# Patient Record
Sex: FEMALE | Race: WHITE | NOT HISPANIC OR LATINO | Employment: OTHER | URBAN - METROPOLITAN AREA
[De-identification: names, ages, dates, MRNs, and addresses within clinical notes are randomized per-mention and may not be internally consistent; named-entity substitution may affect disease eponyms.]

---

## 2017-01-11 ENCOUNTER — OFFICE VISIT (OUTPATIENT)
Dept: FAMILY MEDICINE CLINIC | Facility: CLINIC | Age: 72
End: 2017-01-11

## 2017-01-11 VITALS
TEMPERATURE: 97.6 F | SYSTOLIC BLOOD PRESSURE: 110 MMHG | RESPIRATION RATE: 16 BRPM | WEIGHT: 180.8 LBS | BODY MASS INDEX: 29.06 KG/M2 | DIASTOLIC BLOOD PRESSURE: 80 MMHG | HEIGHT: 66 IN

## 2017-01-11 DIAGNOSIS — E78.5 HYPERLIPIDEMIA, UNSPECIFIED HYPERLIPIDEMIA TYPE: ICD-10-CM

## 2017-01-11 DIAGNOSIS — I10 ESSENTIAL HYPERTENSION: Primary | ICD-10-CM

## 2017-01-11 PROCEDURE — 99213 OFFICE O/P EST LOW 20 MIN: CPT | Performed by: INTERNAL MEDICINE

## 2017-01-11 RX ORDER — ATORVASTATIN CALCIUM 20 MG/1
20 TABLET, FILM COATED ORAL DAILY
Qty: 30 TABLET | Refills: 5 | Status: SHIPPED | OUTPATIENT
Start: 2017-01-11 | End: 2017-05-25 | Stop reason: SDUPTHER

## 2017-01-11 NOTE — MR AVS SNAPSHOT
Judi Sy   1/11/2017 9:00 AM   Office Visit    Dept Phone:  [phone]   Encounter #:  [id number]    Provider:  Ravi Griffin MD   Department:  [facility]                Your Full Care Plan              Today's Medication Changes          These changes are accurate as of: 1/11/17  9:34 AM.  If you have any questions, ask your nurse or doctor.               Medication(s)that have changed:     atorvastatin 20 MG tablet   Commonly known as:  LIPITOR   Take 1 tablet by mouth Daily.   What changed:  See the new instructions.   Changed by:  Ravi Griffin MD            Where to Get Your Medications      These medications were sent to [address] [place] [address] AT [facility] [phone] [facility] [phone] [address], [facility] [address]     Phone:  [phone]     atorvastatin 20 MG tablet                  Your Updated Medication List          This list is accurate as of: 1/11/17  9:34 AM.  Always use your most recent med list.                atorvastatin 20 MG tablet   Commonly known as:  LIPITOR   Take 1 tablet by mouth Daily.       Calcium Citrate-Vitamin D 250-200 MG-UNIT tablet       clopidogrel 75 MG tablet   Commonly known as:  PLAVIX   TAKE ONE TABLET DAILY       MULTI-VITAMIN tablet       pantoprazole 40 MG EC tablet   Commonly known as:  PROTONIX   TAKE ONE TABLET BY MOUTH EVERY MORNING 30 MINUTES BEFORE BREAKFAST               You Were Diagnosed With        Codes Comments    Essential hypertension    -  Primary ICD-10-CM: I10  ICD-9-CM: 401.9     Hyperlipidemia, unspecified hyperlipidemia type     ICD-10-CM: E78.5  ICD-9-CM: 272.4       Instructions     None    Patient Instructions History      Upcoming Appointments     Visit Type Date Time Department    OFFICE VISIT 1/11/2017  9:00 AM [facility] Signup     Our records indicate that you have  "an active [facility] account.    You can view your After Visit Summary by going to [email or web address] and logging in with your [employer] username and password.  If you don't have a [employer] username and password but a parent or guardian has access to your record, the parent or guardian should login with their own [employer] username and password and access your record to view the After Visit Summary.    If you have questions, you can email [employer] [email or web address] or call [phone] to talk to our [employer] staff.  Remember, [employer] is NOT to be used for urgent needs.  For medical emergencies, dial 911.               Other Info from Your Visit           Allergies     Latex        Reason for Visit     Follow-up LABS      Vital Signs     Blood Pressure Temperature Respirations Height Weight Body Mass Index    110/80 97.6 °F (36.4 °C) (Oral) 16 66\" (167.6 cm) 180 lb 12.8 oz (82 kg) 29.18 kg/m2    Smoking Status                   Former Smoker           Problems and Diagnoses Noted     High cholesterol or triglycerides    High blood pressure        "

## 2017-01-11 NOTE — PROGRESS NOTES
Subjective   Judi Sy is a 71 y.o. female. Patient is here today for follow-up on her hypertension and hyperlipidemia.  She generally is feeling okay and has no new acute problems and has had no chest pain, shortness of breath, edema or significant myalgias.    Chief Complaint   Patient presents with   • Follow-up     LABS          Vitals:    01/11/17 0913   BP: 110/80   Resp: 16   Temp: 97.6 °F (36.4 °C)     The following portions of the patient's history were reviewed and updated as appropriate: allergies, current medications, past family history, past medical history, past social history, past surgical history and problem list.    Past Medical History   Diagnosis Date   • Atrial fibrillation    • Hypertension    • Vasovagal syncope       Allergies   Allergen Reactions   • Latex       Social History     Social History   • Marital status:      Spouse name: N/A   • Number of children: N/A   • Years of education: N/A     Occupational History   • Not on file.     Social History Main Topics   • Smoking status: Former Smoker   • Smokeless tobacco: Not on file   • Alcohol use Yes   • Drug use: Defer   • Sexual activity: Defer     Other Topics Concern   • Not on file     Social History Narrative        Current Outpatient Prescriptions:   •  atorvastatin (LIPITOR) 20 MG tablet, Take 1 tablet by mouth Daily., Disp: 30 tablet, Rfl: 5  •  Calcium Citrate-Vitamin D 250-200 MG-UNIT tablet, Take  by mouth., Disp: , Rfl:   •  clopidogrel (PLAVIX) 75 MG tablet, TAKE ONE TABLET DAILY, Disp: 90 tablet, Rfl: 0  •  Multiple Vitamin (MULTI-VITAMIN) tablet, Take  by mouth daily., Disp: , Rfl:   •  pantoprazole (PROTONIX) 40 MG EC tablet, TAKE ONE TABLET BY MOUTH EVERY MORNING 30 MINUTES BEFORE BREAKFAST, Disp: 90 tablet, Rfl: 2     Objective     History of Present Illness     Review of Systems   Constitutional: Negative.    HENT: Negative.    Respiratory: Negative.    Cardiovascular: Negative.    Gastrointestinal: Negative.     Genitourinary: Negative.    Musculoskeletal: Negative.    Skin: Negative.    Neurological: Negative.    Psychiatric/Behavioral: Negative.        Physical Exam   Constitutional: She appears well-developed and well-nourished.   Pleasant, cooperative and in no distress with a blood pressure 130/80   HENT:   Head: Normocephalic and atraumatic.   Eyes: Conjunctivae are normal. Pupils are equal, round, and reactive to light.   Neck: Normal range of motion. Neck supple. No thyromegaly present.   Cardiovascular: Normal rate, regular rhythm and normal heart sounds.    Pulmonary/Chest: Effort normal and breath sounds normal. No respiratory distress. She has no wheezes. She has no rales.   Musculoskeletal: Normal range of motion. She exhibits no edema.   Neurological: She is alert.   Skin: Skin is warm and dry.   Psychiatric: She has a normal mood and affect. Her behavior is normal.   Nursing note and vitals reviewed.      ASSESSMENT  overall the patient seems stable.  Her blood pressures under reasonable control and heart and lungs sound fine and she has no new acute problems.  Her CMP is normal aside from a sugar of 105.  Her lipid panel remains under excellent control and TSH was normal.  Hepatitis C screen was negative.     Problem List Items Addressed This Visit        Cardiovascular and Mediastinum    Hypertension - Primary       Other    Hyperlipidemia    Relevant Medications    atorvastatin (LIPITOR) 20 MG tablet          PLAN  the patient will continue her current medicines but I'm going to decrease her atorvastatin to 20 mg daily.  I would like to recheck her in 4 months with a CMP, NMR lipid panel, hemoglobin A1c    There are no Patient Instructions on file for this visit.  Return in about 4 months (around 5/11/2017) for with labs.

## 2017-03-01 RX ORDER — CLOPIDOGREL BISULFATE 75 MG/1
TABLET ORAL
Qty: 90 TABLET | Refills: 0 | Status: SHIPPED | OUTPATIENT
Start: 2017-03-01 | End: 2017-05-30 | Stop reason: SDUPTHER

## 2017-03-20 ENCOUNTER — OFFICE VISIT (OUTPATIENT)
Dept: CARDIOLOGY | Facility: CLINIC | Age: 72
End: 2017-03-20

## 2017-03-20 VITALS
WEIGHT: 174 LBS | BODY MASS INDEX: 28.08 KG/M2 | DIASTOLIC BLOOD PRESSURE: 90 MMHG | HEART RATE: 92 BPM | SYSTOLIC BLOOD PRESSURE: 130 MMHG

## 2017-03-20 DIAGNOSIS — I67.82 TEMPORARY CEREBRAL VASCULAR DYSFUNCTION: ICD-10-CM

## 2017-03-20 DIAGNOSIS — Q21.12 PATENT FORAMEN OVALE: ICD-10-CM

## 2017-03-20 DIAGNOSIS — E78.2 MIXED HYPERLIPIDEMIA: Primary | ICD-10-CM

## 2017-03-20 DIAGNOSIS — I10 ESSENTIAL HYPERTENSION: ICD-10-CM

## 2017-03-20 PROCEDURE — 93000 ELECTROCARDIOGRAM COMPLETE: CPT | Performed by: INTERNAL MEDICINE

## 2017-03-20 PROCEDURE — 99213 OFFICE O/P EST LOW 20 MIN: CPT | Performed by: INTERNAL MEDICINE

## 2017-03-20 NOTE — PROGRESS NOTES
Procedure   [facility]  Cardiology Progress Note    Patient Identification:  Name:Judi Sy  Age:71 y.o.  Sex: female  :  1945  MRN: [id number]           3/20/2017    Subjective:    Chief Complaint   Patient presents with   • Hypertension       HPI     Ms. Sy is a 71-year-old white female with history of patent foramen ovale an intra-atrial septal aneurysm and underwent PFO closure by Dr. Helms, a followup echocardiogram showed small right-to-left shunt with residual PFO; hypertension, hyperlipidemia, who presents for cardiac followup. she denies any chest pain. No palpitations, dizziness or syncope. No orthopedic or PND. No edema. She has gained a couple pounds because of lack of physical activity because the bad weather. Lipid profile management is followed by Dr. Griffin in the past and shows total cholesterol 134, triglyceride 120, HDL 53, LDL 50 in February.    No angina, had normal cardiolyte stress test in . Clinically she is doing ok and her ECG has been stable. She is on plavix, no unusual dyspnea.    Review of Systems   Constitution: Negative for chills, fever and malaise/fatigue.   HENT: Negative for headaches.    Eyes: Negative for blurred vision and double vision.   Cardiovascular: Negative for chest pain, irregular heartbeat, leg swelling and palpitations.   Respiratory: Negative for shortness of breath and snoring.    Skin: Negative for color change.   Musculoskeletal: Negative for arthritis and joint pain.   Gastrointestinal: Negative for abdominal pain, nausea and vomiting.   Neurological: Negative for dizziness, light-headedness and numbness.   Psychiatric/Behavioral: Negative for depression.       The following portions of the patient's history were reviewed and updated as appropriate: allergies, current medications, past family history, past medical history, past social history,and problem list.    Past Medical History   Diagnosis Date   • Atrial fibrillation    •  Hypertension    • Vasovagal syncope        Past Surgical History   Procedure Laterality Date   • Tonsillectomy     • Tubal abdominal ligation     • Patent foramen ovale closure         Social History     Social History   • Marital status:      Spouse name: N/A   • Number of children: N/A   • Years of education: N/A     Occupational History   • Not on file.     Social History Main Topics   • Smoking status: Former Smoker   • Smokeless tobacco: Not on file   • Alcohol use Yes   • Drug use: Defer   • Sexual activity: Defer     Other Topics Concern   • Not on file     Social History Narrative       Family History   Problem Relation Age of Onset   • Heart disease Father    • Heart disease Maternal Grandmother        Scheduled Meds:    Current Outpatient Prescriptions:   •  atorvastatin (LIPITOR) 20 MG tablet, Take 1 tablet by mouth Daily., Disp: 30 tablet, Rfl: 5  •  Calcium Citrate-Vitamin D 250-200 MG-UNIT tablet, Take  by mouth., Disp: , Rfl:   •  clopidogrel (PLAVIX) 75 MG tablet, TAKE ONE TABLET DAILY, Disp: 90 tablet, Rfl: 0  •  Multiple Vitamin (MULTI-VITAMIN) tablet, Take  by mouth daily., Disp: , Rfl:   •  pantoprazole (PROTONIX) 40 MG EC tablet, TAKE ONE TABLET BY MOUTH EVERY MORNING 30 MINUTES BEFORE BREAKFAST, Disp: 90 tablet, Rfl: 2    Objective:  Visit Vitals   • /90   • Pulse 92   • Wt 174 lb (78.9 kg)   • BMI 28.08 kg/m2        Physical Exam  Physical Exam:    General: No acute distress.    Skin: Warm and dry, no diaphoresis noted   HEENT: No ptosis; external ear and nose normal; oral mucosa moist   Neck: Supple; no carotid bruits; no JVD, Trachea mid line   Heart: S1S2 regular rate and rhythm; no murmurs; no gallop or rub appreciated, apex not displaced   Chest: Respirations regular, unlabored at rest, bilateral breath sounds have good air entry; no  wheezes auscultated.     Abdomen: Soft, non-tender, non-distended, positive bowel sounds  No hepatosplenomegaly   Extremities: Bilateral lower  extremities have no pre-tibial pitting edema; Radials are palpable   Neurological: Alert and oriented x 3; no new motor deficits,           ECG 12 Lead  Date/Time: 3/20/2017 11:46 AM  Performed by: MAXINE MERCADO  Authorized by: MAXINE MERCADO   Comparison: compared with previous ECG   Similar to previous ECG  Rhythm: sinus rhythm  Rate: normal  QRS axis: normal             Comparison to previous ECG:  Similar to previous ecg     Assessment:  Problem List Items Addressed This Visit        Cardiovascular and Mediastinum    Hyperlipidemia - Primary    Hypertension    Patent foramen ovale    Temporary cerebral vascular dysfunction          Plan:    Overall clinically she has been stable with no exertional angina.  She had occasional PVC.  No shortness of breath or dizzy spells or blackouts.  Physical she is active and lost some weight as well clinically she is doing very well and her lipid panel is reasonably good. No angina and her PVC's are stable.    I will see her Prn    03/20/2017  Senthil Mercado MD, FACC

## 2017-05-10 DIAGNOSIS — E78.2 MIXED HYPERLIPIDEMIA: ICD-10-CM

## 2017-05-10 DIAGNOSIS — R73.9 BLOOD GLUCOSE ELEVATED: Primary | ICD-10-CM

## 2017-05-12 LAB
ALBUMIN SERPL-MCNC: 4.3 G/DL (ref 3.5–5.2)
ALBUMIN/GLOB SERPL: 2 G/DL
ALP SERPL-CCNC: 88 U/L (ref 39–117)
ALT SERPL-CCNC: 16 U/L (ref 1–33)
AST SERPL-CCNC: 17 U/L (ref 1–32)
BILIRUB SERPL-MCNC: 0.4 MG/DL (ref 0.1–1.2)
BUN SERPL-MCNC: 15 MG/DL (ref 8–23)
BUN/CREAT SERPL: 21.4 (ref 7–25)
CALCIUM SERPL-MCNC: 9.4 MG/DL (ref 8.6–10.5)
CHLORIDE SERPL-SCNC: 103 MMOL/L (ref 98–107)
CHOLEST SERPL-MCNC: 165 MG/DL (ref 100–199)
CO2 SERPL-SCNC: 27.3 MMOL/L (ref 22–29)
CREAT SERPL-MCNC: 0.7 MG/DL (ref 0.57–1)
GLOBULIN SER CALC-MCNC: 2.2 GM/DL
GLUCOSE SERPL-MCNC: 103 MG/DL (ref 65–99)
HBA1C MFR BLD: 5.39 % (ref 4.8–5.6)
HDL SERPL-SCNC: 37.8 UMOL/L
HDLC SERPL-MCNC: 62 MG/DL
LDL SERPL QN: 21.2 NM
LDL SERPL-SCNC: 1231 NMOL/L
LDL SMALL SERPL-SCNC: 534 NMOL/L
LDLC SERPL CALC-MCNC: 87 MG/DL (ref 0–99)
POTASSIUM SERPL-SCNC: 4.1 MMOL/L (ref 3.5–5.2)
PROT SERPL-MCNC: 6.5 G/DL (ref 6–8.5)
SODIUM SERPL-SCNC: 143 MMOL/L (ref 136–145)
TRIGL SERPL-MCNC: 78 MG/DL (ref 0–149)

## 2017-05-25 ENCOUNTER — OFFICE VISIT (OUTPATIENT)
Dept: FAMILY MEDICINE CLINIC | Facility: CLINIC | Age: 72
End: 2017-05-25

## 2017-05-25 VITALS
HEIGHT: 66 IN | TEMPERATURE: 98 F | SYSTOLIC BLOOD PRESSURE: 128 MMHG | DIASTOLIC BLOOD PRESSURE: 78 MMHG | OXYGEN SATURATION: 96 % | BODY MASS INDEX: 27.93 KG/M2 | HEART RATE: 85 BPM | WEIGHT: 173.8 LBS

## 2017-05-25 DIAGNOSIS — Q21.12 PATENT FORAMEN OVALE: Primary | ICD-10-CM

## 2017-05-25 DIAGNOSIS — R03.0 PRE-HYPERTENSION: ICD-10-CM

## 2017-05-25 DIAGNOSIS — K21.9 GASTROESOPHAGEAL REFLUX DISEASE WITHOUT ESOPHAGITIS: ICD-10-CM

## 2017-05-25 DIAGNOSIS — E78.2 MIXED HYPERLIPIDEMIA: ICD-10-CM

## 2017-05-25 PROCEDURE — 99214 OFFICE O/P EST MOD 30 MIN: CPT | Performed by: INTERNAL MEDICINE

## 2017-05-25 RX ORDER — ATORVASTATIN CALCIUM 20 MG/1
20 TABLET, FILM COATED ORAL DAILY
Qty: 90 TABLET | Refills: 3 | Status: SHIPPED | OUTPATIENT
Start: 2017-05-25 | End: 2018-05-12 | Stop reason: SDUPTHER

## 2017-05-30 RX ORDER — CLOPIDOGREL BISULFATE 75 MG/1
TABLET ORAL
Qty: 90 TABLET | Refills: 0 | Status: SHIPPED | OUTPATIENT
Start: 2017-05-30 | End: 2017-08-31 | Stop reason: SDUPTHER

## 2017-08-31 RX ORDER — PANTOPRAZOLE SODIUM 40 MG/1
TABLET, DELAYED RELEASE ORAL
Qty: 90 TABLET | Refills: 1 | Status: SHIPPED | OUTPATIENT
Start: 2017-08-31 | End: 2017-09-21

## 2017-08-31 RX ORDER — CLOPIDOGREL BISULFATE 75 MG/1
TABLET ORAL
Qty: 90 TABLET | Refills: 0 | Status: SHIPPED | OUTPATIENT
Start: 2017-08-31 | End: 2017-11-26 | Stop reason: SDUPTHER

## 2017-09-06 DIAGNOSIS — R73.9 BLOOD GLUCOSE ELEVATED: ICD-10-CM

## 2017-09-06 DIAGNOSIS — E78.2 MIXED HYPERLIPIDEMIA: ICD-10-CM

## 2017-09-14 LAB
ALBUMIN SERPL-MCNC: 4.3 G/DL (ref 3.5–5.2)
ALBUMIN/GLOB SERPL: 2.3 G/DL
ALP SERPL-CCNC: 86 U/L (ref 39–117)
ALT SERPL-CCNC: 16 U/L (ref 1–33)
AST SERPL-CCNC: 16 U/L (ref 1–32)
BILIRUB SERPL-MCNC: 0.4 MG/DL (ref 0.1–1.2)
BUN SERPL-MCNC: 12 MG/DL (ref 8–23)
BUN/CREAT SERPL: 16.7 (ref 7–25)
CALCIUM SERPL-MCNC: 9.3 MG/DL (ref 8.6–10.5)
CHLORIDE SERPL-SCNC: 104 MMOL/L (ref 98–107)
CHOLEST SERPL-MCNC: 157 MG/DL (ref 100–199)
CO2 SERPL-SCNC: 28.3 MMOL/L (ref 22–29)
CREAT SERPL-MCNC: 0.72 MG/DL (ref 0.57–1)
GLOBULIN SER CALC-MCNC: 1.9 GM/DL
GLUCOSE SERPL-MCNC: 88 MG/DL (ref 65–99)
HBA1C MFR BLD: 5.5 % (ref 4.8–5.6)
HDL SERPL-SCNC: 36.5 UMOL/L
HDLC SERPL-MCNC: 62 MG/DL
LDL SERPL QN: 21.2 NM
LDL SERPL-SCNC: 1013 NMOL/L
LDL SMALL SERPL-SCNC: 277 NMOL/L
LDLC SERPL CALC-MCNC: 75 MG/DL (ref 0–99)
POTASSIUM SERPL-SCNC: 4.2 MMOL/L (ref 3.5–5.2)
PROT SERPL-MCNC: 6.2 G/DL (ref 6–8.5)
SODIUM SERPL-SCNC: 144 MMOL/L (ref 136–145)
TRIGL SERPL-MCNC: 99 MG/DL (ref 0–149)

## 2017-09-21 ENCOUNTER — OFFICE VISIT (OUTPATIENT)
Dept: FAMILY MEDICINE CLINIC | Facility: CLINIC | Age: 72
End: 2017-09-21

## 2017-09-21 VITALS
OXYGEN SATURATION: 96 % | DIASTOLIC BLOOD PRESSURE: 70 MMHG | HEART RATE: 82 BPM | TEMPERATURE: 98.2 F | SYSTOLIC BLOOD PRESSURE: 112 MMHG | WEIGHT: 171 LBS | HEIGHT: 66 IN | BODY MASS INDEX: 27.48 KG/M2

## 2017-09-21 DIAGNOSIS — I67.82 TEMPORARY CEREBRAL VASCULAR DYSFUNCTION: Primary | ICD-10-CM

## 2017-09-21 DIAGNOSIS — E78.2 MIXED HYPERLIPIDEMIA: ICD-10-CM

## 2017-09-21 DIAGNOSIS — Z12.31 VISIT FOR SCREENING MAMMOGRAM: ICD-10-CM

## 2017-09-21 DIAGNOSIS — M81.0 OSTEOPOROSIS: ICD-10-CM

## 2017-09-21 DIAGNOSIS — Z78.0 POST-MENOPAUSAL: ICD-10-CM

## 2017-09-21 DIAGNOSIS — K21.9 GASTROESOPHAGEAL REFLUX DISEASE WITHOUT ESOPHAGITIS: ICD-10-CM

## 2017-09-21 DIAGNOSIS — Q21.12 PATENT FORAMEN OVALE: ICD-10-CM

## 2017-09-21 DIAGNOSIS — R03.0 PRE-HYPERTENSION: ICD-10-CM

## 2017-09-21 PROCEDURE — 99214 OFFICE O/P EST MOD 30 MIN: CPT | Performed by: INTERNAL MEDICINE

## 2017-09-21 NOTE — PROGRESS NOTES
Subjective   Judi Sy is a 72 y.o. female. Patient is here today for follow-up on her history of TIA, pre-hypertension, a patent foramen ovale, hyperlipidemia, GERD and history of osteoporosis.  She's not had mammogram or bone density in at least 2 years.  Otherwise she's been feeling well, is exercising and her weight is down a couple of pounds.  She's had no chest pain, shortness of breath, edema or significant myalgias and no neurologic symptoms.    Chief Complaint   Patient presents with   • Hyperlipidemia     GERD- FOLLOW UP LABS          Vitals:    09/21/17 0943   BP: 112/70   Pulse: 82   Temp: 98.2 °F (36.8 °C)   SpO2: 96%     The following portions of the patient's history were reviewed and updated as appropriate: allergies, current medications, past family history, past medical history, past social history, past surgical history and problem list.    Past Medical History:   Diagnosis Date   • Atrial fibrillation    • Hypertension    • Vasovagal syncope       Allergies   Allergen Reactions   • Latex       Social History     Social History   • Marital status:      Spouse name: N/A   • Number of children: N/A   • Years of education: N/A     Occupational History   • Not on file.     Social History Main Topics   • Smoking status: Former Smoker   • Smokeless tobacco: Not on file   • Alcohol use Yes   • Drug use: Defer   • Sexual activity: Defer     Other Topics Concern   • Not on file     Social History Narrative   • No narrative on file        Current Outpatient Prescriptions:   •  atorvastatin (LIPITOR) 20 MG tablet, Take 1 tablet by mouth Daily., Disp: 90 tablet, Rfl: 3  •  Calcium Citrate-Vitamin D 250-200 MG-UNIT tablet, Take  by mouth., Disp: , Rfl:   •  clopidogrel (PLAVIX) 75 MG tablet, TAKE ONE TABLET BY MOUTH DAILY, Disp: 90 tablet, Rfl: 0  •  Multiple Vitamin (MULTI-VITAMIN) tablet, Take  by mouth daily., Disp: , Rfl:      Objective     History of Present Illness     Review of Systems    Constitutional: Negative.    HENT: Negative.    Eyes: Negative.    Respiratory: Negative.    Cardiovascular: Negative.    Gastrointestinal: Negative.    Endocrine: Negative.    Genitourinary: Negative.    Musculoskeletal: Negative.    Skin: Negative.    Neurological: Negative.    Psychiatric/Behavioral: Negative.        Physical Exam   Constitutional: She is oriented to person, place, and time. She appears well-developed and well-nourished.   Pleasant, cooperative and in no distress with a blood pressure of 120/80   HENT:   Head: Normocephalic and atraumatic.   Eyes: Conjunctivae are normal. Pupils are equal, round, and reactive to light. No scleral icterus.   Neck: Normal range of motion. Neck supple. No thyromegaly present.   Cardiovascular: Normal rate, regular rhythm and normal heart sounds.    Pulmonary/Chest: Effort normal and breath sounds normal. No respiratory distress. She has no wheezes. She has no rales.   Musculoskeletal: Normal range of motion. She exhibits no edema.   Neurological: She is alert and oriented to person, place, and time.   Skin: Skin is warm and dry.   Psychiatric: She has a normal mood and affect. Her behavior is normal.   Nursing note and vitals reviewed.      ASSESSMENT  CMP is completely normal.  Hemoglobin A1c is also normal at 5.5.  Lipid panel is better with a total cholesterol of 157, HDL of 62, LDL 75 and LDL particle #1013.  Patient's blood pressures fine and heart and lungs sound fine.  She's had no cardiac or neurologic symptoms.     Problem List Items Addressed This Visit        Cardiovascular and Mediastinum    Hyperlipidemia    Pre-hypertension    Patent foramen ovale    Temporary cerebral vascular dysfunction - Primary       Digestive    Gastroesophageal reflux disease without esophagitis       Musculoskeletal and Integument    Osteoporosis    Relevant Orders    DEXA Bone Density Axial      Other Visit Diagnoses     Visit for screening mammogram        Relevant Orders     Mammo Screening Bilateral With CAD    Post-menopausal        Relevant Orders    DEXA Bone Density Axial          PLAN  The patient will continue current medicines.  I've ordered a mammogram and bone density test for the patient.  She will schedule a gynecology appointment.  I plan on rechecking her in 4 months with a CBC, CMP, NMR lipid panel    There are no Patient Instructions on file for this visit.  Return in about 4 months (around 1/21/2018) for with labs.

## 2017-09-28 ENCOUNTER — HOSPITAL ENCOUNTER (OUTPATIENT)
Dept: MAMMOGRAPHY | Facility: HOSPITAL | Age: 72
Discharge: HOME | End: 2017-09-28

## 2017-09-28 ENCOUNTER — HOSPITAL ENCOUNTER (OUTPATIENT)
Dept: BONE DENSITY | Facility: HOSPITAL | Age: 72
Discharge: HOME | End: 2017-09-28
Admitting: INTERNAL MEDICINE

## 2017-09-28 DIAGNOSIS — M81.0 OSTEOPOROSIS: ICD-10-CM

## 2017-09-28 DIAGNOSIS — Z78.0 POST-MENOPAUSAL: ICD-10-CM

## 2017-09-28 DIAGNOSIS — Z12.31 VISIT FOR SCREENING MAMMOGRAM: ICD-10-CM

## 2017-09-28 PROCEDURE — 77080 DXA BONE DENSITY AXIAL: CPT

## 2017-09-28 PROCEDURE — G0202 SCR MAMMO BI INCL CAD: HCPCS

## 2017-09-29 ENCOUNTER — TELEPHONE (OUTPATIENT)
Dept: FAMILY MEDICINE CLINIC | Facility: CLINIC | Age: 72
End: 2017-09-29

## 2017-09-29 NOTE — TELEPHONE ENCOUNTER
S/W PT AND ADVISED NEEDS AN APPT. [facility] APPT FOR PATIENT.     ----- Message from Ravi Griffin MD sent at 9/29/2017  8:01 AM EDT -----  Needs apt        ----- Message -----     From: Interface, Rad Results [place] In     Sent: 9/28/2017   5:07 PM       To: Ravi Griffin MD

## 2017-10-04 ENCOUNTER — OFFICE VISIT (OUTPATIENT)
Dept: FAMILY MEDICINE CLINIC | Facility: CLINIC | Age: 72
End: 2017-10-04

## 2017-10-04 VITALS
HEIGHT: 66 IN | BODY MASS INDEX: 28.12 KG/M2 | WEIGHT: 175 LBS | HEART RATE: 80 BPM | OXYGEN SATURATION: 95 % | TEMPERATURE: 98.2 F | SYSTOLIC BLOOD PRESSURE: 120 MMHG | DIASTOLIC BLOOD PRESSURE: 80 MMHG

## 2017-10-04 DIAGNOSIS — M81.0 AGE-RELATED OSTEOPOROSIS WITHOUT CURRENT PATHOLOGICAL FRACTURE: Primary | ICD-10-CM

## 2017-10-04 PROCEDURE — 99213 OFFICE O/P EST LOW 20 MIN: CPT | Performed by: INTERNAL MEDICINE

## 2017-10-04 NOTE — PROGRESS NOTES
Subjective   Judi Sy is a 72 y.o. female. Patient is here today for follow-up on her bone density test.  She's been feeling okay and she has been taking some calcium but has not been on any vitamin D.  She's had no pathologic fractures.  In the past she has had osteopenia and also been in the osteoporotic range    Chief Complaint   Patient presents with   • Osteoporosis     FOLLOW UP ON DEXA SCAN          Vitals:    10/04/17 1456   BP: 120/80   Pulse: 80   Temp: 98.2 °F (36.8 °C)   SpO2: 95%     The following portions of the patient's history were reviewed and updated as appropriate: allergies, current medications, past family history, past medical history, past social history, past surgical history and problem list.    Past Medical History:   Diagnosis Date   • Atrial fibrillation    • Hypertension    • Vasovagal syncope       Allergies   Allergen Reactions   • Latex       Social History     Social History   • Marital status:      Spouse name: N/A   • Number of children: N/A   • Years of education: N/A     Occupational History   • Not on file.     Social History Main Topics   • Smoking status: Former Smoker   • Smokeless tobacco: Never Used   • Alcohol use Yes   • Drug use: Defer   • Sexual activity: Defer     Other Topics Concern   • Not on file     Social History Narrative        Current Outpatient Prescriptions:   •  atorvastatin (LIPITOR) 20 MG tablet, Take 1 tablet by mouth Daily., Disp: 90 tablet, Rfl: 3  •  Calcium Citrate-Vitamin D 250-200 MG-UNIT tablet, Take  by mouth., Disp: , Rfl:   •  clopidogrel (PLAVIX) 75 MG tablet, TAKE ONE TABLET BY MOUTH DAILY, Disp: 90 tablet, Rfl: 0  •  Multiple Vitamin (MULTI-VITAMIN) tablet, Take  by mouth daily., Disp: , Rfl:      Objective     History of Present Illness     Review of Systems   Constitutional: Negative.    HENT: Negative.    Eyes: Negative.    Respiratory: Negative.    Cardiovascular: Negative.    Gastrointestinal: Negative.    Endocrine:  Negative.    Genitourinary: Negative.    Musculoskeletal: Negative.    Skin: Negative.    Neurological: Negative.    Psychiatric/Behavioral: Negative.        Physical Exam   Constitutional: She is oriented to person, place, and time. She appears well-developed and well-nourished.   Pleasant, cooperative and in no distress   HENT:   Head: Normocephalic and atraumatic.   Eyes: EOM are normal. Pupils are equal, round, and reactive to light. No scleral icterus.   Neck: Normal range of motion. Neck supple.   Cardiovascular: Normal rate, regular rhythm and normal heart sounds.    Pulmonary/Chest: Effort normal and breath sounds normal. No respiratory distress. She has no wheezes. She has no rales.   Musculoskeletal: Normal range of motion. She exhibits no edema.   Neurological: She is alert and oriented to person, place, and time.   Skin: Skin is warm and dry.   Psychiatric: She has a normal mood and affect. Her behavior is normal.   Nursing note and vitals reviewed.      ASSESSMENT  mammograms were okay on report.  Patient's bone density showed osteopenia in the lumbar spine, a T score in the left hip of -2.4, improved and in the right hip -2.5, unchanged  The patient does not want to do any specific treatment for osteoporosis at this time.     Problem List Items Addressed This Visit        Musculoskeletal and Integument    Osteoporosis - Primary          PLAN  The patient will continue with walking and I encouraged some strength training.  She will start a calcium and vitamin D supplements such as Citracal her Caltrate on a regular basis.  We should repeat her bone density test in one year    There are no Patient Instructions on file for this visit.  No Follow-up on file.

## 2017-10-16 ENCOUNTER — FLU SHOT (OUTPATIENT)
Dept: FAMILY MEDICINE CLINIC | Facility: CLINIC | Age: 72
End: 2017-10-16

## 2017-10-16 PROCEDURE — G0008 ADMIN INFLUENZA VIRUS VAC: HCPCS | Performed by: INTERNAL MEDICINE

## 2017-10-19 ENCOUNTER — APPOINTMENT (OUTPATIENT)
Dept: GENERAL RADIOLOGY | Facility: HOSPITAL | Age: 72
End: 2017-10-19

## 2017-10-19 PROCEDURE — 73630 X-RAY EXAM OF FOOT: CPT | Performed by: GENERAL PRACTICE

## 2017-10-19 PROCEDURE — 73610 X-RAY EXAM OF ANKLE: CPT | Performed by: GENERAL PRACTICE

## 2017-10-20 ENCOUNTER — OFFICE VISIT (OUTPATIENT)
Dept: ORTHOPEDIC SURGERY | Facility: CLINIC | Age: 72
End: 2017-10-20

## 2017-10-20 VITALS — WEIGHT: 170 LBS | BODY MASS INDEX: 27.32 KG/M2 | HEIGHT: 66 IN | TEMPERATURE: 97.6 F

## 2017-10-20 DIAGNOSIS — S92.152A CLOSED DISPLACED AVULSION FRACTURE OF LEFT TALUS, INITIAL ENCOUNTER: ICD-10-CM

## 2017-10-20 DIAGNOSIS — S93.402A SPRAIN OF LEFT ANKLE, UNSPECIFIED LIGAMENT, INITIAL ENCOUNTER: Primary | ICD-10-CM

## 2017-10-20 PROCEDURE — 99204 OFFICE O/P NEW MOD 45 MIN: CPT | Performed by: ORTHOPAEDIC SURGERY

## 2017-10-20 PROCEDURE — 28430 CLTX TALUS FRACTURE W/O MNPJ: CPT | Performed by: ORTHOPAEDIC SURGERY

## 2017-10-20 NOTE — PROGRESS NOTES
New Patient Complaint      Patient: Judi Sy  YOB: 1945 72 y.o. female  Medical Record Number: [id number]    Chief Complaints: I hurt my ankle    History of Present Illness:   Patient sustained a fall on 10/5/17 while hiking in the at around that.  She's not sure she felt or heard a pop had quite a bit of pain and swelling at the time of injury and no prior injury to the ankle.  Symptoms persisted and she was seen at urgent care recently and placed into a boot which was quite uncomfortable for her since then she's been using an ankle stirrup.  She describes moderate intermittent stabbing pain with swelling over the anterolateral aspect of the left ankle and dorsum of the hindfoot with associated swelling is worse with walking and improved some with use of her brace.       HPI    Allergies:   Allergies   Allergen Reactions   • Latex        Medications:   Current Outpatient Prescriptions on File Prior to Visit   Medication Sig   • atorvastatin (LIPITOR) 20 MG tablet Take 1 tablet by mouth Daily.   • Calcium Citrate-Vitamin D 250-200 MG-UNIT tablet Take  by mouth.   • clopidogrel (PLAVIX) 75 MG tablet TAKE ONE TABLET BY MOUTH DAILY   • Multiple Vitamin (MULTI-VITAMIN) tablet Take  by mouth daily.     No current facility-administered medications on file prior to visit.        Past Medical History:   Diagnosis Date   • Atrial fibrillation    • Hypertension    • Vasovagal syncope      Past Surgical History:   Procedure Laterality Date   • BREAST BIOPSY Left     15 years ago; benign   • PATENT FORAMEN OVALE CLOSURE     • TONSILLECTOMY     • TUBAL ABDOMINAL LIGATION       Social History     Occupational History   • Not on file.     Social History Main Topics   • Smoking status: Former Smoker   • Smokeless tobacco: Never Used   • Alcohol use Yes   • Drug use: Defer   • Sexual activity: Defer      Social History     Social History Narrative     Family History   Problem Relation Age of Onset   • Heart  "disease Father    • Heart disease Maternal Grandmother    • Breast cancer Mother      50's       Review of Systems: 14 point review of systems performed, positive pertinent findings identified in HPI. All remaining systems negative     Review of Systems      Physical Exam:   Vitals:    10/20/17 1429   Temp: 97.6 °F (36.4 °C)   Weight: 170 lb (77.1 kg)   Height: 66\" (167.6 cm)     Physical Exam   Constitutional: pleasant, well developed   Eyes: sclera non icteric  Hearing : adequate for exam  Cardiovascular: palpable pulses in left foot, left calf/ thigh NT without sign of DVT  Respiratoy: breathing unlabored   Neurological: grossly sensate to LT throughout left LE  Psychiatric: oriented with normal mood and affect.   Lymphatic: No palpable popliteal lymphadenopathy left LE  Skin: intact throughout left leg/foot  Musculoskeletal: Left ankle shows no tenderness medially there is mild discomfort over the anterolateral ligamentous structures but grossly stable anterior drawer and no focal pain along the peroneal tendons to palpation or with resisted eversion.  There is mild discomfort over the dorsum of the hindfoot but no palpable defects of the anterior tib tendon she is able to actively dorsiflex the ankle.  She's nontender over the syndesmosis and has no focal pain with external rotation testing or proximal fibular compression    There are some palpable osteophytes and limited motion of the first MTP joint but no focal pain with range of motion  Physical Exam  Ortho Exam    Radiology: 3 views of the left foot and 3 views left ankle were reviewed on the [employer] system from 10/19/17.  Talus appears well seated within the mortise no widening of the syndesmosis.  The does appear to a small avulsion over the dorsum of the talar neck.  There is moderate arthritic change of the first MTP joint    Assessment/Plan: 1.  Left ankle anterolateral ligamentous sprain without evidence of peroneal tendon injury  2.  Left dorsal " talar neck avulsion fracture  3.  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    I discussed these findings with her in detail and I do not anticipate anything from a surgical standpoint at this time.  As she was uncomfortable in her boot she was fitted with an ASO brace which was more comfortable for her today and begin physical therapy.  She was to go to place in [place] I gave her prescription for this.    I will see her back in 4 weeks x-rays of her left ankle.

## 2017-10-24 ENCOUNTER — TREATMENT (OUTPATIENT)
Dept: PHYSICAL THERAPY | Facility: CLINIC | Age: 72
End: 2017-10-24

## 2017-10-24 DIAGNOSIS — S93.402D SPRAIN OF LEFT ANKLE, UNSPECIFIED LIGAMENT, SUBSEQUENT ENCOUNTER: Primary | ICD-10-CM

## 2017-10-24 DIAGNOSIS — S92.152D CLOSED DISPLACED AVULSION FRACTURE OF LEFT TALUS WITH ROUTINE HEALING, SUBSEQUENT ENCOUNTER: ICD-10-CM

## 2017-10-24 DIAGNOSIS — S82.892G CLOSED AVULSION FRACTURE OF LEFT ANKLE WITH DELAYED HEALING, SUBSEQUENT ENCOUNTER: ICD-10-CM

## 2017-10-24 PROCEDURE — G8978 MOBILITY CURRENT STATUS: HCPCS | Performed by: PHYSICAL THERAPIST

## 2017-10-24 PROCEDURE — 97140 MANUAL THERAPY 1/> REGIONS: CPT | Performed by: PHYSICAL THERAPIST

## 2017-10-24 PROCEDURE — 97530 THERAPEUTIC ACTIVITIES: CPT | Performed by: PHYSICAL THERAPIST

## 2017-10-24 PROCEDURE — G8979 MOBILITY GOAL STATUS: HCPCS | Performed by: PHYSICAL THERAPIST

## 2017-10-24 PROCEDURE — 97161 PT EVAL LOW COMPLEX 20 MIN: CPT | Performed by: PHYSICAL THERAPIST

## 2017-10-24 PROCEDURE — 97110 THERAPEUTIC EXERCISES: CPT | Performed by: PHYSICAL THERAPIST

## 2017-10-24 NOTE — PROGRESS NOTES
[facility]  Physical Therapy  Orthopedic / Sports / Industrial Physical Therapy  Physical Therapy Initial Evaluation and Plan of Care    Patient Name: Judi Sy          :  1945  Referring Physician: Kade Jacob MD  Diagnosis: Sprain of left ankle, unspecified ligament, subsequent encounter [Z30.204P] ;  Avulsion Fx (L) ankle 10/08/2017  Date of Evaluation: 10/24/2017  ______________________________________________________________________  TIME IN 1045 TIME OUT 1220    Subjective Evaluation    History of Present Illness  Date of onset: 10/8/2017  Mechanism of injury: [place] and slipped on some rocks and leaves and twisted ankle - Pain and swelling -     Subjective comment: Normally walks 4-5 miles per day Pain  Current pain ratin  At worst pain ratin  Location: Anterior, ant/lat ankle (L) -   Quality: Ache, stabbing, throbbing -   Alleviating factors: Rest, brace, heat.  Exacerbated by: Inversion, PF, in/out bathtub; Walking; WB-ing LLE; Stairs ( pain more Down > UP)  Progression: improved    Social Support  Lives in: multiple-level home    Diagnostic Tests  X-ray: abnormal (There is a triangular ossific density adjacent to the dorsal margin of)    Treatments  Current treatment: immobilization and medication  Patient Goals  Patient/family treatment goals: Pain alleviation; Normal mobility, gait, strength, function, and return to normal hobby activiites including hiking -          ___________________________________________________  Objective       Observations     Additional Observation Details  Swelling anterior and lateral ankle   (R) foot pronated more with calcaneal valgus and (L) foot more supinated -  Antalgic gait with decreased WB-ing LLE and decreased push-off (L) - Slight toing in (B) with ambulation -     Palpation     Additional Palpation Details  Very tender anterior ankle, talus, ant tib-fib lig and up distal tib-fib region,   ant/lat ligament region (L) -     Active Range of Motion   Left Ankle/Foot   Dorsiflexion (ke): 5 degrees with pain  Plantar flexion: 35 degrees with pain  Inversion: 15 degrees with pain  Eversion: 10 degrees     Right Ankle/Foot   Normal active range of motion    Strength/Myotome Testing     Additional Strength Details  (L) Pain with resisted EVersion > INversion and DF > PF    Tests     Additional Tests Details  (L) Pain with *Anterior Drawer;                        *INVersion stress                       *Talar tilt    Swelling   Left Ankle/Foot   Figure 8 girth: 2.5 CM > (R)     MODALITIES:          ULTRASOUND x 10 min:  1.0; 1.8w/cm2 to anterior, ant/lat ankle (L)  MANUAL THERAPY: x 15 min         Edema mobilization to (L) foot, ankle, Lower leg         STM / DTM to foot, ankle, lower leg (L)  THERAPEUTIC EXERCISE: x 10 min         AROM Ankle DF/PF, INV/EV, CW/CCW x 20 ea (L)   FUNCTIONAL ACTIVITIES: X 25 min  · TAPING / BRACING: Taping of ankle, arch and foot with basket weave with leukotape for proximal tib-fib stabilization   · Jt protection, ADL modification; Posture and     ___________________________________________________  Assessment & Plan     Assessment  Assessment details:   (L) Ankle Sprain with avulsion Fx;     PROBLEMS: Pain; Limited mobility, gait, strength, function, and unable to participate in hobby activities -   PROGNOSIS: Good     GOALS:   SHORT TERM GOALS: 2 weeks:  1) HEP Initiated; 2) Pain decreased 50%:   3) AROM  increased:  4) Improved gait / functional ability grossly;     LONG TERM GOALS: 4 weeks (or at time of DISCHARGE): 1) (I) HEP; 2) AROM WFL and pain free; 3) Strength / gait / mobility to be able to perform all ADL's and hobby activities w/o restrictions;       Plan  Planned therapy interventions: flexibility, gait training, home exercise program, joint mobilization, manual therapy, neuromuscular re-education, orthotic fitting/training, soft tissue mobilization,  strengthening, stretching and therapeutic activities (Modalities prn; Taping / Bracing prn - )  Frequency: 3x week  Duration in weeks: 4  Treatment plan discussed with: patient    ___________________________________________________  Manual Therapy:    15     mins  52641;   Therapeutic Exercise:    10     mins  57400;     Neuromuscular Lea:    NA    mins  26203;   Therapeutic Activity:     25     mins  53535;     Ultrasound:     10     mins  02581;    Electrical Stimulation:   NA     mins  68294 ( );  Dry Needling     NA     mins self-pay   Gait Training:     NA     mins  36918;  EVAL TIME:   25 mins    Timed Treatment:   60   mins                Total Treatment:     95   mins    PT SIGNATURE:   Salazar Conteh, PT  DATE TREATMENT INITIATED: 10/24/2017  ___________________________________________________  Initial Certification  Certification Period: 1/22/2018  I certify that the therapy services are furnished while this patient is under my care.  The services outlined above are required by this patient, and will be reviewed every 90 days.     PHYSICIAN: ________________________________  DATE: ______  Kade Jacob MD        Please sign and return via fax to [phone].. Thank you, [facility].  ______________________________________________________________________  [address] [place] [address]  Phone: [phone] Fax: [phone]

## 2017-10-26 ENCOUNTER — TREATMENT (OUTPATIENT)
Dept: PHYSICAL THERAPY | Facility: CLINIC | Age: 72
End: 2017-10-26

## 2017-10-26 DIAGNOSIS — S93.402D SPRAIN OF LEFT ANKLE, UNSPECIFIED LIGAMENT, SUBSEQUENT ENCOUNTER: Primary | ICD-10-CM

## 2017-10-26 DIAGNOSIS — S92.152D CLOSED DISPLACED AVULSION FRACTURE OF LEFT TALUS WITH ROUTINE HEALING, SUBSEQUENT ENCOUNTER: ICD-10-CM

## 2017-10-26 PROCEDURE — 97530 THERAPEUTIC ACTIVITIES: CPT | Performed by: PHYSICAL THERAPIST

## 2017-10-26 PROCEDURE — 97110 THERAPEUTIC EXERCISES: CPT | Performed by: PHYSICAL THERAPIST

## 2017-10-26 NOTE — PROGRESS NOTES
Physical Therapy Daily Progress Note    Patient Name: Judi Sy         :  1945  Referring Physician: Kade Jacob MD    Time In 0900  Time Out 1030    Subjective   Judi Sy reports: feeling much better after initial session with  pain and improved mobility and function - noting that the taping very very helpful and she was able to walk over 2 miles with minimal discomfort - today notes soreness anterior and ant/lat ankle (L)    Objective   Pt demonstrating improved gait w/ ankle taped with increased WB-ing LLE -   Grossly decreased swelling (L) ankle / foot -   Tender anterior ankle, ant tib-fib lig region; Tender ant/lat ankle ligament region; (L)  Improved AROM grossly (L) ankle -     See Exercise, Manual, and Modality Logs for complete treatment.     Functional / Therapeutic Activities:  30 min  · TAPING / BRACIN) K-Tape to prevent over PF and stabilize anterior ankle; 2) (L) ankle taping with arch tape; 3) Basket weave with leuko tape to stabilize distal tib-fib joint (L)  · SEE EXERCISE FLOW SHEET -   · Jt protection, ADL modification; Posture and      Assessment/Plan  (L) ankle sprain with avulsion fx and High ankle component -   Taping very effective in decreasing pain and allowing improved gait / function -   Improving w/ decreased pain and improved mobility, gait, and function -     Progress strengthening /stabilization /functional activity     _________________________________________________  Manual Therapy:    15     mins  77191;  Therapeutic Exercise:    25     mins  52825;     Neuromuscular Lea:   3    mins  13182;    Therapeutic Activity:     30     mins  26334;     Gait Training:      NA     mins  05827;     Ultrasound:     10     mins  70740;    Electrical Stimulation:    NA     mins  25875 ( );  Dry Needling     NA     mins self-pay    Timed Treatment:   83   mins                  Total Treatment:     90   mins    Salazar Conteh  PT  Physical Therapist

## 2017-10-30 ENCOUNTER — TREATMENT (OUTPATIENT)
Dept: PHYSICAL THERAPY | Facility: CLINIC | Age: 72
End: 2017-10-30

## 2017-10-30 DIAGNOSIS — S92.152D CLOSED DISPLACED AVULSION FRACTURE OF LEFT TALUS WITH ROUTINE HEALING, SUBSEQUENT ENCOUNTER: ICD-10-CM

## 2017-10-30 DIAGNOSIS — S93.402D SPRAIN OF LEFT ANKLE, UNSPECIFIED LIGAMENT, SUBSEQUENT ENCOUNTER: Primary | ICD-10-CM

## 2017-10-30 PROCEDURE — 97530 THERAPEUTIC ACTIVITIES: CPT | Performed by: PHYSICAL THERAPIST

## 2017-10-30 PROCEDURE — 97110 THERAPEUTIC EXERCISES: CPT | Performed by: PHYSICAL THERAPIST

## 2017-10-30 PROCEDURE — 97112 NEUROMUSCULAR REEDUCATION: CPT | Performed by: PHYSICAL THERAPIST

## 2017-10-31 NOTE — PROGRESS NOTES
Physical Therapy Daily Progress Note     Patient Name: Judi Sy         :  1945  Referring Physician: aKde Jacob MD     Time In 1030                                   Time Out 1150     Subjective   Judi Sy reports: continued improvement - feeling much better with  pain and improved mobility and function - noting that the taping very very helpful and she was able to walk much farther with minimal discomfort - today notes minimal  soreness anterior and ant/lat ankle (L)     Objective   Pt demonstrating improved gait w/ ankle taped with increased WB-ing LLE -   Grossly decreased swelling (L) ankle / foot -   Tender anterior ankle, ant tib-fib lig region; Tender ant/lat ankle ligament region; (L)  Improved AROM grossly (L) ankle -      See Exercise, Manual, and Modality Logs for complete treatment.      Functional / Therapeutic Activities:  30 min  · TAPING / BRACING: HELD TODAY - 1) K-Tape to prevent over PF and stabilize anterior ankle; 2) (L) ankle taping with arch tape; 3) Basket weave with leuko tape to stabilize distal tib-fib joint (L)  · SEE EXERCISE FLOW SHEET -   · Jt protection, ADL modification; Posture and       Assessment/Plan  (L) ankle sprain with avulsion fx and High ankle component -   Taping very effective in decreasing pain and allowing improved gait / function -   Improving w/ decreased pain and improved mobility, gait, and function -      Progress strengthening /stabilization /functional activity - Use brace at home prn -      _________________________________________________  Manual Therapy:                      NA     mins  41355;  Therapeutic Exercise:              25     mins  98406;     Neuromuscular Lea:   10    mins  10450;    Therapeutic Activity:                 30     mins  46685;     Gait Training:                            NA     mins  06351;     Ultrasound:                               10     mins  45937;    Electrical  Stimulation:              NA     mins  31179 ( );  Dry Needling                              NA     mins self-pay     Timed Treatment:   75   mins                  Total Treatment:     80   mins     Salazar Conteh, PT  Physical Therapist

## 2017-11-01 ENCOUNTER — TREATMENT (OUTPATIENT)
Dept: PHYSICAL THERAPY | Facility: CLINIC | Age: 72
End: 2017-11-01

## 2017-11-01 DIAGNOSIS — S92.152D CLOSED DISPLACED AVULSION FRACTURE OF LEFT TALUS WITH ROUTINE HEALING, SUBSEQUENT ENCOUNTER: ICD-10-CM

## 2017-11-01 DIAGNOSIS — S93.402D SPRAIN OF LEFT ANKLE, UNSPECIFIED LIGAMENT, SUBSEQUENT ENCOUNTER: Primary | ICD-10-CM

## 2017-11-01 PROCEDURE — 97110 THERAPEUTIC EXERCISES: CPT | Performed by: PHYSICAL THERAPIST

## 2017-11-01 PROCEDURE — 97530 THERAPEUTIC ACTIVITIES: CPT | Performed by: PHYSICAL THERAPIST

## 2017-11-01 PROCEDURE — 97112 NEUROMUSCULAR REEDUCATION: CPT | Performed by: PHYSICAL THERAPIST

## 2017-11-06 NOTE — PROGRESS NOTES
Physical Therapy Daily Progress Note      Patient Name: Judi Sy         :  1945  Referring Physician: Kade Jacob MD      Time In 0930                                   Time Out 1050      Subjective   Judi Sy reports: continued improvement - feeling much better with  pain and improved mobility and function - noting that the taping and now her brace are very very helpful and she was able to walk much farther with minimal discomfort - today notes minimal  soreness anterior and ant/lat ankle (L) -       Objective   Pt demonstrating improved gait w/o ankle taped with increased WB-ing LLE -   Grossly decreased swelling (L) ankle / foot -   Tender anterior ankle, ant tib-fib lig region; Tender ant/lat ankle ligament region; (L)  Improved AROM grossly (L) ankle -       See Exercise, Manual, and Modality Logs for complete treatment.      Functional / Therapeutic Activities:  30 min  · TAPING / BRACING: **HELD TODAY** - 1) K-Tape to prevent over PF and stabilize anterior ankle; 2) (L) ankle taping with arch tape; 3) Basket weave with leuko tape to stabilize distal tib-fib joint (L)  · SEE EXERCISE FLOW SHEET -   · Jt protection, ADL modification; Posture and        Assessment/Plan  (L) ankle sprain with avulsion fx and High ankle component -   Taping very effective in decreasing pain and allowing improved gait / function -   Improving w/ decreased pain and improved mobility, gait, and function -       Progress strengthening /stabilization /functional activity - Use brace at home prn -      _________________________________________________  Manual Therapy:                      NA     mins  57442;  Therapeutic Exercise:              25     mins  95273;     Neuromuscular Lea:            10    mins  89187;    Therapeutic Activity:                 30     mins  42548;     Gait Training:                            NA     mins  71932;      Ultrasound:                               10     mins  18616;    Electrical Stimulation:              NA     mins  47803 ( );  Dry Needling                              NA     mins self-pay      Timed Treatment:   75   mins                  Total Treatment:     80   mins      Salazar Conteh PT  Physical Therapist

## 2017-11-07 ENCOUNTER — TREATMENT (OUTPATIENT)
Dept: PHYSICAL THERAPY | Facility: CLINIC | Age: 72
End: 2017-11-07

## 2017-11-07 DIAGNOSIS — S93.402D SPRAIN OF LEFT ANKLE, UNSPECIFIED LIGAMENT, SUBSEQUENT ENCOUNTER: Primary | ICD-10-CM

## 2017-11-07 DIAGNOSIS — S92.152D CLOSED DISPLACED AVULSION FRACTURE OF LEFT TALUS WITH ROUTINE HEALING, SUBSEQUENT ENCOUNTER: ICD-10-CM

## 2017-11-07 PROCEDURE — 97110 THERAPEUTIC EXERCISES: CPT | Performed by: PHYSICAL THERAPIST

## 2017-11-07 PROCEDURE — 97530 THERAPEUTIC ACTIVITIES: CPT | Performed by: PHYSICAL THERAPIST

## 2017-11-07 PROCEDURE — 97112 NEUROMUSCULAR REEDUCATION: CPT | Performed by: PHYSICAL THERAPIST

## 2017-11-27 RX ORDER — CLOPIDOGREL BISULFATE 75 MG/1
TABLET ORAL
Qty: 90 TABLET | Refills: 1 | Status: SHIPPED | OUTPATIENT
Start: 2017-11-27 | End: 2018-03-06

## 2018-01-16 DIAGNOSIS — E78.2 MIXED HYPERLIPIDEMIA: Primary | ICD-10-CM

## 2018-01-18 ENCOUNTER — TELEPHONE (OUTPATIENT)
Dept: FAMILY MEDICINE CLINIC | Facility: CLINIC | Age: 73
End: 2018-01-18

## 2018-01-18 ENCOUNTER — TREATMENT (OUTPATIENT)
Dept: PHYSICAL THERAPY | Facility: CLINIC | Age: 73
End: 2018-01-18

## 2018-01-18 DIAGNOSIS — M25.562 LEFT KNEE PAIN, UNSPECIFIED CHRONICITY: Primary | ICD-10-CM

## 2018-01-18 DIAGNOSIS — M17.12 PRIMARY OSTEOARTHRITIS OF LEFT KNEE: ICD-10-CM

## 2018-01-18 PROCEDURE — 97140 MANUAL THERAPY 1/> REGIONS: CPT | Performed by: PHYSICAL THERAPIST

## 2018-01-18 PROCEDURE — G8979 MOBILITY GOAL STATUS: HCPCS | Performed by: PHYSICAL THERAPIST

## 2018-01-18 PROCEDURE — 97161 PT EVAL LOW COMPLEX 20 MIN: CPT | Performed by: PHYSICAL THERAPIST

## 2018-01-18 PROCEDURE — 97035 APP MDLTY 1+ULTRASOUND EA 15: CPT | Performed by: PHYSICAL THERAPIST

## 2018-01-18 PROCEDURE — 97530 THERAPEUTIC ACTIVITIES: CPT | Performed by: PHYSICAL THERAPIST

## 2018-01-18 PROCEDURE — G8978 MOBILITY CURRENT STATUS: HCPCS | Performed by: PHYSICAL THERAPIST

## 2018-01-18 NOTE — TELEPHONE ENCOUNTER
ORDER HAS BEEN PUT IN.     ----- Message from Randal Moya sent at 1/18/2018  9:10 AM EST -----  Contact: PT  PT CALLED AND WANTS TO GO BACK TO [facility] FOR L KNEE PAIN. CAN YOU PLEASE PUT A NEW REFERRAL IN FOR THIS?    THANKS LUCINA

## 2018-01-18 NOTE — PROGRESS NOTES
[facility]  Physical Therapy  Orthopedic / Sports / Industrial Physical Therapy  Physical Therapy Initial Evaluation and Plan of Care    Patient Name: Judi Sy          :  1945  Referring Physician: Ravi Griffin MD  Diagnosis: Left knee pain, unspecified chronicity [M25.562]  Knee OA; PF Jt Dysfunction;   Date of Evaluation: 2018  ______________________________________________________________________  TIME IN 1400 TIME OUT 1555    Subjective Evaluation    History of Present Illness  Onset date: 1 week or 10 days ago.  Mechanism of injury: Bathtub - turned on it to flip over and had pain  (L) knee gives out since -   Then walking in [employer] x 4 days -           Pain  Current pain ratin  At worst pain ratin  Location: Posterior (L) knee pain and buckling - Mild swelling?   Quality: Sharp/stabbing; ache;   Alleviating factors: Heat / hot bath; Rest;   Exacerbated by: Walking; Stairs; Walking up/down hill; Squatting; pivoting / twisting;   Progression: worsening    Social Support  Patient lives at: Two story home - w/ stairs -   Lives with: spouse    Diagnostic Tests  No diagnostic tests performed    Treatments  No previous or current treatments  Patient Goals  Patient/family treatment goals: Pain alleviation; Normal mobility, gait, strength, function, and return to normal  hobby activiites -          ___________________________________________________  Objective       Observations     Additional Observation Details  (L) Knee flexed vs (R); WS to (R);   Pt ambulating with St cane on (R) - Antalgic gait with decreased WB-ing LLE with toeing in on LLE -     Palpation     Additional Palpation Details  Tender infrapatella, medial PF Jt > lat PF Jt; Tender along medial joint line (L) around post/medial knee - ;      Active Range of Motion     Additional Active Range of Motion Details  (L) Knee WNL with pain at end range knee flexion -     Strength/Myotome Testing     Left Knee    Flexion: 4- (Pain anterior knee)  Extension: 3+ (Pain anterior knee )  Quadriceps contraction: fair    Tests     Additional Tests Details  (+) Step-Up and Squat Tests (PF Jt) (L);   (-) ACL, MCL / LCL  (+) Bernie's medial joint line (L)        MODALITIES: US x 10 min 100% 2.0 w/cm2 to infrapatella, medial PF jt, medial joint line (L) knee  MANUAL THERAPY: x 15 min ; Edema mobilization; STM / DTM to (L) Knee; PF Jt mobs  THERAPEUTIC EXERCISE: x 10 min; QS on roll; [employer],  FUNCTIONAL ACTIVITIES: X 20 min  · TAPING / BRACING: K-Tape to 1) Unload PF Jt; 2) Unload infrapatella; 3) Unload Medial Jt (L) knee -   · Jt protection, ADL modification; Posture and     ___________________________________________________  Assessment & Plan     Assessment  Assessment details:   Knee Strain; Knee OA; PF Jt Dysfunction; Possible medial meniscus involvement -     PROBLEMS: Pain; Limited strength, weakness, abnormal gait, limited function;   PROGNOSIS: Good    GOALS:   SHORT TERM GOALS: 2 weeks:  1) HEP Initiated; 2) Pain decreased 50%:   3) AROM  increased:  4) Improved gait / functional ability grossly;     LONG TERM GOALS: 4 weeks (or at time of DISCHARGE): 1) (I) HEP; 2) AROM WFL and pain free; 3) Strength / gait / mobility to be able to perform all ADL's and job-related activities w/o restrictions;       Plan  Planned therapy interventions: flexibility, gait training, home exercise program, joint mobilization, manual therapy, neuromuscular re-education, postural training, soft tissue mobilization, strengthening, stretching and therapeutic activities (Modalities prn; Taping / bracing prn - )  Frequency: 2-3x week.  Duration in weeks: 4  Treatment plan discussed with: patient    ___________________________________________________  Manual Therapy:    15     mins  92093;   Therapeutic Exercise:    05     mins  51901;     Neuromuscular Lea:        mins  01929;   Therapeutic Activity:     20     mins  01155;      Ultrasound:     10     mins  40734;    Electrical Stimulation:        mins  65037 ( );  Dry Needling          mins self-pay   Gait Training:          mins  26514;  EVAL TIME:   25 mins     Timed Treatment:   50   mins                Total Treatment:     85   mins    PT SIGNATURE:   Salazar Conteh, PT  DATE TREATMENT INITIATED: 1/18/2018  ___________________________________________________  Initial Certification  Certification Period: 4/18/2018  I certify that the therapy services are furnished while this patient is under my care.  The services outlined above are required by this patient, and will be reviewed every 90 days.     PHYSICIAN: ________________________________  DATE: ______  Ravi Griffin MD        Please sign and return via fax to [phone].. Thank you, [facility].  ______________________________________________________________________  [address] [place] [address]  Phone: [phone] Fax: [phone]

## 2018-01-22 ENCOUNTER — TREATMENT (OUTPATIENT)
Dept: PHYSICAL THERAPY | Facility: CLINIC | Age: 73
End: 2018-01-22

## 2018-01-22 DIAGNOSIS — M25.562 LEFT KNEE PAIN, UNSPECIFIED CHRONICITY: Primary | ICD-10-CM

## 2018-01-22 DIAGNOSIS — M17.12 PRIMARY OSTEOARTHRITIS OF LEFT KNEE: ICD-10-CM

## 2018-01-22 PROCEDURE — 97530 THERAPEUTIC ACTIVITIES: CPT | Performed by: PHYSICAL THERAPIST

## 2018-01-22 PROCEDURE — 97035 APP MDLTY 1+ULTRASOUND EA 15: CPT | Performed by: PHYSICAL THERAPIST

## 2018-01-22 NOTE — PROGRESS NOTES
Physical Therapy Daily Progress Note    Patient Name: Judi Sy         :  1945  Referring Physician: Ravi Griffin MD    Time In 11:00  Time Out 1150    Subjective   Judi Sy reports: feeling much better after last session with knee taped on 2018 and she notes that she did well for the next 2-3 days - She took the tape off last night and then she twisted her knee in bed last night/this AM and had severe pain posterior and medial (L) knee and severe pain and giving way with walking today - needing cane more today -     Objective   Pt ambulating with antalgic gait with cane not properly fit for her and improper gait mechanics - limited WB-ing LLE and guarded / antalgic gait - with occasional buckling of (L) knee -   Severely tender (Pt screamed) w/ palpation of posterior, posterior-lat, post-medial knee, medial joint line and medial PF jt > infrapatellar region -   Severe pain with Bernie's and Thesaly testing with increased pain medial / post-medial (L) knee -   Very painful AROM (L) knee and with any twisting -     See Exercise, Manual, and Modality Logs for complete treatment.     Functional / Therapeutic Activities:  30 min  · TAPING / BRACING: K-Tape to 1) Unload PF Jt; 2) Unload Infrapat reg (L); 3) Unload med / lat jt; 4) Unload post/lat knee (L)  · Fitted Pt with St cane and instructed in proper gait pattern using a St cane on her (R) side -   · Jt protection, ADL modification; Posture and      Assessment/Plan  Knee Strain; Knee OA; PF Jt Dysfunction; Possible medial meniscus involvement, etc-   Was much improved with decreased pain and improved gait and function with knee taped, but when tape came off, she twisted her knee in bed with severe pain then and since and much more difficult to get pain under control - with tape with continued pain and giving way -   Pt would benefit from further assessment (i.e. X-Ray, MRI, Ortho referral) to rule out possible internal  derangement vs OA, etc.     Progress strengthening /stabilization /functional activity -        _________________________________________________  Manual Therapy:          mins  67048;  Therapeutic Exercise:          mins  96222;     Neuromuscular Lea:         mins  49337;    Therapeutic Activity:     30    mins  69788;     Gait Training:              mins  66340;     Ultrasound:      10    mins  90629;    Electrical Stimulation:            mins  50169 ( );  Dry Needling             mins self-pay    Timed Treatment:   40   mins                  Total Treatment:     50   mins    Salazar Conteh PT  Physical Therapist

## 2018-01-23 ENCOUNTER — TELEPHONE (OUTPATIENT)
Dept: FAMILY MEDICINE CLINIC | Facility: CLINIC | Age: 73
End: 2018-01-23

## 2018-01-23 DIAGNOSIS — M25.562 LEFT KNEE PAIN, UNSPECIFIED CHRONICITY: Primary | ICD-10-CM

## 2018-01-23 NOTE — TELEPHONE ENCOUNTER
CALLED AND S/W PT- SHE IS REQUESTING AN APPT WITH ORTHO OR XRAY ORDERS, BASED OFF HER PHYSICAL THERAPIST RECOMMENDATION FOR HER LEFT KNEE PAIN. I S/W DR. LAWTON AND HE SAID TO REFER PATIENT TO [facility]. ORDER HAS BEEN PUT IN AND PT IS AWARE THAT SOMEONE IN REFERRALS WILL CONTACT HER REGARDING APPT.     ----- Message from Marya Haji sent at 1/22/2018  1:11 PM EST -----  Wanted to know if an order for mri on knee or xray to see an orthopedic     Please call pt with any question [phone]

## 2018-01-25 ENCOUNTER — TREATMENT (OUTPATIENT)
Dept: PHYSICAL THERAPY | Facility: CLINIC | Age: 73
End: 2018-01-25

## 2018-01-25 DIAGNOSIS — M25.562 LEFT KNEE PAIN, UNSPECIFIED CHRONICITY: Primary | ICD-10-CM

## 2018-01-25 DIAGNOSIS — M17.12 PRIMARY OSTEOARTHRITIS OF LEFT KNEE: ICD-10-CM

## 2018-01-25 LAB
ALBUMIN SERPL-MCNC: 4.1 G/DL (ref 3.5–5.2)
ALBUMIN/GLOB SERPL: 1.6 G/DL
ALP SERPL-CCNC: 81 U/L (ref 39–117)
ALT SERPL-CCNC: 15 U/L (ref 1–33)
AST SERPL-CCNC: 18 U/L (ref 1–32)
BASOPHILS # BLD AUTO: 0.03 10*3/MM3 (ref 0–0.2)
BASOPHILS NFR BLD AUTO: 0.4 % (ref 0–1.5)
BILIRUB SERPL-MCNC: 0.4 MG/DL (ref 0.1–1.2)
BUN SERPL-MCNC: 13 MG/DL (ref 8–23)
BUN/CREAT SERPL: 18.1 (ref 7–25)
CALCIUM SERPL-MCNC: 9.3 MG/DL (ref 8.6–10.5)
CHLORIDE SERPL-SCNC: 104 MMOL/L (ref 98–107)
CHOLEST SERPL-MCNC: 172 MG/DL (ref 100–199)
CO2 SERPL-SCNC: 28.8 MMOL/L (ref 22–29)
CREAT SERPL-MCNC: 0.72 MG/DL (ref 0.57–1)
EOSINOPHIL # BLD AUTO: 0.19 10*3/MM3 (ref 0–0.7)
EOSINOPHIL NFR BLD AUTO: 2.8 % (ref 0.3–6.2)
ERYTHROCYTE [DISTWIDTH] IN BLOOD BY AUTOMATED COUNT: 13.8 % (ref 11.7–13)
GFR SERPLBLD CREATININE-BSD FMLA CKD-EPI: 80 ML/MIN/1.73
GFR SERPLBLD CREATININE-BSD FMLA CKD-EPI: 97 ML/MIN/1.73
GLOBULIN SER CALC-MCNC: 2.6 GM/DL
GLUCOSE SERPL-MCNC: 94 MG/DL (ref 65–99)
HCT VFR BLD AUTO: 43.3 % (ref 35.6–45.5)
HDL SERPL-SCNC: 36.4 UMOL/L
HDLC SERPL-MCNC: 54 MG/DL
HGB BLD-MCNC: 14.2 G/DL (ref 11.9–15.5)
IMM GRANULOCYTES # BLD: 0.02 10*3/MM3 (ref 0–0.03)
IMM GRANULOCYTES NFR BLD: 0.3 % (ref 0–0.5)
LDL SERPL QN: 20.7 NM
LDL SERPL-SCNC: 1324 NMOL/L
LDL SMALL SERPL-SCNC: 676 NMOL/L
LDLC SERPL CALC-MCNC: 97 MG/DL (ref 0–99)
LYMPHOCYTES # BLD AUTO: 1.78 10*3/MM3 (ref 0.9–4.8)
LYMPHOCYTES NFR BLD AUTO: 26 % (ref 19.6–45.3)
MCH RBC QN AUTO: 31.6 PG (ref 26.9–32)
MCHC RBC AUTO-ENTMCNC: 32.8 G/DL (ref 32.4–36.3)
MCV RBC AUTO: 96.4 FL (ref 80.5–98.2)
MONOCYTES # BLD AUTO: 0.57 10*3/MM3 (ref 0.2–1.2)
MONOCYTES NFR BLD AUTO: 8.3 % (ref 5–12)
NEUTROPHILS # BLD AUTO: 4.25 10*3/MM3 (ref 1.9–8.1)
NEUTROPHILS NFR BLD AUTO: 62.2 % (ref 42.7–76)
PLATELET # BLD AUTO: 192 10*3/MM3 (ref 140–500)
POTASSIUM SERPL-SCNC: 4.4 MMOL/L (ref 3.5–5.2)
PROT SERPL-MCNC: 6.7 G/DL (ref 6–8.5)
RBC # BLD AUTO: 4.49 10*6/MM3 (ref 3.9–5.2)
SODIUM SERPL-SCNC: 146 MMOL/L (ref 136–145)
TRIGL SERPL-MCNC: 105 MG/DL (ref 0–149)
WBC # BLD AUTO: 6.84 10*3/MM3 (ref 4.5–10.7)

## 2018-01-25 PROCEDURE — 97035 APP MDLTY 1+ULTRASOUND EA 15: CPT | Performed by: PHYSICAL THERAPIST

## 2018-01-25 PROCEDURE — G0283 ELEC STIM OTHER THAN WOUND: HCPCS | Performed by: PHYSICAL THERAPIST

## 2018-01-25 PROCEDURE — 97140 MANUAL THERAPY 1/> REGIONS: CPT | Performed by: PHYSICAL THERAPIST

## 2018-01-25 PROCEDURE — 97530 THERAPEUTIC ACTIVITIES: CPT | Performed by: PHYSICAL THERAPIST

## 2018-01-29 NOTE — PROGRESS NOTES
Physical Therapy Daily Progress Note     Patient Name: Judi Sy         :  1945  Referring Physician: Ravi Griffin MD     Time In 1030                                  Time Out 1130     Subjective   Judi Sy reports:Decreased pain after last session - Says she Dr. Griffin has referred her to an Orthopedic Surgeon and her appointment is next week -   Taping very helpful in decreasing her pain and allowing improved gait - Pain is anterior, posterior, med and lat (L) knee increased with AROM, WB-ing / Walking, stairs, squatting, twisting, etc,      Objective   Pt ambulating with antalgic gait with st cane with improved, but limited WB-ing LLE and guarded / antalgic gait - with fewer instances of buckling of (L) knee -   Very tender w/ palpation of posterior, posterior-lat, post-medial knee, medial joint line and medial PF jt > infrapatellar region -   Improved, but limited and  painful AROM (L) knee and with any twisting -      See Exercise, Manual, and Modality Logs for complete treatment.      Functional / Therapeutic Activities:  15 min  · TAPING / BRACING: K-Tape to 1) Unload PF Jt; 2) Unload Infrapat reg (L); 3) Unload med / lat jt; 4) Unload post/lat knee (L)  · Jt protection, ADL modification; Posture and       Assessment/Plan  Knee Strain; Knee OA; PF Jt Dysfunction; Possible medial meniscus involvement, etc-   Was much improved with decreased pain and improved gait and function with knee taped, but when tape came off, she twisted her knee in bed with severe pain then and since and much more difficult to get pain under control - with tape with continued pain and giving way -   Pt would benefit from further assessment (i.e. X-Ray, MRI, Ortho referral) to rule out possible internal derangement vs OA, etc.      Progress strengthening /stabilization /functional activity - To see Ortho next week -      _________________________________________________  Manual Therapy:                       10      mins  28265;  Therapeutic Exercise:                    mins  62840;     Neuromuscular Lea:         mins  13153;    Therapeutic Activity:                15    mins  13174;     Gait Training:                                  mins  90660;     Ultrasound:                                10    mins  48044;    Electrical Stimulation:               20       mins  06227 ( );  Dry Needling                                              mins self-pay     Timed Treatment:   35   mins                  Total Treatment:     60   mins     Salazar Conteh, PT  Physical Therapist

## 2018-01-30 ENCOUNTER — TREATMENT (OUTPATIENT)
Dept: PHYSICAL THERAPY | Facility: CLINIC | Age: 73
End: 2018-01-30

## 2018-01-30 DIAGNOSIS — M17.12 PRIMARY OSTEOARTHRITIS OF LEFT KNEE: ICD-10-CM

## 2018-01-30 DIAGNOSIS — M25.562 LEFT KNEE PAIN, UNSPECIFIED CHRONICITY: Primary | ICD-10-CM

## 2018-01-30 PROCEDURE — 97110 THERAPEUTIC EXERCISES: CPT | Performed by: PHYSICAL THERAPIST

## 2018-01-30 PROCEDURE — 97530 THERAPEUTIC ACTIVITIES: CPT | Performed by: PHYSICAL THERAPIST

## 2018-01-31 ENCOUNTER — OFFICE VISIT (OUTPATIENT)
Dept: FAMILY MEDICINE CLINIC | Facility: CLINIC | Age: 73
End: 2018-01-31

## 2018-01-31 VITALS
HEART RATE: 90 BPM | TEMPERATURE: 97.9 F | HEIGHT: 66 IN | OXYGEN SATURATION: 96 % | SYSTOLIC BLOOD PRESSURE: 120 MMHG | BODY MASS INDEX: 28.54 KG/M2 | DIASTOLIC BLOOD PRESSURE: 80 MMHG | WEIGHT: 177.6 LBS

## 2018-01-31 DIAGNOSIS — R03.0 PRE-HYPERTENSION: Primary | ICD-10-CM

## 2018-01-31 DIAGNOSIS — S83.207S ACUTE MENISCAL TEAR OF KNEE, LEFT, SEQUELA: ICD-10-CM

## 2018-01-31 DIAGNOSIS — S92.152D CLOSED DISPLACED AVULSION FRACTURE OF LEFT TALUS WITH ROUTINE HEALING, SUBSEQUENT ENCOUNTER: ICD-10-CM

## 2018-01-31 DIAGNOSIS — E78.2 MIXED HYPERLIPIDEMIA: ICD-10-CM

## 2018-01-31 PROBLEM — S93.402A SPRAIN OF LEFT ANKLE: Status: RESOLVED | Noted: 2017-10-20 | Resolved: 2018-01-31

## 2018-01-31 PROCEDURE — 99214 OFFICE O/P EST MOD 30 MIN: CPT | Performed by: INTERNAL MEDICINE

## 2018-01-31 RX ORDER — PREDNISONE 10 MG/1
TABLET ORAL
COMMUNITY
Start: 2018-01-29 | End: 2018-03-06

## 2018-01-31 NOTE — PROGRESS NOTES
Subjective   Judi Sy is a 72 y.o. female. Patient is here today for follow-up on her pre-hypertension, hyperlipidemia and history of patent foramen ovale.  She is generally been doing okay.  She does tell me that she had a ankle injury with a small avulsion fracture and receive physical therapy for that.  That's pretty much resolved.  Additionally she apparently had a meniscal tear of her left knee and has seen orthopedics for that and is getting physical therapy and that's improving.  Otherwise she's had no chest pain, shortness of breath, edema or myalgias.  Chief Complaint   Patient presents with   • Hyperlipidemia     FOLLOW UP LABS          Vitals:    01/31/18 1255   BP: 120/80   Pulse: 90   Temp: 97.9 °F (36.6 °C)   SpO2: 96%     The following portions of the patient's history were reviewed and updated as appropriate: allergies, current medications, past family history, past medical history, past social history, past surgical history and problem list.    Past Medical History:   Diagnosis Date   • Atrial fibrillation    • Hypertension    • Vasovagal syncope       Allergies   Allergen Reactions   • Latex       Social History     Social History   • Marital status:      Spouse name: N/A   • Number of children: N/A   • Years of education: N/A     Occupational History   • Not on file.     Social History Main Topics   • Smoking status: Former Smoker   • Smokeless tobacco: Never Used   • Alcohol use Yes   • Drug use: Defer   • Sexual activity: Defer     Other Topics Concern   • Not on file     Social History Narrative        Current Outpatient Prescriptions:   •  atorvastatin (LIPITOR) 20 MG tablet, Take 1 tablet by mouth Daily., Disp: 90 tablet, Rfl: 3  •  Calcium Citrate-Vitamin D 250-200 MG-UNIT tablet, Take  by mouth., Disp: , Rfl:   •  clopidogrel (PLAVIX) 75 MG tablet, TAKE ONE TABLET BY MOUTH DAILY, Disp: 90 tablet, Rfl: 1  •  Multiple Vitamin (MULTI-VITAMIN) tablet, Take  by mouth daily., Disp: ,  Rfl:   •  predniSONE (DELTASONE) 10 MG tablet, , Disp: , Rfl:      Objective     History of Present Illness     Review of Systems   Constitutional: Negative.    HENT: Negative.    Eyes: Negative.    Respiratory: Negative.    Cardiovascular: Negative.    Gastrointestinal: Negative.    Endocrine: Negative.    Genitourinary: Negative.    Musculoskeletal: Negative.    Neurological: Negative.    Psychiatric/Behavioral: Negative.        Physical Exam   Constitutional: She is oriented to person, place, and time. She appears well-developed and well-nourished.   Pleasant, cooperative no distress with a blood pressure 130/80   HENT:   Head: Normocephalic and atraumatic.   Eyes: Conjunctivae are normal. Pupils are equal, round, and reactive to light. No scleral icterus.   Neck: Normal range of motion. Neck supple.   Cardiovascular: Normal rate, regular rhythm and normal heart sounds.    Pulmonary/Chest: Effort normal and breath sounds normal. No respiratory distress. She has no wheezes. She has no rales.   Musculoskeletal: Normal range of motion. She exhibits no edema.   Neurological: She is alert and oriented to person, place, and time.   Skin: Skin is warm and dry.   Psychiatric: She has a normal mood and affect. Her behavior is normal.   Nursing note and vitals reviewed.      ASSESSMENT  CBC and CMP are normal.  Lipid panel has a total cholesterol 172, HDL 54, LDL of 97 with the particle number just into the borderline range at 1324, slightly higher than usual.  I personally reviewed x-rays of the patient's left knee which shows some osteoarthritic changes but fair amount of cartilage and is not bone-on-bone.  I can see no fractures.  #1-pre-hypertension with acceptable blood pressure today  #2-hyperlipidemia, just slightly higher than usual  #3-history of meniscal tear of the left knee that seems to be improving with physical therapy  #4-history of avulsion fracture of the left ankle that pretty much is resolved.      Problem List Items Addressed This Visit        Cardiovascular and Mediastinum    Hyperlipidemia    Pre-hypertension - Primary       Musculoskeletal and Integument    Closed displaced avulsion fracture of left talus    Acute meniscal tear of knee, left, sequela          PLAN  The patient will continue current medicines as now and continue with physical therapy.  I plan on rechecking her in 4 months in general with a CMP, NMR lipid panel, TSH and vitamin D level    There are no Patient Instructions on file for this visit.  Return in about 4 months (around 5/31/2018) for with labs.

## 2018-02-01 ENCOUNTER — TREATMENT (OUTPATIENT)
Dept: PHYSICAL THERAPY | Facility: CLINIC | Age: 73
End: 2018-02-01

## 2018-02-01 DIAGNOSIS — M17.12 PRIMARY OSTEOARTHRITIS OF LEFT KNEE: ICD-10-CM

## 2018-02-01 DIAGNOSIS — M25.562 LEFT KNEE PAIN, UNSPECIFIED CHRONICITY: Primary | ICD-10-CM

## 2018-02-01 PROCEDURE — G0283 ELEC STIM OTHER THAN WOUND: HCPCS | Performed by: PHYSICAL THERAPIST

## 2018-02-01 PROCEDURE — 97530 THERAPEUTIC ACTIVITIES: CPT | Performed by: PHYSICAL THERAPIST

## 2018-02-01 PROCEDURE — 97110 THERAPEUTIC EXERCISES: CPT | Performed by: PHYSICAL THERAPIST

## 2018-02-01 NOTE — PROGRESS NOTES
Physical Therapy Daily Progress Note      Patient Name: Judi Sy         :  1945  Referring Physician: Ravi Griffin MD      Time In 0930                                  Time Out 1100      Subjective   Judi Sy reports: seeing an orthopedic surgeon who took xrays and said she had OA and gave her a steroid pack and was supportive of taping - Pt reports decreased pain after last session - She notes minimal giving out and most pain posterior knee (L)  Taping very helpful in decreasing her pain and allowing improved gait - Able to walk much better and w/o AD, but pain with steps, squatting, twisting -      Objective   Pt ambulating with decreased antalgic gait without st cane with improvedWB-ing LLE and less guarded  - with fewer instances of buckling of (L) knee -   Very tender w/ palpation of posterior, posterior-lat, post-medial knee, medial joint line and medial PF jt > infrapatellar region -   Much Improved, AROM (L) knee  -       See Exercise, Manual, and Modality Logs for complete treatment.      Functional / Therapeutic Activities:  25 min  · TAPING / BRACING: K-Tape to 1) Unload PF Jt; 2) Unload Infrapatellar reg; 3) Unload post / med; pos/lat knee (L)  · Jt protection, ADL modification; Posture and        Assessment/Plan  Knee Strain; Knee OA of Fem-Tib and Pat-Fem joints; PF Jt Dysfunction; Possible medial meniscus involvement, etc-   Improving with decreased pain and improved gait and function with knee taped,       Progress strengthening /stabilization /functional activity -       _________________________________________________  Manual Therapy:                            mins  32309;  Therapeutic Exercise:              25      mins  69405;     Neuromuscular Lea:         mins  17435;    Therapeutic Activity:                25    mins  33572;     Gait Training:                                  mins  06914;     Ultrasound:                                10    mins   58889;    Electrical Stimulation:               20       mins  01510 ( );  Dry Needling                                              mins self-pay      Timed Treatment:   60   mins                  Total Treatment:     90   mins      Salazar Conteh, PT  Physical Therapist

## 2018-02-04 NOTE — PROGRESS NOTES
Physical Therapy Daily Progress Note      Patient Name: Judi Sy         :  1945  Referring Physician: Ravi Griffin MD      Time In 1000                                 Time Out 1130      Subjective   Judi Sy reports: continued improvement with decreased pain and improved mobility and function. Wants to try today w/o tape     Objective   Pt ambulating improved gait without st cane with improvedWB-ing LLE and less guarded  - with fewer instances of buckling of (L) knee -       See Exercise, Manual, and Modality Logs for complete treatment.      Functional / Therapeutic Activities:  10 min  · TAPING / BRACING: HELD -K-Tape to 1) Unload PF Jt; 2) Unload Infrapatellar reg; 3) Unload post / med; pos/lat knee (L)  · SEE EXERCISE FLOW SHEET -   · Jt protection, ADL modification; Posture and        Assessment/Plan  Knee Strain; Knee OA of Fem-Tib and Pat-Fem joints; PF Jt Dysfunction; Possible medial meniscus involvement, etc-   Improving with decreased pain and improved gait and function even without knee taped,       Progress strengthening /stabilization /functional activity -       _________________________________________________  Manual Therapy:                            mins  86031;  Therapeutic Exercise:              30      mins  40411;     Neuromuscular Lea:           10      mins  16423;    Therapeutic Activity:                10    mins  46379;     Gait Training:                                  mins  18265;     Ultrasound:                                    mins  14134;    Electrical Stimulation:               20       mins  13562 ( );  Dry Needling                                              mins self-pay      Timed Treatment:   50   mins                  Total Treatment:     90   mins      Salazar Conteh PT  Physical Therapist

## 2018-02-05 ENCOUNTER — TREATMENT (OUTPATIENT)
Dept: PHYSICAL THERAPY | Facility: CLINIC | Age: 73
End: 2018-02-05

## 2018-02-05 DIAGNOSIS — M25.562 LEFT KNEE PAIN, UNSPECIFIED CHRONICITY: Primary | ICD-10-CM

## 2018-02-05 DIAGNOSIS — M17.12 PRIMARY OSTEOARTHRITIS OF LEFT KNEE: ICD-10-CM

## 2018-02-05 DIAGNOSIS — S83.207S ACUTE MENISCAL TEAR OF KNEE, LEFT, SEQUELA: ICD-10-CM

## 2018-02-05 PROCEDURE — 97112 NEUROMUSCULAR REEDUCATION: CPT | Performed by: PHYSICAL THERAPIST

## 2018-02-05 PROCEDURE — 97110 THERAPEUTIC EXERCISES: CPT | Performed by: PHYSICAL THERAPIST

## 2018-02-05 PROCEDURE — 97530 THERAPEUTIC ACTIVITIES: CPT | Performed by: PHYSICAL THERAPIST

## 2018-02-06 NOTE — PROGRESS NOTES
Physical Therapy Daily Progress Note      Patient Name: Judi Sy         :  1945  Referring Physician: Ravi Griffin MD      Time In 0930                                 Time Out 1050      Subjective   Judi Sy reports: overall continued improvement with decreased pain and improved mobility and function. No tape or brace and notes increased medial and post/lateral and anterior knee pain (L) -  Fewer instances of buckling - Pt admits to going up/down the stairs a lot over the weekend doing projects at home -       Objective   Pt antalgic gait without st cane with decreased WB-ing LLE and flexed posture  -     Tender medial region of (L) knee and post/lateral as well as medial PF jt and infrapatellar region -       See Exercise, Manual, and Modality Logs for complete treatment.      Functional / Therapeutic Activities:  25 min  · TAPING / BRACING:  -K-Tape to 1) Unload PF Jt; 2) Unload Infrapatellar reg; 3) Unload  Med jt line and 4) pos/lat knee (L)  · SEE EXERCISE FLOW SHEET -   · Jt protection, ADL modification; Posture and        Assessment/Plan  Knee Strain; Knee OA of Fem-Tib and Pat-Fem joints; PF Jt Dysfunction; Possible medial meniscus involvement, etc-   Improving with decreased pain and improved gait and function even without knee taped,   Pain increased without taping and bracing and increased activities over weekend -   Pain greatly decreased and gait improved with knee taping today -       Progress strengthening /stabilization /functional activity -       _________________________________________________  Manual Therapy:                            mins  60722;  Therapeutic Exercise:              25      mins  51889;     Neuromuscular Lea:           10      mins  19528;    Therapeutic Activity:                25    mins  61784;     Gait Training:                                  mins  06377;     Ultrasound:                                    mins  95158;    Electrical  Stimulation:                      mins  39076 ( );  Dry Needling                                    mins self-pay      Timed Treatment:   60   mins                  Total Treatment:     80   mins      Salazar Conteh, PT  Physical Therapist

## 2018-02-08 ENCOUNTER — TREATMENT (OUTPATIENT)
Dept: PHYSICAL THERAPY | Facility: CLINIC | Age: 73
End: 2018-02-08

## 2018-02-08 DIAGNOSIS — M25.562 LEFT KNEE PAIN, UNSPECIFIED CHRONICITY: Primary | ICD-10-CM

## 2018-02-08 DIAGNOSIS — S83.207S ACUTE MENISCAL TEAR OF KNEE, LEFT, SEQUELA: ICD-10-CM

## 2018-02-08 DIAGNOSIS — M17.12 PRIMARY OSTEOARTHRITIS OF LEFT KNEE: ICD-10-CM

## 2018-02-08 PROCEDURE — 97112 NEUROMUSCULAR REEDUCATION: CPT | Performed by: PHYSICAL THERAPIST

## 2018-02-08 PROCEDURE — 97110 THERAPEUTIC EXERCISES: CPT | Performed by: PHYSICAL THERAPIST

## 2018-02-08 PROCEDURE — 97530 THERAPEUTIC ACTIVITIES: CPT | Performed by: PHYSICAL THERAPIST

## 2018-02-11 NOTE — PROGRESS NOTES
Physical Therapy Daily Progress Note      Patient Name: Judi Sy         :  1945  Referring Physician: Ravi Griffin MD      Time In 0930                                 Time Out 1040      Subjective   Judi Sy reports: overall continued improvement with decreased pain and improved mobility and function. Notes mostly medial knee pain today - Pain with walking, stairs, squatting, twisting - Tape very effective in minimizing her discomfort improving function -       Objective   Pt demonstrating improved gait with increased WB-ing LLE but limited with flexed (L) knee  -     Tender medial region of (L) knee       See Exercise, Manual, and Modality Logs for complete treatment.      Functional / Therapeutic Activities:  25 min  · TAPING / BRACING:  -K-Tape to 1) Unload PF Jt; 2) Unload Infrapatellar reg; 3) Unload  Med jt line and 4) pos/lat knee (L)  · SEE EXERCISE FLOW SHEET -   · Jt protection, ADL modification; Posture and        Assessment/Plan  Knee Strain; Knee OA of Fem-Tib and Pat-Fem joints; PF Jt Dysfunction; Possible medial meniscus involvement, etc-   Improving with decreased pain and improved gait and function even without knee taped,   Pain increased without taping and bracing and increased activities over weekend -   Pain greatly decreased and gait improved with knee taping today -       Progress strengthening /stabilization /functional activity -       _________________________________________________  Manual Therapy:                            mins  76732;  Therapeutic Exercise:              20      mins  04547;     Neuromuscular Lea:           10      mins  33766;    Therapeutic Activity:                25    mins  99182;     Gait Training:                                  mins  83659;     Ultrasound:                                    mins  28966;    Electrical Stimulation:                      mins  81733 ( );  Dry  Leighton                                    mins self-pay      Timed Treatment:   55   mins                  Total Treatment:     70   mins      Salazar Conteh, PT  Physical Therapist

## 2018-02-14 ENCOUNTER — TREATMENT (OUTPATIENT)
Dept: PHYSICAL THERAPY | Facility: CLINIC | Age: 73
End: 2018-02-14

## 2018-02-14 DIAGNOSIS — S83.207S ACUTE MENISCAL TEAR OF KNEE, LEFT, SEQUELA: ICD-10-CM

## 2018-02-14 DIAGNOSIS — M17.12 PRIMARY OSTEOARTHRITIS OF LEFT KNEE: ICD-10-CM

## 2018-02-14 DIAGNOSIS — M25.562 LEFT KNEE PAIN, UNSPECIFIED CHRONICITY: Primary | ICD-10-CM

## 2018-02-14 PROCEDURE — 97110 THERAPEUTIC EXERCISES: CPT | Performed by: PHYSICAL THERAPIST

## 2018-02-14 NOTE — PROGRESS NOTES
Physical Therapy Daily Progress Note  Visits:8    Subjective : Judi Sy reports: The knee still hurts quite a bit.  It seems like the leg locks up if I sit too long or at night.  I don't do well with too much sitting.  I will be seeing my orthopedic doctor next week.  The only things that help are hot baths and getting the leg moving.     Objective: Noted antalgic gait pattern with limited WB'ing time on the L LE.  Decreased gait speed.    See Exercise, Manual, and Modality Logs for complete treatment.     Assessment/Plan:Pt tolerated treatment fair.  She did require additional direction for existing exercises and review.  She continues to be reluctant to sit for any length of time, even for rest periods between activity.      Progress per Plan of Care and Progress strengthening /stabilization /functional activity       Manual Therapy:    5     mins  98465;  Therapeutic Exercise:    23     mins  32736;     Neuromuscular Lea:    6    mins  79613;    Therapeutic Activity:     3     mins  84643;     Gait Training:      -     mins  80278;     Ultrasound:     -     mins  90530;    Electrical Stimulation:    -     mins  72694 ( );  Dry Needling     -     mins self-pay    Timed Treatment:   37   mins   Total Treatment:     65   mins    MARYJO Varghese License #[id number]    Physical Therapist

## 2018-02-19 ENCOUNTER — TREATMENT (OUTPATIENT)
Dept: PHYSICAL THERAPY | Facility: CLINIC | Age: 73
End: 2018-02-19

## 2018-02-19 DIAGNOSIS — M17.12 PRIMARY OSTEOARTHRITIS OF LEFT KNEE: ICD-10-CM

## 2018-02-19 DIAGNOSIS — M25.562 LEFT KNEE PAIN, UNSPECIFIED CHRONICITY: Primary | ICD-10-CM

## 2018-02-19 DIAGNOSIS — S83.207S ACUTE MENISCAL TEAR OF KNEE, LEFT, SEQUELA: ICD-10-CM

## 2018-02-19 PROCEDURE — 97530 THERAPEUTIC ACTIVITIES: CPT | Performed by: PHYSICAL THERAPIST

## 2018-02-19 PROCEDURE — G0283 ELEC STIM OTHER THAN WOUND: HCPCS | Performed by: PHYSICAL THERAPIST

## 2018-02-19 PROCEDURE — 97035 APP MDLTY 1+ULTRASOUND EA 15: CPT | Performed by: PHYSICAL THERAPIST

## 2018-02-19 NOTE — PROGRESS NOTES
Physical Therapy Daily Progress Note      Patient Name: Judi Sy         :  1945  Referring Physician: Ravi Griffin MD      Time In 0930                                 Time Out 1030      Subjective   Judi Sy reports:  persistent medial knee pain  - Pain with walking, stairs, squatting, twisting - Tape very effective in minimizing her discomfort improving function - concerned and is seeing her ortho MD Painter hoping she will get a MRI - She says she doesn't want to do more damage - Does not want to do exercise due to pain      Objective   Pt demonstrating antalgic gait w/ decreased WB-ing LLE with flexed (L) knee w/ IR'ed LE using st cane RLE  -     Tender medial region of (L) knee , jt line, pes reg,       See Exercise, Manual, and Modality Logs for complete treatment.      Functional / Therapeutic Activities:  15 min  · TAPING / BRACING:  -K-Tape to 1) Unload PF Jt; 2) Unload  Med jt line W/ X   · Jt protection, ADL modification; Posture and        Assessment/Plan  Knee Strain; Knee OA of Fem-Tib and Pat-Fem joints; PF Jt Dysfunction; Possible medial meniscus involvement, etc-   Persistent pain, abnormal gait and function concerning patient,   Pain decreased and gait improved with knee taping, but still limited and painful -       Progress strengthening /stabilization /functional activity -  TO SEE MD PAINTER.       _________________________________________________  Manual Therapy:                            mins  66728;  Therapeutic Exercise:                    mins  73696;     Neuromuscular Lea:                 mins  78461;    Therapeutic Activity:                15    mins  67103;     Gait Training:                                  mins  07911;     Ultrasound:                              10     mins  20578;    Electrical Stimulation:              20      mins  24535 ( );  Dry Needling                                    mins self-pay      Timed Treatment:   25   mins                   Total Treatment:     60   mins      Salazar Conteh, PT  Physical Therapist

## 2018-02-21 ENCOUNTER — OFFICE VISIT (OUTPATIENT)
Dept: ORTHOPEDIC SURGERY | Facility: CLINIC | Age: 73
End: 2018-02-21

## 2018-02-21 VITALS — HEIGHT: 66 IN | TEMPERATURE: 98.6 F | BODY MASS INDEX: 29.06 KG/M2 | WEIGHT: 180.8 LBS

## 2018-02-21 DIAGNOSIS — S83.242A TEAR OF MEDIAL MENISCUS OF LEFT KNEE, CURRENT, UNSPECIFIED TEAR TYPE, INITIAL ENCOUNTER: Primary | ICD-10-CM

## 2018-02-21 DIAGNOSIS — M17.12 PRIMARY LOCALIZED OSTEOARTHROSIS OF LEFT LOWER LEG: ICD-10-CM

## 2018-02-21 PROCEDURE — 99214 OFFICE O/P EST MOD 30 MIN: CPT | Performed by: ORTHOPAEDIC SURGERY

## 2018-02-21 NOTE — PROGRESS NOTES
New Patient Complaint      Patient: Judi Sy  YOB: 1945 72 y.o. female  Medical Record Number: [id number]    Chief Complaints: I hurt my knee    History of Present Illness:  Patient injured her left knee on 1/10/18 when she slipped and twisted getting out of the bathtub she's had persistent complaints of severe intermittent stabbing aching pain with uncomfortable popping and swelling worse with standing and walking and improved some with ice and rest.    She went LOC where x-rays were made and was told by Dr. Pete that it may be some arthritis or meniscus and she was started on oral steroids.    She's been using a cane as well as a knee sleeve that she has    She's also seen a physical therapist is been doing some taping and stretching exercises.  She states that the stretching does help but she when she first gets up she doesn't have much pain but pain progressively worsens with walking.    Although she is on Plavix she has been intermittently holding her Plavix and using Advil for day or so with some relief.        HPI    Allergies:   Allergies   Allergen Reactions   • Latex        Medications:   Current Outpatient Prescriptions on File Prior to Visit   Medication Sig   • atorvastatin (LIPITOR) 20 MG tablet Take 1 tablet by mouth Daily.   • Calcium Citrate-Vitamin D 250-200 MG-UNIT tablet Take  by mouth.   • clopidogrel (PLAVIX) 75 MG tablet TAKE ONE TABLET BY MOUTH DAILY   • Multiple Vitamin (MULTI-VITAMIN) tablet Take  by mouth daily.   • predniSONE (DELTASONE) 10 MG tablet      No current facility-administered medications on file prior to visit.        Past Medical History:   Diagnosis Date   • Aortic heart valve prolapse    • Arthritis    • Atrial fibrillation    • Hypertension    • Vasovagal syncope      Past Surgical History:   Procedure Laterality Date   • BREAST BIOPSY Left     15 years ago; benign   • PATENT FORAMEN OVALE CLOSURE     • TONSILLECTOMY     • TUBAL ABDOMINAL LIGATION   "     Social History     Occupational History   • Not on file.     Social History Main Topics   • Smoking status: Former Smoker   • Smokeless tobacco: Never Used   • Alcohol use Yes   • Drug use: Defer   • Sexual activity: Defer      Social History     Social History Narrative     Family History   Problem Relation Age of Onset   • Heart disease Father    • Heart disease Maternal Grandmother    • Breast cancer Mother      50's       Review of Systems: 14 point review of systems performed, positive pertinent findings identified in HPI. All remaining systems negativeExcept bruising easily     Review of Systems      Physical Exam:   Vitals:    02/21/18 0857   Temp: 98.6 °F (37 °C)   TempSrc: Temporal Artery    Weight: 82 kg (180 lb 12.8 oz)   Height: 167.6 cm (66\")     Physical Exam   Constitutional: pleasant, well developed  she is with her   Eyes: sclera non icteric  Hearing : adequate for exam  Cardiovascular: palpable pulses in left foot, left calf/ thigh NT without sign of DVT  Respiratoy: breathing unlabored   Neurological: grossly sensate to LT throughout left LE  Psychiatric: oriented with normal mood and affect.   Lymphatic: No palpable popliteal lymphadenopathy left LE  Skin: intact throughout left leg/foot  Musculoskeletal: Left knee shows mild effusion no warmth or erythema.  She's able to actively fully extend this with no palpable defects of the extensor mechanism.  She has 110° of flexion.  There is discomfort with patellofemoral compression as well as over the medial more so the lateral joint line exacerbated with Bernie's.  Grossly stable ligamentous exam  Physical Exam  Ortho Exam    Radiology: 3 views left knee were reviewed from 1/29/18.  There are no prior x-rays available for comparison there is arthritic change tricompartmentally but overall joint space is maintained medially and laterally.  I do not see any gross malalignment, but does appear to be somewhat osteopenic    Assessment/Plan: " Left knee pain after fall with mechanical symptoms.    She certain he seems exacerbated some arthritis to the left knee but there is certainly concern for displaced meniscal tear and/or stress fracture.    I recommended that she continue with use of her knee sleeve as well as her cane.  She declined use of a walker.  I recommend that she hold on physical therapy until we get an MRI of her left knee to evaluate for meniscal tear or stress fracture.    Also recommended that she not hold her Plavix in order to use Advil as I think the risks of this out with the benefits.    She and her  understand to call to schedule follow-up appointment after MRI has been scheduled and ordered to review results in the office

## 2018-02-26 DIAGNOSIS — S83.242A TEAR OF MEDIAL MENISCUS OF LEFT KNEE, CURRENT, UNSPECIFIED TEAR TYPE, INITIAL ENCOUNTER: ICD-10-CM

## 2018-02-26 DIAGNOSIS — M17.12 PRIMARY LOCALIZED OSTEOARTHROSIS OF LEFT LOWER LEG: ICD-10-CM

## 2018-02-28 ENCOUNTER — TELEPHONE (OUTPATIENT)
Dept: ORTHOPEDIC SURGERY | Facility: CLINIC | Age: 73
End: 2018-02-28

## 2018-02-28 ENCOUNTER — LAB (OUTPATIENT)
Dept: LAB | Facility: HOSPITAL | Age: 73
End: 2018-02-28

## 2018-02-28 ENCOUNTER — OFFICE VISIT (OUTPATIENT)
Dept: ORTHOPEDIC SURGERY | Facility: CLINIC | Age: 73
End: 2018-02-28

## 2018-02-28 VITALS — BODY MASS INDEX: 29.02 KG/M2 | WEIGHT: 180.6 LBS | TEMPERATURE: 98.6 F | HEIGHT: 66 IN

## 2018-02-28 DIAGNOSIS — S83.242D TEAR OF MEDIAL MENISCUS OF LEFT KNEE, CURRENT, UNSPECIFIED TEAR TYPE, SUBSEQUENT ENCOUNTER: Primary | ICD-10-CM

## 2018-02-28 DIAGNOSIS — E55.9 VITAMIN D DEFICIENCY: ICD-10-CM

## 2018-02-28 DIAGNOSIS — S72.415A CLOSED NONDISPLACED FRACTURE OF CONDYLE OF LEFT FEMUR, INITIAL ENCOUNTER (HCC): ICD-10-CM

## 2018-02-28 LAB — 25(OH)D3 SERPL-MCNC: 32.2 NG/ML (ref 30–100)

## 2018-02-28 PROCEDURE — 99213 OFFICE O/P EST LOW 20 MIN: CPT | Performed by: ORTHOPAEDIC SURGERY

## 2018-02-28 PROCEDURE — 36415 COLL VENOUS BLD VENIPUNCTURE: CPT

## 2018-02-28 PROCEDURE — 82306 VITAMIN D 25 HYDROXY: CPT

## 2018-02-28 RX ORDER — CEFAZOLIN SODIUM 2 G/100ML
2 INJECTION, SOLUTION INTRAVENOUS ONCE
Status: CANCELLED | OUTPATIENT
Start: 2018-03-09 | End: 2018-03-09

## 2018-02-28 NOTE — PROGRESS NOTES
"Knee Exam      Patient: Judi Sy    YOB: 1945 72 y.o. female    Chief Complaints: knee hurts    History of Present Illness: Follows up injury to her left knee on 1/10/18 when she slipped and twisting out of bathtub.  She was initially seen on 2/21/18 has persistent complaints of severe intermittent stabbing aching pain with uncomfortable popping and says that she's had difficulty fully straightening the knee and pain is worse with weightbearing.  She been using a cane.  She's been doing some taping and stretching exercises with physical therapy but has held on this after her MRI.    She takes Plavix under the direction of her PCP.  HPI    ROS: knee pain fevers chills chest pain or shortness of breath  Past Medical History:   Diagnosis Date   • Aortic heart valve prolapse    • Arthritis    • Atrial fibrillation    • Hypertension    • Vasovagal syncope        Physical Exam:   Vitals:    02/28/18 0919   Temp: 98.6 °F (37 °C)   TempSrc: Temporal Artery    Weight: 81.9 kg (180 lb 9.6 oz)   Height: 167.6 cm (66\")     Well developed with normal mood.Knee shows mild effusion with moderate tenderness to palpation over the medial femoral condyle.  When she stands she is not able to fully straighten the left knee is much as the right and maybe lacks 3-5° of extension.  His 110° of flexion.  Left calf and thigh are nontender without sign of DVT moderate discomfort over the medial more so than lateral joint line.      Radiology: MRI films and report of the left knee dated 2/24/18 from [facility] were reviewed and shows a subarticular trabecular impaction fracture the medial femoral condyle with reactive marrow edema and some medial pericapsular inflammation.    There is also a radial tear of the medial meniscus at the posterior horn with some medial deviation and some intrameniscal degeneration.    Partial thickness chondral loss at the patellar apex large joint effusion with moderate Baker's cyst and mild " "thickening of the MCL from possible previous sprain.  It is felt that the area ligamentous edema is due to the medial femoral condylar injury rather than recent MCL injury also mild medial compartment chondral surface irregularity without high-grade cartilage defect    Vit D 11-3-15 33.8      Assessment/Plan:  .  Left knee medial meniscal tear  2.  Left knee impaction fracture medial femoral condyle with mild medial compartment chondral surface irregularity without high-grade chondral defect  3.  Previous MCL sprain  4.  Patellar chondromalacia    We have discussed operative and nonoperative treatment options.    This has been going on now for for about 7 weeks.  This is been unimproved so far.     I have discussed operative and non-operative treatment options and although no guarantees could be given,  pt would like to proceed with operative treatment. Plan is for knee scope with debridement as needed. We discussed the  procedure in detail as well as  post op protocol. Risks, benefits, potential outcomes, and complications were reviewed and can include but are not limited to heart attack, stroke, death, pneumonia, infection, bleeding, damage to blood vessels, nerves, or tendons, blood clot, pulmonary embolus,persistent or worsening symptoms, stiffness, need for subsequent surgery, and failure to return to presurgery and precondition levels of activity, as well as extravasation of some chondroplasty matrix into the knee joint or surrounding soft tissues, also discussed possible need for further surgery including knee replacement.Pt voiced a clear understanding of these. This will be scheduled on an outpatient basis at a mutually convenient time. Pt was encouraged to call with any questions prior to surgery.    She is going to check with her primary care physician regarding allowance of discontinuing Plavix 4-5 days before surgery.  She states that she is done this in the past when she had \"implants\" for her " teeth.

## 2018-02-28 NOTE — TELEPHONE ENCOUNTER
I spoke with patient and will have surgery scheduling check with her PCPs office about documentation for coming off Plavix

## 2018-03-01 ENCOUNTER — TELEPHONE (OUTPATIENT)
Dept: FAMILY MEDICINE CLINIC | Facility: CLINIC | Age: 73
End: 2018-03-01

## 2018-03-01 ENCOUNTER — TELEPHONE (OUTPATIENT)
Dept: ORTHOPEDIC SURGERY | Facility: CLINIC | Age: 73
End: 2018-03-01

## 2018-03-01 DIAGNOSIS — E55.9 VITAMIN D DEFICIENCY: Primary | ICD-10-CM

## 2018-03-01 RX ORDER — CHOLECALCIFEROL (VITAMIN D3) 1250 MCG
50000 CAPSULE ORAL
Qty: 4 CAPSULE | Refills: 0 | Status: SHIPPED | OUTPATIENT
Start: 2018-03-01 | End: 2018-03-23

## 2018-03-01 NOTE — TELEPHONE ENCOUNTER
Serum vitamin D level was 32.2.  Have discussed this with the patient and have advised her that Dr. Jacob is recommending that she begin vitamin D 3 50,000 units once a week.  I have E prescribed a new prescription to [facility] pharmacy at [phone] for vitamin D 3 50,000 units, #4, directions her to take 1 capsule weekly.  Patient also will need to have a repeat serum vitamin D level in 4 weeks.  Per Dr. Jacob.  Patient also states that she did contact her primary care physician regarding stopping her Plavix prior to surgery.  The physician recommended 4-5 days prior to surgery.  Have advised her that she should discontinue the Plavix 5 days prior to surgery and will plan to restart it the day after her surgery

## 2018-03-01 NOTE — TELEPHONE ENCOUNTER
----- Message from Kade Jacob MD sent at 2/28/2018  1:31 PM EST -----  Please put on D3 50k weekly and recheck in 4 weeks, thanks

## 2018-03-01 NOTE — TELEPHONE ENCOUNTER
CALLED AND S/W PT AND ADVISED WHAT DR. LAWTON SAID. PT VOICED UNDERSTANDING.     ----- Message from Ravi Lawton MD sent at 3/1/2018  9:09 AM EST -----  Contact: PT  It should be okay for her to stop the Plavix 4-5 days before her knee scoping and she should be able to restart it the day after if okay with the orthopedists      ----- Message -----     From: Clare Mcgill MA     Sent: 3/1/2018   8:32 AM       To: MD DR. SHIRIN Gaitan,  PLEASE SEE BELOW AND ADVISE. THANK YOU.      ----- Message -----     From: Joselyn Anthony MA     Sent: 2/28/2018   3:00 PM       To: Clare Mcgill MA    PT SAYS SHE IS ABOUT TO HAVE OPERATION ON KNEE WHICH DR CARPIO IS AWARE OF         SHE WANTS TO HAVE VERBAL ON DAYS SHE CAN BE OFF PLAVIX      PT CAN BE REACHED AT [phone]

## 2018-03-06 ENCOUNTER — APPOINTMENT (OUTPATIENT)
Dept: PREADMISSION TESTING | Facility: HOSPITAL | Age: 73
End: 2018-03-06

## 2018-03-06 VITALS
BODY MASS INDEX: 28.61 KG/M2 | DIASTOLIC BLOOD PRESSURE: 83 MMHG | TEMPERATURE: 97 F | HEIGHT: 66 IN | WEIGHT: 178 LBS | RESPIRATION RATE: 20 BRPM | HEART RATE: 89 BPM | SYSTOLIC BLOOD PRESSURE: 143 MMHG | OXYGEN SATURATION: 97 %

## 2018-03-06 LAB
ANION GAP SERPL CALCULATED.3IONS-SCNC: 11.4 MMOL/L
BUN BLD-MCNC: 11 MG/DL (ref 8–23)
BUN/CREAT SERPL: 16.2 (ref 7–25)
CALCIUM SPEC-SCNC: 9.8 MG/DL (ref 8.6–10.5)
CHLORIDE SERPL-SCNC: 103 MMOL/L (ref 98–107)
CO2 SERPL-SCNC: 28.6 MMOL/L (ref 22–29)
CREAT BLD-MCNC: 0.68 MG/DL (ref 0.57–1)
DEPRECATED RDW RBC AUTO: 48.3 FL (ref 37–54)
ERYTHROCYTE [DISTWIDTH] IN BLOOD BY AUTOMATED COUNT: 13.4 % (ref 11.7–13)
GFR SERPL CREATININE-BSD FRML MDRD: 86 ML/MIN/1.73
GLUCOSE BLD-MCNC: 94 MG/DL (ref 65–99)
HCT VFR BLD AUTO: 42.8 % (ref 35.6–45.5)
HGB BLD-MCNC: 13.8 G/DL (ref 11.9–15.5)
MCH RBC QN AUTO: 31.5 PG (ref 26.9–32)
MCHC RBC AUTO-ENTMCNC: 32.2 G/DL (ref 32.4–36.3)
MCV RBC AUTO: 97.7 FL (ref 80.5–98.2)
PLATELET # BLD AUTO: 270 10*3/MM3 (ref 140–500)
PMV BLD AUTO: 11.2 FL (ref 6–12)
POTASSIUM BLD-SCNC: 4.3 MMOL/L (ref 3.5–5.2)
RBC # BLD AUTO: 4.38 10*6/MM3 (ref 3.9–5.2)
SODIUM BLD-SCNC: 143 MMOL/L (ref 136–145)
WBC NRBC COR # BLD: 5.51 10*3/MM3 (ref 4.5–10.7)

## 2018-03-06 PROCEDURE — 36415 COLL VENOUS BLD VENIPUNCTURE: CPT

## 2018-03-06 PROCEDURE — 85027 COMPLETE CBC AUTOMATED: CPT | Performed by: ORTHOPAEDIC SURGERY

## 2018-03-06 PROCEDURE — 80048 BASIC METABOLIC PNL TOTAL CA: CPT | Performed by: ORTHOPAEDIC SURGERY

## 2018-03-06 PROCEDURE — 93005 ELECTROCARDIOGRAM TRACING: CPT

## 2018-03-06 PROCEDURE — 93010 ELECTROCARDIOGRAM REPORT: CPT | Performed by: INTERNAL MEDICINE

## 2018-03-06 RX ORDER — CLOPIDOGREL BISULFATE 75 MG/1
75 TABLET ORAL DAILY
COMMUNITY
End: 2018-06-06 | Stop reason: SDUPTHER

## 2018-03-06 NOTE — DISCHARGE INSTRUCTIONS
Take the following medications the morning of surgery with a small sip of water:  NONE      General Instructions:  • Do not eat solid food after midnight the night before surgery.  • You may drink clear liquids day of surgery but must stop at least one hour before your hospital arrival time.  • It is beneficial for you to have a clear drink that contains carbohydrates the day of surgery.  We suggest a 12 to 20 ounce bottle of Gatorade or Powerade for non-diabetic patients or a 12 to 20 ounce bottle of G2 or Powerade Zero for diabetic patients. (Pediatric patients, are not advised to drink a 12 to 20 ounce carbohydrate drink)    Clear liquids are liquids you can see through.  Nothing red in color.     Plain water                               Sports drinks  Sodas                                   Gelatin (Jell-O)  Fruit juices without pulp such as white grape juice and apple juice  Popsicles that contain no fruit or yogurt  Tea or coffee (no cream or milk added)  Gatorade / Powerade  G2 / Powerade Zero    • Infants may have breast milk up to four hours before surgery.  • Infants drinking formula may drink formula up to six hours before surgery.   • Patients who avoid smoking, chewing tobacco and alcohol for 4 weeks prior to surgery have a reduced risk of post-operative complications.  Quit smoking as many days before surgery as you can.  • Do not smoke, use chewing tobacco or drink alcohol the day of surgery.   • If applicable bring your C-PAP/ BI-PAP machine.  • Bring any papers given to you in the doctor’s office.  • Wear clean comfortable clothes and socks.  • Do not wear contact lenses or make-up.  Bring a case for your glasses.   • Bring crutches or walker if applicable.  • Remove all piercings.  Leave jewelry and any other valuables at home.  • Hair extensions with metal clips must be removed prior to surgery.  • The Pre-Admission Testing nurse will instruct you to bring medications if unable to obtain an  accurate list in Pre-Admission Testing.        If you were given a blood bank ID arm band remember to bring it with you the day of surgery.    Preventing a Surgical Site Infection:  • For 2 to 3 days before surgery, avoid shaving with a razor because the razor can irritate skin and make it easier to develop an infection.  • The night prior to surgery sleep in a clean bed with clean clothing.  Do not allow pets to sleep with you.  • Shower on the morning of surgery using a fresh bar of anti-bacterial soap (such as Dial) and clean washcloth.  Dry with a clean towel and dress in clean clothing.  • Ask your surgeon if you will be receiving antibiotics prior to surgery.  • Make sure you, your family, and all healthcare providers clean their hands with soap and water or an alcohol based hand  before caring for you or your wound.    Day of surgery: 3/9/2018 ARRIVAL TIME 5:45 AM  Upon arrival, a Pre-op nurse and Anesthesiologist will review your health history, obtain vital signs, and answer questions you may have.  The only belongings needed at this time will be your home medications and if applicable your C-PAP/BI-PAP machine.  If you are staying overnight your family can leave the rest of your belongings in the car and bring them to your room later.  A Pre-op nurse will start an IV and you may receive medication in preparation for surgery, including something to help you relax.  Your family will be able to see you in the Pre-op area.  While you are in surgery your family should notify the waiting room  if they leave the waiting room area and provide a contact phone number.    Please be aware that surgery does come with discomfort.  We want to make every effort to control your discomfort so please discuss any uncontrolled symptoms with your nurse.   Your doctor will most likely have prescribed pain medications.      If you are going home after surgery you will receive individualized written care  instructions before being discharged.  A responsible adult must drive you to and from the hospital on the day of your surgery and stay with you for 24 hours.    If you are staying overnight following surgery, you will be transported to your hospital room following the recovery period.  [facility] has all private rooms.    If you have any questions please call Pre-Admission Testing at [phone].  Deductibles and co-payments are collected on the day of service. Please be prepared to pay the required co-pay, deductible or deposit on the day of service as defined by your plan.

## 2018-03-09 ENCOUNTER — ANESTHESIA (OUTPATIENT)
Dept: PERIOP | Facility: HOSPITAL | Age: 73
End: 2018-03-09

## 2018-03-09 ENCOUNTER — HOSPITAL ENCOUNTER (OUTPATIENT)
Facility: HOSPITAL | Age: 73
Setting detail: HOSPITAL OUTPATIENT SURGERY
Discharge: HOME | End: 2018-03-09
Attending: ORTHOPAEDIC SURGERY | Admitting: ORTHOPAEDIC SURGERY

## 2018-03-09 ENCOUNTER — APPOINTMENT (OUTPATIENT)
Dept: GENERAL RADIOLOGY | Facility: HOSPITAL | Age: 73
End: 2018-03-09

## 2018-03-09 ENCOUNTER — ANESTHESIA EVENT (OUTPATIENT)
Dept: PERIOP | Facility: HOSPITAL | Age: 73
End: 2018-03-09

## 2018-03-09 VITALS
OXYGEN SATURATION: 94 % | RESPIRATION RATE: 16 BRPM | WEIGHT: 177 LBS | BODY MASS INDEX: 28.57 KG/M2 | DIASTOLIC BLOOD PRESSURE: 85 MMHG | TEMPERATURE: 98 F | HEART RATE: 93 BPM | SYSTOLIC BLOOD PRESSURE: 138 MMHG

## 2018-03-09 DIAGNOSIS — S83.242D TEAR OF MEDIAL MENISCUS OF LEFT KNEE, CURRENT, UNSPECIFIED TEAR TYPE, SUBSEQUENT ENCOUNTER: ICD-10-CM

## 2018-03-09 DIAGNOSIS — S72.415A CLOSED NONDISPLACED FRACTURE OF CONDYLE OF LEFT FEMUR, INITIAL ENCOUNTER (HCC): ICD-10-CM

## 2018-03-09 PROCEDURE — 76000 FLUOROSCOPY <1 HR PHYS/QHP: CPT

## 2018-03-09 PROCEDURE — 25010000002 HYDROMORPHONE PER 4 MG: Performed by: ANESTHESIOLOGY

## 2018-03-09 PROCEDURE — 25010000002 FENTANYL CITRATE (PF) 100 MCG/2ML SOLUTION: Performed by: NURSE ANESTHETIST, CERTIFIED REGISTERED

## 2018-03-09 PROCEDURE — 25010000002 ONDANSETRON PER 1 MG: Performed by: NURSE ANESTHETIST, CERTIFIED REGISTERED

## 2018-03-09 PROCEDURE — 29881 ARTHRS KNE SRG MNISECTMY M/L: CPT | Performed by: ORTHOPAEDIC SURGERY

## 2018-03-09 PROCEDURE — 25010000002 MIDAZOLAM PER 1 MG: Performed by: ANESTHESIOLOGY

## 2018-03-09 PROCEDURE — 25010000002 ONDANSETRON PER 1 MG: Performed by: ANESTHESIOLOGY

## 2018-03-09 PROCEDURE — C1713 ANCHOR/SCREW BN/BN,TIS/BN: HCPCS | Performed by: ORTHOPAEDIC SURGERY

## 2018-03-09 PROCEDURE — 27509 TREATMENT OF THIGH FRACTURE: CPT | Performed by: ORTHOPAEDIC SURGERY

## 2018-03-09 PROCEDURE — 73560 X-RAY EXAM OF KNEE 1 OR 2: CPT

## 2018-03-09 PROCEDURE — 25010000002 PHENYLEPHRINE PER 1 ML: Performed by: NURSE ANESTHETIST, CERTIFIED REGISTERED

## 2018-03-09 PROCEDURE — 25010000002 PROPOFOL 10 MG/ML EMULSION: Performed by: NURSE ANESTHETIST, CERTIFIED REGISTERED

## 2018-03-09 PROCEDURE — 25010000002 DEXAMETHASONE PER 1 MG: Performed by: NURSE ANESTHETIST, CERTIFIED REGISTERED

## 2018-03-09 DEVICE — CMT BONE 5CC: Type: IMPLANTABLE DEVICE | Status: FUNCTIONAL

## 2018-03-09 DEVICE — KT KN SCP W/ACCUPORT DS BX4: Type: IMPLANTABLE DEVICE | Status: FUNCTIONAL

## 2018-03-09 RX ORDER — FENTANYL CITRATE 50 UG/ML
50 INJECTION, SOLUTION INTRAMUSCULAR; INTRAVENOUS
Status: DISCONTINUED | OUTPATIENT
Start: 2018-03-09 | End: 2018-03-09 | Stop reason: SDUPTHER

## 2018-03-09 RX ORDER — ONDANSETRON 2 MG/ML
INJECTION INTRAMUSCULAR; INTRAVENOUS AS NEEDED
Status: DISCONTINUED | OUTPATIENT
Start: 2018-03-09 | End: 2018-03-09 | Stop reason: SURG

## 2018-03-09 RX ORDER — CEFAZOLIN SODIUM 2 G/100ML
2 INJECTION, SOLUTION INTRAVENOUS ONCE
Status: DISCONTINUED | OUTPATIENT
Start: 2018-03-09 | End: 2018-03-09 | Stop reason: HOSPADM

## 2018-03-09 RX ORDER — PROMETHAZINE HYDROCHLORIDE 25 MG/ML
12.5 INJECTION, SOLUTION INTRAMUSCULAR; INTRAVENOUS ONCE AS NEEDED
Status: DISCONTINUED | OUTPATIENT
Start: 2018-03-09 | End: 2018-03-09 | Stop reason: SDUPTHER

## 2018-03-09 RX ORDER — OXYCODONE HYDROCHLORIDE AND ACETAMINOPHEN 5; 325 MG/1; MG/1
1 TABLET ORAL ONCE AS NEEDED
Status: DISCONTINUED | OUTPATIENT
Start: 2018-03-09 | End: 2018-03-09 | Stop reason: HOSPADM

## 2018-03-09 RX ORDER — HYDRALAZINE HYDROCHLORIDE 20 MG/ML
5 INJECTION INTRAMUSCULAR; INTRAVENOUS
Status: DISCONTINUED | OUTPATIENT
Start: 2018-03-09 | End: 2018-03-09 | Stop reason: HOSPADM

## 2018-03-09 RX ORDER — PROMETHAZINE HYDROCHLORIDE 25 MG/1
25 SUPPOSITORY RECTAL ONCE AS NEEDED
Status: DISCONTINUED | OUTPATIENT
Start: 2018-03-09 | End: 2018-03-09 | Stop reason: SDUPTHER

## 2018-03-09 RX ORDER — FENTANYL CITRATE 50 UG/ML
INJECTION, SOLUTION INTRAMUSCULAR; INTRAVENOUS AS NEEDED
Status: DISCONTINUED | OUTPATIENT
Start: 2018-03-09 | End: 2018-03-09 | Stop reason: SURG

## 2018-03-09 RX ORDER — FLUMAZENIL 0.1 MG/ML
0.2 INJECTION INTRAVENOUS AS NEEDED
Status: DISCONTINUED | OUTPATIENT
Start: 2018-03-09 | End: 2018-03-09 | Stop reason: SDUPTHER

## 2018-03-09 RX ORDER — LIDOCAINE HYDROCHLORIDE 20 MG/ML
INJECTION, SOLUTION INFILTRATION; PERINEURAL AS NEEDED
Status: DISCONTINUED | OUTPATIENT
Start: 2018-03-09 | End: 2018-03-09 | Stop reason: SURG

## 2018-03-09 RX ORDER — FLUMAZENIL 0.1 MG/ML
0.2 INJECTION INTRAVENOUS AS NEEDED
Status: DISCONTINUED | OUTPATIENT
Start: 2018-03-09 | End: 2018-03-09 | Stop reason: HOSPADM

## 2018-03-09 RX ORDER — NALOXONE HCL 0.4 MG/ML
0.2 VIAL (ML) INJECTION AS NEEDED
Status: DISCONTINUED | OUTPATIENT
Start: 2018-03-09 | End: 2018-03-09 | Stop reason: SDUPTHER

## 2018-03-09 RX ORDER — PROMETHAZINE HYDROCHLORIDE 25 MG/1
12.5 TABLET ORAL ONCE AS NEEDED
Status: DISCONTINUED | OUTPATIENT
Start: 2018-03-09 | End: 2018-03-09 | Stop reason: SDUPTHER

## 2018-03-09 RX ORDER — SODIUM CHLORIDE 0.9 % (FLUSH) 0.9 %
1-10 SYRINGE (ML) INJECTION AS NEEDED
Status: DISCONTINUED | OUTPATIENT
Start: 2018-03-09 | End: 2018-03-09 | Stop reason: HOSPADM

## 2018-03-09 RX ORDER — EPHEDRINE SULFATE 50 MG/ML
5 INJECTION, SOLUTION INTRAVENOUS ONCE AS NEEDED
Status: DISCONTINUED | OUTPATIENT
Start: 2018-03-09 | End: 2018-03-09 | Stop reason: HOSPADM

## 2018-03-09 RX ORDER — PROMETHAZINE HYDROCHLORIDE 25 MG/1
25 TABLET ORAL ONCE AS NEEDED
Status: DISCONTINUED | OUTPATIENT
Start: 2018-03-09 | End: 2018-03-09 | Stop reason: SDUPTHER

## 2018-03-09 RX ORDER — FAMOTIDINE 10 MG/ML
20 INJECTION, SOLUTION INTRAVENOUS ONCE
Status: COMPLETED | OUTPATIENT
Start: 2018-03-09 | End: 2018-03-09

## 2018-03-09 RX ORDER — DIPHENHYDRAMINE HYDROCHLORIDE 50 MG/ML
12.5 INJECTION INTRAMUSCULAR; INTRAVENOUS
Status: DISCONTINUED | OUTPATIENT
Start: 2018-03-09 | End: 2018-03-09 | Stop reason: HOSPADM

## 2018-03-09 RX ORDER — LABETALOL HYDROCHLORIDE 5 MG/ML
5 INJECTION, SOLUTION INTRAVENOUS
Status: DISCONTINUED | OUTPATIENT
Start: 2018-03-09 | End: 2018-03-09 | Stop reason: SDUPTHER

## 2018-03-09 RX ORDER — EPHEDRINE SULFATE 50 MG/ML
INJECTION, SOLUTION INTRAVENOUS AS NEEDED
Status: DISCONTINUED | OUTPATIENT
Start: 2018-03-09 | End: 2018-03-09 | Stop reason: SURG

## 2018-03-09 RX ORDER — LABETALOL HYDROCHLORIDE 5 MG/ML
5 INJECTION, SOLUTION INTRAVENOUS
Status: DISCONTINUED | OUTPATIENT
Start: 2018-03-09 | End: 2018-03-09 | Stop reason: HOSPADM

## 2018-03-09 RX ORDER — SODIUM CHLORIDE, SODIUM LACTATE, POTASSIUM CHLORIDE, CALCIUM CHLORIDE 600; 310; 30; 20 MG/100ML; MG/100ML; MG/100ML; MG/100ML
9 INJECTION, SOLUTION INTRAVENOUS CONTINUOUS
Status: DISCONTINUED | OUTPATIENT
Start: 2018-03-09 | End: 2018-03-09 | Stop reason: HOSPADM

## 2018-03-09 RX ORDER — CEPHALEXIN 500 MG/1
500 CAPSULE ORAL EVERY 6 HOURS
Qty: 8 CAPSULE | Refills: 0 | Status: SHIPPED | OUTPATIENT
Start: 2018-03-09 | End: 2018-03-11

## 2018-03-09 RX ORDER — PROMETHAZINE HYDROCHLORIDE 25 MG/ML
12.5 INJECTION, SOLUTION INTRAMUSCULAR; INTRAVENOUS ONCE AS NEEDED
Status: DISCONTINUED | OUTPATIENT
Start: 2018-03-09 | End: 2018-03-09 | Stop reason: HOSPADM

## 2018-03-09 RX ORDER — HYDROCODONE BITARTRATE AND ACETAMINOPHEN 7.5; 325 MG/1; MG/1
1 TABLET ORAL ONCE AS NEEDED
Status: DISCONTINUED | OUTPATIENT
Start: 2018-03-09 | End: 2018-03-09 | Stop reason: HOSPADM

## 2018-03-09 RX ORDER — OXYCODONE AND ACETAMINOPHEN 7.5; 325 MG/1; MG/1
1 TABLET ORAL ONCE AS NEEDED
Status: DISCONTINUED | OUTPATIENT
Start: 2018-03-09 | End: 2018-03-09 | Stop reason: SDUPTHER

## 2018-03-09 RX ORDER — ONDANSETRON 2 MG/ML
4 INJECTION INTRAMUSCULAR; INTRAVENOUS ONCE AS NEEDED
Status: COMPLETED | OUTPATIENT
Start: 2018-03-09 | End: 2018-03-09

## 2018-03-09 RX ORDER — OXYCODONE AND ACETAMINOPHEN 7.5; 325 MG/1; MG/1
1 TABLET ORAL ONCE AS NEEDED
Status: COMPLETED | OUTPATIENT
Start: 2018-03-09 | End: 2018-03-09

## 2018-03-09 RX ORDER — SODIUM CHLORIDE, SODIUM LACTATE, POTASSIUM CHLORIDE, AND CALCIUM CHLORIDE .6; .31; .03; .02 G/100ML; G/100ML; G/100ML; G/100ML
IRRIGANT IRRIGATION AS NEEDED
Status: DISCONTINUED | OUTPATIENT
Start: 2018-03-09 | End: 2018-03-09 | Stop reason: HOSPADM

## 2018-03-09 RX ORDER — NALOXONE HCL 0.4 MG/ML
0.2 VIAL (ML) INJECTION AS NEEDED
Status: DISCONTINUED | OUTPATIENT
Start: 2018-03-09 | End: 2018-03-09 | Stop reason: HOSPADM

## 2018-03-09 RX ORDER — PROMETHAZINE HYDROCHLORIDE 25 MG/1
12.5 TABLET ORAL ONCE AS NEEDED
Status: DISCONTINUED | OUTPATIENT
Start: 2018-03-09 | End: 2018-03-09 | Stop reason: HOSPADM

## 2018-03-09 RX ORDER — DIPHENHYDRAMINE HYDROCHLORIDE 50 MG/ML
12.5 INJECTION INTRAMUSCULAR; INTRAVENOUS
Status: DISCONTINUED | OUTPATIENT
Start: 2018-03-09 | End: 2018-03-09 | Stop reason: SDUPTHER

## 2018-03-09 RX ORDER — MIDAZOLAM HYDROCHLORIDE 1 MG/ML
2 INJECTION INTRAMUSCULAR; INTRAVENOUS
Status: DISCONTINUED | OUTPATIENT
Start: 2018-03-09 | End: 2018-03-09 | Stop reason: HOSPADM

## 2018-03-09 RX ORDER — PROMETHAZINE HYDROCHLORIDE 25 MG/1
25 SUPPOSITORY RECTAL ONCE AS NEEDED
Status: DISCONTINUED | OUTPATIENT
Start: 2018-03-09 | End: 2018-03-09 | Stop reason: HOSPADM

## 2018-03-09 RX ORDER — HYDRALAZINE HYDROCHLORIDE 20 MG/ML
5 INJECTION INTRAMUSCULAR; INTRAVENOUS
Status: DISCONTINUED | OUTPATIENT
Start: 2018-03-09 | End: 2018-03-09 | Stop reason: SDUPTHER

## 2018-03-09 RX ORDER — ONDANSETRON 2 MG/ML
4 INJECTION INTRAMUSCULAR; INTRAVENOUS ONCE AS NEEDED
Status: DISCONTINUED | OUTPATIENT
Start: 2018-03-09 | End: 2018-03-09 | Stop reason: SDUPTHER

## 2018-03-09 RX ORDER — MIDAZOLAM HYDROCHLORIDE 1 MG/ML
1 INJECTION INTRAMUSCULAR; INTRAVENOUS
Status: DISCONTINUED | OUTPATIENT
Start: 2018-03-09 | End: 2018-03-09 | Stop reason: HOSPADM

## 2018-03-09 RX ORDER — DEXAMETHASONE SODIUM PHOSPHATE 10 MG/ML
INJECTION INTRAMUSCULAR; INTRAVENOUS AS NEEDED
Status: DISCONTINUED | OUTPATIENT
Start: 2018-03-09 | End: 2018-03-09 | Stop reason: SURG

## 2018-03-09 RX ORDER — FENTANYL CITRATE 50 UG/ML
50 INJECTION, SOLUTION INTRAMUSCULAR; INTRAVENOUS
Status: DISCONTINUED | OUTPATIENT
Start: 2018-03-09 | End: 2018-03-09 | Stop reason: HOSPADM

## 2018-03-09 RX ORDER — EPHEDRINE SULFATE 50 MG/ML
5 INJECTION, SOLUTION INTRAVENOUS ONCE AS NEEDED
Status: DISCONTINUED | OUTPATIENT
Start: 2018-03-09 | End: 2018-03-09 | Stop reason: SDUPTHER

## 2018-03-09 RX ORDER — OXYCODONE HYDROCHLORIDE AND ACETAMINOPHEN 5; 325 MG/1; MG/1
1-2 TABLET ORAL EVERY 4 HOURS PRN
Qty: 60 TABLET | Refills: 0 | Status: SHIPPED | OUTPATIENT
Start: 2018-03-09 | End: 2018-03-29

## 2018-03-09 RX ORDER — BUPIVACAINE HYDROCHLORIDE AND EPINEPHRINE 5; 5 MG/ML; UG/ML
INJECTION, SOLUTION PERINEURAL AS NEEDED
Status: DISCONTINUED | OUTPATIENT
Start: 2018-03-09 | End: 2018-03-09 | Stop reason: HOSPADM

## 2018-03-09 RX ORDER — LIDOCAINE HYDROCHLORIDE 10 MG/ML
0.5 INJECTION, SOLUTION EPIDURAL; INFILTRATION; INTRACAUDAL; PERINEURAL ONCE AS NEEDED
Status: COMPLETED | OUTPATIENT
Start: 2018-03-09 | End: 2018-03-09

## 2018-03-09 RX ORDER — PROMETHAZINE HYDROCHLORIDE 25 MG/1
25 TABLET ORAL ONCE AS NEEDED
Status: DISCONTINUED | OUTPATIENT
Start: 2018-03-09 | End: 2018-03-09 | Stop reason: HOSPADM

## 2018-03-09 RX ORDER — PROPOFOL 10 MG/ML
VIAL (ML) INTRAVENOUS AS NEEDED
Status: DISCONTINUED | OUTPATIENT
Start: 2018-03-09 | End: 2018-03-09 | Stop reason: SURG

## 2018-03-09 RX ORDER — HYDROCODONE BITARTRATE AND ACETAMINOPHEN 7.5; 325 MG/1; MG/1
1 TABLET ORAL ONCE AS NEEDED
Status: DISCONTINUED | OUTPATIENT
Start: 2018-03-09 | End: 2018-03-09 | Stop reason: SDUPTHER

## 2018-03-09 RX ADMIN — FENTANYL CITRATE 25 MCG: 50 INJECTION INTRAMUSCULAR; INTRAVENOUS at 08:15

## 2018-03-09 RX ADMIN — LIDOCAINE HYDROCHLORIDE 100 MG: 20 INJECTION, SOLUTION INFILTRATION; PERINEURAL at 07:26

## 2018-03-09 RX ADMIN — EPHEDRINE SULFATE 10 MG: 50 INJECTION INTRAMUSCULAR; INTRAVENOUS; SUBCUTANEOUS at 08:01

## 2018-03-09 RX ADMIN — PHENYLEPHRINE HYDROCHLORIDE 150 MCG: 10 INJECTION INTRAVENOUS at 07:54

## 2018-03-09 RX ADMIN — HYDROMORPHONE HYDROCHLORIDE 0.5 MG: 2 INJECTION INTRAMUSCULAR; INTRAVENOUS; SUBCUTANEOUS at 09:30

## 2018-03-09 RX ADMIN — PHENYLEPHRINE HYDROCHLORIDE 100 MCG: 10 INJECTION INTRAVENOUS at 07:47

## 2018-03-09 RX ADMIN — PROPOFOL 150 MG: 10 INJECTION, EMULSION INTRAVENOUS at 07:26

## 2018-03-09 RX ADMIN — PHENYLEPHRINE HYDROCHLORIDE 100 MCG: 10 INJECTION INTRAVENOUS at 08:30

## 2018-03-09 RX ADMIN — HYDROMORPHONE HYDROCHLORIDE 0.5 MG: 2 INJECTION INTRAMUSCULAR; INTRAVENOUS; SUBCUTANEOUS at 09:50

## 2018-03-09 RX ADMIN — HYDROMORPHONE HYDROCHLORIDE 0.5 MG: 2 INJECTION INTRAMUSCULAR; INTRAVENOUS; SUBCUTANEOUS at 09:14

## 2018-03-09 RX ADMIN — PHENYLEPHRINE HYDROCHLORIDE 150 MCG: 10 INJECTION INTRAVENOUS at 07:43

## 2018-03-09 RX ADMIN — PHENYLEPHRINE HYDROCHLORIDE 100 MCG: 10 INJECTION INTRAVENOUS at 07:45

## 2018-03-09 RX ADMIN — SODIUM CHLORIDE, POTASSIUM CHLORIDE, SODIUM LACTATE AND CALCIUM CHLORIDE 9 ML/HR: 600; 310; 30; 20 INJECTION, SOLUTION INTRAVENOUS at 06:33

## 2018-03-09 RX ADMIN — FAMOTIDINE 20 MG: 10 INJECTION, SOLUTION INTRAVENOUS at 06:33

## 2018-03-09 RX ADMIN — ONDANSETRON 4 MG: 2 INJECTION INTRAMUSCULAR; INTRAVENOUS at 09:14

## 2018-03-09 RX ADMIN — FENTANYL CITRATE 25 MCG: 50 INJECTION INTRAMUSCULAR; INTRAVENOUS at 08:00

## 2018-03-09 RX ADMIN — ONDANSETRON 4 MG: 2 INJECTION INTRAMUSCULAR; INTRAVENOUS at 08:30

## 2018-03-09 RX ADMIN — MIDAZOLAM 1 MG: 1 INJECTION INTRAMUSCULAR; INTRAVENOUS at 06:33

## 2018-03-09 RX ADMIN — SODIUM CHLORIDE, POTASSIUM CHLORIDE, SODIUM LACTATE AND CALCIUM CHLORIDE: 600; 310; 30; 20 INJECTION, SOLUTION INTRAVENOUS at 07:20

## 2018-03-09 RX ADMIN — DEXAMETHASONE SODIUM PHOSPHATE 10 MG: 10 INJECTION INTRAMUSCULAR; INTRAVENOUS at 07:39

## 2018-03-09 RX ADMIN — EPHEDRINE SULFATE 10 MG: 50 INJECTION INTRAMUSCULAR; INTRAVENOUS; SUBCUTANEOUS at 08:13

## 2018-03-09 RX ADMIN — FENTANYL CITRATE 50 MCG: 50 INJECTION INTRAMUSCULAR; INTRAVENOUS at 07:35

## 2018-03-09 RX ADMIN — LIDOCAINE HYDROCHLORIDE 0.5 ML: 10 INJECTION, SOLUTION EPIDURAL; INFILTRATION; INTRACAUDAL; PERINEURAL at 06:32

## 2018-03-09 RX ADMIN — OXYCODONE HYDROCHLORIDE AND ACETAMINOPHEN 1 TABLET: 7.5; 325 TABLET ORAL at 09:40

## 2018-03-09 NOTE — ANESTHESIA POSTPROCEDURE EVALUATION
Patient: Judi Sy    Procedure Summary     Date Anesthesia Start Anesthesia Stop Room / Location    03/09/18 0720 0904  [facility]  /  [facility]       Procedure Diagnosis Surgeon Provider    LEFT KNEE ARTHROSCOPY, PARTIAL MEDIAL MENISCECTOMY, AND MEDIAL FEMORAL SUBCHONDROPLASTY  (Left Knee) Closed nondisplaced fracture of condyle of left femur, initial encounter; Tear of medial meniscus of left knee, current, unspecified tear type, subsequent encounter  (Closed nondisplaced fracture of condyle of left femur, initial encounter [S72.415A]; Tear of medial meniscus of left knee, current, unspecified tear type, subsequent encounter [S83.242D]) MD Shawn Velazquez MD          Anesthesia Type: general  Last vitals  BP   138/85 (03/09/18 1045)   Temp   36.7 °C (98 °F) (03/09/18 1030)   Pulse   93 (03/09/18 1045)   Resp   16 (03/09/18 1045)     SpO2   94 % (03/09/18 1045)     Post Anesthesia Care and Evaluation    Patient location during evaluation: bedside  Patient participation: complete - patient participated  Level of consciousness: awake  Pain score: 2  Pain management: adequate  Airway patency: patent  Anesthetic complications: No anesthetic complications  PONV Status: none  Cardiovascular status: acceptable  Respiratory status: acceptable  Hydration status: acceptable    Comments: /85  Pulse 93  Temp 36.7 °C (98 °F)  Resp 16  Wt 80.3 kg (177 lb)  SpO2 94%  BMI 28.57 kg/m2

## 2018-03-09 NOTE — ANESTHESIA PROCEDURE NOTES
Airway  Urgency: elective    Airway not difficult    General Information and Staff    Patient location during procedure: OR  Anesthesiologist: YOSELIN AMES  CRNA: TIFFANIE DU    Indications and Patient Condition  Indications for airway management: airway protection    Preoxygenated: yes  Mask difficulty assessment: 1 - vent by mask    Final Airway Details  Final airway type: supraglottic airway      Successful airway: unique  Size 4    Number of attempts at approach: 1    Additional Comments  Preoxygenated with 100% FiO2. Smooth IV induction. Atraumatic insertion. No change to dentition or soft tissue.

## 2018-03-15 ENCOUNTER — OFFICE VISIT (OUTPATIENT)
Dept: ORTHOPEDIC SURGERY | Facility: CLINIC | Age: 73
End: 2018-03-15

## 2018-03-15 VITALS — HEIGHT: 64 IN | TEMPERATURE: 98.6 F | WEIGHT: 178.2 LBS | BODY MASS INDEX: 30.42 KG/M2

## 2018-03-15 DIAGNOSIS — S72.415D CLOSED NONDISPLACED FRACTURE OF CONDYLE OF LEFT FEMUR WITH ROUTINE HEALING, SUBSEQUENT ENCOUNTER: ICD-10-CM

## 2018-03-15 DIAGNOSIS — M94.262 CHONDROMALACIA OF LEFT KNEE: ICD-10-CM

## 2018-03-15 DIAGNOSIS — S83.242D TEAR OF MEDIAL MENISCUS OF LEFT KNEE, CURRENT, UNSPECIFIED TEAR TYPE, SUBSEQUENT ENCOUNTER: Primary | ICD-10-CM

## 2018-03-15 PROCEDURE — 99024 POSTOP FOLLOW-UP VISIT: CPT | Performed by: ORTHOPAEDIC SURGERY

## 2018-03-15 NOTE — PROGRESS NOTES
"Knee Exam      Patient: Judi Sy    YOB: 1945 72 y.o. female    Chief Complaints: knee hurts but doing okay    History of Present Illness: Follows up left knee arthroscopy from 3/9/18 with partial medial meniscectomy and tricompartmental abrasion chondroplasty as well as sub-chondroplasty medial femoral condyle for stress fracture.    She states her pain has been well-controlled with mild intermittent aching pain she's been taking pain medication twice a day and it was constipating she is  going to switch to Tylenol.  She's been using a walker doing only partial weightbearing only mild complaints of intermittent pain.  HPI    ROS: knee pain, no fevers chills chest pain or shortness of breath  Past Medical History:   Diagnosis Date   • Aortic heart valve prolapse    • Arthritis    • High cholesterol    • History of atrial fibrillation    • Knee pain, left    • TIA (transient ischemic attack)    • Vasovagal syncope        Physical Exam:   Vitals:    03/15/18 1451   Temp: 98.6 °F (37 °C)   TempSrc: Temporal Artery    Weight: 80.8 kg (178 lb 3.2 oz)   Height: 162.6 cm (64\")     Well developed with normal mood.With her .  Left knee portals and medial incision are healing nicely without sign of infection.  She lacked about 5° from full extension and flexed to about 100°.  Left calf and thigh are nontender without sign of DVT.  There is no drainage from the portals.      Radiology: None performed      Assessment/Plan:  Is post left knee scope with medial femoral sub-chondroplasty for condylar fracture as well as partial medial meniscectomy and tricompartmental abrasion chondroplasty.    All of her results reviewed with her in detail.  Sutures removed Steri-Strips are applied she may let this get wet in the shower but understands not to immerse it.  She'll begin physical therapy at [facility] at her request.  Instructions were given for gentle range of motion and strengthening to " her buttock to continue with only partial weightbearing on her walker and should be on her walker for at least another 3-4 weeks.    Regarding her vitamin D deficiency she's currently on D3 weekly and understands to have her level rechecked in about 2 weeks prior to her next visit.  I want x-rays of her left knee at follow-up

## 2018-03-20 ENCOUNTER — TELEPHONE (OUTPATIENT)
Dept: ORTHOPEDIC SURGERY | Facility: CLINIC | Age: 73
End: 2018-03-20

## 2018-03-20 NOTE — TELEPHONE ENCOUNTER
Patient reports for the last several nights she's had very brief intermittent spasm in the right leg and wondered if she should be wearing any brace for her knee.  She does not report any numbness tingling chest pain shortness of breath or leg swelling.  I counseled her that I did not feel that a brace was necessary for this in the spasm should settle down if she becomes somewhat more active and starts physical therapy.    She appreciated the call

## 2018-03-22 ENCOUNTER — TREATMENT (OUTPATIENT)
Dept: PHYSICAL THERAPY | Facility: CLINIC | Age: 73
End: 2018-03-22

## 2018-03-22 DIAGNOSIS — M25.562 LEFT KNEE PAIN, UNSPECIFIED CHRONICITY: Primary | ICD-10-CM

## 2018-03-22 PROCEDURE — 97110 THERAPEUTIC EXERCISES: CPT | Performed by: PHYSICAL THERAPIST

## 2018-03-22 PROCEDURE — G8978 MOBILITY CURRENT STATUS: HCPCS | Performed by: PHYSICAL THERAPIST

## 2018-03-22 PROCEDURE — G8979 MOBILITY GOAL STATUS: HCPCS | Performed by: PHYSICAL THERAPIST

## 2018-03-22 PROCEDURE — 97161 PT EVAL LOW COMPLEX 20 MIN: CPT | Performed by: PHYSICAL THERAPIST

## 2018-03-22 PROCEDURE — G0283 ELEC STIM OTHER THAN WOUND: HCPCS | Performed by: PHYSICAL THERAPIST

## 2018-03-22 NOTE — PROGRESS NOTES
Physical Therapy Initial Evaluation and Plan of Care        Subjective Evaluation    History of Present Illness  Onset date: early 2018.  Date of surgery: 3/9/2018  Mechanism of injury: Bathtub - turned on it to flip over and had pain  (L) knee gives out since -   Then walking in [place] x 4 days -     Surgery - scope for MMT and femoral condyle fx            Patient Occupation: NA Pain  No pain reported  Current pain ratin  At worst pain ratin  Location: posterior, medial and ant knee  Quality: sharp and dull ache (Sharp/stabbing; ache; )  Alleviating factors: Heat / hot bath; Rest;   Aggravating factors: ambulation and prolonged positioning (Walking; Stairs; Walking up/down hill; Squatting; pivoting / twisting; )  Progression: improved    Social Support  Patient lives at: Two story home - w/ stairs -   Lives with: spouse    Diagnostic Tests  No diagnostic tests performed  MRI studies: abnormal (MMT, fem condyle fx)    Treatments  No previous or current treatments  Previous treatment: physical therapy  Current treatment: medication  Current treatment comments: Tylenol prn.     Patient Goals  Patient/family treatment goals: Pain alleviation; Normal mobility, gait, strength, function, and return to normal  hobby activiites -   Patient goal: return to walking 5 miles a day and go back on vacations           Objective       Observations   Left Knee   Positive for edema.     Additional Observation Details  Steri-strips over portals    Tenderness   Left Knee   Tenderness in the popliteal fossa.     Active Range of Motion   Left Knee   Flexion: 68 degrees   Extensor la degrees     Patellar Mobility   Left Knee Patellar tendons within functional limits include the medial and lateral. Hypomobile in the left superior and left inferior patellar tendon(s).     Strength/Myotome Testing     Left Knee   Flexion: 5  Extension: 4    Tests     Additional Tests Details  Neg Homans    Swelling     Left Knee  Girth Measurement (cm)   Joint line: 46 cm    Right Knee Girth Measurement (cm)   Joint line: 44.5.    Ambulation     Ambulation: Level Surfaces   Ambulation with assistive device: independent (rolling walker)    Additional Level Surfaces Ambulation Details  Stiff knee but heel-toe         Assessment & Plan     Assessment  Impairments: abnormal gait, abnormal or restricted ROM, impaired physical strength and pain with function  Assessment details: 72 F presents 2 wks s/p scope with notably limited ROM, edema, weakness, altered gait and limited ability for WB ADLs  Prognosis: good  Functional Limitations: walking, standing, stooping and unable to perform repetitive tasks  Goals  Plan Goals: STGs 2 Weeks  1. Inc AROM to >3-100 ° for ease with ADLs  2. Amb level surface and >4 inch steps with  min to no limp    3.  Able to safely resume light household ADLs    LTGs 6 weeks  1. Independent with HEP and joint protection principles  2. Ambulates level surface > 5 min and > 6 inch steps with min to no antalgia  3. AROM > 1-120 for ease with ADLs (in/out of car, tub, etc)  4. Strength > 4+/5 and stability sufficient to allow safe return to normal ADLs    Plan  Therapy options: will be seen for skilled physical therapy services  Planned modality interventions: cryotherapy, electrical stimulation/[place] stimulation and ultrasound  Planned therapy interventions: manual therapy, home exercise program, stretching, strengthening, body mechanics training and functional ROM exercises  Frequency: 2-3 x week.  Duration in weeks: 6  Treatment plan discussed with: patient        Manual Therapy:    0     mins  12640;  Therapeutic Exercise:    25     mins  43632;     Neuromuscular Lea:    0    mins  12201;    Therapeutic Activity:     0     mins  17923;     Gait Training:      3    mins  59097;     Ultrasound:     0     mins  29950;    Work Hardening           0      mins 86733  Iontophoresis               0   mins 74083    Timed  Treatment:   28   mins   Total Treatment:     68   mins    PT SIGNATURE: Opal Small, MARYJO   DATE TREATMENT INITIATED: 3/22/2018    Medicare Initial Certification  Certification Period: 6/20/2018  I certify that the therapy services are furnished while this patient is under my care.  The services outlined above are required by this patient, and will be reviewed every 90 days.     PHYSICIAN: Kade Jacob MD      DATE:     Please sign and return via fax to [phone].. Thank you, [facility].

## 2018-03-27 ENCOUNTER — TREATMENT (OUTPATIENT)
Dept: PHYSICAL THERAPY | Facility: CLINIC | Age: 73
End: 2018-03-27

## 2018-03-27 DIAGNOSIS — M25.562 LEFT KNEE PAIN, UNSPECIFIED CHRONICITY: Primary | ICD-10-CM

## 2018-03-27 PROCEDURE — 97110 THERAPEUTIC EXERCISES: CPT | Performed by: PHYSICAL THERAPIST

## 2018-03-27 PROCEDURE — 97530 THERAPEUTIC ACTIVITIES: CPT | Performed by: PHYSICAL THERAPIST

## 2018-03-27 NOTE — PROGRESS NOTES
Physical Therapy Daily Progress Note            Subjective   Doing well - the exercises help    Objective       Ambulation     Ambulation: Level Surfaces   Ambulation without assistive device: independent (std cane)    Additional Level Surfaces Ambulation Details  Heel-toe gait with minimal cues but still lacks full knee extension     See Exercise, Manual, and Modality Logs for complete treatment.       Assessment/Plan  Responding favorably to Rx with increasing flexion, improved gait and tolerance for progression of ex/activities but still with limited extension ROM      Assess for MD         Manual Therapy:    0     mins  67558;  Therapeutic Exercise:    35     mins  78882;     Neuromuscular Lea:    0    mins  77377;    Therapeutic Activity:     15     mins  17390;     Gait Trainin     mins  46812;     Ultrasound:     0     mins  59334;    Work Hardening           0      mins 87110  Iontophoresis               0   mins 07696    Timed Treatment:   50   mins   Total Treatment:     60   mins    Opal Small PT  Physical Therapist

## 2018-03-28 ENCOUNTER — LAB (OUTPATIENT)
Dept: LAB | Facility: HOSPITAL | Age: 73
End: 2018-03-28

## 2018-03-28 DIAGNOSIS — E55.9 VITAMIN D DEFICIENCY: ICD-10-CM

## 2018-03-28 LAB — 25(OH)D3 SERPL-MCNC: 55.2 NG/ML (ref 30–100)

## 2018-03-28 PROCEDURE — 36415 COLL VENOUS BLD VENIPUNCTURE: CPT

## 2018-03-28 PROCEDURE — 82306 VITAMIN D 25 HYDROXY: CPT

## 2018-03-29 ENCOUNTER — OFFICE VISIT (OUTPATIENT)
Dept: ORTHOPEDIC SURGERY | Facility: CLINIC | Age: 73
End: 2018-03-29

## 2018-03-29 ENCOUNTER — TREATMENT (OUTPATIENT)
Dept: PHYSICAL THERAPY | Facility: CLINIC | Age: 73
End: 2018-03-29

## 2018-03-29 VITALS — BODY MASS INDEX: 30.22 KG/M2 | TEMPERATURE: 98.9 F | HEIGHT: 64 IN | WEIGHT: 177 LBS

## 2018-03-29 DIAGNOSIS — M25.562 LEFT KNEE PAIN, UNSPECIFIED CHRONICITY: Primary | ICD-10-CM

## 2018-03-29 DIAGNOSIS — S72.415D CLOSED NONDISPLACED FRACTURE OF CONDYLE OF LEFT FEMUR WITH ROUTINE HEALING, SUBSEQUENT ENCOUNTER: Primary | ICD-10-CM

## 2018-03-29 DIAGNOSIS — S83.242D TEAR OF MEDIAL MENISCUS OF LEFT KNEE, CURRENT, UNSPECIFIED TEAR TYPE, SUBSEQUENT ENCOUNTER: ICD-10-CM

## 2018-03-29 DIAGNOSIS — E55.9 VITAMIN D DEFICIENCY: ICD-10-CM

## 2018-03-29 DIAGNOSIS — M17.12 PRIMARY LOCALIZED OSTEOARTHROSIS OF LEFT LOWER LEG: ICD-10-CM

## 2018-03-29 PROCEDURE — 73562 X-RAY EXAM OF KNEE 3: CPT | Performed by: ORTHOPAEDIC SURGERY

## 2018-03-29 PROCEDURE — 97530 THERAPEUTIC ACTIVITIES: CPT | Performed by: PHYSICAL THERAPIST

## 2018-03-29 PROCEDURE — 97110 THERAPEUTIC EXERCISES: CPT | Performed by: PHYSICAL THERAPIST

## 2018-03-29 PROCEDURE — 99024 POSTOP FOLLOW-UP VISIT: CPT | Performed by: ORTHOPAEDIC SURGERY

## 2018-03-29 NOTE — PROGRESS NOTES
Physical Therapy  Progress Note          3/29/2018  Kade Jacob MD    Re: Judi Sy  ________________________________________________________________    Ms. Judi Sy, has attended 3/3 PT sessions since her surgery.  Treatment has consisted of: there-ex, HEP, modalities prn and patient education     S: Ms. Judi Sy states: pain is not as bad and can get in/out of car much easier      Subjective     Objective       Observations   Left Knee   Positive for edema.     Additional Observation Details  Steri-strips over portals    Tenderness   Left Knee   Tenderness in the popliteal fossa.     Active Range of Motion   Left Knee   Flexion: 98 degrees   Extensor la degrees     Patellar Mobility   Left Knee Patellar tendons within functional limits include the medial and lateral. Hypomobile in the left superior and left inferior patellar tendon(s).     Strength/Myotome Testing     Left Knee   Flexion: 5  Extension: 4+    Swelling     Left Knee Girth Measurement (cm)   Joint line: 46 cm    Right Knee Girth Measurement (cm)   Joint line: 44.5.    Ambulation     Ambulation: Level Surfaces   Ambulation with assistive device: independent (std cane)    Additional Level Surfaces Ambulation Details  Stiff knee but heel-toe     See Exercise, Manual, and Modality Logs for complete treatment.       Assessment/Plan    ROM has improved since eval but is still moderately limited.  Strength has improved and edema is unchanged but minimal.  Needs continued PT to focus on restoring functional ROM, gait and return to normal ADLs.    Please advise after your exam.             Manual Therapy:    5     mins  81737;  Therapeutic Exercise:    50     mins  10803;     Neuromuscular Lea:    0    mins  12745;    Therapeutic Activity:     15     mins  85371;     Gait Trainin     mins  46524;     Ultrasound:     0     mins  35052;    Work Hardening           0      mins 68184  Iontophoresis               0    mins 53359    Timed Treatment:   70   mins   Total Treatment:     76   mins    Opal Small, PT  Physical Therapist

## 2018-04-02 ENCOUNTER — TREATMENT (OUTPATIENT)
Dept: PHYSICAL THERAPY | Facility: CLINIC | Age: 73
End: 2018-04-02

## 2018-04-02 DIAGNOSIS — M17.12 PRIMARY OSTEOARTHRITIS OF LEFT KNEE: ICD-10-CM

## 2018-04-02 DIAGNOSIS — M25.562 LEFT KNEE PAIN, UNSPECIFIED CHRONICITY: Primary | ICD-10-CM

## 2018-04-02 PROCEDURE — 97110 THERAPEUTIC EXERCISES: CPT | Performed by: PHYSICAL THERAPIST

## 2018-04-02 PROCEDURE — 97140 MANUAL THERAPY 1/> REGIONS: CPT | Performed by: PHYSICAL THERAPIST

## 2018-04-02 NOTE — PROGRESS NOTES
Physical Therapy Daily Progress Note    Visit # : 13  Judi Sy reports: stiffness in my knee persists.      Subjective     Objective   See Exercise, Manual, and Modality Logs for complete treatment.       Assessment/Plan  Soft end feel with end range stretching; noting HS guarding with extension stretch.  Pt is cautious and slow with gait and is avoiding use of AD.  Progress per Plan of Care           Manual Therapy:    15     mins  38867;  Therapeutic Exercise:    8     mins  03240;     Neuromuscular Lea:    -    mins  71330;    Therapeutic Activity:     -     mins  23469;     Gait Training:      -     mins  92529;     Ultrasound:     -     mins  89083;    Electrical Stimulation:    20     mins  06841 ( ); set up by tech so no charge  Iontophoresis                 -     mins 21070      Timed Treatment:   23   mins direct  Total Treatment:     60   mins        Savi Anthony PT  Physical Therapist  [place] License # [id number]

## 2018-04-06 ENCOUNTER — TREATMENT (OUTPATIENT)
Dept: PHYSICAL THERAPY | Facility: CLINIC | Age: 73
End: 2018-04-06

## 2018-04-06 DIAGNOSIS — M17.12 PRIMARY OSTEOARTHRITIS OF LEFT KNEE: ICD-10-CM

## 2018-04-06 DIAGNOSIS — S83.207S ACUTE MENISCAL TEAR OF KNEE, LEFT, SEQUELA: ICD-10-CM

## 2018-04-06 DIAGNOSIS — M25.562 LEFT KNEE PAIN, UNSPECIFIED CHRONICITY: Primary | ICD-10-CM

## 2018-04-06 PROCEDURE — 97110 THERAPEUTIC EXERCISES: CPT | Performed by: PHYSICAL THERAPIST

## 2018-04-06 PROCEDURE — 97140 MANUAL THERAPY 1/> REGIONS: CPT | Performed by: PHYSICAL THERAPIST

## 2018-04-06 NOTE — PATIENT INSTRUCTIONS
Access Code: [email or web address]   URL: [email or web address]   Date: 04/06/2018   Prepared by: Paulette Anthony     Exercises   Prone Terminal Knee Extension - 10 reps - 1 sets - 5 hold - 1x daily   Long Sitting Quad Set - 10 reps - 1 sets - 10 hold - 2x daily   Supine Short Arc Quad - 20 reps - 1 sets - 5 hold - 2x daily   Seated Long Arc Quad - 20 reps - 1 sets - 3 hold - 2x daily   Hooklying Single Knee to Chest Stretch - 2 reps - 1 sets - 20 hold - 2x daily   Supine Heel Slide with Strap - 10 reps - 1 sets - 10 hold - 2x daily   Toe Raises with Counter Support - 20 reps - 1 sets - 1x daily   Mini Squat with Counter Support - 15 reps - 1 sets - 1x daily   Forward Step Up - 15 reps - 1 sets - 1x daily   Lateral Step Ups - 15 reps - 1 sets - 1x daily   Seated Hamstring Stretch with Strap - 2 reps - 1 sets - 20 hold - 2x daily   Seated Knee Extension Stretch with Chair - 1 sets - 10 minutes hold - 1x daily

## 2018-04-06 NOTE — PROGRESS NOTES
Physical Therapy Daily Progress Note    Visit # : 14  Judi Sy reports: started using a cane. Has fallen off her bed a couple of times at home doing the prone knee hang    Subjective     Objective   See Exercise, Manual, and Modality Logs for complete treatment.   AAROM knee flex 120 with strap  Pt unable to sit to stand without UE assist    Assessment/Plan  ROM is improving; attempted Airdyne but pt was unable to perform safely.  Pt required tactile cuing to avoid lateral shift.    Progress per Plan of Care           Manual Therapy:    10     mins  36780;  Therapeutic Exercise:    30     mins  97193;     Neuromuscular Lea:    -    mins  47695;    Therapeutic Activity:     5     mins  87584;     Gait Training:      -     mins  17189;     Ultrasound:     -     mins  55594;    Electrical Stimulation:    -     mins  94989 ( );  Iontophoresis                 -     mins 42117      Timed Treatment:   45   mins direct  Total Treatment:     70   mins        Savi Anthony PT  Physical Therapist  [place] License # [id number]

## 2018-04-09 ENCOUNTER — TREATMENT (OUTPATIENT)
Dept: PHYSICAL THERAPY | Facility: CLINIC | Age: 73
End: 2018-04-09

## 2018-04-09 DIAGNOSIS — M17.12 PRIMARY OSTEOARTHRITIS OF LEFT KNEE: ICD-10-CM

## 2018-04-09 DIAGNOSIS — M25.562 LEFT KNEE PAIN, UNSPECIFIED CHRONICITY: Primary | ICD-10-CM

## 2018-04-09 PROCEDURE — 97140 MANUAL THERAPY 1/> REGIONS: CPT | Performed by: PHYSICAL THERAPIST

## 2018-04-09 PROCEDURE — 97110 THERAPEUTIC EXERCISES: CPT | Performed by: PHYSICAL THERAPIST

## 2018-04-09 NOTE — PATIENT INSTRUCTIONS
Access Code: [email or web address]   URL: [email or web address]   Date: 04/09/2018   Prepared by: Paulette Anthony     Program Notes   Perform the mat exercises on your bed     Exercises   Prone Terminal Knee Extension - 10 reps - 1 sets - 5 hold - 1x daily   Long Sitting Quad Set - 10 reps - 1 sets - 10 hold - 2x daily   Supine Short Arc Quad - 20 reps - 1 sets - 5 hold - 2x daily   Seated Long Arc Quad - 20 reps - 1 sets - 3 hold - 2x daily   Hooklying Single Knee to Chest Stretch - 2 reps - 1 sets - 20 hold - 2x daily   Supine Heel Slide with Strap - 10 reps - 1 sets - 10 hold - 2x daily   Toe Raises with Counter Support - 20 reps - 1 sets - 1x daily   Sit to Stand with Arms Crossed - 10-15 reps - 1 sets - 1x daily   Forward Step Up - 15 reps - 1 sets - 1x daily   Lateral Step Ups - 15 reps - 1 sets - 1x daily   Seated Hamstring Stretch with Strap - 2 reps - 1 sets - 20 hold - 2x daily   Seated Knee Extension Stretch with Chair - 1 sets - 10 minutes hold - 1x daily

## 2018-04-09 NOTE — PROGRESS NOTES
Physical Therapy Daily Progress Note    Visit # : 15  Judi Sy reports: knee is feeling better; has been working on getting out of chair without using arms    Subjective     Objective   See Exercise, Manual, and Modality Logs for complete treatment.   Knee flex with strap 122    Assessment/Plan  Pt was able to perform 6 sit to stands without UE assist though progressively shifted to the R.  ROM continues to improve.   Progress per Plan of Care           Manual Therapy:    10     mins  07393;  Therapeutic Exercise:    20     mins  85538;     Neuromuscular Lea:    -    mins  69205;    Therapeutic Activity:     -     mins  42854;     Gait Training:      -     mins  18648;     Ultrasound:     -     mins  67374;    Electrical Stimulation:    -     mins  06089 ( );  Iontophoresis                 -     mins 69990      Timed Treatment:  30    mins direct  Total Treatment:     70   mins        Savi Anthony PT  Physical Therapist  [place] License # [id number]

## 2018-04-12 ENCOUNTER — TREATMENT (OUTPATIENT)
Dept: PHYSICAL THERAPY | Facility: CLINIC | Age: 73
End: 2018-04-12

## 2018-04-12 DIAGNOSIS — S83.242D TEAR OF MEDIAL MENISCUS OF LEFT KNEE, CURRENT, UNSPECIFIED TEAR TYPE, SUBSEQUENT ENCOUNTER: ICD-10-CM

## 2018-04-12 DIAGNOSIS — S72.415D CLOSED NONDISPLACED FRACTURE OF CONDYLE OF LEFT FEMUR WITH ROUTINE HEALING, SUBSEQUENT ENCOUNTER: Primary | ICD-10-CM

## 2018-04-12 PROCEDURE — 97110 THERAPEUTIC EXERCISES: CPT | Performed by: PHYSICAL THERAPIST

## 2018-04-12 PROCEDURE — 97140 MANUAL THERAPY 1/> REGIONS: CPT | Performed by: PHYSICAL THERAPIST

## 2018-04-12 NOTE — PROGRESS NOTES
"Physical Therapy Daily Progress Note    Visit # : 16  Judi Sy reports: knee is feeling better; able to go up and down the stairs with greater ease.  No longer needing cane for community ambulation.    Subjective     Objective   See Exercise, Manual, and Modality Logs for complete treatment.       Assessment/Plan  Good tolerance to exercise progression.  Knee extension remains limited but is tolerating greater force of knee extension stretch with manual interventions. Pt exhibited improved squat pattern with minimal R lateral shift with sit to stand from low table (20\") vs chair (17.5\")  Progress per Plan of Care           Manual Therapy:    8     mins  26920;  Therapeutic Exercise:    17     mins  53954;     Neuromuscular Lea:    -    mins  42206;    Therapeutic Activity:     5     mins  02050;     Gait Training:      -     mins  07161;     Ultrasound:     -     mins  00351;    Electrical Stimulation:    -     mins  83575 ( );  Iontophoresis                 -     mins 13911      Timed Treatment:   30   mins direct  Total Treatment:     65   mins        Savi Anthony PT  Physical Therapist  [place] License # [id number]      "

## 2018-04-16 ENCOUNTER — TREATMENT (OUTPATIENT)
Dept: PHYSICAL THERAPY | Facility: CLINIC | Age: 73
End: 2018-04-16

## 2018-04-16 DIAGNOSIS — S72.415D CLOSED NONDISPLACED FRACTURE OF CONDYLE OF LEFT FEMUR WITH ROUTINE HEALING, SUBSEQUENT ENCOUNTER: Primary | ICD-10-CM

## 2018-04-16 DIAGNOSIS — M25.562 LEFT KNEE PAIN, UNSPECIFIED CHRONICITY: ICD-10-CM

## 2018-04-16 DIAGNOSIS — S83.242D TEAR OF MEDIAL MENISCUS OF LEFT KNEE, CURRENT, UNSPECIFIED TEAR TYPE, SUBSEQUENT ENCOUNTER: ICD-10-CM

## 2018-04-16 PROCEDURE — 97110 THERAPEUTIC EXERCISES: CPT | Performed by: PHYSICAL THERAPIST

## 2018-04-16 PROCEDURE — 97140 MANUAL THERAPY 1/> REGIONS: CPT | Performed by: PHYSICAL THERAPIST

## 2018-04-16 NOTE — PROGRESS NOTES
Physical Therapy Daily Progress Note    Visit # : 17  Judi Sy reports: my knee is stiff    Subjective     Objective   See Exercise, Manual, and Modality Logs for complete treatment.       Assessment/Plan  Pt was able to perform full, slow revolutions on recumbent bike.  Muscle guarding today with manual extension stretching.  Progress per Plan of Care           Manual Therapy:    8     mins  62095;  Therapeutic Exercise:    17     mins  80392;     Neuromuscular Lea:    -    mins  13700;    Therapeutic Activity:     -     mins  12764;     Gait Training:      -     mins  75412;     Ultrasound:     -     mins  65189;    Electrical Stimulation:    -     mins  47547 ( );  Iontophoresis                 -     mins 26489      Timed Treatment:   25   mins direct  Total Treatment:     75   mins        Savi Anthony PT  Physical Therapist  [place] License # [id number]

## 2018-04-19 ENCOUNTER — TREATMENT (OUTPATIENT)
Dept: PHYSICAL THERAPY | Facility: CLINIC | Age: 73
End: 2018-04-19

## 2018-04-19 DIAGNOSIS — M17.12 PRIMARY OSTEOARTHRITIS OF LEFT KNEE: ICD-10-CM

## 2018-04-19 DIAGNOSIS — S83.242D TEAR OF MEDIAL MENISCUS OF LEFT KNEE, CURRENT, UNSPECIFIED TEAR TYPE, SUBSEQUENT ENCOUNTER: ICD-10-CM

## 2018-04-19 DIAGNOSIS — Z98.890 H/O LEFT KNEE SURGERY: ICD-10-CM

## 2018-04-19 DIAGNOSIS — S83.207S ACUTE MENISCAL TEAR OF KNEE, LEFT, SEQUELA: ICD-10-CM

## 2018-04-19 DIAGNOSIS — M25.562 LEFT KNEE PAIN, UNSPECIFIED CHRONICITY: ICD-10-CM

## 2018-04-19 DIAGNOSIS — M94.262 CHONDROMALACIA OF LEFT KNEE: ICD-10-CM

## 2018-04-19 DIAGNOSIS — S72.415D CLOSED NONDISPLACED FRACTURE OF CONDYLE OF LEFT FEMUR WITH ROUTINE HEALING, SUBSEQUENT ENCOUNTER: Primary | ICD-10-CM

## 2018-04-19 PROCEDURE — 97140 MANUAL THERAPY 1/> REGIONS: CPT | Performed by: PHYSICAL THERAPIST

## 2018-04-19 PROCEDURE — 97112 NEUROMUSCULAR REEDUCATION: CPT | Performed by: PHYSICAL THERAPIST

## 2018-04-19 PROCEDURE — 97110 THERAPEUTIC EXERCISES: CPT | Performed by: PHYSICAL THERAPIST

## 2018-04-19 NOTE — PROGRESS NOTES
Physical Therapy Daily Progress Note    Patient Name: Judi Sy         :  1945  Referring Physician: Kade Jacob MD    Time In 0930  Time Out 1050    Subjective   Judi Sy reports: stiffness in posterior knee - unable to fully extend her knee (L) - Intermittent pain in posterior knee (L)    Objective   Pt ambulating w/o AD with flexed (L) knee posture - mildly antalgic -   Diffusely swollen (L) knee -   Very tight and tender posterior knee centrally and up (B) distal HS and proximal gastroc mm and tendons (L) with multiple trigger points  AROM:  120+-deg flexion; 15-deg knee Ext (L)    See Exercise, Manual, and Modality Logs for complete treatment.     Functional / Therapeutic Activities:   min  · TAPING / BRACING:   · Jt protection, ADL modification; Posture and      Assessment/Plan  S/P Knee sx 3/09/2018; LPMM; abrasion chondroplasty Med fem condyle; lat tibial plateau, patella and trochlea;   Knee OA; MMT; Chondromalacia medial femoral condyle, lat tib plateau, patella and trochlea (L)  Limited mobility, gait, and function -   Pt would benefit from continued Physical Therapy =     Progress per Plan of Care       _________________________________________________  Manual Therapy:    20     mins  85358;  Therapeutic Exercise:    25     mins  62550;     Neuromuscular Lea:    10    mins  38983;    Therapeutic Activity:          mins  45334;     Gait Training:           mins  56166;     Ultrasound:     10     mins  31234;    Electrical Stimulation:         mins  54742 ( );  Dry Needling          mins self-pay    Timed Treatment:   65   mins                  Total Treatment:     80   mins    Salazar Conteh PT  Physical Therapist

## 2018-04-24 ENCOUNTER — TREATMENT (OUTPATIENT)
Dept: PHYSICAL THERAPY | Facility: CLINIC | Age: 73
End: 2018-04-24

## 2018-04-24 DIAGNOSIS — Z98.890 H/O LEFT KNEE SURGERY: ICD-10-CM

## 2018-04-24 DIAGNOSIS — S83.242D TEAR OF MEDIAL MENISCUS OF LEFT KNEE, CURRENT, UNSPECIFIED TEAR TYPE, SUBSEQUENT ENCOUNTER: Primary | ICD-10-CM

## 2018-04-24 DIAGNOSIS — M17.12 PRIMARY OSTEOARTHRITIS OF LEFT KNEE: ICD-10-CM

## 2018-04-24 PROCEDURE — 97140 MANUAL THERAPY 1/> REGIONS: CPT | Performed by: PHYSICAL THERAPIST

## 2018-04-24 PROCEDURE — 97112 NEUROMUSCULAR REEDUCATION: CPT | Performed by: PHYSICAL THERAPIST

## 2018-04-24 PROCEDURE — 97530 THERAPEUTIC ACTIVITIES: CPT | Performed by: PHYSICAL THERAPIST

## 2018-04-24 PROCEDURE — 97110 THERAPEUTIC EXERCISES: CPT | Performed by: PHYSICAL THERAPIST

## 2018-05-01 ENCOUNTER — TREATMENT (OUTPATIENT)
Dept: PHYSICAL THERAPY | Facility: CLINIC | Age: 73
End: 2018-05-01

## 2018-05-01 DIAGNOSIS — S72.415D CLOSED NONDISPLACED FRACTURE OF CONDYLE OF LEFT FEMUR WITH ROUTINE HEALING, SUBSEQUENT ENCOUNTER: Primary | ICD-10-CM

## 2018-05-01 DIAGNOSIS — Z98.890 H/O LEFT KNEE SURGERY: ICD-10-CM

## 2018-05-01 DIAGNOSIS — S83.242D TEAR OF MEDIAL MENISCUS OF LEFT KNEE, CURRENT, UNSPECIFIED TEAR TYPE, SUBSEQUENT ENCOUNTER: ICD-10-CM

## 2018-05-01 DIAGNOSIS — M17.12 PRIMARY OSTEOARTHRITIS OF LEFT KNEE: ICD-10-CM

## 2018-05-01 PROCEDURE — 97112 NEUROMUSCULAR REEDUCATION: CPT | Performed by: PHYSICAL THERAPIST

## 2018-05-01 PROCEDURE — 97530 THERAPEUTIC ACTIVITIES: CPT | Performed by: PHYSICAL THERAPIST

## 2018-05-01 PROCEDURE — 97110 THERAPEUTIC EXERCISES: CPT | Performed by: PHYSICAL THERAPIST

## 2018-05-01 PROCEDURE — 97140 MANUAL THERAPY 1/> REGIONS: CPT | Performed by: PHYSICAL THERAPIST

## 2018-05-03 ENCOUNTER — TREATMENT (OUTPATIENT)
Dept: PHYSICAL THERAPY | Facility: CLINIC | Age: 73
End: 2018-05-03

## 2018-05-03 DIAGNOSIS — S83.242D TEAR OF MEDIAL MENISCUS OF LEFT KNEE, CURRENT, UNSPECIFIED TEAR TYPE, SUBSEQUENT ENCOUNTER: ICD-10-CM

## 2018-05-03 DIAGNOSIS — Z98.890 H/O LEFT KNEE SURGERY: ICD-10-CM

## 2018-05-03 DIAGNOSIS — S72.415D CLOSED NONDISPLACED FRACTURE OF CONDYLE OF LEFT FEMUR WITH ROUTINE HEALING, SUBSEQUENT ENCOUNTER: Primary | ICD-10-CM

## 2018-05-03 DIAGNOSIS — M17.12 PRIMARY OSTEOARTHRITIS OF LEFT KNEE: ICD-10-CM

## 2018-05-03 PROCEDURE — 97110 THERAPEUTIC EXERCISES: CPT | Performed by: PHYSICAL THERAPIST

## 2018-05-03 PROCEDURE — 97112 NEUROMUSCULAR REEDUCATION: CPT | Performed by: PHYSICAL THERAPIST

## 2018-05-03 PROCEDURE — 97140 MANUAL THERAPY 1/> REGIONS: CPT | Performed by: PHYSICAL THERAPIST

## 2018-05-03 PROCEDURE — 97530 THERAPEUTIC ACTIVITIES: CPT | Performed by: PHYSICAL THERAPIST

## 2018-05-03 NOTE — PROGRESS NOTES
Physical Therapy Daily Progress Note     Patient Name: Judi Sy         :  1945  Referring Physician: Kade Jacob MD     Time In 0930               Time Out 1100     Subjective   Judi Sy reports: doing better after her last session with improved mobility and decreased pain - Working on knee extension at home by bending over to wash her baseboards - stiffness in posterior knee - More able to fully extend her knee (L) - Notes increased pain medial (L) knee with prolonged walking and stairs -       Objective   Pt ambulating w/o AD with flexed (L) knee posture - mildly antalgic -   Diffusely swollen (L) knee -   Tight and tender posterior knee centrally and up (B) distal HS and proximal gastroc mm and tendons (L) with multiple trigger points  AROM:  130-135-deg flexion; 8-5-deg knee Ext (L)     See Exercise, Manual, and Modality Logs for complete treatment.     Functional / Therapeutic Activities:  15 min  · TAPING / BRACING: K-Tape to unload medial knee joint (L) x2 w/ 1 X  · See Flow Sheet -   · Jt protection, ADL modification; Posture and       Assessment/Plan  S/P Knee sx 3/09/2018; Left Partial Medial Menisectomy; abrasion chondroplasty Med fem condyle; lat tibial plateau, patella and trochlea;   Knee OA; MMT; Chondromalacia medial femoral condyle, lat tib plateau, patella and trochlea (L)  Limited mobility, gait, and function due to flexed knee posture -   Taping alleviated medial knee pain (L) -   Pt would benefit from continued Physical Therapy -      Progress per Plan of Care   _________________________________________________  Manual Therapy:            20     mins  17819;  Therapeutic Exercise:    25     mins  06406;     Neuromuscular Lea:    10    mins  27816;    Therapeutic Activity:     15     mins  82233;     Gait Training:                      mins  48607;     Ultrasound:                     10     mins  78504;    Electrical Stimulation:         mins  23968  ( );  Dry Needling                       mins self-pay     Timed Treatment:   80   mins                  Total Treatment:     90   mins     Salazar Conteh, PT  Physical Therapist

## 2018-05-07 ENCOUNTER — TREATMENT (OUTPATIENT)
Dept: PHYSICAL THERAPY | Facility: CLINIC | Age: 73
End: 2018-05-07

## 2018-05-07 DIAGNOSIS — S72.415D CLOSED NONDISPLACED FRACTURE OF CONDYLE OF LEFT FEMUR WITH ROUTINE HEALING, SUBSEQUENT ENCOUNTER: Primary | ICD-10-CM

## 2018-05-07 DIAGNOSIS — Z98.890 H/O LEFT KNEE SURGERY: ICD-10-CM

## 2018-05-07 DIAGNOSIS — M17.12 PRIMARY OSTEOARTHRITIS OF LEFT KNEE: ICD-10-CM

## 2018-05-07 DIAGNOSIS — S83.242D TEAR OF MEDIAL MENISCUS OF LEFT KNEE, CURRENT, UNSPECIFIED TEAR TYPE, SUBSEQUENT ENCOUNTER: ICD-10-CM

## 2018-05-07 PROCEDURE — 97530 THERAPEUTIC ACTIVITIES: CPT | Performed by: PHYSICAL THERAPIST

## 2018-05-07 PROCEDURE — 97140 MANUAL THERAPY 1/> REGIONS: CPT | Performed by: PHYSICAL THERAPIST

## 2018-05-07 PROCEDURE — 97112 NEUROMUSCULAR REEDUCATION: CPT | Performed by: PHYSICAL THERAPIST

## 2018-05-07 PROCEDURE — 97110 THERAPEUTIC EXERCISES: CPT | Performed by: PHYSICAL THERAPIST

## 2018-05-07 NOTE — PROGRESS NOTES
Physical Therapy Daily Progress Note     Patient Name: Judi Sy         :  1945  Referring Physician: Kade Jacob MD     Time In 0930               Time Out 1110     Subjective   Judi Sy reports: doing better after her last session with improved mobility and decreased pain - Working on knee extension at home by bending over to wash her baseboards - stiffness in posterior knee - More able to fully extend her knee (L) - Notes increased pain medial (L) knee with prolonged walking and stairs -       Objective   Pt ambulating w/o AD with flexed (L) knee posture - mildly antalgic -   Diffusely swollen (L) knee -   Tight and tender posterior knee centrally and up (B) distal HS and proximal gastroc mm and tendons (L) with multiple trigger points  AROM:  130-135-deg flexion; 8-5-deg knee Ext (L)     See Exercise, Manual, and Modality Logs for complete treatment.     Functional / Therapeutic Activities:  25 min  · TAPING / BRACING: K-Tape to unload medial knee joint (L) x2 w/ 1 X  · See Flow Sheet -   · Jt protection, ADL modification; Posture and       Assessment/Plan  S/P Knee sx 3/09/2018; Left Partial Medial Menisectomy; abrasion chondroplasty Med fem condyle; lat tibial plateau, patella and trochlea;   Knee OA; MMT; Chondromalacia medial femoral condyle, lat tib plateau, patella and trochlea (L)  Limited mobility, gait, and function due to flexed knee posture -   Taping alleviated medial knee pain (L) -   Pt would benefit from continued Physical Therapy -      Progress per Plan of Care   _________________________________________________  Manual Therapy:            15     mins  11232;  Therapeutic Exercise:    25     mins  78501;     Neuromuscular Lea:    10    mins  37073;    Therapeutic Activity:     25     mins  67988;     Gait Training:                      mins  24181;     Ultrasound:                     10     mins  51605;    Electrical Stimulation:         mins  70103  ( );  Dry Needling                       mins self-pay     Timed Treatment:   85   mins                  Total Treatment:     100   mins     Salazar Conteh, PT  Physical Therapist

## 2018-05-07 NOTE — PROGRESS NOTES
------------------------------------------------------------------------------------------------------   MD PROGRESS NOTE  Time In 0930     Time Out 1040    Patient: Judi Sy        : 1945  Diagnosis/ICD-10 Code:  H/O (L) Knee surgery; Knee OA; Closed nondisplaced fx of condyle of Left femur; Torsten of medial meniscus;   Referring practitioner: Kade Jacob*  Date of Initial Visit: 2018                  Today's Date: 2018  _________________________________________________________________  Dr. Jacob,     Thank you for the referral of Mrs. Sy to [facility].  Mrs. Sy has attended 13 PT sessions and their treatment has consisted of: modalities prn, manual therapy, therapeutic exercise, Kinesio Taping, patient education, and HEP.     Subjective   Judi Sy reports: decreased pain and improved mobility, strength, gait, and function.  She notes intermittent medial (L) knee pain and feeling of stability with WB-ing activities.  She notes improved endurance ambulating w/o her cane -  She notes that taping reduces her medial knee pain and improves her feeling of stability -   ___________________________________________________________________  Objective              OBSERVATION: Pt ambulating with flexed (L) knee posturing and IR of (L) LE, but with minimal complaints of pain -               PALPATION: Tender along medial joint line (L) knee - Decreased tightness and tenderness posterior (L) knee, HS and gastrocs -         AROM: Full knee flexion;  Knee ext to 5-deg -    STRENGTH: Improved firing of quad / VMO -    SPECIAL TESTS: Pain with rotational stress tests medial (L) knee -   ACTIVITY TOLERANCE: Improved tolerance to light ADL's, but limited with prolonged walking, stairs / LLE activitiesand still having difficulties with ambulating with a flexed (L) knee -     See Exercise, Manual, and Modality Logs for complete treatment.      Functional /  Therapeutic Activities:  X 25 min  · TAPING / BRACING: K-Tape to unload medial knee joint (L) x2 w/ 1 X   · See Flow Sheet -   · FUNCTIONAL ASSESSMENT -   · Jt protection, ADL modification; Posture and    ___________________________________________________________________   Assessment/Plan  S/P Knee sx 3/09/2018; Left Partial Medial Menisectomy; abrasion chondroplasty Med fem condyle; lat tibial plateau, patella and trochlea;   Knee OA; MMT; Chondromalacia medial femoral condyle, lat tib plateau, patella and trochlea (L)  Limited mobility, gait, and function due to flexed knee posture -   Taping alleviated medial knee pain (L) -   Mrs. Sy may benefit from continued Physical Therapy.     P: Recommend continued Physical Therapy to allow a full and safe return to ADL's and normal job duties 2 times/wk x 4 weeks.    Please advise after your exam.    Thank you again for this referral of Mrs. Sy to [facility].    PT Signature: ______________________________   Salazar Conteh, PT  ______________________________________________________  Based upon review of the patient's progress and continued therapy plan, it is my medical opinion that Judi Sy should continue physical therapy treatment per the recommendation above.     Signature: ____________________________   Date: ______    Kade Jacob MD  ____________________________________________________________________  Manual Therapy:            15     mins  56555;  Therapeutic Exercise:    20     mins  91285;     Neuromuscular Lea:  10      mins  40667;    Therapeutic Activity:     25     mins  69276;     Gait Training:                      mins  73977;     Ultrasound:                          mins  06681;    Electrical Stimulation:         mins  63647 ( );  Dry Needling                       mins self-pay     Timed Treatment:   70   mins                  Total Treatment:      80   mins  ______________________________________________________________________  [address] [facility] [place] [address]  Phone: [phone] Fax: [phone]

## 2018-05-09 ENCOUNTER — LAB (OUTPATIENT)
Dept: LAB | Facility: HOSPITAL | Age: 73
End: 2018-05-09

## 2018-05-09 DIAGNOSIS — E55.9 VITAMIN D DEFICIENCY: ICD-10-CM

## 2018-05-09 LAB — 25(OH)D3 SERPL-MCNC: 57 NG/ML (ref 30–100)

## 2018-05-09 PROCEDURE — 36415 COLL VENOUS BLD VENIPUNCTURE: CPT

## 2018-05-09 PROCEDURE — 82306 VITAMIN D 25 HYDROXY: CPT

## 2018-05-10 ENCOUNTER — OFFICE VISIT (OUTPATIENT)
Dept: ORTHOPEDIC SURGERY | Facility: CLINIC | Age: 73
End: 2018-05-10

## 2018-05-10 VITALS — WEIGHT: 175 LBS | HEIGHT: 64 IN | BODY MASS INDEX: 29.88 KG/M2 | TEMPERATURE: 98.5 F

## 2018-05-10 DIAGNOSIS — Z96.652 STATUS POST TOTAL LEFT KNEE REPLACEMENT: ICD-10-CM

## 2018-05-10 DIAGNOSIS — E55.9 VITAMIN D DEFICIENCY: ICD-10-CM

## 2018-05-10 DIAGNOSIS — M94.262 CHONDROMALACIA OF LEFT KNEE: ICD-10-CM

## 2018-05-10 DIAGNOSIS — S72.415D CLOSED NONDISPLACED FRACTURE OF CONDYLE OF LEFT FEMUR WITH ROUTINE HEALING, SUBSEQUENT ENCOUNTER: ICD-10-CM

## 2018-05-10 DIAGNOSIS — S83.207S ACUTE MENISCAL TEAR OF KNEE, LEFT, SEQUELA: ICD-10-CM

## 2018-05-10 PROCEDURE — 73562 X-RAY EXAM OF KNEE 3: CPT | Performed by: ORTHOPAEDIC SURGERY

## 2018-05-10 PROCEDURE — 99024 POSTOP FOLLOW-UP VISIT: CPT | Performed by: ORTHOPAEDIC SURGERY

## 2018-05-11 NOTE — PROGRESS NOTES
"Knee Exam      Patient: Judi Sy    YOB: 1945 72 y.o. female    Chief Complaints: knee doing okay but hurts sometimes    History of Present Illness: Patient had left knee medial femoral sub-chondroplasty as well as partial medial meniscectomy and tricompartmental abrasion chondroplasty on 3/9/18.  She was last seen on 3/29/18 and has since advanced activities.  She has tried walking up to 2 miles a day but gets tired and has had some improvement with that with use of a cane.  She gets some mild achiness towards the end of the day with some slight swelling that improves significantly with rest.  She states that overall her knee feels much better than it did before surgery and she was even actually able to \"clean her baseboards\".  HPI    ROS: knee pain  Past Medical History:   Diagnosis Date   • Aortic heart valve prolapse    • Arthritis    • High cholesterol    • History of atrial fibrillation    • Knee pain, left    • TIA (transient ischemic attack)    • Vasovagal syncope        Physical Exam:   Vitals:    05/10/18 1506   Temp: 98.5 °F (36.9 °C)   Weight: 79.4 kg (175 lb)   Height: 162.6 cm (64\")     Well developed with normal mood.Nonantalgic gait.  Left knee shows minimal discomfort over the medial joint line and no focal discomfort over the medial tibial plateau.  0-125° range of motion with minimal discomfort on patellofemoral compression.  No exacerbation of pain with Bernie's.  Left calf and thigh are nontender without sign of DVT      Radiology: 3 views left knee ordered to evaluate pain reviewed and compared with previous x-rays.  There is evidence of sub-chondroplasty but no evidence of extravasation into the joint.  There is joint space narrowing but no sniffing change compared with previous x-rays.    Vitamin D 5/9/18 57.0      Assessment/Plan:  1.  Status post left knee scope with partial medial meniscectomy, abrasion chondroplasty and medial femoral sub-chondroplasty.  She " continues to improve and overall is better than she was before surgery but understands that she may have some persistent pain from arthritic change.  I recommended that she decrease her attempted mileage to maybe 1 mile a day and use her cane as needed and continue working on strengthening exercises with physical therapy until they have plateaued and she will continue with home exercises.    She declined injection today but understands that may be a possibility if she has persistent or worsening pain.    2.  Regarding her vitamin D level she is markedly improved up from 32.2 on 2/28/18.  At her last visit on 3/28/18 we reviewed that her level was 55.2 and since then she has been taking 5000 units daily.    She will continue with 5000 units daily and we will recheck this again in 6 weeks.    Follow-up in 6 weeks' x-rays of left knee if having pain

## 2018-05-14 ENCOUNTER — TREATMENT (OUTPATIENT)
Dept: PHYSICAL THERAPY | Facility: CLINIC | Age: 73
End: 2018-05-14

## 2018-05-14 DIAGNOSIS — Z98.890 H/O LEFT KNEE SURGERY: ICD-10-CM

## 2018-05-14 DIAGNOSIS — S83.242D TEAR OF MEDIAL MENISCUS OF LEFT KNEE, CURRENT, UNSPECIFIED TEAR TYPE, SUBSEQUENT ENCOUNTER: ICD-10-CM

## 2018-05-14 DIAGNOSIS — M17.12 PRIMARY OSTEOARTHRITIS OF LEFT KNEE: ICD-10-CM

## 2018-05-14 DIAGNOSIS — S72.415D CLOSED NONDISPLACED FRACTURE OF CONDYLE OF LEFT FEMUR WITH ROUTINE HEALING, SUBSEQUENT ENCOUNTER: Primary | ICD-10-CM

## 2018-05-14 PROCEDURE — 97530 THERAPEUTIC ACTIVITIES: CPT | Performed by: PHYSICAL THERAPIST

## 2018-05-14 PROCEDURE — 97112 NEUROMUSCULAR REEDUCATION: CPT | Performed by: PHYSICAL THERAPIST

## 2018-05-14 PROCEDURE — 97110 THERAPEUTIC EXERCISES: CPT | Performed by: PHYSICAL THERAPIST

## 2018-05-14 RX ORDER — ATORVASTATIN CALCIUM 20 MG/1
TABLET, FILM COATED ORAL
Qty: 90 TABLET | Refills: 1 | Status: SHIPPED | OUTPATIENT
Start: 2018-05-14 | End: 2018-10-11 | Stop reason: SDUPTHER

## 2018-05-14 NOTE — PROGRESS NOTES
Physical Therapy Daily Progress Note     Patient Name: Judi Sy         :  1945  Referring Physician: Kade Jacob MD     Time In 1000               Time Out 1105     Subjective   Judi Sy reports: seeing MD who was very pleased with her progress and Pt reports continued improvement with decreased pain and improved mobility and function.   Working on knee extension at home by bending over to wash her baseboards - Much less stiffness in posterior knee - More able to fully extend her knee (L) - Notes much less pain medial (L) knee and able to walk farther without pain -   She has returned to [employer] and plans on going to the gym with her  in the near future  -       Objective   Pt ambulating w/o AD with grossly decreased flexed (L) knee posture - non-antalgic -   Diffusely swollen (L) knee -   Tight and tender posterior knee centrally and up (B) distal HS and proximal gastroc mm and tendons (L) with multiple trigger points  AROM:  130-135-deg flexion; 8-5-deg knee Ext (L)     See Exercise, Manual, and Modality Logs for complete treatment.     Functional / Therapeutic Activities:  20 min  · TAPING / BRACING: NA  · See Flow Sheet -   · Jt protection, ADL modification; Posture and       Assessment/Plan  S/P Knee sx 3/09/2018; Left Partial Medial Menisectomy; abrasion chondroplasty Med fem condyle; lat tibial plateau, patella and trochlea;   Knee OA; MMT; Chondromalacia medial femoral condyle, lat tib plateau, patella and trochlea (L)  Limited mobility, gait, and function due to flexed knee posture -   Taping alleviated medial knee pain (L) -   Pt would benefit from one last PT session to progress HEP for transition to gym  -      Progress per Plan of Care with probable DC to HEP next session -    _________________________________________________  Manual Therapy:                 mins  35203;  Therapeutic Exercise:    25     mins  89825;     Neuromuscular Lea:    10    mins   23858;    Therapeutic Activity:     20     mins  05739;     Gait Training:                      mins  80291;     Ultrasound:                          mins  87834;    Electrical Stimulation:         mins  15492 ( );  Dry Needling                       mins self-pay     Timed Treatment:   55   mins                  Total Treatment:     65   mins     Salazar Conteh, PT  Physical Therapist

## 2018-05-22 ENCOUNTER — TREATMENT (OUTPATIENT)
Dept: PHYSICAL THERAPY | Facility: CLINIC | Age: 73
End: 2018-05-22

## 2018-05-22 DIAGNOSIS — S72.415D CLOSED NONDISPLACED FRACTURE OF CONDYLE OF LEFT FEMUR WITH ROUTINE HEALING, SUBSEQUENT ENCOUNTER: Primary | ICD-10-CM

## 2018-05-22 DIAGNOSIS — Z98.890 H/O LEFT KNEE SURGERY: ICD-10-CM

## 2018-05-22 DIAGNOSIS — M94.262 CHONDROMALACIA OF LEFT KNEE: ICD-10-CM

## 2018-05-22 DIAGNOSIS — M25.562 LEFT KNEE PAIN, UNSPECIFIED CHRONICITY: ICD-10-CM

## 2018-05-22 DIAGNOSIS — M17.12 PRIMARY OSTEOARTHRITIS OF LEFT KNEE: ICD-10-CM

## 2018-05-22 DIAGNOSIS — S83.242D TEAR OF MEDIAL MENISCUS OF LEFT KNEE, CURRENT, UNSPECIFIED TEAR TYPE, SUBSEQUENT ENCOUNTER: ICD-10-CM

## 2018-05-22 PROCEDURE — 97112 NEUROMUSCULAR REEDUCATION: CPT | Performed by: PHYSICAL THERAPIST

## 2018-05-22 PROCEDURE — 97530 THERAPEUTIC ACTIVITIES: CPT | Performed by: PHYSICAL THERAPIST

## 2018-05-22 PROCEDURE — 97110 THERAPEUTIC EXERCISES: CPT | Performed by: PHYSICAL THERAPIST

## 2018-05-23 DIAGNOSIS — E78.2 MIXED HYPERLIPIDEMIA: ICD-10-CM

## 2018-05-23 DIAGNOSIS — E55.9 VITAMIN D DEFICIENCY: ICD-10-CM

## 2018-05-28 NOTE — PROGRESS NOTES
Physical Therapy Daily Progress Note / DC Note     Patient Name: Judi Sy         :  1945  Referring Physician: Kade Jacob MD     Time In 0930               Time Out 1045     Subjective   Judi Sy reports: being very pleased with her progress and Pt reports continued improvement with decreased pain and improved mobility and function.   Working on knee extension at home by bending over to wash her baseboards - Much less stiffness in posterior knee - More able to fully extend her knee (L) - Notes much less pain medial (L) knee and able to walk farther without pain = walking over 2-2.5 miles w/o pain / problems-   She has returned to [employer] and plans on going to the gym with her  in the near future  -    She wants to learn how to walk on Treadmill with (B) LE's     Objective   Pt ambulating w/o AD with much improved WB-ing and improved knee extension LLE -   Much less tenderness posterior (L) knee -   AROM:  WFL     See Exercise, Manual, and Modality Logs for complete treatment.     Functional / Therapeutic Activities:  20 min  · TAPING / BRACING: NA  · See Flow Sheet -   · Reviewed HEP, Pt learned how to ambulate safely on TM with (B) Efren and discussed progression at Gym, etc -   · Jt protection, ADL modification; Posture and       Assessment/Plan  S/P Knee sx 3/09/2018; Left Partial Medial Menisectomy; abrasion chondroplasty Med fem condyle; lat tibial plateau, patella and trochlea;   Knee OA; MMT; Chondromalacia medial femoral condyle, lat tib plateau, patella and trochlea (L)    Pt much improved with decreased pain and improved mobility, gait, and function -   Pt would benefit from continuing with HEP and progress  to gym and Yoga -   Goals met -      DC PT to HEP   _________________________________________________  Manual Therapy:                 mins  54406;  Therapeutic Exercise:    25     mins  68004;     Neuromuscular Lea:    10    mins  62186;     Therapeutic Activity:     20     mins  24301;     Gait Training:                      mins  81519;     Ultrasound:                          mins  68220;    Electrical Stimulation:         mins  99774 ( );  Dry Needling                       mins self-pay     Timed Treatment:   55   mins                  Total Treatment:     75   mins     Salazar Conteh, PT  Physical Therapist

## 2018-05-29 RX ORDER — CLOPIDOGREL BISULFATE 75 MG/1
TABLET ORAL
Qty: 90 TABLET | Refills: 0 | Status: SHIPPED | OUTPATIENT
Start: 2018-05-29 | End: 2018-08-27 | Stop reason: SDUPTHER

## 2018-06-01 LAB
25(OH)D3+25(OH)D2 SERPL-MCNC: 47.7 NG/ML (ref 30–100)
ALBUMIN SERPL-MCNC: 4.3 G/DL (ref 3.5–5.2)
ALBUMIN/GLOB SERPL: 2 G/DL
ALP SERPL-CCNC: 82 U/L (ref 39–117)
ALT SERPL-CCNC: 10 U/L (ref 1–33)
AST SERPL-CCNC: 8 U/L (ref 1–32)
BILIRUB SERPL-MCNC: 0.4 MG/DL (ref 0.1–1.2)
BUN SERPL-MCNC: 15 MG/DL (ref 8–23)
BUN/CREAT SERPL: 20.8 (ref 7–25)
CALCIUM SERPL-MCNC: 9.5 MG/DL (ref 8.6–10.5)
CHLORIDE SERPL-SCNC: 102 MMOL/L (ref 98–107)
CHOLEST SERPL-MCNC: 172 MG/DL (ref 100–199)
CO2 SERPL-SCNC: 28.5 MMOL/L (ref 22–29)
CREAT SERPL-MCNC: 0.72 MG/DL (ref 0.57–1)
GFR SERPLBLD CREATININE-BSD FMLA CKD-EPI: 80 ML/MIN/1.73
GFR SERPLBLD CREATININE-BSD FMLA CKD-EPI: 97 ML/MIN/1.73
GLOBULIN SER CALC-MCNC: 2.1 GM/DL
GLUCOSE SERPL-MCNC: 93 MG/DL (ref 65–99)
HDL SERPL-SCNC: 34.6 UMOL/L
HDLC SERPL-MCNC: 49 MG/DL
LDL SERPL QN: 20.9 NM
LDL SERPL-SCNC: 1188 NMOL/L
LDL SMALL SERPL-SCNC: 296 NMOL/L
LDLC SERPL CALC-MCNC: 100 MG/DL (ref 0–99)
POTASSIUM SERPL-SCNC: 4.5 MMOL/L (ref 3.5–5.2)
PROT SERPL-MCNC: 6.4 G/DL (ref 6–8.5)
SODIUM SERPL-SCNC: 144 MMOL/L (ref 136–145)
TRIGL SERPL-MCNC: 117 MG/DL (ref 0–149)
TSH SERPL DL<=0.005 MIU/L-ACNC: 3.31 MIU/ML (ref 0.27–4.2)

## 2018-06-06 ENCOUNTER — OFFICE VISIT (OUTPATIENT)
Dept: FAMILY MEDICINE CLINIC | Facility: CLINIC | Age: 73
End: 2018-06-06

## 2018-06-06 VITALS
OXYGEN SATURATION: 98 % | HEART RATE: 58 BPM | HEIGHT: 64 IN | SYSTOLIC BLOOD PRESSURE: 120 MMHG | BODY MASS INDEX: 29.23 KG/M2 | WEIGHT: 171.2 LBS | TEMPERATURE: 98.4 F | DIASTOLIC BLOOD PRESSURE: 80 MMHG

## 2018-06-06 DIAGNOSIS — R03.0 PRE-HYPERTENSION: ICD-10-CM

## 2018-06-06 DIAGNOSIS — E78.2 MIXED HYPERLIPIDEMIA: ICD-10-CM

## 2018-06-06 DIAGNOSIS — E55.9 VITAMIN D DEFICIENCY: Primary | ICD-10-CM

## 2018-06-06 PROCEDURE — 99214 OFFICE O/P EST MOD 30 MIN: CPT | Performed by: INTERNAL MEDICINE

## 2018-06-06 NOTE — PROGRESS NOTES
Subjective   Judi Sy is a 72 y.o. female. Patient is here today for follow-up on her pre-hypertension, hyperlipidemia, vitamin D deficiency.  Since SR last she is had a recent leg fracture that is healed.  She's back to walking.  She generally is feeling well without any chest pain, shortness of breath, edema or myalgias and is taking vitamin D 5000 units daily.  Chief Complaint   Patient presents with   • Hyperlipidemia     VITAMIN D- FOLLOW UP LABS          Vitals:    06/06/18 0953   BP: 120/80   Pulse: 58   Temp: 98.4 °F (36.9 °C)   SpO2: 98%     The following portions of the patient's history were reviewed and updated as appropriate: allergies, current medications, past family history, past medical history, past social history, past surgical history and problem list.    Past Medical History:   Diagnosis Date   • Aortic heart valve prolapse    • Arthritis    • High cholesterol    • History of atrial fibrillation    • Knee pain, left    • TIA (transient ischemic attack)    • Vasovagal syncope       Allergies   Allergen Reactions   • Latex Rash      Social History     Social History   • Marital status:      Spouse name: N/A   • Number of children: N/A   • Years of education: N/A     Occupational History   • Not on file.     Social History Main Topics   • Smoking status: Former Smoker     Types: Cigarettes   • Smokeless tobacco: Never Used      Comment: QUIT 7 YEARS AGO   • Alcohol use No   • Drug use: No   • Sexual activity: Defer     Other Topics Concern   • Not on file     Social History Narrative   • No narrative on file        Current Outpatient Prescriptions:   •  atorvastatin (LIPITOR) 20 MG tablet, TAKE ONE TABLET BY MOUTH DAILY, Disp: 90 tablet, Rfl: 1  •  Calcium Citrate-Vitamin D 250-200 MG-UNIT tablet, Take 1 tablet by mouth Every Morning. HOLD FOR SURGERY, Disp: , Rfl:   •  clopidogrel (PLAVIX) 75 MG tablet, TAKE ONE TABLET BY MOUTH DAILY, Disp: 90 tablet, Rfl: 0  •  Multiple Vitamin  (MULTI-VITAMIN) tablet, Take 1 tablet by mouth Daily. HOLD FOR SURGERY, Disp: , Rfl:      Objective     History of Present Illness     Review of Systems   Constitutional: Negative.    HENT: Negative.    Eyes: Negative.    Respiratory: Negative.    Cardiovascular: Negative.    Gastrointestinal: Negative.    Genitourinary: Negative.    Musculoskeletal: Negative.    Skin: Negative.    Neurological: Negative.    Psychiatric/Behavioral: Negative.        Physical Exam   Constitutional: She is oriented to person, place, and time. She appears well-developed and well-nourished.   Pleasant, cooperative and in no distress blood pressure 120/80   HENT:   Head: Normocephalic and atraumatic.   Eyes: Conjunctivae are normal. Pupils are equal, round, and reactive to light. No scleral icterus.   Neck: Normal range of motion. Neck supple.   Cardiovascular: Normal rate, regular rhythm and normal heart sounds.    Pulmonary/Chest: Effort normal and breath sounds normal. No respiratory distress. She has no wheezes. She has no rales.   Musculoskeletal: Normal range of motion. She exhibits no edema.   Neurological: She is alert and oriented to person, place, and time.   Skin: Skin is warm and dry.   Psychiatric: She has a normal mood and affect. Her behavior is normal.   Nursing note and vitals reviewed.      ASSESSMENT  CMP, TSH and vitamin D level were all normal.  NMR lipid panel is stable with total cholesterol 172, HDL 49,  the particle number in the moderate range  #1-pre-hypertension with normal pressure today  #2-hyperlipidemia, adequate control on medication  #3-history of vitamin D deficiency, normal level on replacement  #4-healed left leg fracture     Problem List Items Addressed This Visit        Cardiovascular and Mediastinum    Hyperlipidemia    Pre-hypertension       Digestive    Vitamin D deficiency - Primary          PLAN  The patient will continue current medicines as now.  I recommended shingles and hepatitis A  immunizations.  I plan on rechecking her in 4 months with a CBC, CMP, NMR lipid panel and vitamin D level    There are no Patient Instructions on file for this visit.  Return in about 4 months (around 10/6/2018) for with labs.

## 2018-06-21 ENCOUNTER — OFFICE VISIT (OUTPATIENT)
Dept: ORTHOPEDIC SURGERY | Facility: CLINIC | Age: 73
End: 2018-06-21

## 2018-06-21 VITALS — WEIGHT: 170.8 LBS | TEMPERATURE: 98.2 F | BODY MASS INDEX: 29.16 KG/M2 | HEIGHT: 64 IN

## 2018-06-21 DIAGNOSIS — E55.9 VITAMIN D DEFICIENCY: ICD-10-CM

## 2018-06-21 DIAGNOSIS — M94.262 CHONDROMALACIA OF LEFT KNEE: ICD-10-CM

## 2018-06-21 DIAGNOSIS — S72.415D CLOSED NONDISPLACED FRACTURE OF CONDYLE OF LEFT FEMUR WITH ROUTINE HEALING, SUBSEQUENT ENCOUNTER: Primary | ICD-10-CM

## 2018-06-21 DIAGNOSIS — S83.242D TEAR OF MEDIAL MENISCUS OF LEFT KNEE, CURRENT, UNSPECIFIED TEAR TYPE, SUBSEQUENT ENCOUNTER: ICD-10-CM

## 2018-06-21 PROCEDURE — 99212 OFFICE O/P EST SF 10 MIN: CPT | Performed by: ORTHOPAEDIC SURGERY

## 2018-06-21 NOTE — PROGRESS NOTES
"Knee Exam      Patient: Judi Sy    YOB: 1945 72 y.o. female    Chief Complaints: knee is better    History of Present Illness:Patient had left knee medial femoral sub-chondroplasty as well as partial medial meniscectomy and tricompartmental abrasion chondroplasty on 3/9/18. She was last seen on 5/10/18 at which time she reported she was trying to walk up to 2 miles a day became tired thereafter.  She stated that her knee felt much better than it did before surgery he was even able to clean base boards.  Instructions were given at that time to continue with physical therapy and to decrease her mileage.  She declined injection at her last visit.    He states that her knee feels much better no further mechanical symptoms and only feels that he gets \"tired\" after walking 3-4 miles per day.  She has completed therapy and felt like it helped tremendously  HPI    ROS: knee pain  Past Medical History:   Diagnosis Date   • Aortic heart valve prolapse    • Arthritis    • High cholesterol    • History of atrial fibrillation    • Knee pain, left    • TIA (transient ischemic attack)    • Vasovagal syncope        Physical Exam:   Vitals:    06/21/18 1302   Temp: 98.2 °F (36.8 °C)   Weight: 77.5 kg (170 lb 12.8 oz)   Height: 162.6 cm (64\")     Well developed with normal mood.Non-Antalgic gait without assistive device.  Left knee shows minimal effusion no warmth erythema.  0-125° range of motion with no focal discomfort over the medial joint line femoral condyle or tibial plateau.  Minimal if any discomfort with patellofemoral compression.      Radiology: None performed    Vitamin D 5/9/18 57.0                     5/30/18 47.7    Assessment/Plan:  1.  Status post left knee scope with partial medial meniscectomy, abrasion chondroplasty and medial femoral sub-chondroplasty.     2.  Her vitamin D level her last level on 5/30/18 (checked by her PCP) was 47.7 she continues to take vitamin D 5000 units daily and " will have this checked again in several weeks.  She understands she will need to check with her primary care physician going forward from there regarding further dosing and monitoring.    We had a very pleasant visit and she told me how much better her knee feels and how much she appreciates my care and understanding of her condition.  Had a pleasant visit and I will see her back as needed

## 2018-06-25 ENCOUNTER — TELEPHONE (OUTPATIENT)
Dept: FAMILY MEDICINE CLINIC | Facility: CLINIC | Age: 73
End: 2018-06-25

## 2018-06-25 NOTE — TELEPHONE ENCOUNTER
CALLED AND LEFT MESSAGE FOR PT ADVISING SHE HAD VIT D DONE ON 05/30/2018 AND IT WAS IN NORMAL RANGE.       ----- Message from Joselyn Anthony MA sent at 6/21/2018  4:15 PM EDT -----  Contact: PT  PT SAYS SHE WAS IN TO SEE HER DR [facility] AND HE TOLD HER HE WANTED VITAMIN D PERIODICALLY CHECKED SHE WANTS TO KNOW WHEN SHE SHOULD GET THIS CHECKED AND HOW OFTEN      PT CAN BE REACHED [phone]

## 2018-08-27 RX ORDER — CLOPIDOGREL BISULFATE 75 MG/1
TABLET ORAL
Qty: 90 TABLET | Refills: 1 | Status: SHIPPED | OUTPATIENT
Start: 2018-08-27 | End: 2018-10-11 | Stop reason: SDUPTHER

## 2018-10-03 DIAGNOSIS — I10 HYPERTENSION, UNSPECIFIED TYPE: ICD-10-CM

## 2018-10-03 DIAGNOSIS — E55.9 VITAMIN D DEFICIENCY: ICD-10-CM

## 2018-10-03 DIAGNOSIS — E78.2 MIXED HYPERLIPIDEMIA: Primary | ICD-10-CM

## 2018-10-05 LAB
25(OH)D3+25(OH)D2 SERPL-MCNC: 47 NG/ML (ref 30–100)
ALBUMIN SERPL-MCNC: 4.4 G/DL (ref 3.5–5.2)
ALBUMIN/GLOB SERPL: 2.3 G/DL
ALP SERPL-CCNC: 90 U/L (ref 39–117)
ALT SERPL-CCNC: 12 U/L (ref 1–33)
AST SERPL-CCNC: 15 U/L (ref 1–32)
BASOPHILS # BLD AUTO: 0.02 10*3/MM3 (ref 0–0.2)
BASOPHILS NFR BLD AUTO: 0.4 % (ref 0–1.5)
BILIRUB SERPL-MCNC: 0.3 MG/DL (ref 0.1–1.2)
BUN SERPL-MCNC: 10 MG/DL (ref 8–23)
BUN/CREAT SERPL: 13.2 (ref 7–25)
CALCIUM SERPL-MCNC: 9.7 MG/DL (ref 8.6–10.5)
CHLORIDE SERPL-SCNC: 103 MMOL/L (ref 98–107)
CHOLEST SERPL-MCNC: 162 MG/DL (ref 100–199)
CO2 SERPL-SCNC: 30.6 MMOL/L (ref 22–29)
CREAT SERPL-MCNC: 0.76 MG/DL (ref 0.57–1)
EOSINOPHIL # BLD AUTO: 0.16 10*3/MM3 (ref 0–0.7)
EOSINOPHIL NFR BLD AUTO: 3.1 % (ref 0.3–6.2)
ERYTHROCYTE [DISTWIDTH] IN BLOOD BY AUTOMATED COUNT: 14 % (ref 11.7–13)
GLOBULIN SER CALC-MCNC: 1.9 GM/DL
GLUCOSE SERPL-MCNC: 95 MG/DL (ref 65–99)
HCT VFR BLD AUTO: 39.6 % (ref 35.6–45.5)
HDL SERPL-SCNC: 33.6 UMOL/L
HDLC SERPL-MCNC: 54 MG/DL
HGB BLD-MCNC: 12.8 G/DL (ref 11.9–15.5)
IMM GRANULOCYTES # BLD: 0.01 10*3/MM3 (ref 0–0.03)
IMM GRANULOCYTES NFR BLD: 0.2 % (ref 0–0.5)
LDL SERPL QN: 20.9 NM
LDL SERPL-SCNC: 1141 NMOL/L
LDL SMALL SERPL-SCNC: 522 NMOL/L
LDLC SERPL CALC-MCNC: 85 MG/DL (ref 0–99)
LYMPHOCYTES # BLD AUTO: 1.79 10*3/MM3 (ref 0.9–4.8)
LYMPHOCYTES NFR BLD AUTO: 34.3 % (ref 19.6–45.3)
MCH RBC QN AUTO: 30.8 PG (ref 26.9–32)
MCHC RBC AUTO-ENTMCNC: 32.3 G/DL (ref 32.4–36.3)
MCV RBC AUTO: 95.4 FL (ref 80.5–98.2)
MONOCYTES # BLD AUTO: 0.5 10*3/MM3 (ref 0.2–1.2)
MONOCYTES NFR BLD AUTO: 9.6 % (ref 5–12)
NEUTROPHILS # BLD AUTO: 2.75 10*3/MM3 (ref 1.9–8.1)
NEUTROPHILS NFR BLD AUTO: 52.6 % (ref 42.7–76)
PLATELET # BLD AUTO: 209 10*3/MM3 (ref 140–500)
POTASSIUM SERPL-SCNC: 4.3 MMOL/L (ref 3.5–5.2)
PROT SERPL-MCNC: 6.3 G/DL (ref 6–8.5)
RBC # BLD AUTO: 4.15 10*6/MM3 (ref 3.9–5.2)
SODIUM SERPL-SCNC: 144 MMOL/L (ref 136–145)
TRIGL SERPL-MCNC: 114 MG/DL (ref 0–149)
WBC # BLD AUTO: 5.22 10*3/MM3 (ref 4.5–10.7)

## 2018-10-11 ENCOUNTER — OFFICE VISIT (OUTPATIENT)
Dept: FAMILY MEDICINE CLINIC | Facility: CLINIC | Age: 73
End: 2018-10-11

## 2018-10-11 VITALS
HEART RATE: 78 BPM | HEIGHT: 64 IN | SYSTOLIC BLOOD PRESSURE: 122 MMHG | BODY MASS INDEX: 29.67 KG/M2 | OXYGEN SATURATION: 98 % | DIASTOLIC BLOOD PRESSURE: 76 MMHG | TEMPERATURE: 98.3 F | WEIGHT: 173.8 LBS

## 2018-10-11 DIAGNOSIS — K44.9 HIATAL HERNIA: ICD-10-CM

## 2018-10-11 DIAGNOSIS — M81.0 AGE-RELATED OSTEOPOROSIS WITHOUT CURRENT PATHOLOGICAL FRACTURE: ICD-10-CM

## 2018-10-11 DIAGNOSIS — E78.2 MIXED HYPERLIPIDEMIA: ICD-10-CM

## 2018-10-11 DIAGNOSIS — R03.0 PRE-HYPERTENSION: Primary | ICD-10-CM

## 2018-10-11 DIAGNOSIS — E55.9 VITAMIN D DEFICIENCY: ICD-10-CM

## 2018-10-11 PROCEDURE — 99214 OFFICE O/P EST MOD 30 MIN: CPT | Performed by: INTERNAL MEDICINE

## 2018-10-11 RX ORDER — ATORVASTATIN CALCIUM 20 MG/1
20 TABLET, FILM COATED ORAL DAILY
Qty: 90 TABLET | Refills: 3 | Status: SHIPPED | OUTPATIENT
Start: 2018-10-11 | End: 2019-08-28 | Stop reason: SDUPTHER

## 2018-10-11 RX ORDER — CLOPIDOGREL BISULFATE 75 MG/1
75 TABLET ORAL DAILY
Qty: 90 TABLET | Refills: 3 | Status: SHIPPED | OUTPATIENT
Start: 2018-10-11 | End: 2019-08-28 | Stop reason: SDUPTHER

## 2018-10-11 NOTE — PROGRESS NOTES
Subjective   Judi Sy is a 73 y.o. female. Patient is here today for follow-up on her pre-hypertension, hyperlipidemia, hiatal hernia, vitamin D deficiency and history of osteoporosis.  She is generally been stable and is had no new fractures.  Her GERD is not bothering her very much.  She has a history of a patent foramen ovale and TIA but is had no neurologic symptoms.  She did get a hepatitis A shot and had a mild reaction to it.  She's had no chest pain, shortness of breath, edema or myalgias  Chief Complaint   Patient presents with   • Hyperlipidemia     lab follow up           Vitals:    10/11/18 0932   BP: 122/76   Pulse: 78   Temp: 98.3 °F (36.8 °C)   SpO2: 98%     The following portions of the patient's history were reviewed and updated as appropriate: allergies, current medications, past family history, past medical history, past social history, past surgical history and problem list.    Past Medical History:   Diagnosis Date   • Aortic heart valve prolapse    • Arthritis    • High cholesterol    • History of atrial fibrillation    • Knee pain, left    • TIA (transient ischemic attack)    • Vasovagal syncope       Allergies   Allergen Reactions   • Latex Rash      Social History     Social History   • Marital status:      Spouse name: N/A   • Number of children: N/A   • Years of education: N/A     Occupational History   • Not on file.     Social History Main Topics   • Smoking status: Former Smoker     Types: Cigarettes   • Smokeless tobacco: Never Used      Comment: QUIT 7 YEARS AGO   • Alcohol use No   • Drug use: No   • Sexual activity: Defer     Other Topics Concern   • Not on file     Social History Narrative   • No narrative on file        Current Outpatient Prescriptions:   •  atorvastatin (LIPITOR) 20 MG tablet, Take 1 tablet by mouth Daily., Disp: 90 tablet, Rfl: 3  •  Calcium Citrate-Vitamin D 250-200 MG-UNIT tablet, Take 1 tablet by mouth Every Morning. HOLD FOR SURGERY, Disp: ,  Rfl:   •  clopidogrel (PLAVIX) 75 MG tablet, Take 1 tablet by mouth Daily., Disp: 90 tablet, Rfl: 3  •  Multiple Vitamin (MULTI-VITAMIN) tablet, Take 1 tablet by mouth Daily. HOLD FOR SURGERY, Disp: , Rfl:      Objective     History of Present Illness     Review of Systems   Constitutional: Negative.    HENT: Negative.    Eyes: Negative.    Respiratory: Negative.    Cardiovascular: Negative.    Gastrointestinal: Negative.    Genitourinary: Negative.    Musculoskeletal: Negative.    Skin: Negative.    Neurological: Negative.    Psychiatric/Behavioral: Negative.        Physical Exam   Constitutional: She is oriented to person, place, and time. She appears well-developed and well-nourished.   Pleasant, cooperative no distress blood pressure 120/80   HENT:   Head: Normocephalic and atraumatic.   Eyes: Pupils are equal, round, and reactive to light. Conjunctivae are normal. No scleral icterus.   Neck: Normal range of motion. Neck supple.   Cardiovascular: Normal rate, regular rhythm and normal heart sounds.    Pulmonary/Chest: Effort normal and breath sounds normal. No respiratory distress. She has no wheezes. She has no rales.   Musculoskeletal: Normal range of motion. She exhibits no edema.   Neurological: She is alert and oriented to person, place, and time.   Skin: Skin is warm and dry.   Psychiatric: She has a normal mood and affect. Her behavior is normal.   Nursing note and vitals reviewed.      ASSESSMENT  CBC is completely normal.  CMP was completely normal and vitamin D level also remains normal.  NMR lipid panel is stable with total cholesterol 162, HDL 54, LDL 85 and a particle number in the moderate range  #1-pre-hypertension with acceptable blood pressure today  #2-hyperlipidemia, adequate control on medication  #3-history of patent foramen ovale and TIA, asymptomatic withneurologic symptoms  #4-history of hiatal hernia, asymptomatic  #5-vitamin D deficiency, corrected on supplementation  #6-history of  osteoporosis with no recent symptoms     Problem List Items Addressed This Visit        Cardiovascular and Mediastinum    Hyperlipidemia    Relevant Medications    atorvastatin (LIPITOR) 20 MG tablet    Pre-hypertension - Primary       Respiratory    Hiatal hernia       Digestive    Vitamin D deficiency       Musculoskeletal and Integument    Osteoporosis          PLAN  the patient will continue current medicines as now and I want to recheck her in 4 months with a CMP, lipid panel and TSH    There are no Patient Instructions on file for this visit.  Return in about 4 months (around 2/11/2019) for with labs.

## 2019-01-03 ENCOUNTER — TRANSCRIBE ORDERS (OUTPATIENT)
Dept: ADMINISTRATIVE | Facility: HOSPITAL | Age: 74
End: 2019-01-03

## 2019-01-03 DIAGNOSIS — Z13.9 VISIT FOR SCREENING: Primary | ICD-10-CM

## 2019-02-04 ENCOUNTER — HOSPITAL ENCOUNTER (OUTPATIENT)
Dept: MAMMOGRAPHY | Facility: HOSPITAL | Age: 74
Discharge: HOME | End: 2019-02-04
Admitting: INTERNAL MEDICINE

## 2019-02-04 DIAGNOSIS — Z13.9 VISIT FOR SCREENING: ICD-10-CM

## 2019-02-04 PROCEDURE — 77067 SCR MAMMO BI INCL CAD: CPT

## 2019-02-04 PROCEDURE — 77063 BREAST TOMOSYNTHESIS BI: CPT

## 2019-02-12 DIAGNOSIS — Z13.29 SCREENING FOR THYROID DISORDER: Primary | ICD-10-CM

## 2019-02-12 DIAGNOSIS — R03.0 PRE-HYPERTENSION: ICD-10-CM

## 2019-02-12 DIAGNOSIS — Z13.220 SCREENING FOR CHOLESTEROL LEVEL: ICD-10-CM

## 2019-02-12 DIAGNOSIS — E78.2 MIXED HYPERLIPIDEMIA: ICD-10-CM

## 2019-02-15 LAB
ALBUMIN SERPL-MCNC: 4.3 G/DL (ref 3.5–4.8)
ALBUMIN/GLOB SERPL: 1.9 {RATIO} (ref 1.2–2.2)
ALP SERPL-CCNC: 87 IU/L (ref 39–117)
ALT SERPL-CCNC: 13 IU/L (ref 0–32)
AST SERPL-CCNC: 17 IU/L (ref 0–40)
BILIRUB SERPL-MCNC: 0.4 MG/DL (ref 0–1.2)
BUN SERPL-MCNC: 12 MG/DL (ref 8–27)
BUN/CREAT SERPL: 17 (ref 12–28)
CALCIUM SERPL-MCNC: 9.7 MG/DL (ref 8.7–10.3)
CHLORIDE SERPL-SCNC: 104 MMOL/L (ref 96–106)
CHOLEST SERPL-MCNC: 154 MG/DL (ref 100–199)
CO2 SERPL-SCNC: 26 MMOL/L (ref 20–29)
CREAT SERPL-MCNC: 0.7 MG/DL (ref 0.57–1)
GLOBULIN SER CALC-MCNC: 2.3 G/DL (ref 1.5–4.5)
GLUCOSE SERPL-MCNC: 91 MG/DL (ref 65–99)
HDLC SERPL-MCNC: 60 MG/DL
LDLC SERPL CALC-MCNC: 82 MG/DL (ref 0–99)
LDLC/HDLC SERPL: 1.4 RATIO (ref 0–3.2)
POTASSIUM SERPL-SCNC: 4.4 MMOL/L (ref 3.5–5.2)
PROT SERPL-MCNC: 6.6 G/DL (ref 6–8.5)
SODIUM SERPL-SCNC: 145 MMOL/L (ref 134–144)
TRIGL SERPL-MCNC: 60 MG/DL (ref 0–149)
TSH SERPL DL<=0.005 MIU/L-ACNC: 2.54 UIU/ML (ref 0.45–4.5)
VLDLC SERPL CALC-MCNC: 12 MG/DL (ref 5–40)

## 2019-02-21 ENCOUNTER — OFFICE VISIT (OUTPATIENT)
Dept: FAMILY MEDICINE CLINIC | Facility: CLINIC | Age: 74
End: 2019-02-21

## 2019-02-21 VITALS
BODY MASS INDEX: 28.82 KG/M2 | WEIGHT: 173 LBS | HEART RATE: 88 BPM | DIASTOLIC BLOOD PRESSURE: 80 MMHG | RESPIRATION RATE: 16 BRPM | HEIGHT: 65 IN | OXYGEN SATURATION: 98 % | SYSTOLIC BLOOD PRESSURE: 120 MMHG | TEMPERATURE: 97 F

## 2019-02-21 DIAGNOSIS — M94.262 CHONDROMALACIA OF LEFT KNEE: ICD-10-CM

## 2019-02-21 DIAGNOSIS — E78.2 MIXED HYPERLIPIDEMIA: ICD-10-CM

## 2019-02-21 DIAGNOSIS — K21.9 GASTROESOPHAGEAL REFLUX DISEASE WITHOUT ESOPHAGITIS: ICD-10-CM

## 2019-02-21 DIAGNOSIS — R03.0 PRE-HYPERTENSION: Primary | ICD-10-CM

## 2019-02-21 PROCEDURE — 99214 OFFICE O/P EST MOD 30 MIN: CPT | Performed by: INTERNAL MEDICINE

## 2019-02-21 NOTE — PROGRESS NOTES
Subjective   Judi Sy is a 73 y.o. female. Patient is here today for follow-up on her pre-hypertension, hyperlipidemia, history of TIA, history of GERD.  She is generally feeling fine and has had no acute complaints and denies any chest pain, shortness of breath, edema or myalgias.  She is exercising at [employer].  She did get one hepatitis A shot but says the first 1 was 80% effective and she is not going to get the second dose despite my strong recommendation that she complete the series.  Chief Complaint   Patient presents with   • Hyperlipidemia   • Hypertension          Vitals:    02/21/19 0940   BP: 120/80   Pulse: 88   Resp: 16   Temp: 97 °F (36.1 °C)   SpO2: 98%     The following portions of the patient's history were reviewed and updated as appropriate: allergies, current medications, past family history, past medical history, past social history, past surgical history and problem list.    Past Medical History:   Diagnosis Date   • Aortic heart valve prolapse    • Arthritis    • High cholesterol    • History of atrial fibrillation    • Knee pain, left    • TIA (transient ischemic attack)    • Vasovagal syncope       Allergies   Allergen Reactions   • Latex Rash      Social History     Socioeconomic History   • Marital status:      Spouse name: Not on file   • Number of children: Not on file   • Years of education: Not on file   • Highest education level: Not on file   Social Needs   • Financial resource strain: Not on file   • Food insecurity - worry: Not on file   • Food insecurity - inability: Not on file   • Transportation needs - medical: Not on file   • Transportation needs - non-medical: Not on file   Occupational History   • Not on file   Tobacco Use   • Smoking status: Former Smoker     Types: Cigarettes   • Smokeless tobacco: Never Used   • Tobacco comment: QUIT 7 YEARS AGO   Substance and Sexual Activity   • Alcohol use: No   • Drug use: No   • Sexual activity: Defer   Other  Topics Concern   • Not on file   Social History Narrative   • Not on file        Current Outpatient Medications:   •  atorvastatin (LIPITOR) 20 MG tablet, Take 1 tablet by mouth Daily., Disp: 90 tablet, Rfl: 3  •  Calcium Citrate-Vitamin D 250-200 MG-UNIT tablet, Take 1 tablet by mouth Every Morning. HOLD FOR SURGERY, Disp: , Rfl:   •  clopidogrel (PLAVIX) 75 MG tablet, Take 1 tablet by mouth Daily., Disp: 90 tablet, Rfl: 3  •  Multiple Vitamin (MULTI-VITAMIN) tablet, Take 1 tablet by mouth Daily. HOLD FOR SURGERY, Disp: , Rfl:      Objective     History of Present Illness     Review of Systems   Constitutional: Negative.    HENT: Negative.    Eyes: Negative.    Respiratory: Negative.    Cardiovascular: Negative.    Gastrointestinal: Negative.    Genitourinary: Negative.    Musculoskeletal: Negative.    Skin: Negative.    Neurological: Negative.    Psychiatric/Behavioral: Negative.        Physical Exam   Constitutional: She is oriented to person, place, and time. She appears well-developed and well-nourished.   Pleasant, cooperative no distress, blood pressure 125/80   HENT:   Head: Normocephalic and atraumatic.   Eyes: Conjunctivae are normal. Pupils are equal, round, and reactive to light. No scleral icterus.   Neck: Normal range of motion. Neck supple. No thyromegaly present.   Cardiovascular: Normal rate, regular rhythm and normal heart sounds.   Pulmonary/Chest: Effort normal and breath sounds normal. No respiratory distress. She has no wheezes. She has no rales.   Musculoskeletal: Normal range of motion. She exhibits no edema.   Neurological: She is alert and oriented to person, place, and time.   Skin: Skin is warm and dry.   Psychiatric: She has a normal mood and affect. Her behavior is normal.   Nursing note and vitals reviewed.      ASSESSMENT CMP is essentially completely normal.  Lipid panel is good with total cholesterol 154, HDL 60, LDL 82.  TSH was quite normal.  #1-pre-hypertension, blood pressure  essentially normal.  #2-hyperlipidemia, well controlled  #3-GERD, asymptomatic  #4-history of TIA, neurologically stable     Problem List Items Addressed This Visit        Cardiovascular and Mediastinum    Hyperlipidemia    Pre-hypertension - Primary       Digestive    Gastroesophageal reflux disease without esophagitis       Musculoskeletal and Integument    RESOLVED: Chondromalacia of left knee          PLAN I strongly advised the patient to get the second hepatitis A immunization as well as the shingles immunizations.  She will continue current medicines as now and continue her exercising.  I would like to recheck her in 6 months with a CMP, lipid panel, vitamin D level    There are no Patient Instructions on file for this visit.  Return in about 6 months (around 8/21/2019) for with labs.

## 2019-03-06 ENCOUNTER — APPOINTMENT (OUTPATIENT)
Dept: CT IMAGING | Facility: HOSPITAL | Age: 74
End: 2019-03-06

## 2019-03-06 ENCOUNTER — HOSPITAL ENCOUNTER (EMERGENCY)
Facility: HOSPITAL | Age: 74
Discharge: HOME | End: 2019-03-06
Attending: EMERGENCY MEDICINE | Admitting: EMERGENCY MEDICINE

## 2019-03-06 VITALS
BODY MASS INDEX: 27.32 KG/M2 | RESPIRATION RATE: 16 BRPM | OXYGEN SATURATION: 97 % | WEIGHT: 170 LBS | TEMPERATURE: 97.5 F | HEIGHT: 66 IN | SYSTOLIC BLOOD PRESSURE: 118 MMHG | HEART RATE: 81 BPM | DIASTOLIC BLOOD PRESSURE: 66 MMHG

## 2019-03-06 DIAGNOSIS — R10.2 PELVIC PAIN: ICD-10-CM

## 2019-03-06 DIAGNOSIS — N30.90 CYSTITIS: Primary | ICD-10-CM

## 2019-03-06 LAB
ALBUMIN SERPL-MCNC: 4 G/DL (ref 3.5–5.2)
ALBUMIN/GLOB SERPL: 1.7 G/DL
ALP SERPL-CCNC: 66 U/L (ref 39–117)
ALT SERPL W P-5'-P-CCNC: 11 U/L (ref 1–33)
ANION GAP SERPL CALCULATED.3IONS-SCNC: 9.7 MMOL/L
AST SERPL-CCNC: 17 U/L (ref 1–32)
BACTERIA UR QL AUTO: ABNORMAL /HPF
BASOPHILS # BLD AUTO: 0.04 10*3/MM3 (ref 0–0.2)
BASOPHILS NFR BLD AUTO: 0.5 % (ref 0–1.5)
BILIRUB SERPL-MCNC: 0.3 MG/DL (ref 0.1–1.2)
BILIRUB UR QL STRIP: NEGATIVE
BUN BLD-MCNC: 13 MG/DL (ref 8–23)
BUN/CREAT SERPL: 16 (ref 7–25)
CALCIUM SPEC-SCNC: 9.9 MG/DL (ref 8.6–10.5)
CHLORIDE SERPL-SCNC: 103 MMOL/L (ref 98–107)
CLARITY UR: CLEAR
CO2 SERPL-SCNC: 29.3 MMOL/L (ref 22–29)
COLOR UR: YELLOW
CREAT BLD-MCNC: 0.81 MG/DL (ref 0.57–1)
DEPRECATED RDW RBC AUTO: 48 FL (ref 37–54)
EOSINOPHIL # BLD AUTO: 0.1 10*3/MM3 (ref 0–0.4)
EOSINOPHIL NFR BLD AUTO: 1.1 % (ref 0.3–6.2)
ERYTHROCYTE [DISTWIDTH] IN BLOOD BY AUTOMATED COUNT: 13.3 % (ref 12.3–15.4)
GFR SERPL CREATININE-BSD FRML MDRD: 70 ML/MIN/1.73
GLOBULIN UR ELPH-MCNC: 2.3 GM/DL
GLUCOSE BLD-MCNC: 89 MG/DL (ref 65–99)
GLUCOSE UR STRIP-MCNC: NEGATIVE MG/DL
HCT VFR BLD AUTO: 40.2 % (ref 34–46.6)
HGB BLD-MCNC: 13 G/DL (ref 12–15.9)
HGB UR QL STRIP.AUTO: ABNORMAL
HYALINE CASTS UR QL AUTO: ABNORMAL /LPF
IMM GRANULOCYTES # BLD AUTO: 0.04 10*3/MM3 (ref 0–0.05)
IMM GRANULOCYTES NFR BLD AUTO: 0.5 % (ref 0–0.5)
KETONES UR QL STRIP: NEGATIVE
LEUKOCYTE ESTERASE UR QL STRIP.AUTO: ABNORMAL
LIPASE SERPL-CCNC: 24 U/L (ref 13–60)
LYMPHOCYTES # BLD AUTO: 1.6 10*3/MM3 (ref 0.7–3.1)
LYMPHOCYTES NFR BLD AUTO: 18 % (ref 19.6–45.3)
MAGNESIUM SERPL-MCNC: 2.1 MG/DL (ref 1.6–2.4)
MCH RBC QN AUTO: 31.3 PG (ref 26.6–33)
MCHC RBC AUTO-ENTMCNC: 32.3 G/DL (ref 31.5–35.7)
MCV RBC AUTO: 96.9 FL (ref 79–97)
MONOCYTES # BLD AUTO: 0.82 10*3/MM3 (ref 0.1–0.9)
MONOCYTES NFR BLD AUTO: 9.2 % (ref 5–12)
NEUTROPHILS # BLD AUTO: 6.27 10*3/MM3 (ref 1.4–7)
NEUTROPHILS NFR BLD AUTO: 70.7 % (ref 42.7–76)
NITRITE UR QL STRIP: NEGATIVE
NRBC BLD AUTO-RTO: 0 /100 WBC (ref 0–0)
PH UR STRIP.AUTO: 7.5 [PH] (ref 5–8)
PLATELET # BLD AUTO: 194 10*3/MM3 (ref 140–450)
PMV BLD AUTO: 11.7 FL (ref 6–12)
POTASSIUM BLD-SCNC: 3.8 MMOL/L (ref 3.5–5.2)
PROT SERPL-MCNC: 6.3 G/DL (ref 6–8.5)
PROT UR QL STRIP: NEGATIVE
RBC # BLD AUTO: 4.15 10*6/MM3 (ref 3.77–5.28)
RBC # UR: ABNORMAL /HPF
REF LAB TEST METHOD: ABNORMAL
SODIUM BLD-SCNC: 142 MMOL/L (ref 136–145)
SP GR UR STRIP: 1.01 (ref 1–1.03)
SQUAMOUS #/AREA URNS HPF: ABNORMAL /HPF
UROBILINOGEN UR QL STRIP: ABNORMAL
WBC NRBC COR # BLD: 8.87 10*3/MM3 (ref 3.4–10.8)
WBC UR QL AUTO: ABNORMAL /HPF

## 2019-03-06 PROCEDURE — 80053 COMPREHEN METABOLIC PANEL: CPT | Performed by: EMERGENCY MEDICINE

## 2019-03-06 PROCEDURE — 99283 EMERGENCY DEPT VISIT LOW MDM: CPT

## 2019-03-06 PROCEDURE — 25010000002 IOPAMIDOL 61 % SOLUTION: Performed by: EMERGENCY MEDICINE

## 2019-03-06 PROCEDURE — 81001 URINALYSIS AUTO W/SCOPE: CPT | Performed by: EMERGENCY MEDICINE

## 2019-03-06 PROCEDURE — 83735 ASSAY OF MAGNESIUM: CPT | Performed by: EMERGENCY MEDICINE

## 2019-03-06 PROCEDURE — 87086 URINE CULTURE/COLONY COUNT: CPT | Performed by: EMERGENCY MEDICINE

## 2019-03-06 PROCEDURE — 74177 CT ABD & PELVIS W/CONTRAST: CPT

## 2019-03-06 PROCEDURE — 87186 SC STD MICRODIL/AGAR DIL: CPT | Performed by: EMERGENCY MEDICINE

## 2019-03-06 PROCEDURE — 96360 HYDRATION IV INFUSION INIT: CPT

## 2019-03-06 PROCEDURE — 87077 CULTURE AEROBIC IDENTIFY: CPT | Performed by: EMERGENCY MEDICINE

## 2019-03-06 PROCEDURE — 83690 ASSAY OF LIPASE: CPT | Performed by: EMERGENCY MEDICINE

## 2019-03-06 PROCEDURE — 85025 COMPLETE CBC W/AUTO DIFF WBC: CPT | Performed by: EMERGENCY MEDICINE

## 2019-03-06 RX ORDER — SODIUM CHLORIDE 0.9 % (FLUSH) 0.9 %
10 SYRINGE (ML) INJECTION AS NEEDED
Status: DISCONTINUED | OUTPATIENT
Start: 2019-03-06 | End: 2019-03-06 | Stop reason: HOSPADM

## 2019-03-06 RX ORDER — SODIUM CHLORIDE 9 MG/ML
125 INJECTION, SOLUTION INTRAVENOUS CONTINUOUS
Status: DISCONTINUED | OUTPATIENT
Start: 2019-03-06 | End: 2019-03-06

## 2019-03-06 RX ORDER — CEPHALEXIN 500 MG/1
500 CAPSULE ORAL 3 TIMES DAILY
Qty: 21 CAPSULE | Refills: 0 | Status: SHIPPED | OUTPATIENT
Start: 2019-03-06 | End: 2019-03-13

## 2019-03-06 RX ADMIN — IOPAMIDOL 85 ML: 612 INJECTION, SOLUTION INTRAVENOUS at 17:18

## 2019-03-06 RX ADMIN — SODIUM CHLORIDE 500 ML: 9 INJECTION, SOLUTION INTRAVENOUS at 16:28

## 2019-03-06 NOTE — DISCHARGE INSTRUCTIONS
Take Keflex as prescribed.  Urine culture is pending.  Return if worsening of symptoms, fever, vomiting, or any concerns.

## 2019-03-06 NOTE — ED PROVIDER NOTES
" EMERGENCY DEPARTMENT ENCOUNTER    CHIEF COMPLAINT  Chief Complaint: constipation  History given by: patient  History limited by: none  Room Number: 14/14  PMD: Ravi Griffin MD      HPI:  Pt is a 73 y.o. female who presents complaining of constipation for the past 3 days. Pt has been able to pass very small amounts of stool that are hard and \"pea-like\", and she states that she feels like something might be stuck in her rectum that she is not able to pass. She also states that for the past few days she has been eating large amounts of cabbage. Pt has not had a previous abd surgery. She states that she has been having lower abd pain and distension. Pt has also had hematuria for a few days and that today she began having dysuria. She denies vomiting, fever, decreased appetite, CP, SOA, and previous Hx of constipation. There are no other complaints at this time. She has not tried any OTC laxatives to treat her Sx.    Pt is currently on Plavix.    Duration:  About 3 days  Onset: gradual  Timing: constant  Location: n/a  Radiation: none  Quality: constipation, small hard \"pea-like\" stools  Intensity/Severity: moderate  Progression: unchanged  Associated Symptoms: lower abd pain, hematuria, abd distension, dysuria  Aggravating Factors: none  Alleviating Factors: none  Previous Episodes: none  Treatment before arrival: none    PAST MEDICAL HISTORY  Active Ambulatory Problems     Diagnosis Date Noted   • Gastroesophageal reflux disease without esophagitis 03/16/2016   • Hiatal hernia 03/16/2016   • Hyperlipidemia 03/16/2016   • Pre-hypertension 03/16/2016   • Patent foramen ovale 03/16/2016   • Temporary cerebral vascular dysfunction 03/16/2016   • Osteoporosis 09/21/2017   • Closed displaced avulsion fracture of left talus 10/20/2017   • Acute meniscal tear of knee, left, sequela 01/31/2018   • Primary localized osteoarthrosis of left lower leg 02/21/2018   • Tear of medial meniscus of left knee, current 02/21/2018 "   • Closed nondisplaced fracture of condyle of left femur (CMS/[facility]) 02/28/2018   • Vitamin D deficiency 03/29/2018   • Status post total left knee replacement 05/10/2018     Resolved Ambulatory Problems     Diagnosis Date Noted   • Sprain of left ankle 10/20/2017   • Chondromalacia of left knee 03/15/2018     Past Medical History:   Diagnosis Date   • Aortic heart valve prolapse    • Arthritis    • High cholesterol    • History of atrial fibrillation    • Knee pain, left    • TIA (transient ischemic attack)    • Vasovagal syncope        PAST SURGICAL HISTORY  Past Surgical History:   Procedure Laterality Date   • BREAST BIOPSY Left     15 years ago; benign   • KNEE ARTHROSCOPY Left 3/9/2018    Procedure: LEFT KNEE ARTHROSCOPY, PARTIAL MEDIAL MENISCECTOMY, AND MEDIAL FEMORAL SUBCHONDROPLASTY;  Surgeon: Kade Jaocb MD;  Location: [facility] OR [facility];  Service: Orthopedics   • PATENT FORAMEN OVALE CLOSURE     • TONSILLECTOMY     • TUBAL ABDOMINAL LIGATION         FAMILY HISTORY  Family History   Problem Relation Age of Onset   • Heart disease Father    • Heart disease Maternal Grandmother    • Breast cancer Mother         50's   • Malig Hyperthermia Neg Hx        SOCIAL HISTORY  Social History     Socioeconomic History   • Marital status:      Spouse name: Not on file   • Number of children: Not on file   • Years of education: Not on file   • Highest education level: Not on file   Social Needs   • Financial resource strain: Not on file   • Food insecurity - worry: Not on file   • Food insecurity - inability: Not on file   • Transportation needs - medical: Not on file   • Transportation needs - non-medical: Not on file   Occupational History   • Not on file   Tobacco Use   • Smoking status: Former Smoker     Types: Cigarettes   • Smokeless tobacco: Never Used   • Tobacco comment: QUIT 7 YEARS AGO   Substance and Sexual Activity   • Alcohol use: No   • Drug use: No   • Sexual activity: Defer   Other Topics  Concern   • Not on file   Social History Narrative   • Not on file       ALLERGIES  Latex    REVIEW OF SYSTEMS  Review of Systems   Constitutional: Negative for fever.   HENT: Negative for sore throat.    Eyes: Negative.    Respiratory: Negative for cough and shortness of breath.    Cardiovascular: Negative for chest pain.   Gastrointestinal: Positive for abdominal distention, abdominal pain (lower) and constipation. Negative for diarrhea and vomiting.   Genitourinary: Positive for dysuria and hematuria.   Musculoskeletal: Negative for neck pain.   Skin: Negative for rash.   Neurological: Negative for weakness, numbness and headaches.   Hematological: Negative.    Psychiatric/Behavioral: Negative.    All other systems reviewed and are negative.      PHYSICAL EXAM  ED Triage Vitals [03/06/19 1549]   Temp Heart Rate Resp BP SpO2   96.9 °F (36.1 °C) 99 16 -- 98 %      Temp src Heart Rate Source Patient Position BP Location FiO2 (%)   Tympanic Monitor -- -- --       Physical Exam   Constitutional: She is oriented to person, place, and time. No distress.   HENT:   Head: Normocephalic and atraumatic.   Eyes: EOM are normal. Pupils are equal, round, and reactive to light.   Neck: Normal range of motion. Neck supple.   Cardiovascular: Normal rate, regular rhythm and normal heart sounds.   Pulmonary/Chest: Effort normal and breath sounds normal. No respiratory distress.   Abdominal: Soft. There is no tenderness. There is no rebound and no guarding.   Obese   Genitourinary: Rectal exam shows no external hemorrhoid, no internal hemorrhoid, no fissure and guaiac negative stool.   Genitourinary Comments: No stool on rectal exam, however, there is a light brown smear.   Musculoskeletal: Normal range of motion. She exhibits edema (1+ edema to BLE).   Neurological: She is alert and oriented to person, place, and time. She has normal sensation and normal strength.   Skin: Skin is warm and dry. No rash noted.   Psychiatric: Mood and  affect normal.   Nursing note and vitals reviewed.      LAB RESULTS  Lab Results (last 24 hours)     Procedure Component Value Units Date/Time    Urinalysis With Microscopic If Indicated (No Culture) - Urine, Clean Catch [[id number]]  (Abnormal) Collected:  03/06/19 1604    Specimen:  Urine, Clean Catch Updated:  03/06/19 1633     Color, UA Yellow     Appearance, UA Clear     pH, UA 7.5     Specific Gravity, UA 1.007     Glucose, UA Negative     Ketones, UA Negative     Bilirubin, UA Negative     Blood, UA Large (3+)     Protein, UA Negative     Leuk Esterase, UA Large (3+)     Nitrite, UA Negative     Urobilinogen, UA 0.2 E.U./dL    Urinalysis, Microscopic Only - Urine, Clean Catch [[id number]]  (Abnormal) Collected:  03/06/19 1604    Specimen:  Urine, Clean Catch Updated:  03/06/19 1714     RBC, UA 6-12 /HPF      WBC, UA 6-12 /HPF      Bacteria, UA 1+ /HPF      Squamous Epithelial Cells, UA 0-2 /HPF      Hyaline Casts, UA 3-6 /LPF      Methodology Manual Light Microscopy    CBC & Differential [[id number]] Collected:  03/06/19 1628    Specimen:  Blood Updated:  03/06/19 1639    Narrative:       The following orders were created for panel order CBC & Differential.  Procedure                               Abnormality         Status                     ---------                               -----------         ------                     CBC Auto Differential[[id number]]        Abnormal            Final result                 Please view results for these tests on the individual orders.    Comprehensive Metabolic Panel [[id number]]  (Abnormal) Collected:  03/06/19 1628    Specimen:  Blood Updated:  03/06/19 1702     Glucose 89 mg/dL      BUN 13 mg/dL      Creatinine 0.81 mg/dL      Sodium 142 mmol/L      Potassium 3.8 mmol/L      Chloride 103 mmol/L      CO2 29.3 mmol/L      Calcium 9.9 mg/dL      Total Protein 6.3 g/dL      Albumin 4.00 g/dL      ALT (SGPT) 11 U/L      AST (SGOT) 17 U/L      Alkaline Phosphatase 66 U/L       Total Bilirubin 0.3 mg/dL      eGFR Non African Amer 70 mL/min/1.73      Globulin 2.3 gm/dL      A/G Ratio 1.7 g/dL      BUN/Creatinine Ratio 16.0     Anion Gap 9.7 mmol/L     Narrative:       The MDRD GFR formula is only valid for adults with stable renal function between ages 18 and 70.    Magnesium [[id number]]  (Normal) Collected:  03/06/19 1628    Specimen:  Blood Updated:  03/06/19 1702     Magnesium 2.1 mg/dL     Lipase [[id number]]  (Normal) Collected:  03/06/19 1628    Specimen:  Blood Updated:  03/06/19 1702     Lipase 24 U/L     CBC Auto Differential [[id number]]  (Abnormal) Collected:  03/06/19 1628    Specimen:  Blood Updated:  03/06/19 1639     WBC 8.87 10*3/mm3      RBC 4.15 10*6/mm3      Hemoglobin 13.0 g/dL      Hematocrit 40.2 %      MCV 96.9 fL      MCH 31.3 pg      MCHC 32.3 g/dL      RDW 13.3 %      RDW-SD 48.0 fl      MPV 11.7 fL      Platelets 194 10*3/mm3      Neutrophil % 70.7 %      Lymphocyte % 18.0 %      Monocyte % 9.2 %      Eosinophil % 1.1 %      Basophil % 0.5 %      Immature Grans % 0.5 %      Neutrophils, Absolute 6.27 10*3/mm3      Lymphocytes, Absolute 1.60 10*3/mm3      Monocytes, Absolute 0.82 10*3/mm3      Eosinophils, Absolute 0.10 10*3/mm3      Basophils, Absolute 0.04 10*3/mm3      Immature Grans, Absolute 0.04 10*3/mm3      nRBC 0.0 /100 WBC           I ordered the above labs and reviewed the results    RADIOLOGY  CT Abdomen Pelvis With Contrast   Final Result   Small hiatal hernia. Mild sigmoid diverticulosis without   evidence of diverticulitis. Equivocal evidence of cystitis. Otherwise   unremarkable CT scan of the abdomen and pelvis.       Comment: A tiny noncalcified nodule at the left lung base laterally is   stable since the CT in 2012, and therefore benign.       This report was finalized on 3/6/2019 5:50 PM by Dr. Demond Lopez M.D.               I ordered the above noted radiological studies. Interpreted by radiologist. Reviewed by me in PACS.        PROCEDURES  Procedures      PROGRESS AND CONSULTS     1619- Ordered CT abd, IVF, and labs.    1840- Rechecked pt. Pt is resting comfortably. Notified pt of her imaging and lab results. Discussed the plan to d/c the pt home with Rx for Keflex. Advised pt to use a OTC laxative as needed for control of her Sx. I instructed the pt to f/u with her PCP as needed. RTED instructions given. Pt understands and agrees with the plan, all questions answered.    MEDICAL DECISION MAKING  Results were reviewed/discussed with the patient and they were also made aware of online access. Pt also made aware that some labs, such as cultures, will not be resulted during ER visit and follow up with PMD is necessary.     MDM  Number of Diagnoses or Management Options     Amount and/or Complexity of Data Reviewed  Clinical lab tests: ordered and reviewed (RBC, UA 6-12  WBC, UA 6-12)  Tests in the radiology section of CPT®: ordered and reviewed (CT abd- Small hiatal hernia. Mild sigmoid diverticulosis without evidence of diverticulitis. Equivocal evidence of cystitis. Otherwise unremarkable CT scan of the abdomen and pelvis.)  Decide to obtain previous medical records or to obtain history from someone other than the patient: yes  Review and summarize past medical records: yes (Pt saw her  PCP on the 21st she was seen for HTN and elevated cholesterol. Pt is currently on Plavix. Her lab work was essentially normal and the rest of the encounter was unremarkable.)  Independent visualization of images, tracings, or specimens: yes    Patient Progress  Patient progress: stable         DIAGNOSIS  Final diagnoses:   Pelvic pain   Cystitis       DISPOSITION  DISCHARGE    Patient discharged in stable condition.    Reviewed implications of results, diagnosis, meds, responsibility to follow up, warning signs and symptoms of possible worsening, potential complications and reasons to return to ER.    Patient/Family voiced understanding of above  instructions.    Discussed plan for discharge, as there is no emergent indication for admission. Patient referred to primary care provider for BP management due to today's BP. Pt/family is agreeable and understands need for follow up and repeat testing.  Pt is aware that discharge does not mean that nothing is wrong but it indicates no emergency is present that requires admission and they must continue care with follow-up as given below or physician of their choice.     FOLLOW-UP  Ravi Griffin MD  [id number] [address]  [phone]    In 1 week  Return if pain worsens, If symptoms worsen, shortness of breath, fever, any concerns         Medication List      New Prescriptions    cephalexin 500 MG capsule  Commonly known as:  KEFLEX  Take 1 capsule by mouth 3 (Three) Times a Day for 7 days.              Latest Documented Vital Signs:  As of 6:53 PM  BP- 124/73 HR- 99 Temp- 96.9 °F (36.1 °C) (Tympanic) O2 sat- 98%    --  Documentation assistance provided by nadine Landaverde for Dr. Matamoros.  Information recorded by the scribe was done at my direction and has been verified and validated by me.     Corey Landaverde  03/06/19 9227       Martin Matamoros MD  03/06/19 1027

## 2019-03-06 NOTE — ED NOTES
Pt c/o constipation x's 1 week. Pt also c/o frequent urination and blood in urine.     Kamlesh Paulson, RN  03/06/19 1221

## 2019-03-08 LAB — BACTERIA SPEC AEROBE CULT: ABNORMAL

## 2019-03-13 ENCOUNTER — OFFICE VISIT (OUTPATIENT)
Dept: FAMILY MEDICINE CLINIC | Facility: CLINIC | Age: 74
End: 2019-03-13

## 2019-03-13 VITALS
HEART RATE: 95 BPM | BODY MASS INDEX: 27.64 KG/M2 | TEMPERATURE: 97 F | DIASTOLIC BLOOD PRESSURE: 80 MMHG | SYSTOLIC BLOOD PRESSURE: 110 MMHG | OXYGEN SATURATION: 98 % | HEIGHT: 66 IN | WEIGHT: 172 LBS | RESPIRATION RATE: 16 BRPM

## 2019-03-13 DIAGNOSIS — J30.89 ENVIRONMENTAL AND SEASONAL ALLERGIES: ICD-10-CM

## 2019-03-13 DIAGNOSIS — N39.0 URINARY TRACT INFECTION WITHOUT HEMATURIA, SITE UNSPECIFIED: Primary | ICD-10-CM

## 2019-03-13 LAB
BILIRUB BLD-MCNC: NEGATIVE MG/DL
CLARITY, POC: CLEAR
COLOR UR: YELLOW
GLUCOSE UR STRIP-MCNC: NEGATIVE MG/DL
KETONES UR QL: NEGATIVE
LEUKOCYTE EST, POC: NEGATIVE
NITRITE UR-MCNC: NEGATIVE MG/ML
PH UR: 5.5 [PH] (ref 5–8)
PROT UR STRIP-MCNC: NEGATIVE MG/DL
RBC # UR STRIP: NEGATIVE /UL
SP GR UR: 1.02 (ref 1–1.03)
UROBILINOGEN UR QL: NORMAL

## 2019-03-13 PROCEDURE — 99213 OFFICE O/P EST LOW 20 MIN: CPT | Performed by: INTERNAL MEDICINE

## 2019-03-13 PROCEDURE — 81003 URINALYSIS AUTO W/O SCOPE: CPT | Performed by: INTERNAL MEDICINE

## 2019-03-13 RX ORDER — CETIRIZINE HYDROCHLORIDE 10 MG/1
10 TABLET ORAL DAILY
COMMUNITY

## 2019-03-13 RX ORDER — MONTELUKAST SODIUM 10 MG/1
10 TABLET ORAL NIGHTLY
Qty: 30 TABLET | Refills: 5 | Status: SHIPPED | OUTPATIENT
Start: 2019-03-13 | End: 2019-09-05 | Stop reason: SDUPTHER

## 2019-03-13 NOTE — PROGRESS NOTES
Subjective   Judi Sy is a 73 y.o. female. Patient is here today for follow-up on her recent UTI.  She was having hematuria and marked dysuria, who was seen at the ER and started on Keflex which she is completed.  Symptoms have resolved.  Her only new complaint is seasonal allergies, especially spring and fall which she has had for years.  She has been taking some Zyrtec  Chief Complaint   Patient presents with   • Follow-up     HOSPITAL FOLLOW UP   • Urinary Tract Infection          Vitals:    03/13/19 1446   BP: 110/80   Pulse: 95   Resp: 16   Temp: 97 °F (36.1 °C)   SpO2: 98%     The following portions of the patient's history were reviewed and updated as appropriate: allergies, current medications, past family history, past medical history, past social history, past surgical history and problem list.    Past Medical History:   Diagnosis Date   • Aortic heart valve prolapse    • Arthritis    • High cholesterol    • History of atrial fibrillation    • Knee pain, left    • TIA (transient ischemic attack)    • Vasovagal syncope       Allergies   Allergen Reactions   • Latex Rash      Social History     Socioeconomic History   • Marital status:      Spouse name: Not on file   • Number of children: Not on file   • Years of education: Not on file   • Highest education level: Not on file   Social Needs   • Financial resource strain: Not on file   • Food insecurity - worry: Not on file   • Food insecurity - inability: Not on file   • Transportation needs - medical: Not on file   • Transportation needs - non-medical: Not on file   Occupational History   • Not on file   Tobacco Use   • Smoking status: Former Smoker     Types: Cigarettes   • Smokeless tobacco: Never Used   • Tobacco comment: QUIT 7 YEARS AGO   Substance and Sexual Activity   • Alcohol use: No   • Drug use: No   • Sexual activity: Defer   Other Topics Concern   • Not on file   Social History Narrative   • Not on file        Current Outpatient  Medications:   •  cetirizine (zyrTEC) 10 MG tablet, Take 10 mg by mouth Daily., Disp: , Rfl:   •  atorvastatin (LIPITOR) 20 MG tablet, Take 1 tablet by mouth Daily., Disp: 90 tablet, Rfl: 3  •  Calcium Citrate-Vitamin D 250-200 MG-UNIT tablet, Take 1 tablet by mouth Every Morning. HOLD FOR SURGERY, Disp: , Rfl:   •  clopidogrel (PLAVIX) 75 MG tablet, Take 1 tablet by mouth Daily., Disp: 90 tablet, Rfl: 3  •  montelukast (SINGULAIR) 10 MG tablet, Take 1 tablet by mouth Every Night., Disp: 30 tablet, Rfl: 5  •  Multiple Vitamin (MULTI-VITAMIN) tablet, Take 1 tablet by mouth Daily. HOLD FOR SURGERY, Disp: , Rfl:      Objective     History of Present Illness     Review of Systems   Constitutional: Negative.    HENT: Positive for congestion and sneezing.    Eyes: Positive for itching.   Respiratory: Negative.    Cardiovascular: Negative.    Gastrointestinal: Negative.    Genitourinary: Negative.         Follow-up on recent UTI   Musculoskeletal: Negative.    Skin: Negative.    Neurological: Negative.    Psychiatric/Behavioral: Negative.        Physical Exam   Constitutional: She is oriented to person, place, and time. She appears well-developed and well-nourished.   Pleasant, cooperative no distress and appearing in her usual state of health   HENT:   Head: Normocephalic and atraumatic.   Eyes: Conjunctivae are normal. Pupils are equal, round, and reactive to light.   Neck: Normal range of motion.   Cardiovascular: Normal rate, regular rhythm and normal heart sounds.   Pulmonary/Chest: Effort normal and breath sounds normal. No respiratory distress. She has no wheezes. She has no rales.   Abdominal:   No CVA tenderness   Musculoskeletal: Normal range of motion.   Neurological: She is alert and oriented to person, place, and time.   Skin: Skin is warm and dry.   Psychiatric: She has a normal mood and affect. Her behavior is normal.   Nursing note and vitals reviewed.      ASSESSMENT the most recent urinalysis is  completely clear in the urine symptoms have resolved.  The patient has completed her course of Keflex.  #2-environmental and seasonal allergies, especially spring and fall     Problem List Items Addressed This Visit        Other    Environmental and seasonal allergies      Other Visit Diagnoses     Urinary tract infection without hematuria, site unspecified    -  Primary    Relevant Orders    POCT urinalysis dipstick, automated (Completed)          PLAN the patient will continue on Zyrtec and I am adding in Singulair 10 mg at night and she will try either Flonase or Nasacort for her allergy symptoms and see how they do.    There are no Patient Instructions on file for this visit.  No Follow-up on file.

## 2019-08-19 DIAGNOSIS — E55.9 VITAMIN D DEFICIENCY: ICD-10-CM

## 2019-08-19 DIAGNOSIS — E78.2 MIXED HYPERLIPIDEMIA: ICD-10-CM

## 2019-08-22 LAB
25(OH)D3+25(OH)D2 SERPL-MCNC: 75.6 NG/ML (ref 30–100)
ALBUMIN SERPL-MCNC: 4.6 G/DL (ref 3.5–5.2)
ALBUMIN/GLOB SERPL: 2.6 G/DL
ALP SERPL-CCNC: 84 U/L (ref 39–117)
ALT SERPL-CCNC: 15 U/L (ref 1–33)
AST SERPL-CCNC: 17 U/L (ref 1–32)
BILIRUB SERPL-MCNC: 0.3 MG/DL (ref 0.2–1.2)
BUN SERPL-MCNC: 15 MG/DL (ref 8–23)
BUN/CREAT SERPL: 20 (ref 7–25)
CALCIUM SERPL-MCNC: 9.2 MG/DL (ref 8.6–10.5)
CHLORIDE SERPL-SCNC: 102 MMOL/L (ref 98–107)
CHOLEST SERPL-MCNC: 170 MG/DL (ref 0–200)
CO2 SERPL-SCNC: 29 MMOL/L (ref 22–29)
CREAT SERPL-MCNC: 0.75 MG/DL (ref 0.57–1)
GLOBULIN SER CALC-MCNC: 1.8 GM/DL
GLUCOSE SERPL-MCNC: 98 MG/DL (ref 65–99)
HDLC SERPL-MCNC: 68 MG/DL (ref 40–60)
LDLC SERPL CALC-MCNC: 89 MG/DL (ref 0–100)
LDLC/HDLC SERPL: 1.3 {RATIO}
POTASSIUM SERPL-SCNC: 4.5 MMOL/L (ref 3.5–5.2)
PROT SERPL-MCNC: 6.4 G/DL (ref 6–8.5)
SODIUM SERPL-SCNC: 144 MMOL/L (ref 136–145)
TRIGL SERPL-MCNC: 67 MG/DL (ref 0–150)
VLDLC SERPL CALC-MCNC: 13.4 MG/DL

## 2019-08-28 ENCOUNTER — OFFICE VISIT (OUTPATIENT)
Dept: FAMILY MEDICINE CLINIC | Facility: CLINIC | Age: 74
End: 2019-08-28

## 2019-08-28 VITALS
BODY MASS INDEX: 28.49 KG/M2 | SYSTOLIC BLOOD PRESSURE: 110 MMHG | TEMPERATURE: 97.4 F | RESPIRATION RATE: 17 BRPM | OXYGEN SATURATION: 98 % | HEIGHT: 65 IN | HEART RATE: 84 BPM | DIASTOLIC BLOOD PRESSURE: 74 MMHG | WEIGHT: 171 LBS

## 2019-08-28 DIAGNOSIS — E55.9 VITAMIN D DEFICIENCY: ICD-10-CM

## 2019-08-28 DIAGNOSIS — R03.0 PRE-HYPERTENSION: Primary | ICD-10-CM

## 2019-08-28 DIAGNOSIS — M81.0 AGE-RELATED OSTEOPOROSIS WITHOUT CURRENT PATHOLOGICAL FRACTURE: ICD-10-CM

## 2019-08-28 DIAGNOSIS — Q21.12 PATENT FORAMEN OVALE: ICD-10-CM

## 2019-08-28 DIAGNOSIS — J30.89 ENVIRONMENTAL AND SEASONAL ALLERGIES: ICD-10-CM

## 2019-08-28 DIAGNOSIS — Z78.0 POST-MENOPAUSAL: ICD-10-CM

## 2019-08-28 DIAGNOSIS — E78.2 MIXED HYPERLIPIDEMIA: ICD-10-CM

## 2019-08-28 PROCEDURE — 99214 OFFICE O/P EST MOD 30 MIN: CPT | Performed by: INTERNAL MEDICINE

## 2019-08-28 RX ORDER — CLOPIDOGREL BISULFATE 75 MG/1
75 TABLET ORAL DAILY
Qty: 90 TABLET | Refills: 3 | Status: SHIPPED | OUTPATIENT
Start: 2019-08-28 | End: 2020-09-16 | Stop reason: SDUPTHER

## 2019-08-28 RX ORDER — ATORVASTATIN CALCIUM 20 MG/1
20 TABLET, FILM COATED ORAL DAILY
Qty: 90 TABLET | Refills: 3 | Status: SHIPPED | OUTPATIENT
Start: 2019-08-28 | End: 2020-08-26 | Stop reason: SDUPTHER

## 2019-08-28 NOTE — PROGRESS NOTES
Subjective   Judi Sy is a 73 y.o. female. Patient is here today for follow-up on her pre-hypertension, history of patent foramen ovale and TIA hyperlipidemia, vitamin D deficiency, osteoporosis.  She is generally feeling okay and has no acute complaints and denies any chest pain, shortness of breath, edema or myalgias.  Chief Complaint   Patient presents with   • Hypertension   • Hyperlipidemia          Vitals:    08/28/19 0949   BP: 110/74   Pulse: 84   Resp: 17   Temp: 97.4 °F (36.3 °C)   SpO2: 98%     The following portions of the patient's history were reviewed and updated as appropriate: allergies, current medications, past family history, past medical history, past social history, past surgical history and problem list.    Past Medical History:   Diagnosis Date   • Aortic heart valve prolapse    • Arthritis    • High cholesterol    • History of atrial fibrillation    • Knee pain, left    • TIA (transient ischemic attack)    • Vasovagal syncope       Allergies   Allergen Reactions   • Latex Rash      Social History     Socioeconomic History   • Marital status:      Spouse name: Not on file   • Number of children: Not on file   • Years of education: Not on file   • Highest education level: Not on file   Tobacco Use   • Smoking status: Former Smoker     Types: Cigarettes   • Smokeless tobacco: Never Used   • Tobacco comment: QUIT 7 YEARS AGO   Substance and Sexual Activity   • Alcohol use: No   • Drug use: No   • Sexual activity: Defer        Current Outpatient Medications:   •  atorvastatin (LIPITOR) 20 MG tablet, Take 1 tablet by mouth Daily., Disp: 90 tablet, Rfl: 3  •  Calcium Citrate-Vitamin D 250-200 MG-UNIT tablet, Take 1 tablet by mouth Every Morning. HOLD FOR SURGERY, Disp: , Rfl:   •  cetirizine (zyrTEC) 10 MG tablet, Take 10 mg by mouth Daily., Disp: , Rfl:   •  clopidogrel (PLAVIX) 75 MG tablet, Take 1 tablet by mouth Daily., Disp: 90 tablet, Rfl: 3  •  montelukast (SINGULAIR) 10 MG  tablet, Take 1 tablet by mouth Every Night., Disp: 30 tablet, Rfl: 5  •  Multiple Vitamin (MULTI-VITAMIN) tablet, Take 1 tablet by mouth Daily. HOLD FOR SURGERY, Disp: , Rfl:      Objective     History of Present Illness     Review of Systems   Constitutional: Negative.    HENT: Negative.    Eyes: Negative.    Respiratory: Negative.    Cardiovascular: Negative.    Gastrointestinal: Negative.    Genitourinary: Negative.    Musculoskeletal: Negative.    Skin: Negative.    Neurological: Negative.    Psychiatric/Behavioral: Negative.        Physical Exam   Constitutional: She is oriented to person, place, and time. She appears well-developed and well-nourished.   Pleasant, cooperative no acute distress, blood pressure 120/80   HENT:   Head: Normocephalic and atraumatic.   Eyes: Conjunctivae are normal. Pupils are equal, round, and reactive to light. No scleral icterus.   Neck: Normal range of motion. Neck supple.   Cardiovascular: Normal rate, regular rhythm and normal heart sounds.   Pulmonary/Chest: Effort normal and breath sounds normal. No respiratory distress. She has no wheezes. She has no rales.   Musculoskeletal: Normal range of motion. She exhibits no edema.   Neurological: She is alert and oriented to person, place, and time.   Skin: Skin is warm and dry.   Psychiatric: She has a normal mood and affect. Her behavior is normal.   Nursing note and vitals reviewed.      ASSESSMENT CMP was completely normal.  Vitamin D level was excellent at 75.6.  Lipid panel is stable with total cholesterol 170, HDL 68 and LDL 89.  #1-pre-hypertension with normal pressure today  #2-hyperlipidemia, excellent control on medication  #3-vitamin D deficiency, controlled on supplement  #4-history of patent foramen ovale and TIA  #5-history of osteoporosis     Problem List Items Addressed This Visit        Cardiovascular and Mediastinum    Hyperlipidemia    Relevant Medications    atorvastatin (LIPITOR) 20 MG tablet    Pre-hypertension  - Primary    Patent foramen ovale    Relevant Medications    clopidogrel (PLAVIX) 75 MG tablet    Other Relevant Orders    Ambulatory Referral to Cardiology       Digestive    Vitamin D deficiency       Musculoskeletal and Integument    Osteoporosis       Other    Environmental and seasonal allergies      Other Visit Diagnoses     Post-menopausal        Relevant Orders    DEXA Bone Density Axial          PLAN I recommended the second hepatitis and shingles immunizations.  I have ordered a DEXA scan on the patient for her osteoporosis.  I referred the patient to cardiology because of her history of patent foramen ovale and TIA to see whether she needs to continue on Plavix.  She will continue current medicines as now on I will like to recheck her in 6 months with a CBC, CMP, lipid panel and vitamin D level.    There are no Patient Instructions on file for this visit.  Return in about 6 months (around 2/28/2020) for with labs.

## 2019-09-05 RX ORDER — MONTELUKAST SODIUM 10 MG/1
TABLET ORAL
Qty: 30 TABLET | Refills: 4 | Status: SHIPPED | OUTPATIENT
Start: 2019-09-05 | End: 2020-08-26 | Stop reason: SDUPTHER

## 2019-09-06 ENCOUNTER — OFFICE VISIT (OUTPATIENT)
Dept: CARDIOLOGY | Facility: CLINIC | Age: 74
End: 2019-09-06

## 2019-09-06 VITALS
WEIGHT: 170 LBS | OXYGEN SATURATION: 93 % | BODY MASS INDEX: 28.32 KG/M2 | DIASTOLIC BLOOD PRESSURE: 82 MMHG | HEIGHT: 65 IN | HEART RATE: 85 BPM | RESPIRATION RATE: 16 BRPM | SYSTOLIC BLOOD PRESSURE: 110 MMHG

## 2019-09-06 DIAGNOSIS — Q21.12 PATENT FORAMEN OVALE: Primary | ICD-10-CM

## 2019-09-06 DIAGNOSIS — E78.2 MIXED HYPERLIPIDEMIA: ICD-10-CM

## 2019-09-06 PROCEDURE — 99213 OFFICE O/P EST LOW 20 MIN: CPT | Performed by: INTERNAL MEDICINE

## 2019-09-06 PROCEDURE — 93000 ELECTROCARDIOGRAM COMPLETE: CPT | Performed by: INTERNAL MEDICINE

## 2019-09-06 NOTE — PROGRESS NOTES
[facility]      Patient Name: Judi Sy  :1945  Age: 74 y.o.  Encounter Provider:  Den Morgan Jr, MD      Chief Complaint: No chief complaint on file.        HPI  Judi Sy is a 74 y.o. female past medical history of PFO status post closure by Dr. alarcon in 2016, recurrent TIA and hyperlipidemia presents for follow-up evaluation of the A4 mentioned chronic illnesses.  She denies any chest pain shortness of air palpitations.  No focal neurological deficits dizziness or syncope.  She denies any orthopnea PND or edema.  She exercises daily without any limitations.  She states that 1 of the main reasons she came to see me today was to inquire about potentially coming off of Plavix.  She denies tobacco alcohol or illicit drug use.  Family history reviewed and is noncontributory.      The following portions of the patient's history were reviewed and updated as appropriate: allergies, current medications, past family history, past medical history, past social history, past surgical history and problem list.      Review of Systems   Constitution: Negative for chills and fever.   HENT: Negative for hoarse voice and sore throat.    Eyes: Negative for double vision and photophobia.   Cardiovascular: Positive for leg swelling. Negative for chest pain, near-syncope, orthopnea, palpitations, paroxysmal nocturnal dyspnea and syncope.   Respiratory: Negative for cough and wheezing.    Skin: Negative for poor wound healing and rash.   Musculoskeletal: Negative for arthritis and joint swelling.   Gastrointestinal: Negative for bloating, abdominal pain, hematemesis and hematochezia.   Neurological: Negative for dizziness and focal weakness.   Psychiatric/Behavioral: Negative for depression and suicidal ideas.       OBJECTIVE:   Vital Signs  Vitals:    19 1323   BP: 110/82   Pulse: 85   Resp: 16   SpO2: 93%     Estimated body mass index is 28.29 kg/m² as calculated from the  "following:    Height as of this encounter: 165.1 cm (65\").    Weight as of this encounter: 77.1 kg (170 lb).    Physical Exam   Constitutional: She is oriented to person, place, and time. She appears well-developed and well-nourished.   HENT:   Head: Normocephalic and atraumatic.   Eyes: Conjunctivae are normal. Pupils are equal, round, and reactive to light.   Neck: No JVD present. No thyromegaly present.   Cardiovascular: Exam reveals no gallop and no friction rub.   No murmur heard.  Pulmonary/Chest: No respiratory distress. She exhibits no tenderness.   Abdominal: Bowel sounds are normal. She exhibits no distension.   Musculoskeletal: She exhibits no edema or tenderness.   Neurological: She is alert and oriented to person, place, and time.   Skin: No rash noted. No erythema.   Psychiatric: She has a normal mood and affect. Judgment normal.   Vitals reviewed.        ECG 12 Lead  Date/Time: 9/6/2019 3:00 PM  Performed by: Den Morgan Jr., MD  Authorized by: Den Morgan Jr., MD   Comparison: compared with previous ECG from 3/6/2018  Similar to previous ECG  Rhythm: sinus rhythm    Clinical impression: normal ECG                ASSESSMENT:      Diagnosis Plan   1. Patent foramen ovale     2. Mixed hyperlipidemia           PLAN OF CARE:     1. PFO -patient mentions that there was a residual defect after placement of the Amplatzer closure device.  We will check another echocardiogram with agitated saline contrast bubble study.  Although there is no specific cardiac reason to continue Plavix for the closure device it is a reasonable choice for secondary prevention of neurological events.  I have recommended that she discuss this with her neurologist and she states that it is logical to continue especially since she does not have any bleeding complications at this point.  I have advised her that electively this can be discontinued short-term for planned interventions.  2. Hypertension seemingly well controlled at " this point.  Continued twice daily blood pressure log.  I have advised her on sodium and fluid restriction.    Return to clinic 12 months             Discharge Medications           Accurate as of 9/6/19  2:58 PM. If you have any questions, ask your nurse or doctor.               Continue These Medications      Instructions Start Date   atorvastatin 20 MG tablet  Commonly known as:  LIPITOR   20 mg, Oral, Daily      Calcium Citrate-Vitamin D 250-200 MG-UNIT tablet   1 tablet, Oral, Every Morning, HOLD FOR SURGERY      cetirizine 10 MG tablet  Commonly known as:  zyrTEC   10 mg, Oral, Daily      clopidogrel 75 MG tablet  Commonly known as:  PLAVIX   75 mg, Oral, Daily      montelukast 10 MG tablet  Commonly known as:  SINGULAIR   TAKE ONE TABLET BY MOUTH ONCE NIGHTLY      MULTI-VITAMIN tablet   1 tablet, Oral, Daily, HOLD FOR SURGERY             Thank you for allowing me to participate in the care of your patient,      Sincerely,   Den Morgan Jr, MD  [facility]  09/06/19  2:58 PM

## 2019-09-27 ENCOUNTER — HOSPITAL ENCOUNTER (OUTPATIENT)
Dept: CARDIOLOGY | Facility: HOSPITAL | Age: 74
Discharge: HOME | End: 2019-09-27
Admitting: INTERNAL MEDICINE

## 2019-09-27 VITALS
HEIGHT: 65 IN | HEART RATE: 68 BPM | BODY MASS INDEX: 28.32 KG/M2 | SYSTOLIC BLOOD PRESSURE: 110 MMHG | WEIGHT: 170 LBS | DIASTOLIC BLOOD PRESSURE: 80 MMHG

## 2019-09-27 DIAGNOSIS — Q21.12 PATENT FORAMEN OVALE: ICD-10-CM

## 2019-09-27 LAB
AORTIC ARCH: 2 CM
AORTIC ROOT ANNULUS: 2.2 CM
ASCENDING AORTA: 3.3 CM
BH CV ECHO MEAS - ACS: 2.1 CM
BH CV ECHO MEAS - AO MAX PG (FULL): 4.8 MMHG
BH CV ECHO MEAS - AO MAX PG: 8.9 MMHG
BH CV ECHO MEAS - AO MEAN PG (FULL): 1.8 MMHG
BH CV ECHO MEAS - AO MEAN PG: 4.2 MMHG
BH CV ECHO MEAS - AO V2 MAX: 149.3 CM/SEC
BH CV ECHO MEAS - AO V2 MEAN: 90.3 CM/SEC
BH CV ECHO MEAS - AO V2 VTI: 30.9 CM
BH CV ECHO MEAS - ASC AORTA: 3.3 CM
BH CV ECHO MEAS - AVA(I,A): 2.3 CM^2
BH CV ECHO MEAS - AVA(I,D): 2.3 CM^2
BH CV ECHO MEAS - AVA(V,A): 2.2 CM^2
BH CV ECHO MEAS - AVA(V,D): 2.2 CM^2
BH CV ECHO MEAS - BSA(HAYCOCK): 1.9 M^2
BH CV ECHO MEAS - BSA: 1.8 M^2
BH CV ECHO MEAS - BZI_BMI: 28.3 KILOGRAMS/M^2
BH CV ECHO MEAS - BZI_METRIC_HEIGHT: 165.1 CM
BH CV ECHO MEAS - BZI_METRIC_WEIGHT: 77.1 KG
BH CV ECHO MEAS - EDV(MOD-SP2): 59 ML
BH CV ECHO MEAS - EDV(MOD-SP4): 50 ML
BH CV ECHO MEAS - EDV(TEICH): 103.6 ML
BH CV ECHO MEAS - EF(CUBED): 73.5 %
BH CV ECHO MEAS - EF(MOD-BP): 62 %
BH CV ECHO MEAS - EF(MOD-SP2): 62.7 %
BH CV ECHO MEAS - EF(MOD-SP4): 62 %
BH CV ECHO MEAS - EF(TEICH): 65.3 %
BH CV ECHO MEAS - ESV(MOD-SP2): 22 ML
BH CV ECHO MEAS - ESV(MOD-SP4): 19 ML
BH CV ECHO MEAS - ESV(TEICH): 36 ML
BH CV ECHO MEAS - FS: 35.8 %
BH CV ECHO MEAS - IVS/LVPW: 0.83
BH CV ECHO MEAS - IVSD: 0.92 CM
BH CV ECHO MEAS - LAT PEAK E' VEL: 11 CM/SEC
BH CV ECHO MEAS - LV DIASTOLIC VOL/BSA (35-75): 27.1 ML/M^2
BH CV ECHO MEAS - LV MASS(C)D: 169.9 GRAMS
BH CV ECHO MEAS - LV MASS(C)DI: 92 GRAMS/M^2
BH CV ECHO MEAS - LV MAX PG: 4.1 MMHG
BH CV ECHO MEAS - LV MEAN PG: 2.4 MMHG
BH CV ECHO MEAS - LV SYSTOLIC VOL/BSA (12-30): 10.3 ML/M^2
BH CV ECHO MEAS - LV V1 MAX: 101.7 CM/SEC
BH CV ECHO MEAS - LV V1 MEAN: 70.6 CM/SEC
BH CV ECHO MEAS - LV V1 VTI: 22.6 CM
BH CV ECHO MEAS - LVIDD: 4.7 CM
BH CV ECHO MEAS - LVIDS: 3 CM
BH CV ECHO MEAS - LVLD AP2: 7.7 CM
BH CV ECHO MEAS - LVLD AP4: 6.9 CM
BH CV ECHO MEAS - LVLS AP2: 6.2 CM
BH CV ECHO MEAS - LVLS AP4: 6.5 CM
BH CV ECHO MEAS - LVOT AREA (M): 3.1 CM^2
BH CV ECHO MEAS - LVOT AREA: 3.2 CM^2
BH CV ECHO MEAS - LVOT DIAM: 2 CM
BH CV ECHO MEAS - LVPWD: 1.1 CM
BH CV ECHO MEAS - MED PEAK E' VEL: 10 CM/SEC
BH CV ECHO MEAS - MR MAX PG: 55.2 MMHG
BH CV ECHO MEAS - MR MAX VEL: 371.5 CM/SEC
BH CV ECHO MEAS - MV A DUR: 0.11 SEC
BH CV ECHO MEAS - MV A MAX VEL: 107.8 CM/SEC
BH CV ECHO MEAS - MV DEC SLOPE: 297.9 CM/SEC^2
BH CV ECHO MEAS - MV DEC TIME: 0.26 SEC
BH CV ECHO MEAS - MV E MAX VEL: 72.2 CM/SEC
BH CV ECHO MEAS - MV E/A: 0.67
BH CV ECHO MEAS - MV MAX PG: 7.2 MMHG
BH CV ECHO MEAS - MV MEAN PG: 3.6 MMHG
BH CV ECHO MEAS - MV P1/2T MAX VEL: 74.9 CM/SEC
BH CV ECHO MEAS - MV P1/2T: 73.7 MSEC
BH CV ECHO MEAS - MV V2 MAX: 134.4 CM/SEC
BH CV ECHO MEAS - MV V2 MEAN: 88.2 CM/SEC
BH CV ECHO MEAS - MV V2 VTI: 23 CM
BH CV ECHO MEAS - MVA P1/2T LCG: 2.9 CM^2
BH CV ECHO MEAS - MVA(P1/2T): 3 CM^2
BH CV ECHO MEAS - MVA(VTI): 3.1 CM^2
BH CV ECHO MEAS - PA ACC TIME: 0.14 SEC
BH CV ECHO MEAS - PA MAX PG (FULL): 2.7 MMHG
BH CV ECHO MEAS - PA MAX PG: 4.7 MMHG
BH CV ECHO MEAS - PA PR(ACCEL): 17.2 MMHG
BH CV ECHO MEAS - PA V2 MAX: 108.6 CM/SEC
BH CV ECHO MEAS - PULM A REVS DUR: 0.12 SEC
BH CV ECHO MEAS - PULM A REVS VEL: 45.1 CM/SEC
BH CV ECHO MEAS - PULM DIAS VEL: 42.7 CM/SEC
BH CV ECHO MEAS - PULM S/D: 1.6
BH CV ECHO MEAS - PULM SYS VEL: 67.4 CM/SEC
BH CV ECHO MEAS - PVA(V,A): 2.3 CM^2
BH CV ECHO MEAS - PVA(V,D): 2.3 CM^2
BH CV ECHO MEAS - QP/QS: 0.75
BH CV ECHO MEAS - RAP SYSTOLE: 3 MMHG
BH CV ECHO MEAS - RV MAX PG: 2 MMHG
BH CV ECHO MEAS - RV MEAN PG: 1.2 MMHG
BH CV ECHO MEAS - RV V1 MAX: 71.3 CM/SEC
BH CV ECHO MEAS - RV V1 MEAN: 49 CM/SEC
BH CV ECHO MEAS - RV V1 VTI: 15 CM
BH CV ECHO MEAS - RVOT AREA: 3.6 CM^2
BH CV ECHO MEAS - RVOT DIAM: 2.1 CM
BH CV ECHO MEAS - RVSP: 24 MMHG
BH CV ECHO MEAS - SI(CUBED): 42 ML/M^2
BH CV ECHO MEAS - SI(LVOT): 39 ML/M^2
BH CV ECHO MEAS - SI(MOD-SP2): 20 ML/M^2
BH CV ECHO MEAS - SI(MOD-SP4): 16.8 ML/M^2
BH CV ECHO MEAS - SI(TEICH): 36.6 ML/M^2
BH CV ECHO MEAS - SUP REN AO DIAM: 1.5 CM
BH CV ECHO MEAS - SV(CUBED): 77.5 ML
BH CV ECHO MEAS - SV(LVOT): 72 ML
BH CV ECHO MEAS - SV(MOD-SP2): 37 ML
BH CV ECHO MEAS - SV(MOD-SP4): 31 ML
BH CV ECHO MEAS - SV(RVOT): 53.6 ML
BH CV ECHO MEAS - SV(TEICH): 67.6 ML
BH CV ECHO MEAS - TAPSE (>1.6): 2.2 CM2
BH CV ECHO MEAS - TR MAX VEL: 233.5 CM/SEC
BH CV ECHO MEASUREMENTS AVERAGE E/E' RATIO: 6.88
BH CV XLRA - RV BASE: 2.2 CM
BH CV XLRA - TDI S': 10 CM/SEC
LEFT ATRIUM VOLUME INDEX: 15 ML/M2
MAXIMAL PREDICTED HEART RATE: 148 BPM
SINUS: 3.5 CM
STJ: 3.1 CM
STRESS TARGET HR: 126 BPM

## 2019-09-27 PROCEDURE — 93306 TTE W/DOPPLER COMPLETE: CPT

## 2019-09-27 PROCEDURE — 93306 TTE W/DOPPLER COMPLETE: CPT | Performed by: INTERNAL MEDICINE

## 2019-10-01 ENCOUNTER — HOSPITAL ENCOUNTER (OUTPATIENT)
Dept: BONE DENSITY | Facility: HOSPITAL | Age: 74
Discharge: HOME | End: 2019-10-01
Admitting: INTERNAL MEDICINE

## 2019-10-01 ENCOUNTER — APPOINTMENT (OUTPATIENT)
Dept: GENERAL RADIOLOGY | Facility: HOSPITAL | Age: 74
End: 2019-10-01

## 2019-10-01 DIAGNOSIS — Z78.0 POST-MENOPAUSAL: ICD-10-CM

## 2019-10-01 PROCEDURE — 73630 X-RAY EXAM OF FOOT: CPT | Performed by: GENERAL PRACTICE

## 2019-10-01 PROCEDURE — 77080 DXA BONE DENSITY AXIAL: CPT

## 2019-10-01 NOTE — PROGRESS NOTES
Physical Therapy Daily Progress Note      Patient Name: Judi Sy         :  1945  Referring Physician: Kade Jacob MD      Time In 0900                                   Time Out 1020      Subjective   Judi Sy reports: continued improvement - feeling much better with  pain and improved mobility and function - noting that the taping and now her brace are very very helpful and she was able to walk much farther with minimal discomfort - today notes minimal  soreness anterior and ant/lat ankle (L) -  Pt says she would like to continue PT at home as she is going out of town for some time -       Objective   Pt demonstrating improved gait w/o ankle taped with increased WB-ing LLE -   Grossly decreased swelling (L) ankle / foot -   Tender anterior ankle, ant tib-fib lig region; Tender ant/lat ankle ligament region; (L)  Improved AROM grossly (L) ankle -       See Exercise, Manual, and Modality Logs for complete treatment.      Functional / Therapeutic Activities:  30 min  · TAPING / BRACING: **HELD TODAY** - 1) K-Tape to prevent over PF and stabilize anterior ankle; 2) (L) ankle taping with arch tape; 3) Basket weave with leuko tape to stabilize distal tib-fib joint (L)  · SEE EXERCISE FLOW SHEET -   · Jt protection, ADL modification; Posture and        Assessment/Plan  (L) ankle sprain with avulsion fx and High ankle component -   Taping very effective in decreasing pain and allowing improved gait / function -   Improving w/ decreased pain and improved mobility, gait, and function -       Continue with HEP - Use brace at home prn - To contact PT if pain increases and she needs to return -       _________________________________________________  Manual Therapy:                      NA     mins  44769;  Therapeutic Exercise:              25     mins  58279;     Neuromuscular Lea:            15   mins  77613;    Therapeutic Activity:                 30     mins  65436;      Gait Training:                            NA     mins  87946;     Ultrasound:                                    mins  69173;    Electrical Stimulation:              NA     mins  83370 ( );  Dry Needling                              NA     mins self-pay      Timed Treatment:   70   mins                  Total Treatment:     80   mins      Salazar Conteh, PT  Physical Therapist   [place] to call system

## 2019-10-02 ENCOUNTER — HOSPITAL ENCOUNTER (OUTPATIENT)
Dept: BONE DENSITY | Facility: HOSPITAL | Age: 74
End: 2019-10-02

## 2019-12-11 ENCOUNTER — OFFICE VISIT (OUTPATIENT)
Dept: FAMILY MEDICINE CLINIC | Facility: CLINIC | Age: 74
End: 2019-12-11

## 2019-12-11 VITALS
TEMPERATURE: 97.4 F | HEIGHT: 65 IN | DIASTOLIC BLOOD PRESSURE: 80 MMHG | WEIGHT: 168 LBS | OXYGEN SATURATION: 98 % | RESPIRATION RATE: 16 BRPM | HEART RATE: 90 BPM | SYSTOLIC BLOOD PRESSURE: 126 MMHG | BODY MASS INDEX: 27.99 KG/M2

## 2019-12-11 DIAGNOSIS — J06.9 VIRAL UPPER RESPIRATORY TRACT INFECTION: Primary | ICD-10-CM

## 2019-12-11 PROCEDURE — 99213 OFFICE O/P EST LOW 20 MIN: CPT | Performed by: INTERNAL MEDICINE

## 2019-12-11 NOTE — PATIENT INSTRUCTIONS
Symptoms are consistent with a viral upper respiratory infection which is improving and does not require antibiotics.  Continue Zyrtec 10 mg daily for allergies.

## 2019-12-11 NOTE — PROGRESS NOTES
Subjective   Judi Sy is a 74 y.o. female. Patient is here today for   Chief Complaint   Patient presents with   • Follow-up     [place] [facility] [place]   • Nausea          Vitals:    12/11/19 1103   BP: 126/80   Pulse: 90   Resp: 16   Temp: 97.4 °F (36.3 °C)   SpO2: 98%     Body mass index is 27.96 kg/m².    The following portions of the patient's history were reviewed and updated as appropriate: allergies, current medications, past family history, past medical history, past social history, past surgical history and problem list.    Past Medical History:   Diagnosis Date   • Aortic heart valve prolapse    • Arthritis    • Carotid arterial disease (CMS/HCC)    • High cholesterol    • History of atrial fibrillation    • Knee pain, left    • TIA (transient ischemic attack)    • Vasovagal syncope       Allergies   Allergen Reactions   • Latex Rash      Social History     Socioeconomic History   • Marital status:      Spouse name: Not on file   • Number of children: Not on file   • Years of education: Not on file   • Highest education level: Not on file   Tobacco Use   • Smoking status: Former Smoker     Types: Cigarettes   • Smokeless tobacco: Never Used   • Tobacco comment: QUIT 7 YEARS AGO   Substance and Sexual Activity   • Alcohol use: No   • Drug use: No   • Sexual activity: Defer        Current Outpatient Medications:   •  atorvastatin (LIPITOR) 20 MG tablet, Take 1 tablet by mouth Daily., Disp: 90 tablet, Rfl: 3  •  Calcium Citrate-Vitamin D 250-200 MG-UNIT tablet, Take 1 tablet by mouth Every Morning. HOLD FOR SURGERY, Disp: , Rfl:   •  cetirizine (zyrTEC) 10 MG tablet, Take 10 mg by mouth Daily., Disp: , Rfl:   •  clopidogrel (PLAVIX) 75 MG tablet, Take 1 tablet by mouth Daily., Disp: 90 tablet, Rfl: 3  •  Cyanocobalamin (VITAMIN B12 PO), Take  by mouth Daily., Disp: , Rfl:   •  montelukast (SINGULAIR) 10 MG tablet, TAKE ONE TABLET BY MOUTH ONCE NIGHTLY, Disp: 30 tablet, Rfl: 4  •  Multiple  Vitamins-Minerals (PRESERVISION AREDS 2 PO), Take  by mouth Daily., Disp: , Rfl:   •  benzonatate (TESSALON) 200 MG capsule, Take 1 capsule by mouth 3 (Three) Times a Day As Needed for Cough., Disp: 30 capsule, Rfl: 0  •  meloxicam (MOBIC) 7.5 MG tablet, Take 1 tablet by mouth Daily., Disp: 15 tablet, Rfl: 0     Objective     History of Present Illness Judi complains of a cough that started almost 2 weeks ago.  She initially had an episode of nausea and vomiting..  She continues to have intermittent nausea.  She denies fever.  She went to urgent care and was diagnosed with a viral upper respiratory infection and given Tessalon for cough.  She does have some shortness of breath after coughing.  She is feeling better today.  She does have allergic rhinitis and takes Zyrtec 10 mg daily.  She does have postnasal drip.     Review of Systems   Constitutional: Negative for fatigue and fever.   HENT: Negative for congestion.    Respiratory: Positive for cough and shortness of breath. Negative for wheezing.    Gastrointestinal: Positive for nausea. Negative for abdominal pain and diarrhea.   Neurological: Negative for headaches.       Physical Exam   Constitutional: She appears well-developed and well-nourished.   HENT:   Right Ear: Tympanic membrane normal.   Left Ear: Tympanic membrane normal.   Nose: Nose normal.   Mouth/Throat: Oropharynx is clear and moist.   Cardiovascular: Normal rate, regular rhythm and normal heart sounds.   Pulmonary/Chest: Effort normal. She has no wheezes. She has no rales.   Psychiatric: She has a normal mood and affect. Her behavior is normal. Judgment and thought content normal.   Vitals reviewed.      ASSESSMENT     Problem List Items Addressed This Visit        Respiratory    Viral upper respiratory tract infection - Primary          PLAN  Patient Instructions   Symptoms are consistent with a viral upper respiratory infection which is improving and does not require antibiotics.  Continue  Zyrtec 10 mg daily for allergies.    No follow-ups on file.

## 2020-02-20 DIAGNOSIS — E55.9 VITAMIN D DEFICIENCY: ICD-10-CM

## 2020-02-20 DIAGNOSIS — E78.2 MIXED HYPERLIPIDEMIA: ICD-10-CM

## 2020-02-26 ENCOUNTER — RESULTS ENCOUNTER (OUTPATIENT)
Dept: FAMILY MEDICINE CLINIC | Facility: CLINIC | Age: 75
End: 2020-02-26

## 2020-02-26 DIAGNOSIS — E55.9 VITAMIN D DEFICIENCY: ICD-10-CM

## 2020-02-26 DIAGNOSIS — E78.2 MIXED HYPERLIPIDEMIA: ICD-10-CM

## 2020-02-27 LAB
25(OH)D3+25(OH)D2 SERPL-MCNC: 53.5 NG/ML (ref 30–100)
ALBUMIN SERPL-MCNC: 4.1 G/DL (ref 3.5–5.2)
ALBUMIN/GLOB SERPL: 1.9 G/DL
ALP SERPL-CCNC: 79 U/L (ref 39–117)
ALT SERPL-CCNC: 11 U/L (ref 1–33)
AST SERPL-CCNC: 14 U/L (ref 1–32)
BASOPHILS # BLD AUTO: 0.03 10*3/MM3 (ref 0–0.2)
BASOPHILS NFR BLD AUTO: 0.6 % (ref 0–1.5)
BILIRUB SERPL-MCNC: 0.4 MG/DL (ref 0.2–1.2)
BUN SERPL-MCNC: 16 MG/DL (ref 8–23)
BUN/CREAT SERPL: 25 (ref 7–25)
CALCIUM SERPL-MCNC: 9.2 MG/DL (ref 8.6–10.5)
CHLORIDE SERPL-SCNC: 103 MMOL/L (ref 98–107)
CHOLEST SERPL-MCNC: 180 MG/DL (ref 0–200)
CO2 SERPL-SCNC: 28 MMOL/L (ref 22–29)
CREAT SERPL-MCNC: 0.64 MG/DL (ref 0.57–1)
EOSINOPHIL # BLD AUTO: 0.1 10*3/MM3 (ref 0–0.4)
EOSINOPHIL NFR BLD AUTO: 2 % (ref 0.3–6.2)
ERYTHROCYTE [DISTWIDTH] IN BLOOD BY AUTOMATED COUNT: 12.5 % (ref 12.3–15.4)
GLOBULIN SER CALC-MCNC: 2.2 GM/DL
GLUCOSE SERPL-MCNC: 92 MG/DL (ref 65–99)
HCT VFR BLD AUTO: 40.2 % (ref 34–46.6)
HDLC SERPL-MCNC: 57 MG/DL (ref 40–60)
HGB BLD-MCNC: 13.5 G/DL (ref 12–15.9)
IMM GRANULOCYTES # BLD AUTO: 0.01 10*3/MM3 (ref 0–0.05)
IMM GRANULOCYTES NFR BLD AUTO: 0.2 % (ref 0–0.5)
LDLC SERPL CALC-MCNC: 105 MG/DL (ref 0–100)
LDLC/HDLC SERPL: 1.84 {RATIO}
LYMPHOCYTES # BLD AUTO: 1.38 10*3/MM3 (ref 0.7–3.1)
LYMPHOCYTES NFR BLD AUTO: 27.5 % (ref 19.6–45.3)
MCH RBC QN AUTO: 30.9 PG (ref 26.6–33)
MCHC RBC AUTO-ENTMCNC: 33.6 G/DL (ref 31.5–35.7)
MCV RBC AUTO: 92 FL (ref 79–97)
MONOCYTES # BLD AUTO: 0.45 10*3/MM3 (ref 0.1–0.9)
MONOCYTES NFR BLD AUTO: 9 % (ref 5–12)
NEUTROPHILS # BLD AUTO: 3.04 10*3/MM3 (ref 1.7–7)
NEUTROPHILS NFR BLD AUTO: 60.7 % (ref 42.7–76)
NRBC BLD AUTO-RTO: 0 /100 WBC (ref 0–0.2)
PLATELET # BLD AUTO: 190 10*3/MM3 (ref 140–450)
POTASSIUM SERPL-SCNC: 4.5 MMOL/L (ref 3.5–5.2)
PROT SERPL-MCNC: 6.3 G/DL (ref 6–8.5)
RBC # BLD AUTO: 4.37 10*6/MM3 (ref 3.77–5.28)
SODIUM SERPL-SCNC: 142 MMOL/L (ref 136–145)
TRIGL SERPL-MCNC: 90 MG/DL (ref 0–150)
VLDLC SERPL CALC-MCNC: 18 MG/DL
WBC # BLD AUTO: 5.01 10*3/MM3 (ref 3.4–10.8)

## 2020-03-04 ENCOUNTER — OFFICE VISIT (OUTPATIENT)
Dept: FAMILY MEDICINE CLINIC | Facility: CLINIC | Age: 75
End: 2020-03-04

## 2020-03-04 VITALS
HEART RATE: 85 BPM | SYSTOLIC BLOOD PRESSURE: 130 MMHG | OXYGEN SATURATION: 98 % | WEIGHT: 168.8 LBS | HEIGHT: 65 IN | RESPIRATION RATE: 16 BRPM | TEMPERATURE: 98 F | BODY MASS INDEX: 28.12 KG/M2 | DIASTOLIC BLOOD PRESSURE: 80 MMHG

## 2020-03-04 DIAGNOSIS — E78.2 MIXED HYPERLIPIDEMIA: ICD-10-CM

## 2020-03-04 DIAGNOSIS — Q21.12 PATENT FORAMEN OVALE: Primary | ICD-10-CM

## 2020-03-04 DIAGNOSIS — K21.9 GASTROESOPHAGEAL REFLUX DISEASE WITHOUT ESOPHAGITIS: ICD-10-CM

## 2020-03-04 DIAGNOSIS — M81.0 AGE-RELATED OSTEOPOROSIS WITHOUT CURRENT PATHOLOGICAL FRACTURE: ICD-10-CM

## 2020-03-04 DIAGNOSIS — R03.0 PRE-HYPERTENSION: ICD-10-CM

## 2020-03-04 DIAGNOSIS — E55.9 VITAMIN D DEFICIENCY: ICD-10-CM

## 2020-03-04 PROCEDURE — 99214 OFFICE O/P EST MOD 30 MIN: CPT | Performed by: INTERNAL MEDICINE

## 2020-03-04 NOTE — PROGRESS NOTES
Subjective   Judi Sy is a 74 y.o. female. Patient is here today for follow-up on her pre-hypertension, hyperlipidemia, history of patent foramen ovale, vitamin D deficiency, GERD, osteoporosis.  The patient's generally feeling okay and has no acute complaints and has had no chest pain, shortness of breath, edema or myalgias  Chief Complaint   Patient presents with   • Hyperlipidemia     VITAMIN D DEF- FOLLOW UP LABS          Vitals:    03/04/20 1015   BP: 130/80   Pulse: 85   Resp: 16   Temp: 98 °F (36.7 °C)   SpO2: 98%     Body mass index is 28.09 kg/m².  The following portions of the patient's history were reviewed and updated as appropriate: allergies, current medications, past family history, past medical history, past social history, past surgical history and problem list.    Past Medical History:   Diagnosis Date   • Aortic heart valve prolapse    • Arthritis    • Carotid arterial disease (CMS/HCC)    • High cholesterol    • History of atrial fibrillation    • Knee pain, left    • TIA (transient ischemic attack)    • Vasovagal syncope       Allergies   Allergen Reactions   • Latex Rash      Social History     Socioeconomic History   • Marital status:      Spouse name: Not on file   • Number of children: Not on file   • Years of education: Not on file   • Highest education level: Not on file   Tobacco Use   • Smoking status: Former Smoker     Types: Cigarettes   • Smokeless tobacco: Never Used   • Tobacco comment: QUIT 7 YEARS AGO   Substance and Sexual Activity   • Alcohol use: No   • Drug use: No   • Sexual activity: Defer        Current Outpatient Medications:   •  atorvastatin (LIPITOR) 20 MG tablet, Take 1 tablet by mouth Daily., Disp: 90 tablet, Rfl: 3  •  Calcium Citrate-Vitamin D 250-200 MG-UNIT tablet, Take 1 tablet by mouth Every Morning. HOLD FOR SURGERY, Disp: , Rfl:   •  cetirizine (zyrTEC) 10 MG tablet, Take 10 mg by mouth Daily., Disp: , Rfl:   •  clopidogrel (PLAVIX) 75 MG  tablet, Take 1 tablet by mouth Daily., Disp: 90 tablet, Rfl: 3  •  Cyanocobalamin (VITAMIN B12 PO), Take  by mouth Daily., Disp: , Rfl:   •  meloxicam (MOBIC) 7.5 MG tablet, Take 1 tablet by mouth Daily., Disp: 15 tablet, Rfl: 0  •  montelukast (SINGULAIR) 10 MG tablet, TAKE ONE TABLET BY MOUTH ONCE NIGHTLY, Disp: 30 tablet, Rfl: 4  •  Multiple Vitamins-Minerals (PRESERVISION AREDS 2 PO), Take  by mouth Daily., Disp: , Rfl:      Objective     History of Present Illness     Review of Systems   Constitutional: Negative.    HENT: Negative.    Respiratory: Negative.    Cardiovascular: Negative.    Gastrointestinal: Negative.    Genitourinary: Negative.    Musculoskeletal: Negative.    Skin: Negative.    Neurological: Negative.    Psychiatric/Behavioral: Negative.        Physical Exam   Constitutional: She is oriented to person, place, and time. She appears well-developed and well-nourished.   Pleasant, cooperative no acute distress, blood pressure 120/80   HENT:   Head: Normocephalic and atraumatic.   Eyes: Pupils are equal, round, and reactive to light. Conjunctivae are normal. No scleral icterus.   Neck: Normal range of motion. Neck supple.   Cardiovascular: Normal rate, regular rhythm and normal heart sounds.   Pulmonary/Chest: Effort normal and breath sounds normal. No respiratory distress. She has no wheezes. She has no rales.   Musculoskeletal: Normal range of motion. She exhibits no edema.   Neurological: She is alert and oriented to person, place, and time.   Skin: Skin is warm and dry.   Psychiatric: She has a normal mood and affect. Her behavior is normal.   Nursing note and vitals reviewed.      ASSESSMENT CBC is normal and CMP is also normal.  Vitamin D level is stable and normal and lipid panel is reasonable with total cholesterol 180, HDL 57 and   #1-pre-hypertension with good blood pressure today  #2-hyperlipidemia, overall stable and reasonable  #3-vitamin D deficiency, corrected with  supplement  #4-history of patent foramen ovale, status post closure device with echocardiogram done in September 2019 showing good placement and no sign of leakage  #5-osteoporosis on bone density test     Problem List Items Addressed This Visit        Cardiovascular and Mediastinum    Hyperlipidemia    Pre-hypertension    RESOLVED: Patent foramen ovale - Primary       Digestive    Gastroesophageal reflux disease without esophagitis    Vitamin D deficiency       Musculoskeletal and Integument    Osteoporosis          PLAN the patient is resistant to any specific treatment for the osteoporosis aside from calcium and exercise.  She will continue current medicines as now.  I would like to recheck her in 6 months with a CMP, lipid panel, vitamin D level and TSH    There are no Patient Instructions on file for this visit.  Return in about 6 months (around 9/4/2020) for with labs.

## 2020-08-26 RX ORDER — MONTELUKAST SODIUM 10 MG/1
10 TABLET ORAL NIGHTLY
Qty: 90 TABLET | Refills: 1 | Status: SHIPPED | OUTPATIENT
Start: 2020-08-26 | End: 2020-09-16

## 2020-08-26 RX ORDER — ATORVASTATIN CALCIUM 20 MG/1
20 TABLET, FILM COATED ORAL DAILY
Qty: 90 TABLET | Refills: 1 | Status: SHIPPED | OUTPATIENT
Start: 2020-08-26 | End: 2020-10-01

## 2020-08-28 DIAGNOSIS — Z13.29 THYROID DISORDER SCREENING: ICD-10-CM

## 2020-08-28 DIAGNOSIS — R03.0 PRE-HYPERTENSION: ICD-10-CM

## 2020-08-28 DIAGNOSIS — E78.2 MIXED HYPERLIPIDEMIA: Primary | ICD-10-CM

## 2020-08-28 DIAGNOSIS — E55.9 VITAMIN D DEFICIENCY: ICD-10-CM

## 2020-09-09 LAB
25(OH)D3+25(OH)D2 SERPL-MCNC: 54.1 NG/ML (ref 30–100)
ALBUMIN SERPL-MCNC: 4.4 G/DL (ref 3.5–5.2)
ALBUMIN/GLOB SERPL: 2.9 G/DL
ALP SERPL-CCNC: 80 U/L (ref 39–117)
ALT SERPL-CCNC: 13 U/L (ref 1–33)
AST SERPL-CCNC: 17 U/L (ref 1–32)
BILIRUB SERPL-MCNC: 0.4 MG/DL (ref 0–1.2)
BUN SERPL-MCNC: 13 MG/DL (ref 8–23)
BUN/CREAT SERPL: 17.8 (ref 7–25)
CALCIUM SERPL-MCNC: 9.1 MG/DL (ref 8.6–10.5)
CHLORIDE SERPL-SCNC: 105 MMOL/L (ref 98–107)
CHOLEST SERPL-MCNC: 162 MG/DL (ref 0–200)
CO2 SERPL-SCNC: 28.1 MMOL/L (ref 22–29)
CREAT SERPL-MCNC: 0.73 MG/DL (ref 0.57–1)
GLOBULIN SER CALC-MCNC: 1.5 GM/DL
GLUCOSE SERPL-MCNC: 98 MG/DL (ref 65–99)
HDLC SERPL-MCNC: 59 MG/DL (ref 40–60)
LDLC SERPL CALC-MCNC: 85 MG/DL (ref 0–100)
LDLC/HDLC SERPL: 1.44 {RATIO}
POTASSIUM SERPL-SCNC: 4 MMOL/L (ref 3.5–5.2)
PROT SERPL-MCNC: 5.9 G/DL (ref 6–8.5)
SODIUM SERPL-SCNC: 142 MMOL/L (ref 136–145)
TRIGL SERPL-MCNC: 90 MG/DL (ref 0–150)
TSH SERPL DL<=0.005 MIU/L-ACNC: 2.14 UIU/ML (ref 0.27–4.2)
VLDLC SERPL CALC-MCNC: 18 MG/DL

## 2020-09-16 ENCOUNTER — OFFICE VISIT (OUTPATIENT)
Dept: FAMILY MEDICINE CLINIC | Facility: CLINIC | Age: 75
End: 2020-09-16

## 2020-09-16 VITALS
WEIGHT: 169 LBS | DIASTOLIC BLOOD PRESSURE: 80 MMHG | TEMPERATURE: 96.8 F | RESPIRATION RATE: 16 BRPM | BODY MASS INDEX: 28.16 KG/M2 | HEART RATE: 73 BPM | OXYGEN SATURATION: 95 % | SYSTOLIC BLOOD PRESSURE: 138 MMHG | HEIGHT: 65 IN

## 2020-09-16 DIAGNOSIS — J30.89 ENVIRONMENTAL AND SEASONAL ALLERGIES: ICD-10-CM

## 2020-09-16 DIAGNOSIS — K21.9 GASTROESOPHAGEAL REFLUX DISEASE WITHOUT ESOPHAGITIS: ICD-10-CM

## 2020-09-16 DIAGNOSIS — E55.9 VITAMIN D DEFICIENCY: ICD-10-CM

## 2020-09-16 DIAGNOSIS — R03.0 PRE-HYPERTENSION: Primary | ICD-10-CM

## 2020-09-16 DIAGNOSIS — E78.2 MIXED HYPERLIPIDEMIA: ICD-10-CM

## 2020-09-16 DIAGNOSIS — Z23 NEED FOR VACCINATION: ICD-10-CM

## 2020-09-16 PROCEDURE — 90694 VACC AIIV4 NO PRSRV 0.5ML IM: CPT | Performed by: INTERNAL MEDICINE

## 2020-09-16 PROCEDURE — 99214 OFFICE O/P EST MOD 30 MIN: CPT | Performed by: INTERNAL MEDICINE

## 2020-09-16 PROCEDURE — G0008 ADMIN INFLUENZA VIRUS VAC: HCPCS | Performed by: INTERNAL MEDICINE

## 2020-09-16 RX ORDER — CLOPIDOGREL BISULFATE 75 MG/1
75 TABLET ORAL DAILY
Qty: 90 TABLET | Refills: 3 | Status: SHIPPED | OUTPATIENT
Start: 2020-09-16 | End: 2021-10-13 | Stop reason: SDUPTHER

## 2020-09-16 NOTE — PROGRESS NOTES
Subjective   Judi Sy is a 75 y.o. female. Patient is here today for follow-up on her pre-hypertension, hyperlipidemia, vitamin D deficiency, GERD and history of TIA.  Patient had no neurologic symptoms and generally feels well and has had no chest pains, shortness of breath, edema or myalgias  Chief Complaint   Patient presents with   • Hyperlipidemia   • Prehypertension   • Vitamin D Deficiency          Vitals:    09/16/20 1011   BP: 138/80   Pulse: 73   Resp: 16   Temp: 96.8 °F (36 °C)   SpO2: 95%     Body mass index is 28.12 kg/m².  The following portions of the patient's history were reviewed and updated as appropriate: allergies, current medications, past family history, past medical history, past social history, past surgical history and problem list.    Past Medical History:   Diagnosis Date   • Aortic heart valve prolapse    • Arthritis    • Carotid arterial disease (CMS/HCC)    • High cholesterol    • History of atrial fibrillation    • Knee pain, left    • TIA (transient ischemic attack)    • Vasovagal syncope       Allergies   Allergen Reactions   • Latex Rash      Social History     Socioeconomic History   • Marital status:      Spouse name: Not on file   • Number of children: Not on file   • Years of education: Not on file   • Highest education level: Not on file   Tobacco Use   • Smoking status: Former Smoker     Types: Cigarettes   • Smokeless tobacco: Never Used   • Tobacco comment: QUIT 7 YEARS AGO   Substance and Sexual Activity   • Alcohol use: No   • Drug use: No   • Sexual activity: Defer        Current Outpatient Medications:   •  atorvastatin (LIPITOR) 20 MG tablet, Take 1 tablet by mouth Daily., Disp: 90 tablet, Rfl: 1  •  Calcium Citrate-Vitamin D 250-200 MG-UNIT tablet, Take 1 tablet by mouth Every Morning. HOLD FOR SURGERY, Disp: , Rfl:   •  cetirizine (zyrTEC) 10 MG tablet, Take 10 mg by mouth Daily., Disp: , Rfl:   •  clopidogrel (PLAVIX) 75 MG tablet, Take 1 tablet by  mouth Daily., Disp: 90 tablet, Rfl: 3  •  Cyanocobalamin (VITAMIN B12 PO), Take  by mouth Daily., Disp: , Rfl:   •  meloxicam (MOBIC) 7.5 MG tablet, Take 1 tablet by mouth Daily., Disp: 15 tablet, Rfl: 0  •  Multiple Vitamins-Minerals (PRESERVISION AREDS 2 PO), Take  by mouth Daily., Disp: , Rfl:      Objective     History of Present Illness     Review of Systems   Constitutional: Negative.    HENT: Negative.    Respiratory: Negative.    Cardiovascular: Negative.    Gastrointestinal: Negative.    Genitourinary: Negative.    Musculoskeletal: Negative.    Skin: Negative.    Neurological: Negative.    Psychiatric/Behavioral: Negative.        Physical Exam  Vitals signs (124/85) and nursing note reviewed.   Constitutional:       Appearance: Normal appearance. She is obese.   HENT:      Head: Normocephalic and atraumatic.   Eyes:      General: No scleral icterus.     Conjunctiva/sclera: Conjunctivae normal.   Neck:      Musculoskeletal: Normal range of motion and neck supple.   Cardiovascular:      Rate and Rhythm: Normal rate and regular rhythm.      Heart sounds: Normal heart sounds.   Pulmonary:      Effort: Pulmonary effort is normal. No respiratory distress.      Breath sounds: Normal breath sounds. No wheezing or rales.   Musculoskeletal: Normal range of motion.         General: No swelling.   Skin:     General: Skin is warm and dry.   Neurological:      General: No focal deficit present.      Mental Status: She is alert and oriented to person, place, and time.   Psychiatric:         Mood and Affect: Mood normal.         Behavior: Behavior normal.         ASSESSMENT CMP was essentially normal.  Lipid panel is well controlled with total cholesterol 162, HDL 59 and LDL 85.  Vitamin D level is well controlled at 54.1 and TSH is normal.  #1-history of TIA, asymptomatic  #2-pre-hypertension with good blood pressure today  #3-hyperlipidemia, controlled on medication  #4-history of vitamin D deficiency,  asymptomatic  #5-GERD     Problem List Items Addressed This Visit        Cardiovascular and Mediastinum    Hyperlipidemia    Pre-hypertension - Primary       Digestive    Gastroesophageal reflux disease without esophagitis    Vitamin D deficiency       Other    Environmental and seasonal allergies          PLAN the patient received a flu shot today.  She will continue current medicines as now and I will recheck her in 6 months with a CBC, CMP, lipid panel, vitamin D level    There are no Patient Instructions on file for this visit.  Return in about 6 months (around 3/16/2021) for with labs.

## 2020-09-29 ENCOUNTER — OFFICE VISIT (OUTPATIENT)
Dept: CARDIOLOGY | Facility: CLINIC | Age: 75
End: 2020-09-29

## 2020-09-29 VITALS
WEIGHT: 170 LBS | DIASTOLIC BLOOD PRESSURE: 84 MMHG | HEART RATE: 88 BPM | RESPIRATION RATE: 18 BRPM | BODY MASS INDEX: 28.32 KG/M2 | OXYGEN SATURATION: 99 % | HEIGHT: 65 IN | SYSTOLIC BLOOD PRESSURE: 130 MMHG

## 2020-09-29 DIAGNOSIS — I65.22 CAROTID STENOSIS, ASYMPTOMATIC, LEFT: Primary | ICD-10-CM

## 2020-09-29 PROCEDURE — 99213 OFFICE O/P EST LOW 20 MIN: CPT | Performed by: INTERNAL MEDICINE

## 2020-09-29 NOTE — PROGRESS NOTES
[facility]      Patient Name: Judi Sy  :1945  Age: 75 y.o.  Encounter Provider:  Den Morgan Jr, MD      Chief Complaint:   Chief Complaint   Patient presents with   • PFO     1 yr f/u         HPI  Judi Sy is a 75 y.o. female past medical history of PFO status post closure by Dr. alarcon in 2016, recurrent TIA and hyperlipidemia presents for follow-up evaluation of chronic medical illness.  She denies any chest pain shortness of air palpitations.  No focal neurological deficits dizziness or syncope.  She denies any orthopnea PND or edema.  She exercises daily without any limitations.  She states that 1 of the main reasons she came to see me today was to inquire about potentially coming off of Plavix.  She denies tobacco alcohol or illicit drug use.  Family history reviewed and is not pertinent to this clinic visit.  She does note that she was diagnosed with left-sided carotid stenosis many years ago and has not had a follow-up ultrasound since then.      The following portions of the patient's history were reviewed and updated as appropriate: allergies, current medications, past family history, past medical history, past social history, past surgical history and problem list.      Review of Systems   Constitution: Negative for chills and fever.   HENT: Negative for hoarse voice and sore throat.    Eyes: Negative for double vision and photophobia.   Cardiovascular: Negative for chest pain, leg swelling, near-syncope, orthopnea, palpitations, paroxysmal nocturnal dyspnea and syncope.   Respiratory: Negative for cough and wheezing.    Skin: Negative for poor wound healing and rash.   Musculoskeletal: Negative for arthritis and joint swelling.   Gastrointestinal: Negative for bloating, abdominal pain, hematemesis and hematochezia.   Neurological: Negative for dizziness and focal weakness.   Psychiatric/Behavioral: Negative for depression and suicidal ideas.       OBJECTIVE:  "  Vital Signs  Vitals:    09/29/20 1246   Pulse: 88   Resp: 18   SpO2: 99%     Estimated body mass index is 28.29 kg/m² as calculated from the following:    Height as of this encounter: 165.1 cm (65\").    Weight as of this encounter: 77.1 kg (170 lb).    Physical Exam   Constitutional: She is oriented to person, place, and time. She appears well-developed and well-nourished.   HENT:   Head: Normocephalic and atraumatic.   No bruit on carotid auscultation   Eyes: Pupils are equal, round, and reactive to light. Conjunctivae are normal.   Neck: No JVD present. No thyromegaly present.   Cardiovascular: Exam reveals no gallop and no friction rub.   No murmur heard.  Pulmonary/Chest: No respiratory distress. She exhibits no tenderness.   Abdominal: Bowel sounds are normal. She exhibits no distension.   Musculoskeletal:         General: No tenderness or edema.   Neurological: She is alert and oriented to person, place, and time.   Skin: No rash noted. No erythema.   Psychiatric: She has a normal mood and affect. Judgment normal.   Vitals reviewed.      Procedures        ASSESSMENT:     PFO  Hypertension  Carotid stenosis    PLAN OF CARE:     1. PFO -echocardiogram performed last year with well-seated device and no evidence for color flow across the septum.  Patient is discussed monotherapy with Plavix with her neurologist who wants to continue.  Continue to monitor clinical symptoms.  2. Hypertension seemingly well controlled at this point.  Continued twice daily blood pressure log.  I have advised her on sodium and fluid restriction.  3. Carotid stenosis no bruit on exam.  Will repeat -bilateral ultrasound of carotids.    Return to clinic 12 months             Discharge Medications          Accurate as of September 29, 2020 12:58 PM. If you have any questions, ask your nurse or doctor.            Continue These Medications      Instructions Start Date   atorvastatin 20 MG tablet  Commonly known as: LIPITOR   20 mg, Oral, " Daily      Calcium Citrate-Vitamin D 250-200 MG-UNIT tablet   1 tablet, Oral, Every Morning, HOLD FOR SURGERY      cetirizine 10 MG tablet  Commonly known as: zyrTEC   10 mg, Oral, Daily      clopidogrel 75 MG tablet  Commonly known as: PLAVIX   75 mg, Oral, Daily      meloxicam 7.5 MG tablet  Commonly known as: MOBIC   7.5 mg, Oral, Daily      PRESERVISION AREDS 2 PO   Oral, Daily      VITAMIN B12 PO   Oral, Daily             Thank you for allowing me to participate in the care of your patient,      Sincerely,   Annia Almonte MA  [facility]  09/29/20  12:58 EDT

## 2020-10-01 RX ORDER — ATORVASTATIN CALCIUM 20 MG/1
TABLET, FILM COATED ORAL
Qty: 90 TABLET | Refills: 2 | Status: SHIPPED | OUTPATIENT
Start: 2020-10-01 | End: 2021-07-02

## 2020-10-05 ENCOUNTER — HOSPITAL ENCOUNTER (OUTPATIENT)
Dept: CARDIOLOGY | Facility: HOSPITAL | Age: 75
Discharge: HOME | End: 2020-10-05
Admitting: INTERNAL MEDICINE

## 2020-10-05 DIAGNOSIS — I65.22 CAROTID STENOSIS, ASYMPTOMATIC, LEFT: ICD-10-CM

## 2020-10-05 PROCEDURE — 93880 EXTRACRANIAL BILAT STUDY: CPT

## 2020-10-05 PROCEDURE — 93880 EXTRACRANIAL BILAT STUDY: CPT | Performed by: INTERNAL MEDICINE

## 2020-10-06 LAB
BH CV XLRA MEAS LEFT DIST CCA EDV: 32.1 CM/SEC
BH CV XLRA MEAS LEFT DIST CCA PSV: 80.7 CM/SEC
BH CV XLRA MEAS LEFT DIST ICA EDV: -29 CM/SEC
BH CV XLRA MEAS LEFT DIST ICA PSV: -65.9 CM/SEC
BH CV XLRA MEAS LEFT ICA/CCA RATIO: 1.3
BH CV XLRA MEAS LEFT MID CCA EDV: -36.1 CM/SEC
BH CV XLRA MEAS LEFT MID CCA PSV: -103 CM/SEC
BH CV XLRA MEAS LEFT MID ICA EDV: -26.7 CM/SEC
BH CV XLRA MEAS LEFT MID ICA PSV: -66.6 CM/SEC
BH CV XLRA MEAS LEFT PROX CCA EDV: -28.2 CM/SEC
BH CV XLRA MEAS LEFT PROX CCA PSV: -99.6 CM/SEC
BH CV XLRA MEAS LEFT PROX ECA PSV: -126 CM/SEC
BH CV XLRA MEAS LEFT PROX ICA EDV: -29.8 CM/SEC
BH CV XLRA MEAS LEFT PROX ICA PSV: -105 CM/SEC
BH CV XLRA MEAS LEFT PROX SCLA PSV: -85.6 CM/SEC
BH CV XLRA MEAS LEFT VERTEBRAL A PSV: -36.4 CM/SEC
BH CV XLRA MEAS RIGHT DIST CCA EDV: 29.2 CM/SEC
BH CV XLRA MEAS RIGHT DIST CCA PSV: 87.6 CM/SEC
BH CV XLRA MEAS RIGHT DIST ICA EDV: -28.1 CM/SEC
BH CV XLRA MEAS RIGHT DIST ICA PSV: -65.5 CM/SEC
BH CV XLRA MEAS RIGHT ICA/CCA RATIO: 1.2
BH CV XLRA MEAS RIGHT MID CCA EDV: -24.9 CM/SEC
BH CV XLRA MEAS RIGHT MID CCA PSV: -77.7 CM/SEC
BH CV XLRA MEAS RIGHT MID ICA EDV: -38.4 CM/SEC
BH CV XLRA MEAS RIGHT MID ICA PSV: -93.3 CM/SEC
BH CV XLRA MEAS RIGHT PROX CCA EDV: -23 CM/SEC
BH CV XLRA MEAS RIGHT PROX CCA PSV: -77 CM/SEC
BH CV XLRA MEAS RIGHT PROX ECA PSV: -144 CM/SEC
BH CV XLRA MEAS RIGHT PROX ICA EDV: 40 CM/SEC
BH CV XLRA MEAS RIGHT PROX ICA PSV: 107 CM/SEC
BH CV XLRA MEAS RIGHT PROX SCLA PSV: 57.5 CM/SEC
BH CV XLRA MEAS RIGHT VERTEBRAL A PSV: -43.9 CM/SEC

## 2020-10-07 ENCOUNTER — TELEPHONE (OUTPATIENT)
Dept: CARDIOLOGY | Facility: CLINIC | Age: 75
End: 2020-10-07

## 2020-10-07 NOTE — TELEPHONE ENCOUNTER
----- Message from Den Morgan Jr., MD sent at 10/7/2020 10:17 AM EDT -----  Please tell patient there is minimal atherosclerosis of the carotid arteries and that no change to medications is necessary I will see her at scheduled follow-up.

## 2020-10-07 NOTE — PROGRESS NOTES
Please tell patient there is minimal atherosclerosis of the carotid arteries and that no change to medications is necessary I will see her at scheduled follow-up.

## 2020-10-16 RX ORDER — MONTELUKAST SODIUM 10 MG/1
TABLET ORAL
Qty: 30 TABLET | Refills: 3 | OUTPATIENT
Start: 2020-10-16

## 2021-03-09 DIAGNOSIS — Z23 IMMUNIZATION DUE: ICD-10-CM

## 2021-03-29 ENCOUNTER — TELEPHONE (OUTPATIENT)
Dept: FAMILY MEDICINE CLINIC | Facility: CLINIC | Age: 76
End: 2021-03-29

## 2021-03-29 NOTE — TELEPHONE ENCOUNTER
SPOKE WITH PT AND ADVISED ON WHAT LAST OFFICE NOTE ADVISED THE DR REQUESTED AND SCHEDULED PT FOR LABS AND FU.

## 2021-03-29 NOTE — TELEPHONE ENCOUNTER
Caller: Judi Sy    Relationship: Self    Best call back number: [phone] (LEAVE VM IF NO ANSWER)     What is the best time to reach you: ANYTIME     What was the call regarding: PATIENT WANTS TO KNOW WHEN SHE IS DUE FOR APPT WITH DR. LAWTON. NO APPT WAS SCHEDULED AFTER HER LAST VISIT. PLEASE ADVISE.     Do you require a callback: YES

## 2021-03-30 DIAGNOSIS — E55.9 VITAMIN D DEFICIENCY: ICD-10-CM

## 2021-03-30 DIAGNOSIS — E78.2 MIXED HYPERLIPIDEMIA: Primary | ICD-10-CM

## 2021-03-31 LAB
25(OH)D3+25(OH)D2 SERPL-MCNC: 58.4 NG/ML (ref 30–100)
ALBUMIN SERPL-MCNC: 4.2 G/DL (ref 3.5–5.2)
ALBUMIN/GLOB SERPL: 2.1 G/DL
ALP SERPL-CCNC: 81 U/L (ref 39–117)
ALT SERPL-CCNC: 14 U/L (ref 1–33)
AST SERPL-CCNC: 19 U/L (ref 1–32)
BASOPHILS # BLD AUTO: 0.03 10*3/MM3 (ref 0–0.2)
BASOPHILS NFR BLD AUTO: 0.5 % (ref 0–1.5)
BILIRUB SERPL-MCNC: 0.4 MG/DL (ref 0–1.2)
BUN SERPL-MCNC: 11 MG/DL (ref 8–23)
BUN/CREAT SERPL: 15.7 (ref 7–25)
CALCIUM SERPL-MCNC: 9.4 MG/DL (ref 8.6–10.5)
CHLORIDE SERPL-SCNC: 107 MMOL/L (ref 98–107)
CHOLEST SERPL-MCNC: 160 MG/DL (ref 0–200)
CO2 SERPL-SCNC: 29.1 MMOL/L (ref 22–29)
CREAT SERPL-MCNC: 0.7 MG/DL (ref 0.57–1)
EOSINOPHIL # BLD AUTO: 0.13 10*3/MM3 (ref 0–0.4)
EOSINOPHIL NFR BLD AUTO: 2.3 % (ref 0.3–6.2)
ERYTHROCYTE [DISTWIDTH] IN BLOOD BY AUTOMATED COUNT: 12.9 % (ref 12.3–15.4)
GLOBULIN SER CALC-MCNC: 2 GM/DL
GLUCOSE SERPL-MCNC: 92 MG/DL (ref 65–99)
HCT VFR BLD AUTO: 42.4 % (ref 34–46.6)
HDLC SERPL-MCNC: 63 MG/DL (ref 40–60)
HGB BLD-MCNC: 14.2 G/DL (ref 12–15.9)
IMM GRANULOCYTES # BLD AUTO: 0.01 10*3/MM3 (ref 0–0.05)
IMM GRANULOCYTES NFR BLD AUTO: 0.2 % (ref 0–0.5)
LDLC SERPL CALC-MCNC: 83 MG/DL (ref 0–100)
LDLC/HDLC SERPL: 1.31 {RATIO}
LYMPHOCYTES # BLD AUTO: 1.54 10*3/MM3 (ref 0.7–3.1)
LYMPHOCYTES NFR BLD AUTO: 27.6 % (ref 19.6–45.3)
MCH RBC QN AUTO: 31.9 PG (ref 26.6–33)
MCHC RBC AUTO-ENTMCNC: 33.5 G/DL (ref 31.5–35.7)
MCV RBC AUTO: 95.3 FL (ref 79–97)
MONOCYTES # BLD AUTO: 0.42 10*3/MM3 (ref 0.1–0.9)
MONOCYTES NFR BLD AUTO: 7.5 % (ref 5–12)
NEUTROPHILS # BLD AUTO: 3.45 10*3/MM3 (ref 1.7–7)
NEUTROPHILS NFR BLD AUTO: 61.9 % (ref 42.7–76)
NRBC BLD AUTO-RTO: 0 /100 WBC (ref 0–0.2)
PLATELET # BLD AUTO: 194 10*3/MM3 (ref 140–450)
POTASSIUM SERPL-SCNC: 4.1 MMOL/L (ref 3.5–5.2)
PROT SERPL-MCNC: 6.2 G/DL (ref 6–8.5)
RBC # BLD AUTO: 4.45 10*6/MM3 (ref 3.77–5.28)
SODIUM SERPL-SCNC: 146 MMOL/L (ref 136–145)
TRIGL SERPL-MCNC: 72 MG/DL (ref 0–150)
VLDLC SERPL CALC-MCNC: 14 MG/DL (ref 5–40)
WBC # BLD AUTO: 5.58 10*3/MM3 (ref 3.4–10.8)

## 2021-04-07 ENCOUNTER — OFFICE VISIT (OUTPATIENT)
Dept: FAMILY MEDICINE CLINIC | Facility: CLINIC | Age: 76
End: 2021-04-07

## 2021-04-07 VITALS
BODY MASS INDEX: 28.52 KG/M2 | HEIGHT: 65 IN | WEIGHT: 171.2 LBS | RESPIRATION RATE: 18 BRPM | SYSTOLIC BLOOD PRESSURE: 130 MMHG | HEART RATE: 91 BPM | TEMPERATURE: 96.6 F | OXYGEN SATURATION: 98 % | DIASTOLIC BLOOD PRESSURE: 82 MMHG

## 2021-04-07 DIAGNOSIS — R03.0 PRE-HYPERTENSION: ICD-10-CM

## 2021-04-07 DIAGNOSIS — M81.0 AGE-RELATED OSTEOPOROSIS WITHOUT CURRENT PATHOLOGICAL FRACTURE: ICD-10-CM

## 2021-04-07 DIAGNOSIS — J30.89 ENVIRONMENTAL AND SEASONAL ALLERGIES: Primary | ICD-10-CM

## 2021-04-07 DIAGNOSIS — Z12.31 SCREENING MAMMOGRAM, ENCOUNTER FOR: ICD-10-CM

## 2021-04-07 DIAGNOSIS — E78.2 MIXED HYPERLIPIDEMIA: ICD-10-CM

## 2021-04-07 DIAGNOSIS — E55.9 VITAMIN D DEFICIENCY: ICD-10-CM

## 2021-04-07 DIAGNOSIS — K21.9 GASTROESOPHAGEAL REFLUX DISEASE WITHOUT ESOPHAGITIS: ICD-10-CM

## 2021-04-07 PROCEDURE — 99214 OFFICE O/P EST MOD 30 MIN: CPT | Performed by: INTERNAL MEDICINE

## 2021-04-07 NOTE — PROGRESS NOTES
Subjective   Judi Sy is a 75 y.o. female. Patient is here today for follow-up on her prehypertension, hyperlipidemia, vitamin D deficiency, GERD and history of osteoporosis.  She is generally feeling well and has no acute complaints and has had no chest pain, shortness of breath, edema or myalgias and no cardiac arrhythmias.  Chief Complaint   Patient presents with   • Hyperlipidemia     FOLLOW UP LABS          Vitals:    04/07/21 0949   BP: 130/82   Pulse: 91   Resp: 18   Temp: 96.6 °F (35.9 °C)   SpO2: 98%     Body mass index is 28.49 kg/m².  The following portions of the patient's history were reviewed and updated as appropriate: allergies, current medications, past family history, past medical history, past social history, past surgical history and problem list.    Past Medical History:   Diagnosis Date   • Aortic heart valve prolapse    • Arthritis    • Carotid arterial disease (CMS/HCC)    • High cholesterol    • History of atrial fibrillation    • Knee pain, left    • TIA (transient ischemic attack)    • Vasovagal syncope       Allergies   Allergen Reactions   • Latex Rash      Social History     Socioeconomic History   • Marital status:      Spouse name: Not on file   • Number of children: Not on file   • Years of education: Not on file   • Highest education level: Not on file   Tobacco Use   • Smoking status: Former Smoker     Types: Cigarettes   • Smokeless tobacco: Never Used   • Tobacco comment: QUIT 7 YEARS AGO   Substance and Sexual Activity   • Alcohol use: No   • Drug use: No   • Sexual activity: Defer        Current Outpatient Medications:   •  atorvastatin (LIPITOR) 20 MG tablet, TAKE ONE TABLET BY MOUTH DAILY, Disp: 90 tablet, Rfl: 2  •  Calcium Citrate-Vitamin D 250-200 MG-UNIT tablet, Take 1 tablet by mouth Every Morning. HOLD FOR SURGERY, Disp: , Rfl:   •  cetirizine (zyrTEC) 10 MG tablet, Take 10 mg by mouth Daily., Disp: , Rfl:   •  clopidogrel (PLAVIX) 75 MG tablet, Take 1  tablet by mouth Daily., Disp: 90 tablet, Rfl: 3  •  Cyanocobalamin (VITAMIN B12 PO), Take  by mouth Daily., Disp: , Rfl:   •  meloxicam (MOBIC) 7.5 MG tablet, Take 1 tablet by mouth Daily., Disp: 15 tablet, Rfl: 0  •  Multiple Vitamins-Minerals (PRESERVISION AREDS 2 PO), Take  by mouth Daily., Disp: , Rfl:      Objective     History of Present Illness     Review of Systems   Constitutional: Negative.    HENT: Negative.    Respiratory: Negative.    Cardiovascular: Negative.    Gastrointestinal: Negative.    Genitourinary: Negative.    Musculoskeletal: Negative.    Skin: Negative.    Neurological: Negative.    Hematological: Negative.    Psychiatric/Behavioral: Negative.        Physical Exam  Vitals (120/80) and nursing note reviewed.   Constitutional:       General: She is not in acute distress.     Appearance: Normal appearance. She is not ill-appearing.   HENT:      Head: Normocephalic and atraumatic.   Eyes:      General: No scleral icterus.     Conjunctiva/sclera: Conjunctivae normal.   Cardiovascular:      Rate and Rhythm: Normal rate and regular rhythm.      Heart sounds: Normal heart sounds.   Pulmonary:      Effort: Pulmonary effort is normal. No respiratory distress.      Breath sounds: Normal breath sounds. No wheezing or rales.   Musculoskeletal:         General: Normal range of motion.      Cervical back: Normal range of motion and neck supple.   Skin:     General: Skin is warm and dry.   Neurological:      General: No focal deficit present.      Mental Status: She is alert and oriented to person, place, and time.   Psychiatric:         Mood and Affect: Mood normal.         Behavior: Behavior normal.         ASSESSMENT CBC, CMP and vitamin D levels were all essentially normal.  Lipid panel is controlled with total cholesterol 160, HDL 63 and LDL 83  #1-prehypertension, stable  #2-hyperlipidemia, controlled on medication  #3-history of atrial fibrillation, regular rhythm  #4-vitamin D deficiency, corrected  on supplement  #5-GERD, stable on over-the-counter medications  #6-osteoporosis, asymptomatic     Problems Addressed this Visit        Allergies and Adverse Reactions    Environmental and seasonal allergies - Primary       Cardiac and Vasculature    Hyperlipidemia    Pre-hypertension       Endocrine and Metabolic    Vitamin D deficiency       Gastrointestinal Abdominal     Gastroesophageal reflux disease without esophagitis       Musculoskeletal and Injuries    Osteoporosis      Other Visit Diagnoses     Screening mammogram, encounter for        Relevant Orders    Mammo Screening Bilateral With CAD      Diagnoses       Codes Comments    Environmental and seasonal allergies    -  Primary ICD-10-CM: J30.89  ICD-9-CM: 477.8     Pre-hypertension     ICD-10-CM: R03.0  ICD-9-CM: 796.2     Mixed hyperlipidemia     ICD-10-CM: E78.2  ICD-9-CM: 272.2     Vitamin D deficiency     ICD-10-CM: E55.9  ICD-9-CM: 268.9     Gastroesophageal reflux disease without esophagitis     ICD-10-CM: K21.9  ICD-9-CM: 530.81     Age-related osteoporosis without current pathological fracture     ICD-10-CM: M81.0  ICD-9-CM: 733.01     Screening mammogram, encounter for     ICD-10-CM: Z12.31  ICD-9-CM: V76.12           PLAN I ordered mammograms for the patient and she will be due a bone density at the next visit.  She will continue current medicines as now and I will plan on rechecking her in 6 months with a CMP, lipid panel, vitamin D level and TSH and could probably do a wellness visit    There are no Patient Instructions on file for this visit.  Return in about 6 months (around 10/7/2021) for with labs.

## 2021-06-03 ENCOUNTER — HOSPITAL ENCOUNTER (OUTPATIENT)
Dept: MAMMOGRAPHY | Facility: HOSPITAL | Age: 76
Discharge: HOME | End: 2021-06-03
Admitting: INTERNAL MEDICINE

## 2021-06-03 DIAGNOSIS — Z12.31 SCREENING MAMMOGRAM, ENCOUNTER FOR: ICD-10-CM

## 2021-06-03 PROCEDURE — 77067 SCR MAMMO BI INCL CAD: CPT

## 2021-06-03 PROCEDURE — 77063 BREAST TOMOSYNTHESIS BI: CPT

## 2021-07-02 RX ORDER — ATORVASTATIN CALCIUM 20 MG/1
TABLET, FILM COATED ORAL
Qty: 90 TABLET | Refills: 2 | Status: SHIPPED | OUTPATIENT
Start: 2021-07-02 | End: 2022-03-29

## 2021-10-01 ENCOUNTER — TELEPHONE (OUTPATIENT)
Dept: FAMILY MEDICINE CLINIC | Facility: CLINIC | Age: 76
End: 2021-10-01

## 2021-10-01 DIAGNOSIS — E78.2 MIXED HYPERLIPIDEMIA: ICD-10-CM

## 2021-10-01 DIAGNOSIS — E55.9 VITAMIN D DEFICIENCY: ICD-10-CM

## 2021-10-01 NOTE — TELEPHONE ENCOUNTER
Caller: Judi Sy    Relationship: Self    Best call back number: [phone]    What medication are you requesting: SOMETHING TO HELP WITH THE CONGESTION    What are your current symptoms: COUGH, CHEST CONGESTIONS, FATIGUE    How long have you been experiencing symptoms:  1 WEEK    Have you had these symptoms before:    [x] Yes  [] No    Have you been treated for these symptoms before:   [x] Yes  [] No    If a prescription is needed, what is your preferred pharmacy and phone number: [facility] [id number] [place] [address] AT [facility] & [facility] - [phone] [facility] [phone] FX     Additional notes: PATIENT WAS SEEN IN THE URGENT CARE CENTER ON Sunday.  HER COVID TEST WAS NEGATIVE BUT SHE FEELS LIKE IT IS MOVING INTO HER CHEST.     PLEASE CALL AND ADVISE

## 2021-10-13 ENCOUNTER — OFFICE VISIT (OUTPATIENT)
Dept: FAMILY MEDICINE CLINIC | Facility: CLINIC | Age: 76
End: 2021-10-13

## 2021-10-13 VITALS
DIASTOLIC BLOOD PRESSURE: 80 MMHG | SYSTOLIC BLOOD PRESSURE: 140 MMHG | HEIGHT: 65 IN | TEMPERATURE: 96.5 F | RESPIRATION RATE: 18 BRPM | WEIGHT: 172.6 LBS | HEART RATE: 84 BPM | BODY MASS INDEX: 28.76 KG/M2 | OXYGEN SATURATION: 97 %

## 2021-10-13 DIAGNOSIS — Z78.0 POST-MENOPAUSAL: ICD-10-CM

## 2021-10-13 DIAGNOSIS — J30.89 ENVIRONMENTAL AND SEASONAL ALLERGIES: Primary | ICD-10-CM

## 2021-10-13 DIAGNOSIS — E78.2 MIXED HYPERLIPIDEMIA: ICD-10-CM

## 2021-10-13 DIAGNOSIS — R03.0 PRE-HYPERTENSION: ICD-10-CM

## 2021-10-13 DIAGNOSIS — K21.9 GASTROESOPHAGEAL REFLUX DISEASE WITHOUT ESOPHAGITIS: ICD-10-CM

## 2021-10-13 PROCEDURE — 99214 OFFICE O/P EST MOD 30 MIN: CPT | Performed by: INTERNAL MEDICINE

## 2021-10-13 RX ORDER — CLOPIDOGREL BISULFATE 75 MG/1
75 TABLET ORAL DAILY
Qty: 90 TABLET | Refills: 3 | Status: ON HOLD | OUTPATIENT
Start: 2021-10-13 | End: 2022-05-13

## 2021-10-15 ENCOUNTER — TELEPHONE (OUTPATIENT)
Dept: FAMILY MEDICINE CLINIC | Facility: CLINIC | Age: 76
End: 2021-10-15

## 2021-10-15 DIAGNOSIS — J30.89 ENVIRONMENTAL AND SEASONAL ALLERGIES: Primary | ICD-10-CM

## 2021-10-15 NOTE — TELEPHONE ENCOUNTER
Caller: Judi Sy    Relationship: Self    Best call back number: [phone]   What is the medical concern/diagnosis: ALLERGIES    What specialty or service is being requested: ALLERGY SPECIALIST    What is the provider, practice or medical service name: N/A    What is the office location: N/A    What is the office phone number:N/A    Any additional details: PATIENT STATED THAT IF SHE DOESN'T NEED A REFERRAL THAT SHE COULD MAKE THE APPOINTMENT HERSELF.    PLEASE ADVISE

## 2021-10-21 NOTE — TELEPHONE ENCOUNTER
REFERRAL HAS BEEN PLACED. I CALLED AND S/W PT AND ADVISED HER OF THIS. PT WOULD LIKE TO GO SOMEWHERE IN [place]. I ADVISED HER I WOULD NOTE THIS ON THE REFERRAL.

## 2021-11-03 ENCOUNTER — NURSE TRIAGE (OUTPATIENT)
Dept: CALL CENTER | Facility: HOSPITAL | Age: 76
End: 2021-11-03

## 2021-11-03 ENCOUNTER — HOSPITAL ENCOUNTER (EMERGENCY)
Facility: HOSPITAL | Age: 76
Discharge: HOME | End: 2021-11-03
Attending: EMERGENCY MEDICINE | Admitting: EMERGENCY MEDICINE

## 2021-11-03 ENCOUNTER — TELEPHONE (OUTPATIENT)
Dept: FAMILY MEDICINE CLINIC | Facility: CLINIC | Age: 76
End: 2021-11-03

## 2021-11-03 ENCOUNTER — APPOINTMENT (OUTPATIENT)
Dept: CARDIOLOGY | Facility: HOSPITAL | Age: 76
End: 2021-11-03

## 2021-11-03 VITALS
BODY MASS INDEX: 28.76 KG/M2 | SYSTOLIC BLOOD PRESSURE: 140 MMHG | DIASTOLIC BLOOD PRESSURE: 99 MMHG | HEIGHT: 65 IN | OXYGEN SATURATION: 96 % | TEMPERATURE: 97.8 F | HEART RATE: 91 BPM | RESPIRATION RATE: 18 BRPM

## 2021-11-03 DIAGNOSIS — M71.21 BAKER'S CYST OF KNEE, RIGHT: Primary | ICD-10-CM

## 2021-11-03 LAB
ALBUMIN SERPL-MCNC: 4.3 G/DL (ref 3.5–5.2)
ALBUMIN/GLOB SERPL: 1.7 G/DL
ALP SERPL-CCNC: 80 U/L (ref 39–117)
ALT SERPL W P-5'-P-CCNC: 19 U/L (ref 1–33)
ANION GAP SERPL CALCULATED.3IONS-SCNC: 7.6 MMOL/L (ref 5–15)
AST SERPL-CCNC: 17 U/L (ref 1–32)
BASOPHILS # BLD AUTO: 0.05 10*3/MM3 (ref 0–0.2)
BASOPHILS NFR BLD AUTO: 1 % (ref 0–1.5)
BH CV LOWER VASCULAR LEFT COMMON FEMORAL AUGMENT: NORMAL
BH CV LOWER VASCULAR LEFT COMMON FEMORAL COMPETENT: NORMAL
BH CV LOWER VASCULAR LEFT COMMON FEMORAL COMPRESS: NORMAL
BH CV LOWER VASCULAR LEFT COMMON FEMORAL PHASIC: NORMAL
BH CV LOWER VASCULAR LEFT COMMON FEMORAL SPONT: NORMAL
BH CV LOWER VASCULAR RIGHT COMMON FEMORAL AUGMENT: NORMAL
BH CV LOWER VASCULAR RIGHT COMMON FEMORAL COMPETENT: NORMAL
BH CV LOWER VASCULAR RIGHT COMMON FEMORAL COMPRESS: NORMAL
BH CV LOWER VASCULAR RIGHT COMMON FEMORAL PHASIC: NORMAL
BH CV LOWER VASCULAR RIGHT COMMON FEMORAL SPONT: NORMAL
BH CV LOWER VASCULAR RIGHT DISTAL FEMORAL COMPRESS: NORMAL
BH CV LOWER VASCULAR RIGHT GASTRONEMIUS COMPRESS: NORMAL
BH CV LOWER VASCULAR RIGHT GREATER SAPH AK COMPRESS: NORMAL
BH CV LOWER VASCULAR RIGHT GREATER SAPH BK COMPRESS: NORMAL
BH CV LOWER VASCULAR RIGHT LESSER SAPH COMPRESS: NORMAL
BH CV LOWER VASCULAR RIGHT MID FEMORAL AUGMENT: NORMAL
BH CV LOWER VASCULAR RIGHT MID FEMORAL COMPETENT: NORMAL
BH CV LOWER VASCULAR RIGHT MID FEMORAL COMPRESS: NORMAL
BH CV LOWER VASCULAR RIGHT MID FEMORAL PHASIC: NORMAL
BH CV LOWER VASCULAR RIGHT MID FEMORAL SPONT: NORMAL
BH CV LOWER VASCULAR RIGHT PERONEAL COMPRESS: NORMAL
BH CV LOWER VASCULAR RIGHT POPLITEAL AUGMENT: NORMAL
BH CV LOWER VASCULAR RIGHT POPLITEAL COMPETENT: NORMAL
BH CV LOWER VASCULAR RIGHT POPLITEAL COMPRESS: NORMAL
BH CV LOWER VASCULAR RIGHT POPLITEAL PHASIC: NORMAL
BH CV LOWER VASCULAR RIGHT POPLITEAL SPONT: NORMAL
BH CV LOWER VASCULAR RIGHT POSTERIOR TIBIAL COMPRESS: NORMAL
BH CV LOWER VASCULAR RIGHT PROFUNDA FEMORAL COMPRESS: NORMAL
BH CV LOWER VASCULAR RIGHT PROXIMAL FEMORAL COMPRESS: NORMAL
BH CV LOWER VASCULAR RIGHT SAPHENOFEMORAL JUNCTION COMPRESS: NORMAL
BH CV VAS POP FLUID COLLECTED: 1
BILIRUB SERPL-MCNC: 0.3 MG/DL (ref 0–1.2)
BUN SERPL-MCNC: 9 MG/DL (ref 8–23)
BUN/CREAT SERPL: 13.6 (ref 7–25)
CALCIUM SPEC-SCNC: 9.5 MG/DL (ref 8.6–10.5)
CHLORIDE SERPL-SCNC: 106 MMOL/L (ref 98–107)
CO2 SERPL-SCNC: 29.4 MMOL/L (ref 22–29)
CREAT SERPL-MCNC: 0.66 MG/DL (ref 0.57–1)
DEPRECATED RDW RBC AUTO: 44.2 FL (ref 37–54)
EOSINOPHIL # BLD AUTO: 0.14 10*3/MM3 (ref 0–0.4)
EOSINOPHIL NFR BLD AUTO: 2.7 % (ref 0.3–6.2)
ERYTHROCYTE [DISTWIDTH] IN BLOOD BY AUTOMATED COUNT: 12.9 % (ref 12.3–15.4)
GFR SERPL CREATININE-BSD FRML MDRD: 88 ML/MIN/1.73
GLOBULIN UR ELPH-MCNC: 2.5 GM/DL
GLUCOSE SERPL-MCNC: 92 MG/DL (ref 65–99)
HCT VFR BLD AUTO: 42.7 % (ref 34–46.6)
HGB BLD-MCNC: 14 G/DL (ref 12–15.9)
HOLD SPECIMEN: NORMAL
HOLD SPECIMEN: NORMAL
IMM GRANULOCYTES # BLD AUTO: 0.01 10*3/MM3 (ref 0–0.05)
IMM GRANULOCYTES NFR BLD AUTO: 0.2 % (ref 0–0.5)
LYMPHOCYTES # BLD AUTO: 1.52 10*3/MM3 (ref 0.7–3.1)
LYMPHOCYTES NFR BLD AUTO: 29.1 % (ref 19.6–45.3)
MCH RBC QN AUTO: 30.9 PG (ref 26.6–33)
MCHC RBC AUTO-ENTMCNC: 32.8 G/DL (ref 31.5–35.7)
MCV RBC AUTO: 94.3 FL (ref 79–97)
MONOCYTES # BLD AUTO: 0.49 10*3/MM3 (ref 0.1–0.9)
MONOCYTES NFR BLD AUTO: 9.4 % (ref 5–12)
NEUTROPHILS NFR BLD AUTO: 3.01 10*3/MM3 (ref 1.7–7)
NEUTROPHILS NFR BLD AUTO: 57.6 % (ref 42.7–76)
NRBC BLD AUTO-RTO: 0 /100 WBC (ref 0–0.2)
PLATELET # BLD AUTO: 206 10*3/MM3 (ref 140–450)
PMV BLD AUTO: 10.8 FL (ref 6–12)
POTASSIUM SERPL-SCNC: 3.7 MMOL/L (ref 3.5–5.2)
PROT SERPL-MCNC: 6.8 G/DL (ref 6–8.5)
RBC # BLD AUTO: 4.53 10*6/MM3 (ref 3.77–5.28)
SODIUM SERPL-SCNC: 143 MMOL/L (ref 136–145)
WBC # BLD AUTO: 5.22 10*3/MM3 (ref 3.4–10.8)
WHOLE BLOOD HOLD SPECIMEN: NORMAL
WHOLE BLOOD HOLD SPECIMEN: NORMAL

## 2021-11-03 PROCEDURE — 99283 EMERGENCY DEPT VISIT LOW MDM: CPT

## 2021-11-03 PROCEDURE — 93971 EXTREMITY STUDY: CPT

## 2021-11-03 PROCEDURE — 85025 COMPLETE CBC W/AUTO DIFF WBC: CPT | Performed by: EMERGENCY MEDICINE

## 2021-11-03 PROCEDURE — 80053 COMPREHEN METABOLIC PANEL: CPT | Performed by: EMERGENCY MEDICINE

## 2021-11-03 NOTE — ED NOTES
Pt presents to ED with complains of behind the R knee pain. Pt called her PCP, and they wanted her to come to ER for DVT work up. Pt reports hx of DVT, and is on blood thinner. Pt reports she hadn't been taking her blood thinners. Pt is A&OX4, able to ambulate, and in a mask at this time.     Patient was placed in face mask during first look triage.  Patient was wearing a face mask throughout encounter.  I wore personal protective equipment throughout the encounter.  Hand hygiene was performed before and after patient encounter.       Belén Ridley, RN  11/03/21 1878

## 2021-11-03 NOTE — ED PROVIDER NOTES
EMERGENCY DEPARTMENT ENCOUNTER    Room Number:  B02/02  Date seen:  11/3/2021  Time seen: 15:19 EDT  PCP: Ravi Griffin MD  Historian: patient      HPI:  Chief Complaint: right leg pain    A complete HPI/ROS/PMH/PSH/SH/FH are unobtainable due to: none    Context: Judi Sy is a 76 y.o. female who presents to the ED for evaluation of right leg pain that began approximately 1-1/2 weeks ago and is intermittent, exacerbated by weightbearing and position, also gets better positionally.  She denies any trauma or injury to the area.  She states she has been pretty sedentary the last few weeks secondary to having COVID-19 a month and a half ago.  She is on antiplatelet however does not take an anticoagulant.  She did have postpartum VTE years ago with no recurrence.  She states the pain starts in her right foot and radiates up to her thigh.  She denies any back or hip pain.  She also denies any bowel or bladder dysfunction, lower extremity weakness or saddle paresthesia, chest pain or shortness of breath.         PAST MEDICAL HISTORY  Active Ambulatory Problems     Diagnosis Date Noted   • Gastroesophageal reflux disease without esophagitis 03/16/2016   • Hiatal hernia 03/16/2016   • Hyperlipidemia 03/16/2016   • Pre-hypertension 03/16/2016   • Temporary cerebral vascular dysfunction 03/16/2016   • Osteoporosis 09/21/2017   • Closed displaced avulsion fracture of left talus 10/20/2017   • Acute meniscal tear of knee, left, sequela 01/31/2018   • Primary localized osteoarthrosis of left lower leg 02/21/2018   • Tear of medial meniscus of left knee, current 02/21/2018   • Closed nondisplaced fracture of condyle of left femur (HCC) 02/28/2018   • Vitamin D deficiency 03/29/2018   • Status post total left knee replacement 05/10/2018   • Environmental and seasonal allergies 03/13/2019   • Viral upper respiratory tract infection 12/11/2019     Resolved Ambulatory Problems     Diagnosis Date Noted   • Patent foramen  ovale 03/16/2016   • Sprain of left ankle 10/20/2017   • Chondromalacia of left knee 03/15/2018     Past Medical History:   Diagnosis Date   • Aortic heart valve prolapse    • Arthritis    • Carotid arterial disease (HCC)    • High cholesterol    • History of atrial fibrillation    • Knee pain, left    • TIA (transient ischemic attack)    • Vasovagal syncope          PAST SURGICAL HISTORY  Past Surgical History:   Procedure Laterality Date   • BREAST BIOPSY Left     15 years ago; benign   • KNEE ARTHROSCOPY Left 3/9/2018    Procedure: LEFT KNEE ARTHROSCOPY, PARTIAL MEDIAL MENISCECTOMY, AND MEDIAL FEMORAL SUBCHONDROPLASTY;  Surgeon: Kade Jacob MD;  [facility] [facility] [place] [facility] [place]  Service: Orthopedics   • PATENT FORAMEN OVALE CLOSURE     • TONSILLECTOMY     • TUBAL ABDOMINAL LIGATION           FAMILY HISTORY  Family History   Problem Relation Age of Onset   • Heart disease Father    • Heart disease Maternal Grandmother    • Breast cancer Mother         50's   • Malig Hyperthermia Neg Hx          SOCIAL HISTORY  Social History     Socioeconomic History   • Marital status:    Tobacco Use   • Smoking status: Former Smoker     Types: Cigarettes   • Smokeless tobacco: Never Used   • Tobacco comment: QUIT 7 YEARS AGO   Substance and Sexual Activity   • Alcohol use: No   • Drug use: No   • Sexual activity: Defer         ALLERGIES  Latex        REVIEW OF SYSTEMS  Review of Systems     All systems reviewed and negative except for those discussed in HPI.       PHYSICAL EXAM  ED Triage Vitals   Temp Heart Rate Resp BP SpO2   11/03/21 1413 11/03/21 1413 11/03/21 1413 11/03/21 1441 11/03/21 1413   97.8 °F (36.6 °C) 94 18 (!) 143/103 96 %      Temp src Heart Rate Source Patient Position BP Location FiO2 (%)   11/03/21 1413 11/03/21 1413 -- -- --   Tympanic Monitor            GENERAL: not distressed  HENT: atraumatic  EYES: no scleral icterus  CV: regular rhythm, regular rate  RESPIRATORY: normal effort CTA B    ABDOMEN: soft, nontender  MUSCULOSKELETAL: no deformity.  1+ nonpitting bilateral lower extremity edema.  No calf tenderness.  She does have focal tenderness that reproduces her pain in the posterior distal right thigh as well as the popliteal space.  Negative Homans' sign bilaterally.  DP and PT pulses are 2+, cap refill is brisk, sensation intact distally.  She has full range of motion of the bilateral lower extremities, compartments are soft.  NEURO: alert, moves all extremities, follows commands  SKIN: warm, dry    Vital signs and nursing notes reviewed.          LAB RESULTS  Recent Results (from the past 24 hour(s))   Comprehensive Metabolic Panel    Collection Time: 11/03/21  2:49 PM    Specimen: Blood   Result Value Ref Range    Glucose 92 65 - 99 mg/dL    BUN 9 8 - 23 mg/dL    Creatinine 0.66 0.57 - 1.00 mg/dL    Sodium 143 136 - 145 mmol/L    Potassium 3.7 3.5 - 5.2 mmol/L    Chloride 106 98 - 107 mmol/L    CO2 29.4 (H) 22.0 - 29.0 mmol/L    Calcium 9.5 8.6 - 10.5 mg/dL    Total Protein 6.8 6.0 - 8.5 g/dL    Albumin 4.30 3.50 - 5.20 g/dL    ALT (SGPT) 19 1 - 33 U/L    AST (SGOT) 17 1 - 32 U/L    Alkaline Phosphatase 80 39 - 117 U/L    Total Bilirubin 0.3 0.0 - 1.2 mg/dL    eGFR Non African Amer 88 >60 mL/min/1.73    Globulin 2.5 gm/dL    A/G Ratio 1.7 g/dL    BUN/Creatinine Ratio 13.6 7.0 - 25.0    Anion Gap 7.6 5.0 - 15.0 mmol/L   Green Top (Gel)    Collection Time: 11/03/21  2:49 PM   Result Value Ref Range    Extra Tube Hold for add-ons.    Lavender Top    Collection Time: 11/03/21  2:49 PM   Result Value Ref Range    Extra Tube hold for add-on    Gold Top - SST    Collection Time: 11/03/21  2:49 PM   Result Value Ref Range    Extra Tube Hold for add-ons.    Light Blue Top    Collection Time: 11/03/21  2:49 PM   Result Value Ref Range    Extra Tube hold for add-on    CBC Auto Differential    Collection Time: 11/03/21  2:49 PM    Specimen: Blood   Result Value Ref Range    WBC 5.22 3.40 - 10.80 10*3/mm3     RBC 4.53 3.77 - 5.28 10*6/mm3    Hemoglobin 14.0 12.0 - 15.9 g/dL    Hematocrit 42.7 34.0 - 46.6 %    MCV 94.3 79.0 - 97.0 fL    MCH 30.9 26.6 - 33.0 pg    MCHC 32.8 31.5 - 35.7 g/dL    RDW 12.9 12.3 - 15.4 %    RDW-SD 44.2 37.0 - 54.0 fl    MPV 10.8 6.0 - 12.0 fL    Platelets 206 140 - 450 10*3/mm3    Neutrophil % 57.6 42.7 - 76.0 %    Lymphocyte % 29.1 19.6 - 45.3 %    Monocyte % 9.4 5.0 - 12.0 %    Eosinophil % 2.7 0.3 - 6.2 %    Basophil % 1.0 0.0 - 1.5 %    Immature Grans % 0.2 0.0 - 0.5 %    Neutrophils, Absolute 3.01 1.70 - 7.00 10*3/mm3    Lymphocytes, Absolute 1.52 0.70 - 3.10 10*3/mm3    Monocytes, Absolute 0.49 0.10 - 0.90 10*3/mm3    Eosinophils, Absolute 0.14 0.00 - 0.40 10*3/mm3    Basophils, Absolute 0.05 0.00 - 0.20 10*3/mm3    Immature Grans, Absolute 0.01 0.00 - 0.05 10*3/mm3    nRBC 0.0 0.0 - 0.2 /100 WBC   Duplex Venous Lower Extremity Right    Collection Time: 11/03/21  3:46 PM   Result Value Ref Range    Target HR (85%) 124 bpm    Max. Pred. HR (100%) 146 bpm       Ordered the above labs and independently reviewed the results.        RADIOLOGY  No orders to display       I ordered the above noted radiological studies. Reviewed by me and discussed with radiologist.  See dictation for official radiology interpretation.    PROCEDURES  Procedures        MEDICATIONS GIVEN IN ER  Medications - No data to display          PROGRESS AND CONSULTS    DDX includes but not limited to Baker's cyst, DVT, knee sprain, muscle strain, radiculopathy, sciatica    ED Course as of 11/03/21 1713   Wed Nov 03, 2021   1528 Glucose: 92 [KA]   1528 Creatinine: 0.66 [KA]   1528 WBC: 5.22 [KA]   1528 Hemoglobin: 14.0 [KA]   1608 I discussed the patient with ultrasound technician who states that the right lower extremity Doppler shows a Baker's cyst, no SVT or DVT. [KA]      ED Course User Index  [KA] Opal Lima PA     I reassessed the patient, counseled her on her work-up results including unremarkable labs and  positive Baker's cyst in the right popliteal space.  Based on her symptoms and exam, I do believe this is the source of her symptoms.  I recommended that she follow-up with orthopedics.  She is on an antiplatelet medications, would not recommend prescribing NSAIDs at this time.  Activities as tolerated, symptomatic treatment and she plans to follow-up with Dr. Jacob, her orthopedist.  She is stable for discharge.        Patient was placed in face mask in first look. Patient was wearing facemask each time I entered the room and throughout our encounter. I wore protective equipment throughout this patient encounter including a face mask, eye shield and gloves. Hand hygiene was performed before donning protective equipment and after removal when leaving the room.        DIAGNOSIS  Final diagnoses:   Baker's cyst of knee, right         Follow Up:  Ravi Griffin MD  [address] [facility] [id number] [phone]          Kade Jacob MD  [address] [id number] [phone]    Schedule an appointment as soon as possible for a visit        RX:     Medication List      No changes were made to your prescriptions during this visit.           Latest Documented Vital Signs:  As of 17:13 EDT  BP- 140/99 HR- 91 Temp- 97.8 °F (36.6 °C) (Tympanic) O2 sat- 96%       Opal Lima PA  11/03/21 2160

## 2021-11-03 NOTE — TELEPHONE ENCOUNTER
"SAADIA Alexandra, . She has had blood clots before, has recently had COVID and thinks has blood clot behind her right knee, is waking her from sleep, going on for 2 weeks now and today, hard to walk, hard to move leg, there is a knot there and very tender, told her to be seen.     Reason for Disposition  • History of prior \"blood clot\" in leg or lungs (i.e., deep vein thrombosis, pulmonary embolism)    Additional Information  • Negative: Looks like a broken bone or dislocated joint (e.g., crooked or deformed)  • Negative: Sounds like a life-threatening emergency to the triager  • Negative: Followed a leg injury  • Negative: Leg swelling is main symptom  • Negative: Back pain radiating (shooting) into leg(s)  • Negative: Knee pain is main symptom  • Negative: Ankle pain is main symptom  • Negative: Pregnant  • Negative: Postpartum (from 0 to 6 weeks after delivery)  • Negative: Chest pain  • Negative: Difficulty breathing  • Negative: Entire foot is cool or blue in comparison to other side  • Negative: Unable to walk  • Negative: [1] Red area or streak AND [2] fever  • Negative: [1] Swollen joint AND [2] fever  • Negative: [1] Cast on leg or ankle AND [2] now increased pain  • Negative: Patient sounds very sick or weak to the triager  • Negative: [1] SEVERE pain (e.g., excruciating, unable to do any normal activities) AND [2] not improved after 2 hours of pain medicine  • Negative: [1] Thigh or calf pain AND [2] only 1 side AND [3] present > 1 hour (Exception: chronic unchanged pain)  • Negative: [1] Thigh, calf, or ankle swelling AND [2] only 1 side  • Negative: [1] Thigh, calf, or ankle swelling AND [2] bilateral AND [3] 1 side is more swollen  • Negative: [1] Red area or streak AND [2] large (> 2 in. or 5 cm)  • Negative: History of inherited increased risk of blood clots (e.g., Factor 5 Leiden, Anti-thrombin 3, Protein C or Protein S deficiency, Prothrombin mutation)    Answer Assessment - Initial Assessment " "Questions  1. ONSET: \"When did the pain start?\"       Pain started several weeks 2 weeks or more  2. LOCATION: \"Where is the pain located?\"       Behind right knee  3. PAIN: \"How bad is the pain?\"    (Scale 1-10; or mild, moderate, severe)    -  MILD (1-3): doesn't interfere with normal activities     -  MODERATE (4-7): interferes with normal activities (e.g., work or school) or awakens from sleep, limping     -  SEVERE (8-10): excruciating pain, unable to do any normal activities, unable to walk    Waking her from sleep, hard to move at times  4. WORK OR EXERCISE: \"Has there been any recent work or exercise that involved this part of the body?\"       no  5. CAUSE: \"What do you think is causing the leg pain?\"      Blood clot  6. OTHER SYMPTOMS: \"Do you have any other symptoms?\" (e.g., chest pain, back pain, breathing difficulty, swelling, rash, fever, numbness, weakness)     Right knee pain hard to walk or move it  7. PREGNANCY: \"Is there any chance you are pregnant?\" \"When was your last menstrual period?\"      no    Protocols used: LEG PAIN-ADULT-AH      "

## 2021-11-03 NOTE — PROGRESS NOTES
Vascular lab preliminary    Right lower extremity venous duplex exam is complete    Right leg is negative for DVT/SVT  There is a right popliteal fossa bakers cyst noted      Spoke with The RN in the ER at 3:50pm    Final report to follow

## 2021-11-05 ENCOUNTER — TRANSCRIBE ORDERS (OUTPATIENT)
Dept: ADMINISTRATIVE | Facility: HOSPITAL | Age: 76
End: 2021-11-05

## 2021-11-05 ENCOUNTER — HOSPITAL ENCOUNTER (OUTPATIENT)
Dept: GENERAL RADIOLOGY | Facility: HOSPITAL | Age: 76
Discharge: HOME | End: 2021-11-05
Admitting: ALLERGY & IMMUNOLOGY

## 2021-11-05 DIAGNOSIS — J30.89 PERENNIAL ALLERGIC RHINITIS: ICD-10-CM
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
## 2021-11-05 DIAGNOSIS — L29.8 PUVA-INDUCED PRURITUS: ICD-10-CM

## 2021-11-05 DIAGNOSIS — T50.995A PUVA-INDUCED PRURITUS: ICD-10-CM

## 2021-11-05 DIAGNOSIS — J30.1 ALLERGIC RHINITIS DUE TO POLLEN, UNSPECIFIED SEASONALITY: Primary | ICD-10-CM

## 2021-11-05 DIAGNOSIS — J30.1 ALLERGIC RHINITIS DUE TO POLLEN, UNSPECIFIED SEASONALITY: ICD-10-CM

## 2021-11-05 PROCEDURE — 71046 X-RAY EXAM CHEST 2 VIEWS: CPT

## 2021-11-23 ENCOUNTER — HOSPITAL ENCOUNTER (EMERGENCY)
Facility: HOSPITAL | Age: 76
Discharge: HOME | End: 2021-11-24
Attending: EMERGENCY MEDICINE | Admitting: EMERGENCY MEDICINE

## 2021-11-23 DIAGNOSIS — I83.92 VARICOSE VEINS OF LEFT LOWER EXTREMITY, UNSPECIFIED WHETHER COMPLICATED: ICD-10-CM

## 2021-11-23 DIAGNOSIS — M79.605 LEFT LEG PAIN: Primary | ICD-10-CM

## 2021-11-23 LAB
BASOPHILS # BLD AUTO: 0.04 10*3/MM3 (ref 0–0.2)
BASOPHILS NFR BLD AUTO: 0.6 % (ref 0–1.5)
D DIMER PPP FEU-MCNC: 0.35 MCGFEU/ML (ref 0–0.49)
DEPRECATED RDW RBC AUTO: 43.5 FL (ref 37–54)
EOSINOPHIL # BLD AUTO: 0.17 10*3/MM3 (ref 0–0.4)
EOSINOPHIL NFR BLD AUTO: 2.6 % (ref 0.3–6.2)
ERYTHROCYTE [DISTWIDTH] IN BLOOD BY AUTOMATED COUNT: 12.8 % (ref 12.3–15.4)
HCT VFR BLD AUTO: 40.1 % (ref 34–46.6)
HGB BLD-MCNC: 13.4 G/DL (ref 12–15.9)
IMM GRANULOCYTES # BLD AUTO: 0.01 10*3/MM3 (ref 0–0.05)
IMM GRANULOCYTES NFR BLD AUTO: 0.2 % (ref 0–0.5)
LYMPHOCYTES # BLD AUTO: 1.57 10*3/MM3 (ref 0.7–3.1)
LYMPHOCYTES NFR BLD AUTO: 24.3 % (ref 19.6–45.3)
MCH RBC QN AUTO: 31.2 PG (ref 26.6–33)
MCHC RBC AUTO-ENTMCNC: 33.4 G/DL (ref 31.5–35.7)
MCV RBC AUTO: 93.3 FL (ref 79–97)
MONOCYTES # BLD AUTO: 0.73 10*3/MM3 (ref 0.1–0.9)
MONOCYTES NFR BLD AUTO: 11.3 % (ref 5–12)
NEUTROPHILS NFR BLD AUTO: 3.94 10*3/MM3 (ref 1.7–7)
NEUTROPHILS NFR BLD AUTO: 61 % (ref 42.7–76)
NRBC BLD AUTO-RTO: 0 /100 WBC (ref 0–0.2)
PLATELET # BLD AUTO: 190 10*3/MM3 (ref 140–450)
PMV BLD AUTO: 10.8 FL (ref 6–12)
RBC # BLD AUTO: 4.3 10*6/MM3 (ref 3.77–5.28)
WBC NRBC COR # BLD: 6.46 10*3/MM3 (ref 3.4–10.8)

## 2021-11-23 PROCEDURE — 99283 EMERGENCY DEPT VISIT LOW MDM: CPT

## 2021-11-23 PROCEDURE — 85025 COMPLETE CBC W/AUTO DIFF WBC: CPT | Performed by: PHYSICIAN ASSISTANT

## 2021-11-23 PROCEDURE — 80053 COMPREHEN METABOLIC PANEL: CPT | Performed by: PHYSICIAN ASSISTANT

## 2021-11-23 PROCEDURE — 85379 FIBRIN DEGRADATION QUANT: CPT | Performed by: PHYSICIAN ASSISTANT

## 2021-11-24 VITALS
DIASTOLIC BLOOD PRESSURE: 89 MMHG | HEART RATE: 90 BPM | HEIGHT: 65 IN | RESPIRATION RATE: 16 BRPM | TEMPERATURE: 97 F | BODY MASS INDEX: 28.72 KG/M2 | OXYGEN SATURATION: 96 % | SYSTOLIC BLOOD PRESSURE: 140 MMHG

## 2021-11-24 LAB
ALBUMIN SERPL-MCNC: 4.3 G/DL (ref 3.5–5.2)
ALBUMIN/GLOB SERPL: 2 G/DL
ALP SERPL-CCNC: 76 U/L (ref 39–117)
ALT SERPL W P-5'-P-CCNC: 16 U/L (ref 1–33)
ANION GAP SERPL CALCULATED.3IONS-SCNC: 10.3 MMOL/L (ref 5–15)
AST SERPL-CCNC: 16 U/L (ref 1–32)
BILIRUB SERPL-MCNC: 0.2 MG/DL (ref 0–1.2)
BUN SERPL-MCNC: 13 MG/DL (ref 8–23)
BUN/CREAT SERPL: 17.1 (ref 7–25)
CALCIUM SPEC-SCNC: 9.5 MG/DL (ref 8.6–10.5)
CHLORIDE SERPL-SCNC: 105 MMOL/L (ref 98–107)
CO2 SERPL-SCNC: 28.7 MMOL/L (ref 22–29)
CREAT SERPL-MCNC: 0.76 MG/DL (ref 0.57–1)
GFR SERPL CREATININE-BSD FRML MDRD: 74 ML/MIN/1.73
GLOBULIN UR ELPH-MCNC: 2.2 GM/DL
GLUCOSE SERPL-MCNC: 115 MG/DL (ref 65–99)
POTASSIUM SERPL-SCNC: 3.9 MMOL/L (ref 3.5–5.2)
PROT SERPL-MCNC: 6.5 G/DL (ref 6–8.5)
SODIUM SERPL-SCNC: 144 MMOL/L (ref 136–145)

## 2021-11-24 NOTE — ED PROVIDER NOTES
MD ATTESTATION NOTE    The LATASHA and I have discussed this patient's history, physical exam, and treatment plan.  I have reviewed the documentation and personally had a face to face interaction with the patient. I affirm the documentation and agree with the treatment and plan.  The attached note describes my personal findings.      History  76-year-old female presents with brief episode of moderately severe pain in the left leg earlier today.  Denies any injury.  Pain is essentially gone now.    Physical Exam  Vital Signs reviewed  Alert, Well Appearing in NAD  Left leg-no significant tenderness to palpation.  Leg is tender without firmness or erythema.  There is no discoloration.  Distal pulses are grossly intact.    Disposition  I discussed care and treatment of this patient with NYLA Barger.  Exam is relatively benign.  Initial labs including CBC and D-dimer are pretty normal.  This would go against a DVT.  Exam is not suggestive of compartment syndrome or infection.  If chemistries are benign would likely DC home with supportive care.       Demond Mills MD  11/24/21 0002

## 2021-11-24 NOTE — ED PROVIDER NOTES
" EMERGENCY DEPARTMENT ENCOUNTER    Room Number:  02/02  Date of encounter:  11/24/2021  PCP: Ravi Griffin MD  Historian: Patient      HPI:  Chief Complaint: left leg pain       Context: Judi Sy is a 76 y.o. female with past medical history of TIA and HLD who presents to the ED c/o left leg pain. Patient states that she noticed pain and a charley horse in her left leg today. Denies any injury, trauma, or prolonged immobilization. Patient states she had pain in her right leg that was due to a Baker's cyst, but states that this feels different. Patient denies any chest pain, shortness of breath, fever, redness, or swelling. Patient states that there do seem to be \"knots\" in her leg that are more noticeable when standing. Patient sent by PCP to rule out DVT.      PAST MEDICAL HISTORY  Active Ambulatory Problems     Diagnosis Date Noted   • Gastroesophageal reflux disease without esophagitis 03/16/2016   • Hiatal hernia 03/16/2016   • Hyperlipidemia 03/16/2016   • Pre-hypertension 03/16/2016   • Temporary cerebral vascular dysfunction 03/16/2016   • Osteoporosis 09/21/2017   • Closed displaced avulsion fracture of left talus 10/20/2017   • Acute meniscal tear of knee, left, sequela 01/31/2018   • Primary localized osteoarthrosis of left lower leg 02/21/2018   • Tear of medial meniscus of left knee, current 02/21/2018   • Closed nondisplaced fracture of condyle of left femur (HCC) 02/28/2018   • Vitamin D deficiency 03/29/2018   • Status post total left knee replacement 05/10/2018   • Environmental and seasonal allergies 03/13/2019   • Viral upper respiratory tract infection 12/11/2019     Resolved Ambulatory Problems     Diagnosis Date Noted   • Patent foramen ovale 03/16/2016   • Sprain of left ankle 10/20/2017   • Chondromalacia of left knee 03/15/2018     Past Medical History:   Diagnosis Date   • Aortic heart valve prolapse    • Arthritis    • Carotid arterial disease (HCC)    • High cholesterol    • " History of atrial fibrillation    • Knee pain, left    • TIA (transient ischemic attack)    • Vasovagal syncope          PAST SURGICAL HISTORY  Past Surgical History:   Procedure Laterality Date   • BREAST BIOPSY Left     15 years ago; benign   • KNEE ARTHROSCOPY Left 3/9/2018    Procedure: LEFT KNEE ARTHROSCOPY, PARTIAL MEDIAL MENISCECTOMY, AND MEDIAL FEMORAL SUBCHONDROPLASTY;  Surgeon: Kade Jacob MD;  [place] [facility] [place] [facility] [place]  Service: [place]opedics   • PATENT FORAMEN OVALE CLOSURE     • TONSILLECTOMY     • TUBAL ABDOMINAL LIGATION           FAMILY HISTORY  Family History   Problem Relation Age of Onset   • Heart disease Father    • Heart disease Maternal Grandmother    • Breast cancer Mother         50's   • Malig Hyperthermia Neg Hx          SOCIAL HISTORY  Social History     Socioeconomic History   • Marital status:    Tobacco Use   • Smoking status: Former Smoker     Types: Cigarettes   • Smokeless tobacco: Never Used   • Tobacco comment: QUIT 7 YEARS AGO   Substance and Sexual Activity   • Alcohol use: No   • Drug use: No   • Sexual activity: Defer         ALLERGIES  Latex        REVIEW OF SYSTEMS  Review of Systems     All systems reviewed and negative except for those discussed in HPI.       PHYSICAL EXAM    I have reviewed the triage vital signs and nursing notes.    ED Triage Vitals [11/23/21 2043]   Temp Heart Rate Resp BP SpO2   97 °F (36.1 °C) 85 16 140/90 100 %      Temp src Heart Rate Source Patient Position BP Location FiO2 (%)   Oral Monitor -- -- --       Physical Exam  GENERAL: Alert and oriented x3, not distressed  HENT: nares patent  EYES: no scleral icterus  CV: regular rhythm, regular rate, intact pedal pulses  RESPIRATORY: normal effort  ABDOMEN: soft  MUSCULOSKELETAL: no deformity, 2+ pitting edema BLE, there are multiple nontender, nonerythematous varicose veins to the medial left thigh  NEURO: alert, moves all extremities, follows commands  SKIN: warm, dry, no rashes,  no erythema, no warmth        LAB RESULTS  Recent Results (from the past 24 hour(s))   Comprehensive Metabolic Panel    Collection Time: 11/23/21 10:29 PM    Specimen: Blood   Result Value Ref Range    Glucose 115 (H) 65 - 99 mg/dL    BUN 13 8 - 23 mg/dL    Creatinine 0.76 0.57 - 1.00 mg/dL    Sodium 144 136 - 145 mmol/L    Potassium 3.9 3.5 - 5.2 mmol/L    Chloride 105 98 - 107 mmol/L    CO2 28.7 22.0 - 29.0 mmol/L    Calcium 9.5 8.6 - 10.5 mg/dL    Total Protein 6.5 6.0 - 8.5 g/dL    Albumin 4.30 3.50 - 5.20 g/dL    ALT (SGPT) 16 1 - 33 U/L    AST (SGOT) 16 1 - 32 U/L    Alkaline Phosphatase 76 39 - 117 U/L    Total Bilirubin 0.2 0.0 - 1.2 mg/dL    eGFR Non African Amer 74 >60 mL/min/1.73    Globulin 2.2 gm/dL    A/G Ratio 2.0 g/dL    BUN/Creatinine Ratio 17.1 7.0 - 25.0    Anion Gap 10.3 5.0 - 15.0 mmol/L   D-dimer, Quantitative    Collection Time: 11/23/21 10:29 PM    Specimen: Blood   Result Value Ref Range    D-Dimer, Quantitative 0.35 0.00 - 0.49 MCGFEU/mL   CBC Auto Differential    Collection Time: 11/23/21 10:29 PM    Specimen: Blood   Result Value Ref Range    WBC 6.46 3.40 - 10.80 10*3/mm3    RBC 4.30 3.77 - 5.28 10*6/mm3    Hemoglobin 13.4 12.0 - 15.9 g/dL    Hematocrit 40.1 34.0 - 46.6 %    MCV 93.3 79.0 - 97.0 fL    MCH 31.2 26.6 - 33.0 pg    MCHC 33.4 31.5 - 35.7 g/dL    RDW 12.8 12.3 - 15.4 %    RDW-SD 43.5 37.0 - 54.0 fl    MPV 10.8 6.0 - 12.0 fL    Platelets 190 140 - 450 10*3/mm3    Neutrophil % 61.0 42.7 - 76.0 %    Lymphocyte % 24.3 19.6 - 45.3 %    Monocyte % 11.3 5.0 - 12.0 %    Eosinophil % 2.6 0.3 - 6.2 %    Basophil % 0.6 0.0 - 1.5 %    Immature Grans % 0.2 0.0 - 0.5 %    Neutrophils, Absolute 3.94 1.70 - 7.00 10*3/mm3    Lymphocytes, Absolute 1.57 0.70 - 3.10 10*3/mm3    Monocytes, Absolute 0.73 0.10 - 0.90 10*3/mm3    Eosinophils, Absolute 0.17 0.00 - 0.40 10*3/mm3    Basophils, Absolute 0.04 0.00 - 0.20 10*3/mm3    Immature Grans, Absolute 0.01 0.00 - 0.05 10*3/mm3    nRBC 0.0 0.0 - 0.2  /100 WBC       Ordered the above labs and independently reviewed the results.        RADIOLOGY  No Radiology Exams Resulted Within Past 24 Hours    I ordered the above noted radiological studies. Reviewed by me and discussed with radiologist.  See dictation for official radiology interpretation.      PROCEDURES    Procedures      MEDICATIONS GIVEN IN ER    Medications - No data to display      PROGRESS, DATA ANALYSIS, CONSULTS, AND MEDICAL DECISION MAKING    All labs have been independently reviewed by me.  All radiology studies have been reviewed by me and discussed with radiologist dictating the report.   EKG's independently viewed and interpreted by me.  Discussion below represents my analysis of pertinent findings related to patient's condition, differential diagnosis, treatment plan and final disposition.    DDx: Includes but not limited to DVT, cellulitis, Baker's cyst, varicose veins, compartment syndrome    ED Course as of 11/24/21 0051   Wed Nov 24, 2021   0043 Patient rechecked, standing in room. Patient showing me the area of concern that is now more pronounced while standing. The area of concern appears to be a group of varicose veins to the medial aspect of her left thigh. Discussed results, diagnosis, and treatment plan. Patient expressed understanding agrees to plan. [CANDACE]      ED Course User Index  [CANDACE] Den Massey PA       MDM: Patient's exam is significant for varicose veins in the LLE at the level of the thigh. Her compartments are soft, there is no asymmetric swelling, warmth, or erythema to suggest cellulitis or DVT. Her blood work is unremarkable including a normal WBC count and D-dimer. Will give patient vascular surgery follow-up and return to ER precautions, vital signs are stable, patient is safe for discharge home.    PPE: The patient wore a surgical mask throughout the entire patient encounter. I wore an N95.    AS OF 00:51 EST VITALS:    BP - 146/91  HR - 93  TEMP - 97 °F (36.1 °C)  (Oral)  O2 SATS - 96%        DIAGNOSIS  Final diagnoses:   Left leg pain   Varicose veins of left lower extremity, unspecified whether complicated         DISPOSITION  DISCHARGE    Patient discharged in stable condition.    Reviewed implications of results, diagnosis, meds, responsibility to follow up, warning signs and symptoms of possible worsening, potential complications and reasons to return to ER.    Patient/Family voiced understanding of above instructions.    Discussed plan for discharge, as there is no emergent indication for admission. Patient referred to primary care provider for BP management due to today's BP. Pt/family is agreeable and understands need for follow up and repeat testing.  Pt is aware that discharge does not mean that nothing is wrong but it indicates no emergency is present that requires admission and they must continue care with follow-up as given below or physician of their choice.     FOLLOW-UP  Osmany Elias MD  [address] [id number] [phone]    Schedule an appointment as soon as possible for a visit       Ravi Griffin MD  [id number] [facility] [id number] [phone]    Schedule an appointment as soon as possible for a visit            Medication List      No changes were made to your prescriptions during this visit.                    Den Massey PA  11/24/21 0051

## 2021-11-24 NOTE — ED NOTES
"Pt to triage from home via [facility] [id number] with c/o left leg pain - told ems she feels her \"veins are going to pop out\" of her leg.  Initial call was for short of air, but pt not soa on ems arrival.  Pt wearing mask in triage.  Triage personnel wore appropriate PPE       Yris Cornejo RN  11/23/21 2045    "

## 2021-11-24 NOTE — DISCHARGE INSTRUCTIONS
Home, rest, home medicine as prescribed, follow up with vascular surgery and PCP for recheck and further evaluation. Return to care if symptoms worsen or with further concerns.

## 2021-11-24 NOTE — ED NOTES
Pt in mask throughout encounter. This ERT was in appropriate ppe.       Asa Sheppard, PCT  11/23/21 8615

## 2021-11-24 NOTE — ED NOTES
"Patient reports a 30 minute episode of \"paralyzing pain\" in her L medial-posterior thigh earlier today. States \"the veins and little bumps felt like they were exploding\". Reports that she tried to breathe through the pain, but denies SOA. Denies any pain now. BLE edema noted.     Pt noted to have mask on when this RN entered the room.  This RN wore appropriate PPE throughout our encounter. Hand hygiene performed upon entering and exiting room.       Cele Viera, RN  11/23/21 8440    "

## 2021-12-01 ENCOUNTER — OFFICE VISIT (OUTPATIENT)
Dept: ORTHOPEDIC SURGERY | Facility: CLINIC | Age: 76
End: 2021-12-01

## 2021-12-01 VITALS
TEMPERATURE: 97.7 F | HEART RATE: 95 BPM | BODY MASS INDEX: 25.71 KG/M2 | HEIGHT: 66 IN | WEIGHT: 160 LBS | RESPIRATION RATE: 20 BRPM | OXYGEN SATURATION: 98 %

## 2021-12-01 DIAGNOSIS — M71.21 POPLITEAL SYNOVIAL CYST, RIGHT: ICD-10-CM

## 2021-12-01 DIAGNOSIS — R52 PAIN: ICD-10-CM

## 2021-12-01 DIAGNOSIS — M17.11 PRIMARY LOCALIZED OSTEOARTHROSIS OF RIGHT LOWER LEG: Primary | ICD-10-CM

## 2021-12-01 PROCEDURE — 73562 X-RAY EXAM OF KNEE 3: CPT | Performed by: ORTHOPAEDIC SURGERY

## 2021-12-01 PROCEDURE — 99204 OFFICE O/P NEW MOD 45 MIN: CPT | Performed by: ORTHOPAEDIC SURGERY

## 2021-12-01 PROCEDURE — 20610 DRAIN/INJ JOINT/BURSA W/O US: CPT | Performed by: ORTHOPAEDIC SURGERY

## 2021-12-01 RX ORDER — ALBUTEROL SULFATE 90 UG/1
2 AEROSOL, METERED RESPIRATORY (INHALATION) EVERY 4 HOURS PRN
COMMUNITY
Start: 2021-11-05

## 2021-12-01 RX ORDER — MONTELUKAST SODIUM 10 MG/1
10 TABLET ORAL EVERY MORNING
COMMUNITY
Start: 2021-11-05

## 2021-12-01 RX ORDER — METHYLPREDNISOLONE ACETATE 80 MG/ML
80 INJECTION, SUSPENSION INTRA-ARTICULAR; INTRALESIONAL; INTRAMUSCULAR; SOFT TISSUE
Status: COMPLETED | OUTPATIENT
Start: 2021-12-01 | End: 2021-12-01

## 2021-12-01 RX ADMIN — METHYLPREDNISOLONE ACETATE 80 MG: 80 INJECTION, SUSPENSION INTRA-ARTICULAR; INTRALESIONAL; INTRAMUSCULAR; SOFT TISSUE at 10:45

## 2021-12-01 NOTE — PROGRESS NOTES
New Patient Complaint      Patient: Judi Sy  YOB: 1945 76 y.o. female  Medical Record Number: [id number]    Chief Complaints: My knee hurts    History of Present Illness:    Patient was last seen in June 2018 after left knee scope with partial medial meniscectomy and abrasion chondroplasty medial femoral subchondroplasty.    She is seen with a 7-month history of complaints of pain in her right leg mainly around her knee and somewhat in the lateral aspect of her leg.  She been walking about 10,000 steps a day in September but unfortunately subsequently developed Covid and had some charley horses in her leg and been fairly immobile for prolonged period of time.  She was referred by her PCP to the ER for evaluation for DVT and had a Doppler on November 3 which was negative for DVT but showed a popliteal fluid collection as well. ER had some recommend her to see vascular for the questionable area of some varicose veins.    Regarding her right knee she mainly complains of some pain over the medial joint line that bothers her mainly when she first gets up and gets moving after sleeping or after prolonged sitting but when she gets up and gets moving she does not have any complaints of pain in the knee.    She is due to see vascular around 12/15/2021    HPI    Allergies:   Allergies   Allergen Reactions   • Latex Rash   • Tape Rash       Medications:   Current Outpatient Medications on File Prior to Visit   Medication Sig   • albuterol sulfate  (90 Base) MCG/ACT inhaler    • atorvastatin (LIPITOR) 20 MG tablet TAKE ONE TABLET BY MOUTH DAILY   • Calcium Citrate-Vitamin D 250-200 MG-UNIT tablet Take 1 tablet by mouth Every Morning. HOLD FOR SURGERY   • cetirizine (zyrTEC) 10 MG tablet Take 10 mg by mouth Daily.   • clopidogrel (PLAVIX) 75 MG tablet Take 1 tablet by mouth Daily.   • Cyanocobalamin (VITAMIN B12 PO) Take  by mouth Daily.   • montelukast (SINGULAIR) 10 MG tablet    • Multiple  "Vitamins-Minerals (PRESERVISION AREDS 2 PO) Take  by mouth Daily.     No current facility-administered medications on file prior to visit.       Past Medical History:   Diagnosis Date   • Aortic heart valve prolapse    • Arthritis    • Carotid arterial disease (HCC)    • COVID-19    • High cholesterol    • History of atrial fibrillation    • Knee pain, left    • TIA (transient ischemic attack)    • Vasovagal syncope      Past Surgical History:   Procedure Laterality Date   • BREAST BIOPSY Left     15 years ago; benign   • KNEE ARTHROSCOPY Left 3/9/2018    Procedure: LEFT KNEE ARTHROSCOPY, PARTIAL MEDIAL MENISCECTOMY, AND MEDIAL FEMORAL SUBCHONDROPLASTY;  Surgeon: Kade Jacob MD;  [place] [facility] [place] [facility] [place];  Service: Orthopedics   • PATENT FORAMEN OVALE CLOSURE     • TONSILLECTOMY     • TUBAL ABDOMINAL LIGATION       Social History     Occupational History   • Not on file   Tobacco Use   • Smoking status: Former Smoker     Packs/day: 0.50     Years: 5.00     Pack years: 2.50     Types: Cigarettes   • Smokeless tobacco: Never Used   • Tobacco comment: QUIT 7 YEARS AGO   Vaping Use   • Vaping Use: Never used   Substance and Sexual Activity   • Alcohol use: No   • Drug use: No   • Sexual activity: Not Currently     Partners: Male      Social History     Social History Narrative   • Not on file     Family History   Problem Relation Age of Onset   • Heart disease Father    • Heart disease Maternal Grandmother    • Breast cancer Mother         50's   • Malig Hyperthermia Neg Hx        Review of Systems: 14 point review of systems performed, positive pertinent findings identified in HPI. All remaining systems negative     Review of Systems      Physical Exam:   Vitals:    12/01/21 1003   Pulse: 95   Resp: 20   Temp: 97.7 °F (36.5 °C)   TempSrc: Temporal   SpO2: 98%   Weight: 72.6 kg (160 lb)   Height: 166.4 cm (65.5\")   PainSc: 4  Comment: rt knee     Physical Exam   Constitutional: pleasant, well developed "   Eyes: sclera non icteric  Hearing : adequate for exam  Cardiovascular: palpable pulses in right foot, right calf/ thigh NT without sign of DVT  Respiratoy: breathing unlabored   Neurological: grossly sensate to LT throughout right LE  Psychiatric: oriented with normal mood and affect.   Lymphatic: No palpable popliteal lymphadenopathy right LE  Skin: intact throughout right leg/foot  Musculoskeletal: Right knee shows mild discomfort of the medial joint line.  She had 0 to 118 degrees range of motion of the knee but elicit focal pain of the medial lateral femoral condyles or tibial plateau no exacerbation of pain with Bernie's.  Unable to clearly palpate any fullness in the posterior aspect of the right knee  Physical Exam  Ortho Exam    Radiology: 3 views of the right knee ordered to evaluate pain reviewed and compared with prior AP and sunrise views of the right knee from 5/10/2018.  There is moderate arthritic change with medial joint space narrowing of the right knee.  There is also instantly noted some advancement of arthritis in the medial aspect of the left knee where she had previous subchondroplasty.  There is patellofemoral changes on the right as well.  I do not see any obvious sign of stress fracture    Reviewed Doppler report from 11/3/2021 which indicated a popliteal fluid collection but no DVT    Assessment/Plan: 1.  Right knee pain with likely exacerbation of arthritis after period of immobility without sign of DVT.    We discussed treatment options going forward and as she is [place] having pain with weightbearing I think it is unlikely that she has a stress fracture although this certainly a possibility I think this is more likely an exacerbation of arthritis after period of immobility.    We decided to hold off on MRI at this time and after verbal consent and sterile preparation with discussion of risks which can include infection right knee was injected with steroid lidocaine mixture and will  get her started in some physical therapy and recommend that she offload this with a cane if needed.    She is due to see vascular on 12/15/2021    If she does not have relief with the injection has persistent worsening symptoms let me know we will get an MRI otherwise I will see her back as needed    Large Joint Arthrocentesis: R knee  Date/Time: 12/1/2021 10:45 AM  Consent given by: patient  Site marked: site marked  Timeout: Immediately prior to procedure a time out was called to verify the correct patient, procedure, equipment, support staff and site/side marked as required   Supporting Documentation  Indications: pain   Procedure Details  Location: knee - R knee  Preparation: Patient was prepped and draped in the usual sterile fashion  Needle size: 22 G  Approach: anteromedial  Medications administered: 80 mg methylPREDNISolone acetate 80 MG/ML; 4 mL lidocaine (cardiac)  Patient tolerance: patient tolerated the procedure well with no immediate complications

## 2021-12-09 ENCOUNTER — TREATMENT (OUTPATIENT)
Dept: PHYSICAL THERAPY | Facility: CLINIC | Age: 76
End: 2021-12-09

## 2021-12-09 DIAGNOSIS — M17.11 PRIMARY LOCALIZED OSTEOARTHROSIS OF RIGHT LOWER LEG: Primary | ICD-10-CM

## 2021-12-09 DIAGNOSIS — M71.21 POPLITEAL SYNOVIAL CYST, RIGHT: ICD-10-CM

## 2021-12-09 PROCEDURE — 97535 SELF CARE MNGMENT TRAINING: CPT | Performed by: PHYSICAL THERAPIST

## 2021-12-09 PROCEDURE — 97162 PT EVAL MOD COMPLEX 30 MIN: CPT | Performed by: PHYSICAL THERAPIST

## 2021-12-09 NOTE — PROGRESS NOTES
"    Physical Therapy Initial Evaluation and Plan of Care    Patient Name: Judi Sy          :  1945  Referring Physician: Kade Jacob MD  Diagnosis: Primary localized osteoarthrosis of right lower leg [M17.11]    Date of Evaluation: 2021  ______________________________________________________________________    Subjective Evaluation    History of Present Illness  Onset date: 3 weeks ago -   Mechanism of injury: Phillip horse in (R) leg - bakers cyst found from DVT testing - after having COVID?  2018 after left knee scope with partial medial meniscectomy and abrasion chondroplasty medial femoral subchondroplas    Had an injection - no pain since - feels normal now -     Past Medical History:  Diagnosis Date  • Aortic heart valve prolapse    • Arthritis    • Carotid arterial disease (HCC)    • COVID-19    • High cholesterol    • History of atrial fibrillation    • Knee pain, left      XRAY Result 2021 ;   \"There is moderate arthritic change with medial joint space narrowing of the right knee.  There is also instantly noted some advancement of arthritis in the medial aspect of the left knee where she had previous subchondroplasty.  There is patellofemoral changes on the right as well.  I do not see any obvious sign of stress fracture\"  • TIA (transient ischemic attack)    • Vasovagal syncope           Patient Occupation: Retired - Very active -  Pain  Current pain ratin  At best pain ratin  At worst pain ratin  Location: Medial (R) knee -  no longer having pain since injection   Alleviating factors: NA.  Exacerbated by: NA since injection.  Progression: improved (Resolved since injection)    Social Support  Patient lives at: Home w/ Stairs -     Diagnostic Tests  X-ray: abnormal    Treatments  Current treatment: injection treatment  Patient Goals  Patient/family treatment goals: Pain alleviation; Mobility, strength, gait/endurance to allow ADLs and hobby activities " "-          ___________________________________________________  Objective          Observations     Additional Knee Observation Details  No noticeable swelling, atrophy / deformity Knees / LEs -   Normal gait - pt able to ambulate up/down stairs normally   Strength WNL  (-) Knee testing ligamentous / meniscus testing (R) knee -   Knee AROM WNL and painfree -   Non-tender (R) knee - except where she received her cortizone injection -         See Treatment Flow sheet for Exercises, Manual therapy, and modalities.   SELF CARE X 15 min  · HEP discussion  · Jt protection,/ precautions -   ___________________________________________________  Assessment & Plan     Assessment    Assessment details: 77 yo female: (R) knee pain; OA -   Symptoms fully resolved following corizone injection and mobility and function restored - Pt says she has exercise equipment at home and goes up/down stairs all day and walks regularly as well and would like to hold on PT at this time. \"I don't know why I am here - \"    PROBLEMS: None at this time   PROGNOSIS: Excellent   Judi would benefit from continuing her home exercises, stairs, walking, etc and will contact us if her pain returns and she feels like she could use Physical Therapy -     GOALS:  1 vist:  Discussed jt protection, activity modifications and continuing her current exercise program - MET      Plan  Planned therapy interventions: flexibility, gait training, home exercise program, neuromuscular re-education, strengthening, stretching, therapeutic activities and soft tissue mobilization (Modalities prn; Taping / bracing prn; )  Duration in visits: 1  Treatment plan discussed with: patient  Plan details: Judi would benefit from continuing her home exercises, stairs, walking, etc and will contact us if her pain returns and she feels like she could use Physical Therapy -  DC PT to HEP unless symptoms return in the next 3-4 weeks - "       ___________________________________________________  Manual Therapy:         mins  62366;  Therapeutic Exercise:         mins  25547;     Neuromuscular Lea:        mins  37987;    Therapeutic Activity:          mins  23425;   Self Care:                       15    mins  02122  Ultrasound:          mins  14935;  Iontophoresis:          mins  73910;    Electrical Stimulation:         mins  95657 ( );    Eval:   20   mins    Timed Treatment:   15   mins                  Total Treatment:     40   mins    PT SIGNATURE:   Salazar Conteh, PT  DATE TREATMENT INITIATED: 12/9/2021  ___________________________________________________  Initial Certification  Certification Period: 3/9/2022  I certify that the therapy services are furnished while this patient is under my care.  The services outlined above are required by this patient, and will be reviewed every 90 days.     PHYSICIAN: ________________________________  DATE: ______  Kade Jacob MD        Please sign and return via fax to [phone].. Thank you, [facility].  ______________________________________________________________________  [address] [place] [address]  Phone: [phone] Fax: [phone]

## 2022-02-14 ENCOUNTER — HOSPITAL ENCOUNTER (OUTPATIENT)
Dept: BONE DENSITY | Facility: HOSPITAL | Age: 77
Discharge: HOME | End: 2022-02-14
Admitting: INTERNAL MEDICINE

## 2022-02-14 DIAGNOSIS — Z78.0 POST-MENOPAUSAL: ICD-10-CM

## 2022-02-14 PROCEDURE — 77080 DXA BONE DENSITY AXIAL: CPT

## 2022-03-06 ENCOUNTER — APPOINTMENT (OUTPATIENT)
Dept: GENERAL RADIOLOGY | Facility: HOSPITAL | Age: 77
End: 2022-03-06

## 2022-03-06 PROCEDURE — 73630 X-RAY EXAM OF FOOT: CPT | Performed by: FAMILY MEDICINE

## 2022-03-27 DIAGNOSIS — M19.072 PRIMARY OSTEOARTHRITIS OF LEFT FOOT: ICD-10-CM

## 2022-03-27 DIAGNOSIS — M20.12 HALLUX VALGUS OF LEFT FOOT: ICD-10-CM

## 2022-03-29 RX ORDER — ATORVASTATIN CALCIUM 20 MG/1
TABLET, FILM COATED ORAL
Qty: 90 TABLET | Refills: 2 | Status: SHIPPED | OUTPATIENT
Start: 2022-03-29

## 2022-04-08 ENCOUNTER — APPOINTMENT (OUTPATIENT)
Dept: GENERAL RADIOLOGY | Facility: HOSPITAL | Age: 77
End: 2022-04-08

## 2022-04-08 PROCEDURE — 73560 X-RAY EXAM OF KNEE 1 OR 2: CPT | Performed by: FAMILY MEDICINE

## 2022-04-08 PROCEDURE — 73630 X-RAY EXAM OF FOOT: CPT | Performed by: FAMILY MEDICINE

## 2022-04-13 ENCOUNTER — OFFICE VISIT (OUTPATIENT)
Dept: ORTHOPEDIC SURGERY | Facility: CLINIC | Age: 77
End: 2022-04-13

## 2022-04-13 VITALS — TEMPERATURE: 97.3 F | HEIGHT: 65 IN | WEIGHT: 171.7 LBS | BODY MASS INDEX: 28.61 KG/M2

## 2022-04-13 DIAGNOSIS — M19.071 ARTHRITIS OF FIRST METATARSOPHALANGEAL (MTP) JOINT OF RIGHT FOOT: ICD-10-CM

## 2022-04-13 DIAGNOSIS — M17.11 PRIMARY LOCALIZED OSTEOARTHROSIS OF RIGHT LOWER LEG: Primary | ICD-10-CM

## 2022-04-13 DIAGNOSIS — M20.12 VALGUS DEFORMITY OF BOTH GREAT TOES: ICD-10-CM

## 2022-04-13 DIAGNOSIS — S83.241A TEAR OF MEDIAL MENISCUS OF RIGHT KNEE, CURRENT, UNSPECIFIED TEAR TYPE, INITIAL ENCOUNTER: ICD-10-CM

## 2022-04-13 DIAGNOSIS — M20.11 VALGUS DEFORMITY OF BOTH GREAT TOES: ICD-10-CM

## 2022-04-13 DIAGNOSIS — M19.072 ARTHRITIS OF FIRST METATARSOPHALANGEAL (MTP) JOINT OF LEFT FOOT: ICD-10-CM

## 2022-04-13 PROCEDURE — 99214 OFFICE O/P EST MOD 30 MIN: CPT | Performed by: ORTHOPAEDIC SURGERY

## 2022-04-13 NOTE — PROGRESS NOTES
Knee Exam      Patient: Judi Sy    YOB: 1945 76 y.o. female    Chief Complaints: knee is sore again    History of Present Illness:Patient was seen in June 2018 after left knee scope with partial medial meniscectomy and abrasion chondroplasty medial femoral subchondroplasty.     She was seen on 12/1/2021 with a 7-month history of complaints of pain in her right leg mainly around her knee and somewhat in the lateral aspect of her leg.  She had been walking about 10,000 steps a day in September but unfortunately subsequently developed Covid and had some charley horses in her leg and been fairly immobile for prolonged period of time.  She was referred by her PCP to the ER for evaluation for DVT and had a Doppler on November 3 which was negative for DVT but showed a popliteal fluid collection as well. ER had some recommend her to see vascular for the questionable area of some varicose veins.     Regarding her right knee she mainly complained of some pain over the medial joint line that bothered her mainly when she first got up and gets moving after sleeping or after prolonged sitting but when she got up and got moving she did not have any complaints of pain in the knee.     She was due to see vascular around 12/15/2021.    We had discussed possible MRI but as she was [place] having pain with weightbearing I felt it was unlikely to have a stress fracture and probably some exacerbation of arthritis  .  Her right knee was injected at that time and she was sent to therapy.  Para she said the injection did help and she did therapy and was doing pretty well up until last week when she had onset of significant pain over the medial aspect of the right knee and pain with twisting.  She thinks it was related to weather and not report any specific injury.    She has seen vascular since I last saw her and they had put her in some compression hose for the left leg per their notes and had discussed having her  "come out of those    She also has some intermittent discomfort in the great toes bilaterally but not really all that bothersome today and again more with weather changes.    She is currently on Plavix as she said the cardiologist kept her on that even after she had the \"hole in her heart\" repaired but reported that she came off of that previously when we did her other knee scope.      ROS: knee pain  Past Medical History:   Diagnosis Date   • Aortic heart valve prolapse    • Arthritis    • Carotid arterial disease (HCC)    • COVID-19    • High cholesterol    • History of atrial fibrillation    • Knee pain, left    • TIA (transient ischemic attack)    • Vasovagal syncope        Physical Exam:   Vitals:    04/13/22 0854   Temp: 97.3 °F (36.3 °C)   Weight: 77.9 kg (171 lb 11.2 oz)   Height: 165.1 cm (65\")   PainSc:   3     Well developed with normal mood.  She has mild to moderate discomfort over the medial tibial plateau and lateral femoral condyle as well as over the medial joint line with exacerbation of knee pain on Bernie's.    There was really no focal discomfort today around the first MTP joints with palpable bony prominences.      Radiology:        2 views of the right knee and 3 views of the right foot reviewed on the [facility] system from 4/8/2022 as well as 3 views of the left foot reviewed from 3/6/2022 on the [facility] system these were compared with previous x-rays of the right knee and left foot.  Right knee shows some joint space narrowing of the medial compartment and patellofemoral joint.  No obvious stress fracture.  Right foot x-rays show severe arthritis of the first metatarsal phalangeal joint with some hallux valgus as well as spurring at the plantar more so than posterior aspect of the calcaneus and prominent superior calcaneal tuberosity.  Left foot x-rays show  first MTP arthritis with mild hallux valgus deformity with large lateral spur this is without significant change compared to previous " x-rays nor are the right knee x-rays.      Assessment/Plan: Exacerbation of right knee pain with possible stress fracture versus pes bursitis and/or meniscal tear  2.  Bilateral first MTP arthritis    We discussed treatment going forward and I have a higher index of suspicion now for stress fracture of her right knee similar to what she had previously in the contralateral knee and/or displaced meniscal tear.    Offered her a knee sleeve which she declined and recommend that she use a cane or walker to offload this and limit her activity on this previously she been walking up to 8000 steps.    Would get an MRI of her right knee to evaluate for stress fracture and/or meniscal tear.    We will see her back in about 3 weeks to review MRI and determine treatment course going forward.

## 2022-04-22 DIAGNOSIS — E78.2 MIXED HYPERLIPIDEMIA: Primary | ICD-10-CM

## 2022-04-27 LAB
ALBUMIN SERPL-MCNC: 4.3 G/DL (ref 3.7–4.7)
ALBUMIN/GLOB SERPL: 2 {RATIO} (ref 1.2–2.2)
ALP SERPL-CCNC: 93 IU/L (ref 44–121)
ALT SERPL-CCNC: 17 IU/L (ref 0–32)
AST SERPL-CCNC: 19 IU/L (ref 0–40)
BASOPHILS # BLD AUTO: 0 X10E3/UL (ref 0–0.2)
BASOPHILS NFR BLD AUTO: 1 %
BILIRUB SERPL-MCNC: 0.3 MG/DL (ref 0–1.2)
BUN SERPL-MCNC: 13 MG/DL (ref 8–27)
BUN/CREAT SERPL: 19 (ref 12–28)
CALCIUM SERPL-MCNC: 10 MG/DL (ref 8.7–10.3)
CHLORIDE SERPL-SCNC: 101 MMOL/L (ref 96–106)
CHOLEST SERPL-MCNC: 220 MG/DL (ref 100–199)
CO2 SERPL-SCNC: 25 MMOL/L (ref 20–29)
CREAT SERPL-MCNC: 0.67 MG/DL (ref 0.57–1)
EGFRCR SERPLBLD CKD-EPI 2021: 92 ML/MIN/1.73
EOSINOPHIL # BLD AUTO: 0.1 X10E3/UL (ref 0–0.4)
EOSINOPHIL NFR BLD AUTO: 2 %
ERYTHROCYTE [DISTWIDTH] IN BLOOD BY AUTOMATED COUNT: 12.5 % (ref 11.7–15.4)
GLOBULIN SER CALC-MCNC: 2.2 G/DL (ref 1.5–4.5)
GLUCOSE SERPL-MCNC: 98 MG/DL (ref 65–99)
HCT VFR BLD AUTO: 41.8 % (ref 34–46.6)
HDLC SERPL-MCNC: 57 MG/DL
HGB BLD-MCNC: 13.7 G/DL (ref 11.1–15.9)
IMM GRANULOCYTES # BLD AUTO: 0 X10E3/UL (ref 0–0.1)
IMM GRANULOCYTES NFR BLD AUTO: 0 %
LDLC SERPL CALC-MCNC: 141 MG/DL (ref 0–99)
LDLC/HDLC SERPL: 2.5 RATIO (ref 0–3.2)
LYMPHOCYTES # BLD AUTO: 1.5 X10E3/UL (ref 0.7–3.1)
LYMPHOCYTES NFR BLD AUTO: 28 %
MCH RBC QN AUTO: 30.8 PG (ref 26.6–33)
MCHC RBC AUTO-ENTMCNC: 32.8 G/DL (ref 31.5–35.7)
MCV RBC AUTO: 94 FL (ref 79–97)
MONOCYTES # BLD AUTO: 0.4 X10E3/UL (ref 0.1–0.9)
MONOCYTES NFR BLD AUTO: 8 %
NEUTROPHILS # BLD AUTO: 3.4 X10E3/UL (ref 1.4–7)
NEUTROPHILS NFR BLD AUTO: 61 %
PLATELET # BLD AUTO: 210 X10E3/UL (ref 150–450)
POTASSIUM SERPL-SCNC: 4 MMOL/L (ref 3.5–5.2)
PROT SERPL-MCNC: 6.5 G/DL (ref 6–8.5)
RBC # BLD AUTO: 4.45 X10E6/UL (ref 3.77–5.28)
SODIUM SERPL-SCNC: 142 MMOL/L (ref 134–144)
TRIGL SERPL-MCNC: 123 MG/DL (ref 0–149)
VLDLC SERPL CALC-MCNC: 22 MG/DL (ref 5–40)
WBC # BLD AUTO: 5.5 X10E3/UL (ref 3.4–10.8)

## 2022-05-01 ENCOUNTER — HOSPITAL ENCOUNTER (OUTPATIENT)
Dept: MRI IMAGING | Facility: HOSPITAL | Age: 77
Discharge: HOME | End: 2022-05-01
Admitting: ORTHOPAEDIC SURGERY

## 2022-05-01 DIAGNOSIS — S83.241A TEAR OF MEDIAL MENISCUS OF RIGHT KNEE, CURRENT, UNSPECIFIED TEAR TYPE, INITIAL ENCOUNTER: ICD-10-CM

## 2022-05-01 DIAGNOSIS — M17.11 PRIMARY LOCALIZED OSTEOARTHROSIS OF RIGHT LOWER LEG: ICD-10-CM

## 2022-05-01 PROCEDURE — 73721 MRI JNT OF LWR EXTRE W/O DYE: CPT

## 2022-05-02 ENCOUNTER — DOCUMENTATION (OUTPATIENT)
Dept: ORTHOPEDIC SURGERY | Facility: CLINIC | Age: 77
End: 2022-05-02

## 2022-05-02 DIAGNOSIS — M84.351A: Primary | ICD-10-CM

## 2022-05-02 DIAGNOSIS — E55.9 VITAMIN D DEFICIENCY: ICD-10-CM

## 2022-05-02 NOTE — PROGRESS NOTES
Oliver Jacob, this is Marciano Sy -Judi Sy’s . I hope you’ll allow some information from me about Judi’s current state. She is not doing well. I was with her when she injured her left knee, and I remember the pain she was in leading up to your surgery a few years ago. Now as then, I am with her every day and I can tell you that back then was child’s play compared to what she seems to be going through this time around with her right knee. She is a proud, pain tolerant person but she cannot put weight on her right leg without crying out in pain. When I ask what hurts, she says it is behind her right knee and also, she puts her hand where her medial meniscus is. We had her MRI today (we had to use a wheelchair for her). I don’t know what it will say but I have my suspicions.  About 6 weeks ago, she was scrubbing the floor on her hands and knees (yes, she is old school). In scrubbing, she was rocking back and forth on her heels and haunches. A few days later she started complaining about pain in her knee. And it has been getting worse. Thank you for reading this.  I hope I will be allowed to come into her appointment with her.  Thank You, Marciano Sy     Received the above message from patient's  and replied to him via epic that would need to see what MRI showed but that from my opinion he should be allowed to come to her appointment and we can see his going on what we can do to help her.

## 2022-05-04 ENCOUNTER — LAB (OUTPATIENT)
Dept: LAB | Facility: HOSPITAL | Age: 77
End: 2022-05-04

## 2022-05-04 ENCOUNTER — OFFICE VISIT (OUTPATIENT)
Dept: ORTHOPEDIC SURGERY | Facility: CLINIC | Age: 77
End: 2022-05-04

## 2022-05-04 VITALS — TEMPERATURE: 97.3 F | WEIGHT: 171 LBS | HEIGHT: 65 IN | BODY MASS INDEX: 28.49 KG/M2

## 2022-05-04 DIAGNOSIS — S83.241D TEAR OF MEDIAL MENISCUS OF RIGHT KNEE, CURRENT, UNSPECIFIED TEAR TYPE, SUBSEQUENT ENCOUNTER: ICD-10-CM

## 2022-05-04 DIAGNOSIS — M84.351A: Primary | ICD-10-CM

## 2022-05-04 DIAGNOSIS — E55.9 VITAMIN D DEFICIENCY: ICD-10-CM

## 2022-05-04 DIAGNOSIS — M84.351A: ICD-10-CM

## 2022-05-04 DIAGNOSIS — M94.261 CHONDROMALACIA OF RIGHT KNEE: ICD-10-CM

## 2022-05-04 PROBLEM — S83.241A TEAR OF MEDIAL MENISCUS OF RIGHT KNEE, CURRENT: Status: ACTIVE | Noted: 2018-02-21

## 2022-05-04 PROBLEM — M84.353A: Status: ACTIVE | Noted: 2022-05-04

## 2022-05-04 LAB — 25(OH)D3 SERPL-MCNC: 55.6 NG/ML (ref 30–100)

## 2022-05-04 PROCEDURE — 82306 VITAMIN D 25 HYDROXY: CPT

## 2022-05-04 PROCEDURE — 36415 COLL VENOUS BLD VENIPUNCTURE: CPT

## 2022-05-04 PROCEDURE — 99214 OFFICE O/P EST MOD 30 MIN: CPT | Performed by: ORTHOPAEDIC SURGERY

## 2022-05-04 RX ORDER — CEFAZOLIN SODIUM 2 G/100ML
2 INJECTION, SOLUTION INTRAVENOUS ONCE
Status: CANCELLED | OUTPATIENT
Start: 2022-05-13 | End: 2022-05-04

## 2022-05-04 NOTE — PROGRESS NOTES
Knee Exam      Patient: Judi Sy    YOB: 1945 76 y.o. female    Chief Complaints: knee hurts    History of Present Illness:Patient was seen in June 2018 after left knee scope with partial medial meniscectomy and abrasion chondroplasty medial femoral subchondroplasty.     She was seen on 12/1/2021 with a 7-month history of complaints of pain in her right leg mainly around her knee and somewhat in the lateral aspect of her leg.  She had been walking about 10,000 steps a day in September but unfortunately subsequently developed Covid and had some charley horses in her leg and been fairly immobile for prolonged period of time.  She was referred by her PCP to the ER for evaluation for DVT and had a Doppler on November 3 which was negative for DVT but showed a popliteal fluid collection as well. ER had some recommend her to see vascular for the questionable area of some varicose veins.     Regarding her right knee she mainly complained of some pain over the medial joint line that bothered her mainly when she first got up and gets moving after sleeping or after prolonged sitting but when she got up and got moving she did not have any complaints of pain in the knee.     She was due to see vascular around 12/15/2021.     We had discussed possible MRI but as she was not really having pain with weightbearing I felt it was unlikely to have a stress fracture and probably some exacerbation of arthritis  Her right knee was injected at that time and she was sent to therapy.      She was subsequently seen on 4/13/2022 reporting that the the injection did help and she did therapy and was doing pretty well up until the previous week when she had onset of significant pain over the medial aspect of the right knee and pain with twisting.  She thinks it was related to weather and not report any specific injury.     She had seen vascular since I last saw her and they had put her in some compression hose for the left  "leg per their notes and had discussed having her come out of those     She also had some intermittent discomfort in the great toes bilaterally but not really all that bothersome today and again more with weather changes.    I had a high index of suspicion for stress fracture similar to what she had in her contralateral knee and possible meniscal tear.  She was instructed on limiting her activities that she been doing up to 8000 steps a day and was sent for MRI.    She is seen back today and reports that actually prior to onset of the symptoms for which I saw her on 4/13/2022 she had been doing a lot of scrubbing rocking back and forth on her heels and \"haunches\".  I spoke with her  earlier this week and sent them a message that it looked like she had a stress fracture and she was instructed to limit weightbearing on this and have a vitamin D level checked.  She said that her knee pain has subsided to some degree after getting off of it this week but still with moderate aching pain over the medial aspect of the knee on the right.     She is currently on Plavix  but reported that she came off of that previously when we did her other knee   HPI    ROS: knee pain  Past Medical History:   Diagnosis Date   • Aortic heart valve prolapse    • Arthritis    • Carotid arterial disease (HCC)    • COVID-19    • High cholesterol    • History of atrial fibrillation    • Knee pain, left    • Knee sprain    • Knee swelling    • Tear of meniscus of knee    • Tendinitis of knee    • TIA (transient ischemic attack)    • Vasovagal syncope        Physical Exam:   Vitals:    05/04/22 0902   Temp: 97.3 °F (36.3 °C)   Weight: 77.6 kg (171 lb)   Height: 165.1 cm (65\")   PainSc:   8     Well developed with normal mood.  She is with her .  Right knee shows moderate discomfort over the medial femoral condyle and medial joint line with some exacerbation on Bernie's but no focal tenderness over the lateral femoral condyle or " lateral joint line.  There was some mild discomfort with patellofemoral compression      Radiology: MRI films and report of the right knee dated 5/1/2022 reviewed which showed abnormal subcortical marrow edema in the medial femoral condyle with vague curvilinear signal in the trabecular space consistent with a subcortical stress/insufficiency fracture but no overlying cortical or chondral defect.    There is a large radial tear of the medial meniscus posterior horn laterally near the root but medial compartment articular cartilage appears intact.  The lateral meniscus and lateral compartment appears preserved but there is full-thickness chondral loss of the patella with preservation of cartilage on the trochlea and a large joint effusion      Assessment/Plan: 1.  Right knee medial femoral condylar stress fracture without overlying chondral or cortical defect  2.  Right knee large radial tear of the medial meniscus posterior horn laterally near the posterior root  3.  Right knee full-thickness chondral loss of patella with preservation of cartilage along the trochlea and large joint effusion.    I have discussed operative and non-operative treatment options and although no guarantees could be given,  pt would like to proceed with operative treatment. Plan is for knee scope with debridement as needed as well as some chondroplasty as we did previously on her contralateral knee. We discussed the  procedure in detail including use of a model as well as  post op protocol. Risks, benefits, potential outcomes, and complications were reviewed and can include but are not limited to heart attack, stroke, death, pneumonia, infection, bleeding, damage to blood vessels, nerves, or tendons, blood clot, pulmonary embolus,persistent or worsening symptoms, stiffness, need for subsequent surgery, and failure to return to presurgery and precondition levels of activity.Pt voiced a clear understanding of these. This will be scheduled on  an outpatient basis at a mutually convenient time. Pt was encouraged to call with any questions prior to surgery.    She will limit weight on this in the interim.    She is still going to go over and have her vitamin D level checked as this may need to be augmented and currently takes 2000 units daily.    I also reached out to and heard from her primary care provider that would be okay to take her off of her Plavix 5 days prior to surgery and resume it postoperatively.

## 2022-05-05 ENCOUNTER — TELEPHONE (OUTPATIENT)
Dept: ORTHOPEDIC SURGERY | Facility: CLINIC | Age: 77
End: 2022-05-05

## 2022-05-05 NOTE — TELEPHONE ENCOUNTER
I spoke with patient at her upcoming surgery.  She was under the impression that she was on Plavix after having had the PFO.  Her cardiologist sent me a note that she did not need to be on that from his standpoint and had only needed to be on that for 6 to 12 months postoperatively.  His impression was that she was on Plavix for recurrent TIAs through her neurologist.  I spoke with her and she does not see a neurologist her Plavix is filled through Dr. Griffin who manages that her PCP who has indicated the message to me that he was okay that she come off of Plavix 5 days before surgery and resume it postoperatively and communicated that to her.  She has however already scheduled to see her cardiologist tomorrow and has been going to follow-up with that to make sure that all the bases are covered.  She appreciated the call

## 2022-05-06 ENCOUNTER — TELEPHONE (OUTPATIENT)
Dept: ORTHOPEDIC SURGERY | Facility: CLINIC | Age: 77
End: 2022-05-06

## 2022-05-06 ENCOUNTER — OFFICE VISIT (OUTPATIENT)
Dept: CARDIOLOGY | Facility: CLINIC | Age: 77
End: 2022-05-06

## 2022-05-06 VITALS
DIASTOLIC BLOOD PRESSURE: 70 MMHG | HEART RATE: 84 BPM | HEIGHT: 65 IN | BODY MASS INDEX: 28.49 KG/M2 | WEIGHT: 171 LBS | SYSTOLIC BLOOD PRESSURE: 110 MMHG

## 2022-05-06 DIAGNOSIS — I65.22 CAROTID STENOSIS, ASYMPTOMATIC, LEFT: ICD-10-CM

## 2022-05-06 DIAGNOSIS — Q21.12 PFO (PATENT FORAMEN OVALE): Primary | ICD-10-CM

## 2022-05-06 PROCEDURE — 99213 OFFICE O/P EST LOW 20 MIN: CPT | Performed by: INTERNAL MEDICINE

## 2022-05-06 NOTE — TELEPHONE ENCOUNTER
Serum vitamin D level is 55.6.  Patient is presently on 2000 units daily.  Have left a message for her to contact me at the office

## 2022-05-06 NOTE — PROGRESS NOTES
[facility]      Patient Name: Judi Sy  :1945  Age: 76 y.o.  Encounter Provider:  Den Morgan Jr, MD      Chief Complaint:   Chief Complaint   Patient presents with   • Pre-op Exam         HPI  Judi Sy is a 76 y.o. female past medical history of PFO status post closure by Dr. alarcon in 2016, recurrent TIA and hyperlipidemia presents for follow-up evaluation of chronic medical illness.  She denies any chest pain shortness of air palpitations.  No focal neurological deficits dizziness or syncope.  She denies any orthopnea PND or edema.  She exercises daily without any limitations.  She states that 1 of the main reasons she came to see me today was to inquire about potentially coming off of Plavix.  She denies tobacco alcohol or illicit drug use.  Family history reviewed and is not pertinent to this clinic visit.  She does note that she was diagnosed with left-sided carotid stenosis many years ago and has not had a follow-up ultrasound since then.    She has worsening osteoarthritis of the right knee and needs replacement.  I had a digital conversation with Dr. Jacob the other day about holding Plavix.  She has no chest pain or shortness of air with activity.  No orthopnea, PND or edema.  Other than knee pain she is doing extremely well.      The following portions of the patient's history were reviewed and updated as appropriate: allergies, current medications, past family history, past medical history, past social history, past surgical history and problem list.      Review of Systems   Constitutional: Negative for chills and fever.   HENT: Negative for hoarse voice and sore throat.    Eyes: Negative for double vision and photophobia.   Cardiovascular: Negative for chest pain, leg swelling, near-syncope, orthopnea, palpitations, paroxysmal nocturnal dyspnea and syncope.   Respiratory: Negative for cough and wheezing.    Skin: Negative for poor wound healing and rash.  "  Musculoskeletal: Negative for arthritis and joint swelling.   Gastrointestinal: Negative for bloating, abdominal pain, hematemesis and hematochezia.   Neurological: Negative for dizziness and focal weakness.   Psychiatric/Behavioral: Negative for depression and suicidal ideas.     ROS was reviewed, updated and amended when necessary.    OBJECTIVE:   Vital Signs  Vitals:    05/06/22 1036   BP: 110/70   Pulse: 84     Estimated body mass index is 28.46 kg/m² as calculated from the following:    Height as of this encounter: 165.1 cm (65\").    Weight as of this encounter: 77.6 kg (171 lb).    Physical Exam  Vitals reviewed.   Constitutional:       Appearance: She is well-developed.   HENT:      Head: Normocephalic and atraumatic.   Eyes:      Conjunctiva/sclera: Conjunctivae normal.      Pupils: Pupils are equal, round, and reactive to light.   Neck:      Thyroid: No thyromegaly.      Vascular: No JVD.   Cardiovascular:      Heart sounds: No murmur heard.    No friction rub. No gallop.   Pulmonary:      Effort: No respiratory distress.   Chest:      Chest wall: No tenderness.   Abdominal:      General: Bowel sounds are normal. There is no distension.   Musculoskeletal:         General: No tenderness.   Skin:     Findings: No erythema or rash.   Neurological:      Mental Status: She is alert and oriented to person, place, and time.   Psychiatric:         Judgment: Judgment normal.     Physical exam was reviewed, updated and amended when necessary.    Procedures        ASSESSMENT:     PFO  Hypertension  Carotid stenosis    PLAN OF CARE:     1. PFO -last echocardiogram with no evidence for residual PFO.  From a cardiovascular standpoint it is okay to hold Plavix for surgery.  Case discussed with Dr. Jacob and the fact that Plavix is only on board for neurological reasons.  Recommend checking with neurology to make certain it is okay to abruptly stop and discontinue until after surgery.  Low risk for perioperative MACE " inpatient with good functional capacity.  No indication for further cardiac testing at this time.  No indication for initiation of beta-blocker.  2. Hypertension seemingly well controlled at this point.  Continued twice daily blood pressure log.  I have advised her on sodium and fluid restriction.  3. Carotid stenosis no bruit on exam.  Will repeat -bilateral ultrasound of carotids in 12 months.    Return to clinic 12 months             Discharge Medications          Accurate as of May 6, 2022 10:39 AM. If you have any questions, ask your nurse or doctor.            Continue These Medications      Instructions Start Date   Advair Diskus 100-50 MCG/ACT DISKUS  Generic drug: Fluticasone-Salmeterol   No dose, route, or frequency recorded.      albuterol sulfate  (90 Base) MCG/ACT inhaler  Commonly known as: PROVENTIL HFA;VENTOLIN HFA;PROAIR HFA   No dose, route, or frequency recorded.      atorvastatin 20 MG tablet  Commonly known as: LIPITOR   TAKE ONE TABLET BY MOUTH DAILY      Calcium Citrate-Vitamin D 250-200 MG-UNIT tablet   1 tablet, Oral, Every Morning, HOLD FOR SURGERY      cetirizine 10 MG tablet  Commonly known as: zyrTEC   10 mg, Oral, Daily      clopidogrel 75 MG tablet  Commonly known as: PLAVIX   75 mg, Oral, Daily      Diclofenac Sodium 1 % gel gel  Commonly known as: VOLTAREN   4 g, Topical, 4 Times Daily PRN      montelukast 10 MG tablet  Commonly known as: SINGULAIR   No dose, route, or frequency recorded.      PRESERVISION AREDS 2 PO   Oral, Daily      VITAMIN B12 PO   Oral, Daily             Thank you for allowing me to participate in the care of your patient,      Sincerely,   Den Morgan MD  [facility]  05/06/22  10:39 EDT

## 2022-05-09 ENCOUNTER — TELEPHONE (OUTPATIENT)
Dept: ORTHOPEDIC SURGERY | Facility: CLINIC | Age: 77
End: 2022-05-09

## 2022-05-09 NOTE — TELEPHONE ENCOUNTER
----- Message from Kade Jacob MD sent at 5/9/2022  7:13 AM EDT -----  Yes that is fine,thanks  ----- Message -----  From: Tori Rausch RN  Sent: 5/6/2022   4:44 PM EDT  To: Kade Jacob MD    55.6 is within your range.  Having her go to 3000 and day would require that she buy an extra bottle of vitamin D3.  My concern is that her vitamin D level will go too high.  Okay to stay on 2000 units daily and recheck again in 6 weeks

## 2022-05-11 ENCOUNTER — PRE-ADMISSION TESTING (OUTPATIENT)
Dept: PREADMISSION TESTING | Facility: HOSPITAL | Age: 77
End: 2022-05-11

## 2022-05-11 VITALS
BODY MASS INDEX: 28.32 KG/M2 | HEART RATE: 72 BPM | SYSTOLIC BLOOD PRESSURE: 122 MMHG | DIASTOLIC BLOOD PRESSURE: 78 MMHG | WEIGHT: 170 LBS | HEIGHT: 65 IN | OXYGEN SATURATION: 96 % | RESPIRATION RATE: 18 BRPM | TEMPERATURE: 98.3 F

## 2022-05-11 DIAGNOSIS — M84.351A: ICD-10-CM

## 2022-05-11 DIAGNOSIS — S83.241D TEAR OF MEDIAL MENISCUS OF RIGHT KNEE, CURRENT, UNSPECIFIED TEAR TYPE, SUBSEQUENT ENCOUNTER: ICD-10-CM

## 2022-05-11 DIAGNOSIS — M94.261 CHONDROMALACIA OF RIGHT KNEE: ICD-10-CM

## 2022-05-11 LAB
ANION GAP SERPL CALCULATED.3IONS-SCNC: 10.1 MMOL/L (ref 5–15)
BUN SERPL-MCNC: 10 MG/DL (ref 8–23)
BUN/CREAT SERPL: 14.3 (ref 7–25)
CALCIUM SPEC-SCNC: 9.8 MG/DL (ref 8.6–10.5)
CHLORIDE SERPL-SCNC: 102 MMOL/L (ref 98–107)
CO2 SERPL-SCNC: 28.9 MMOL/L (ref 22–29)
CREAT SERPL-MCNC: 0.7 MG/DL (ref 0.57–1)
DEPRECATED RDW RBC AUTO: 43.9 FL (ref 37–54)
EGFRCR SERPLBLD CKD-EPI 2021: 90.9 ML/MIN/1.73
ERYTHROCYTE [DISTWIDTH] IN BLOOD BY AUTOMATED COUNT: 12.5 % (ref 12.3–15.4)
GLUCOSE SERPL-MCNC: 104 MG/DL (ref 65–99)
HCT VFR BLD AUTO: 41.8 % (ref 34–46.6)
HGB BLD-MCNC: 13.8 G/DL (ref 12–15.9)
MCH RBC QN AUTO: 31.7 PG (ref 26.6–33)
MCHC RBC AUTO-ENTMCNC: 33 G/DL (ref 31.5–35.7)
MCV RBC AUTO: 96.1 FL (ref 79–97)
PLATELET # BLD AUTO: 244 10*3/MM3 (ref 140–450)
PMV BLD AUTO: 10.8 FL (ref 6–12)
POTASSIUM SERPL-SCNC: 3.8 MMOL/L (ref 3.5–5.2)
RBC # BLD AUTO: 4.35 10*6/MM3 (ref 3.77–5.28)
SARS-COV-2 ORF1AB RESP QL NAA+PROBE: NOT DETECTED
SODIUM SERPL-SCNC: 141 MMOL/L (ref 136–145)
WBC NRBC COR # BLD: 6.6 10*3/MM3 (ref 3.4–10.8)

## 2022-05-11 PROCEDURE — 80048 BASIC METABOLIC PNL TOTAL CA: CPT

## 2022-05-11 PROCEDURE — U0005 INFEC AGEN DETEC AMPLI PROBE: HCPCS

## 2022-05-11 PROCEDURE — C9803 HOPD COVID-19 SPEC COLLECT: HCPCS

## 2022-05-11 PROCEDURE — U0004 COV-19 TEST NON-CDC HGH THRU: HCPCS

## 2022-05-11 PROCEDURE — 36415 COLL VENOUS BLD VENIPUNCTURE: CPT

## 2022-05-11 PROCEDURE — 85027 COMPLETE CBC AUTOMATED: CPT

## 2022-05-11 NOTE — DISCHARGE INSTRUCTIONS
Take the following medications the morning of surgery:  ADVAIR    Arrive to hospital on your day of surgery at  5:45 AM.      If you are on prescription narcotic pain medication to control your pain you may also take that medication the morning of surgery.    General Instructions:  Do not eat solid food after midnight the night before surgery.  You may drink clear liquids day of surgery but must stop at least one hour before your hospital arrival time.  It is beneficial for you to have a clear drink that contains carbohydrates the day of surgery.  We suggest a 12 to 20 ounce bottle of Gatorade or Powerade for non-diabetic patients or a 12 to 20 ounce bottle of G2 or Powerade Zero for diabetic patients. (Pediatric patients, are not advised to drink a 12 to 20 ounce carbohydrate drink)    Clear liquids are liquids you can see through.  Nothing red in color.     Plain water                               Sports drinks  Sodas                                   Gelatin (Jell-O)  Fruit juices without pulp such as white grape juice and apple juice  Popsicles that contain no fruit or yogurt  Tea or coffee (no cream or milk added)  Gatorade / Powerade  G2 / Powerade Zero    Infants may have breast milk up to four hours before surgery.  Infants drinking formula may drink formula up to six hours before surgery.   Patients who avoid smoking, chewing tobacco and alcohol for 4 weeks prior to surgery have a reduced risk of post-operative complications.  Quit smoking as many days before surgery as you can.  Do not smoke, use chewing tobacco or drink alcohol the day of surgery.   If applicable bring your C-PAP/ BI-PAP machine.  Bring any papers given to you in the doctor’s office.  Wear clean comfortable clothes.  Do not wear contact lenses, false eyelashes or make-up.  Bring a case for your glasses.   Bring crutches or walker if applicable.  Remove all piercings.  Leave jewelry and any other valuables at home.  Hair extensions with  metal clips must be removed prior to surgery.  The Pre-Admission Testing nurse will instruct you to bring medications if unable to obtain an accurate list in Pre-Admission Testing.        If you were given a blood bank ID arm band remember to bring it with you the day of surgery.    Preventing a Surgical Site Infection:  For 2 to 3 days before surgery, avoid shaving with a razor because the razor can irritate skin and make it easier to develop an infection.    Any areas of open skin can increase the risk of a post-operative wound infection by allowing bacteria to enter and travel throughout the body.  Notify your surgeon if you have any skin wounds / rashes even if it is not near the expected surgical site.  The area will need assessed to determine if surgery should be delayed until it is healed.  The night prior to surgery shower using a fresh bar of anti-bacterial soap (such as Dial) and clean washcloth.  Sleep in a clean bed with clean clothing.  Do not allow pets to sleep with you.  Shower on the morning of surgery using a fresh bar of anti-bacterial soap (such as Dial) and clean washcloth.  Dry with a clean towel and dress in clean clothing.  Ask your surgeon if you will be receiving antibiotics prior to surgery.  Make sure you, your family, and all healthcare providers clean their hands with soap and water or an alcohol based hand  before caring for you or your wound.    Day of surgery:  Your arrival time is approximately two hours before your scheduled surgery time.  Upon arrival, a Pre-op nurse and Anesthesiologist will review your health history, obtain vital signs, and answer questions you may have.  The only belongings needed at this time will be a list of your home medications and if applicable your C-PAP/BI-PAP machine.  A Pre-op nurse will start an IV and you may receive medication in preparation for surgery, including something to help you relax.     Please be aware that surgery does come  with discomfort.  We want to make every effort to control your discomfort so please discuss any uncontrolled symptoms with your nurse.   Your doctor will most likely have prescribed pain medications.      If you are going home after surgery you will receive individualized written care instructions before being discharged.  A responsible adult must drive you to and from the hospital on the day of your surgery and stay with you for 24 hours.  Discharge prescriptions can be filled by the hospital pharmacy during regular pharmacy hours.  If you are having surgery late in the day/evening your prescription may be e-prescribed to your pharmacy.  Please verify your pharmacy hours or chose a 24 hour pharmacy to avoid not having access to your prescription because your pharmacy has closed for the day.    If you are staying overnight following surgery, you will be transported to your hospital room following the recovery period.  [facility] has all private rooms.    If you have any questions please call Pre-Admission Testing at [phone].  Deductibles and co-payments are collected on the day of service. Please be prepared to pay the required co-pay, deductible or deposit on the day of service as defined by your plan.    Patient Education for Self-Quarantine Process    Following your COVID testing, we strongly recommend that you wear a mask when you are with other people and practice social distancing.   Limit your activities to only required outings.  Wash your hands with soap and water frequently for at least 20 seconds.   Avoid touching your eyes, nose and mouth with unwashed hands.  Do not share anything - utensils, drinking glasses, food from the same bowl.   Sanitize household surfaces daily. Include all high touch areas (door handles, light switches, phones, countertops, etc.)    Call your surgeon immediately if you experience any of the following symptoms:  Sore Throat  Shortness of Breath or  difficulty breathing  Cough  Chills  Body soreness or muscle pain  Headache  Fever  New loss of taste or smell  Do not arrive for your surgery ill.  Your procedure will need to be rescheduled to another time.  You will need to call your physician before the day of surgery to avoid any unnecessary exposure to hospital staff as well as other patients.

## 2022-05-12 NOTE — H&P
Patient: Judi Sy     YOB: 1945 76 y.o. female     Chief Complaints: knee hurts     History of Present Illness:Patient was seen in June 2018 after left knee scope with partial medial meniscectomy and abrasion chondroplasty medial femoral subchondroplasty.     She was seen on 12/1/2021 with a 7-month history of complaints of pain in her right leg mainly around her knee and somewhat in the lateral aspect of her leg.  She had been walking about 10,000 steps a day in September but unfortunately subsequently developed Covid and had some charley horses in her leg and been fairly immobile for prolonged period of time.  She was referred by her PCP to the ER for evaluation for DVT and had a Doppler on November 3 which was negative for DVT but showed a popliteal fluid collection as well. ER had some recommend her to see vascular for the questionable area of some varicose veins.     Regarding her right knee she mainly complained of some pain over the medial joint line that bothered her mainly when she first got up and gets moving after sleeping or after prolonged sitting but when she got up and got moving she did not have any complaints of pain in the knee.     She was due to see vascular around 12/15/2021.     We had discussed possible MRI but as she was not really having pain with weightbearing I felt it was unlikely to have a stress fracture and probably some exacerbation of arthritis  Her right knee was injected at that time and she was sent to therapy.       She was subsequently seen on 4/13/2022 reporting that the the injection did help and she did therapy and was doing pretty well up until the previous week when she had onset of significant pain over the medial aspect of the right knee and pain with twisting.  She thinks it was related to weather and not report any specific injury.     She had seen vascular since I last saw her and they had put her in some compression hose for the left leg per  "their notes and had discussed having her come out of those     She also had some intermittent discomfort in the great toes bilaterally but not really all that bothersome today and again more with weather changes.     I had a high index of suspicion for stress fracture similar to what she had in her contralateral knee and possible meniscal tear.  She was instructed on limiting her activities that she been doing up to 8000 steps a day and was sent for MRI.     She is seen back today and reports that actually prior to onset of the symptoms for which I saw her on 4/13/2022 she had been doing a lot of scrubbing rocking back and forth on her heels and \"haunches\".  I spoke with her  earlier this week and sent them a message that it looked like she had a stress fracture and she was instructed to limit weightbearing on this and have a vitamin D level checked.  She said that her knee pain has subsided to some degree after getting off of it this week but still with moderate aching pain over the medial aspect of the knee on the right.     She is currently on Plavix  but reported that she came off of that previously when we did her other knee   HPI     ROS: knee pain  Medical History        Past Medical History:   Diagnosis Date   • Aortic heart valve prolapse     • Arthritis     • Carotid arterial disease (HCC)     • COVID-19     • High cholesterol     • History of atrial fibrillation     • Knee pain, left     • Knee sprain     • Knee swelling     • Tear of meniscus of knee     • Tendinitis of knee     • TIA (transient ischemic attack)     • Vasovagal syncope              Physical Exam:   Vitals       Vitals:     05/04/22 0902   Temp: 97.3 °F (36.3 °C)   Weight: 77.6 kg (171 lb)   Height: 165.1 cm (65\")   PainSc:   8      Performed on 5/4/2022  Well developed with normal mood.  She is with her .  Right knee shows moderate discomfort over the medial femoral condyle and medial joint line with some exacerbation on " Bernie's but no focal tenderness over the lateral femoral condyle or lateral joint line.  There was some mild discomfort with patellofemoral compression        Radiology: MRI films and report of the right knee dated 5/1/2022 reviewed which showed abnormal subcortical marrow edema in the medial femoral condyle with vague curvilinear signal in the trabecular space consistent with a subcortical stress/insufficiency fracture but no overlying cortical or chondral defect.     There is a large radial tear of the medial meniscus posterior horn laterally near the root but medial compartment articular cartilage appears intact.  The lateral meniscus and lateral compartment appears preserved but there is full-thickness chondral loss of the patella with preservation of cartilage on the trochlea and a large joint effusion        Assessment/Plan: 1.  Right knee medial femoral condylar stress fracture without overlying chondral or cortical defect  2.  Right knee large radial tear of the medial meniscus posterior horn laterally near the posterior root  3.  Right knee full-thickness chondral loss of patella with preservation of cartilage along the trochlea and large joint effusion.     I have discussed operative and non-operative treatment options and although no guarantees could be given,  pt would like to proceed with operative treatment. Plan is for knee scope with debridement as needed as well as some chondroplasty as we did previously on her contralateral knee. We discussed the  procedure in detail including use of a model as well as  post op protocol. Risks, benefits, potential outcomes, and complications were reviewed and can include but are not limited to heart attack, stroke, death, pneumonia, infection, bleeding, damage to blood vessels, nerves, or tendons, blood clot, pulmonary embolus,persistent or worsening symptoms, stiffness, need for subsequent surgery, and failure to return to presurgery and precondition levels of  activity.Pt voiced a clear understanding of these. This will be scheduled on an outpatient basis at a mutually convenient time. Pt was encouraged to call with any questions prior to surgery.     She will limit weight on this in the interim.     She is still going to go over and have her vitamin D level checked as this may need to be augmented and currently takes 2000 units daily.     I also reached out to and heard from her primary care provider that would be okay to take her off of her Plavix 5 days prior to surgery and resume it postoperatively.

## 2022-05-13 ENCOUNTER — APPOINTMENT (OUTPATIENT)
Dept: GENERAL RADIOLOGY | Facility: HOSPITAL | Age: 77
End: 2022-05-13

## 2022-05-13 ENCOUNTER — HOSPITAL ENCOUNTER (OUTPATIENT)
Facility: HOSPITAL | Age: 77
Setting detail: HOSPITAL OUTPATIENT SURGERY
Discharge: HOME | End: 2022-05-13
Attending: ORTHOPAEDIC SURGERY | Admitting: ORTHOPAEDIC SURGERY

## 2022-05-13 ENCOUNTER — ANESTHESIA EVENT (OUTPATIENT)
Dept: PERIOP | Facility: HOSPITAL | Age: 77
End: 2022-05-13

## 2022-05-13 ENCOUNTER — ANESTHESIA (OUTPATIENT)
Dept: PERIOP | Facility: HOSPITAL | Age: 77
End: 2022-05-13

## 2022-05-13 VITALS
OXYGEN SATURATION: 95 % | TEMPERATURE: 98.6 F | RESPIRATION RATE: 16 BRPM | DIASTOLIC BLOOD PRESSURE: 86 MMHG | HEART RATE: 97 BPM | SYSTOLIC BLOOD PRESSURE: 143 MMHG

## 2022-05-13 DIAGNOSIS — M94.261 CHONDROMALACIA OF RIGHT KNEE: ICD-10-CM

## 2022-05-13 DIAGNOSIS — M84.351A: ICD-10-CM

## 2022-05-13 DIAGNOSIS — S83.241D TEAR OF MEDIAL MENISCUS OF RIGHT KNEE, CURRENT, UNSPECIFIED TEAR TYPE, SUBSEQUENT ENCOUNTER: ICD-10-CM

## 2022-05-13 PROCEDURE — 25010000002 DEXAMETHASONE PER 1 MG: Performed by: NURSE ANESTHETIST, CERTIFIED REGISTERED

## 2022-05-13 PROCEDURE — C1713 ANCHOR/SCREW BN/BN,TIS/BN: HCPCS | Performed by: ORTHOPAEDIC SURGERY

## 2022-05-13 PROCEDURE — 29881 ARTHRS KNE SRG MNISECTMY M/L: CPT | Performed by: ORTHOPAEDIC SURGERY

## 2022-05-13 PROCEDURE — 25010000002 PROPOFOL 10 MG/ML EMULSION: Performed by: NURSE ANESTHETIST, CERTIFIED REGISTERED

## 2022-05-13 PROCEDURE — 25010000002 FENTANYL CITRATE (PF) 50 MCG/ML SOLUTION: Performed by: NURSE ANESTHETIST, CERTIFIED REGISTERED

## 2022-05-13 PROCEDURE — 25010000002 HYDROMORPHONE PER 4 MG: Performed by: NURSE ANESTHETIST, CERTIFIED REGISTERED

## 2022-05-13 PROCEDURE — 76000 FLUOROSCOPY <1 HR PHYS/QHP: CPT | Performed by: ORTHOPAEDIC SURGERY

## 2022-05-13 PROCEDURE — 25010000002 CEFAZOLIN IN DEXTROSE 2-4 GM/100ML-% SOLUTION: Performed by: ORTHOPAEDIC SURGERY

## 2022-05-13 PROCEDURE — 27599 UNLISTED PX FEMUR/KNEE: CPT | Performed by: ORTHOPAEDIC SURGERY

## 2022-05-13 PROCEDURE — 25010000002 ONDANSETRON PER 1 MG: Performed by: NURSE ANESTHETIST, CERTIFIED REGISTERED

## 2022-05-13 PROCEDURE — 73560 X-RAY EXAM OF KNEE 1 OR 2: CPT | Performed by: ORTHOPAEDIC SURGERY

## 2022-05-13 PROCEDURE — 25010000002 FENTANYL CITRATE (PF) 50 MCG/ML SOLUTION: Performed by: ANESTHESIOLOGY

## 2022-05-13 DEVICE — IMPLANTABLE DEVICE
Type: IMPLANTABLE DEVICE | Site: KNEE | Status: FUNCTIONAL
Brand: SCP® KNEE KIT

## 2022-05-13 RX ORDER — ONDANSETRON 2 MG/ML
INJECTION INTRAMUSCULAR; INTRAVENOUS AS NEEDED
Status: DISCONTINUED | OUTPATIENT
Start: 2022-05-13 | End: 2022-05-13 | Stop reason: SURG

## 2022-05-13 RX ORDER — LIDOCAINE HYDROCHLORIDE 20 MG/ML
INJECTION, SOLUTION INFILTRATION; PERINEURAL AS NEEDED
Status: DISCONTINUED | OUTPATIENT
Start: 2022-05-13 | End: 2022-05-13 | Stop reason: SURG

## 2022-05-13 RX ORDER — ASPIRIN 325 MG
325 TABLET, DELAYED RELEASE (ENTERIC COATED) ORAL DAILY
Qty: 30 TABLET | Refills: 0 | Status: SHIPPED | OUTPATIENT
Start: 2022-05-13

## 2022-05-13 RX ORDER — ACETAMINOPHEN 325 MG/1
650 TABLET ORAL ONCE
Status: DISCONTINUED | OUTPATIENT
Start: 2022-05-13 | End: 2022-05-13 | Stop reason: HOSPADM

## 2022-05-13 RX ORDER — ASPIRIN 81 MG/1
81 TABLET ORAL DAILY
COMMUNITY
End: 2022-05-13 | Stop reason: HOSPADM

## 2022-05-13 RX ORDER — HYDROMORPHONE HYDROCHLORIDE 1 MG/ML
0.5 INJECTION, SOLUTION INTRAMUSCULAR; INTRAVENOUS; SUBCUTANEOUS
Status: DISCONTINUED | OUTPATIENT
Start: 2022-05-13 | End: 2022-05-13 | Stop reason: HOSPADM

## 2022-05-13 RX ORDER — HYDROMORPHONE HCL 110MG/55ML
PATIENT CONTROLLED ANALGESIA SYRINGE INTRAVENOUS AS NEEDED
Status: DISCONTINUED | OUTPATIENT
Start: 2022-05-13 | End: 2022-05-13 | Stop reason: SURG

## 2022-05-13 RX ORDER — HYDROCODONE BITARTRATE AND ACETAMINOPHEN 7.5; 325 MG/1; MG/1
1 TABLET ORAL EVERY 4 HOURS PRN
Status: DISCONTINUED | OUTPATIENT
Start: 2022-05-13 | End: 2022-05-13 | Stop reason: HOSPADM

## 2022-05-13 RX ORDER — ONDANSETRON 2 MG/ML
4 INJECTION INTRAMUSCULAR; INTRAVENOUS ONCE AS NEEDED
Status: DISCONTINUED | OUTPATIENT
Start: 2022-05-13 | End: 2022-05-13 | Stop reason: HOSPADM

## 2022-05-13 RX ORDER — MIDAZOLAM HYDROCHLORIDE 1 MG/ML
0.5 INJECTION INTRAMUSCULAR; INTRAVENOUS
Status: DISCONTINUED | OUTPATIENT
Start: 2022-05-13 | End: 2022-05-13 | Stop reason: HOSPADM

## 2022-05-13 RX ORDER — OXYCODONE HYDROCHLORIDE AND ACETAMINOPHEN 5; 325 MG/1; MG/1
1-2 TABLET ORAL EVERY 4 HOURS PRN
Qty: 45 TABLET | Refills: 0 | Status: SHIPPED | OUTPATIENT
Start: 2022-05-13 | End: 2022-05-23

## 2022-05-13 RX ORDER — FENTANYL CITRATE 50 UG/ML
INJECTION, SOLUTION INTRAMUSCULAR; INTRAVENOUS AS NEEDED
Status: DISCONTINUED | OUTPATIENT
Start: 2022-05-13 | End: 2022-05-13 | Stop reason: SURG

## 2022-05-13 RX ORDER — FENTANYL CITRATE 50 UG/ML
50 INJECTION, SOLUTION INTRAMUSCULAR; INTRAVENOUS
Status: DISCONTINUED | OUTPATIENT
Start: 2022-05-13 | End: 2022-05-13 | Stop reason: HOSPADM

## 2022-05-13 RX ORDER — BUPIVACAINE HYDROCHLORIDE AND EPINEPHRINE 5; 5 MG/ML; UG/ML
INJECTION, SOLUTION EPIDURAL; INTRACAUDAL; PERINEURAL AS NEEDED
Status: DISCONTINUED | OUTPATIENT
Start: 2022-05-13 | End: 2022-05-13 | Stop reason: HOSPADM

## 2022-05-13 RX ORDER — PROPOFOL 10 MG/ML
VIAL (ML) INTRAVENOUS AS NEEDED
Status: DISCONTINUED | OUTPATIENT
Start: 2022-05-13 | End: 2022-05-13 | Stop reason: SURG

## 2022-05-13 RX ORDER — SODIUM CHLORIDE, SODIUM LACTATE, POTASSIUM CHLORIDE, CALCIUM CHLORIDE 600; 310; 30; 20 MG/100ML; MG/100ML; MG/100ML; MG/100ML
9 INJECTION, SOLUTION INTRAVENOUS CONTINUOUS PRN
Status: DISCONTINUED | OUTPATIENT
Start: 2022-05-13 | End: 2022-05-13 | Stop reason: HOSPADM

## 2022-05-13 RX ORDER — HYDROCODONE BITARTRATE AND ACETAMINOPHEN 5; 325 MG/1; MG/1
1 TABLET ORAL ONCE AS NEEDED
Status: DISCONTINUED | OUTPATIENT
Start: 2022-05-13 | End: 2022-05-13 | Stop reason: HOSPADM

## 2022-05-13 RX ORDER — SODIUM CHLORIDE, SODIUM LACTATE, POTASSIUM CHLORIDE, AND CALCIUM CHLORIDE .6; .31; .03; .02 G/100ML; G/100ML; G/100ML; G/100ML
IRRIGANT IRRIGATION AS NEEDED
Status: DISCONTINUED | OUTPATIENT
Start: 2022-05-13 | End: 2022-05-13 | Stop reason: HOSPADM

## 2022-05-13 RX ORDER — LIDOCAINE HYDROCHLORIDE 10 MG/ML
0.5 INJECTION, SOLUTION EPIDURAL; INFILTRATION; INTRACAUDAL; PERINEURAL ONCE AS NEEDED
Status: DISCONTINUED | OUTPATIENT
Start: 2022-05-13 | End: 2022-05-13 | Stop reason: HOSPADM

## 2022-05-13 RX ORDER — CEPHALEXIN 500 MG/1
500 CAPSULE ORAL EVERY 6 HOURS
Qty: 8 CAPSULE | Refills: 0 | Status: SHIPPED | OUTPATIENT
Start: 2022-05-13 | End: 2022-05-15

## 2022-05-13 RX ORDER — FLUMAZENIL 0.1 MG/ML
0.2 INJECTION INTRAVENOUS AS NEEDED
Status: DISCONTINUED | OUTPATIENT
Start: 2022-05-13 | End: 2022-05-13 | Stop reason: HOSPADM

## 2022-05-13 RX ORDER — CEFAZOLIN SODIUM 2 G/100ML
2 INJECTION, SOLUTION INTRAVENOUS ONCE
Status: COMPLETED | OUTPATIENT
Start: 2022-05-13 | End: 2022-05-13

## 2022-05-13 RX ORDER — SODIUM CHLORIDE 0.9 % (FLUSH) 0.9 %
10 SYRINGE (ML) INJECTION EVERY 12 HOURS SCHEDULED
Status: DISCONTINUED | OUTPATIENT
Start: 2022-05-13 | End: 2022-05-13 | Stop reason: HOSPADM

## 2022-05-13 RX ORDER — EPHEDRINE SULFATE 50 MG/ML
5 INJECTION, SOLUTION INTRAVENOUS ONCE AS NEEDED
Status: DISCONTINUED | OUTPATIENT
Start: 2022-05-13 | End: 2022-05-13 | Stop reason: HOSPADM

## 2022-05-13 RX ORDER — DEXAMETHASONE SODIUM PHOSPHATE 4 MG/ML
INJECTION, SOLUTION INTRA-ARTICULAR; INTRALESIONAL; INTRAMUSCULAR; INTRAVENOUS; SOFT TISSUE AS NEEDED
Status: DISCONTINUED | OUTPATIENT
Start: 2022-05-13 | End: 2022-05-13 | Stop reason: SURG

## 2022-05-13 RX ORDER — SODIUM CHLORIDE 0.9 % (FLUSH) 0.9 %
10 SYRINGE (ML) INJECTION AS NEEDED
Status: DISCONTINUED | OUTPATIENT
Start: 2022-05-13 | End: 2022-05-13 | Stop reason: HOSPADM

## 2022-05-13 RX ADMIN — FENTANYL CITRATE 50 MCG: 50 INJECTION INTRAMUSCULAR; INTRAVENOUS at 07:21

## 2022-05-13 RX ADMIN — LIDOCAINE HYDROCHLORIDE 80 MG: 20 INJECTION, SOLUTION INFILTRATION; PERINEURAL at 07:21

## 2022-05-13 RX ADMIN — ONDANSETRON 4 MG: 2 INJECTION INTRAMUSCULAR; INTRAVENOUS at 08:57

## 2022-05-13 RX ADMIN — HYDROMORPHONE HYDROCHLORIDE 0.5 MG: 2 INJECTION, SOLUTION INTRAMUSCULAR; INTRAVENOUS; SUBCUTANEOUS at 08:55

## 2022-05-13 RX ADMIN — HYDROCODONE BITARTRATE AND ACETAMINOPHEN 1 TABLET: 7.5; 325 TABLET ORAL at 09:50

## 2022-05-13 RX ADMIN — DEXAMETHASONE SODIUM PHOSPHATE 8 MG: 4 INJECTION, SOLUTION INTRAMUSCULAR; INTRAVENOUS at 07:30

## 2022-05-13 RX ADMIN — FENTANYL CITRATE 50 MCG: 0.05 INJECTION, SOLUTION INTRAMUSCULAR; INTRAVENOUS at 09:51

## 2022-05-13 RX ADMIN — PROPOFOL 200 MG: 10 INJECTION, EMULSION INTRAVENOUS at 07:21

## 2022-05-13 RX ADMIN — SODIUM CHLORIDE, POTASSIUM CHLORIDE, SODIUM LACTATE AND CALCIUM CHLORIDE: 600; 310; 30; 20 INJECTION, SOLUTION INTRAVENOUS at 09:09

## 2022-05-13 RX ADMIN — FENTANYL CITRATE 50 MCG: 50 INJECTION INTRAMUSCULAR; INTRAVENOUS at 07:54

## 2022-05-13 RX ADMIN — CEFAZOLIN SODIUM 2 G: 2 INJECTION, SOLUTION INTRAVENOUS at 07:09

## 2022-05-13 RX ADMIN — SODIUM CHLORIDE, POTASSIUM CHLORIDE, SODIUM LACTATE AND CALCIUM CHLORIDE 9 ML/HR: 600; 310; 30; 20 INJECTION, SOLUTION INTRAVENOUS at 06:41

## 2022-05-13 NOTE — ANESTHESIA PREPROCEDURE EVALUATION
Anesthesia Evaluation     Patient summary reviewed and Nursing notes reviewed   NPO Solid Status: > 8 hours  NPO Liquid Status: > 2 hours           Airway   Mallampati: II  TM distance: >3 FB  Neck ROM: full  Dental - normal exam     Pulmonary - normal exam   (+) a smoker Former, asthma,  Cardiovascular - normal exam    (+) hypertension, valvular problems/murmurs, dysrhythmias Atrial Fib, hyperlipidemia,  carotid artery disease      Neuro/Psych  (+) TIA, syncope,    GI/Hepatic/Renal/Endo    (+)  hiatal hernia,      Musculoskeletal     Abdominal    Substance History      OB/GYN          Other   arthritis,                        Anesthesia Plan    ASA 3     general       Anesthetic plan, all risks, benefits, and alternatives have been provided, discussed and informed consent has been obtained with: patient.

## 2022-05-13 NOTE — ANESTHESIA POSTPROCEDURE EVALUATION
Patient: Judi Sy    Procedure Summary     Date: 05/13/22 Room / Location:  [facility]  [facility]  [facility]    Anesthesia Start: 0717 Anesthesia Stop: 0926    Procedure: right KNEE ARTHROSCOPY with debridement and subchondral plasty of medial femoral condyle (Right Knee) Diagnosis:       Stress fracture of other site of femur due to multiple or repetitive stress, right, initial encounter      Tear of medial meniscus of right knee, current, unspecified tear type, subsequent encounter      Chondromalacia of right knee      (Stress fracture of other site of femur due to multiple or repetitive stress, right, initial encounter [M84.351A])      (Tear of medial meniscus of right knee, current, unspecified tear type, subsequent encounter [S83.241D])      (Chondromalacia of right knee [M94.261])    Surgeons: Kade Jacob MD Provider: Den Muniz MD    Anesthesia Type: general ASA Status: 3          Anesthesia Type: general    Vitals  Vitals Value Taken Time   /89 05/13/22 1016   Temp 37 °C (98.6 °F) 05/13/22 1015   Pulse 93 05/13/22 1021   Resp 16 05/13/22 1015   SpO2 96 % 05/13/22 1021   Vitals shown include unvalidated device data.        Post Anesthesia Care and Evaluation    Patient location during evaluation: bedside  Patient participation: complete - patient participated  Level of consciousness: awake and alert  Pain management: adequate  Airway patency: patent  Anesthetic complications: No anesthetic complications  PONV Status: controlled  Cardiovascular status: blood pressure returned to baseline and acceptable  Respiratory status: acceptable  Hydration status: acceptable

## 2022-05-13 NOTE — OP NOTE
Facility: [facility]  Patient Name: Judi Sy  YOB: 1945  Date: 5/13/2022  Medical Record Number: [id number]      Pre-op Diagnosis: 1.  Right knee medial condylar stress fracture  2.  Right knee complex tear posterior horn medial meniscus  3.  Right knee chondromalacia patella      Post-op Diagnosis: 1.  Right knee medial condylar stress fracture  2.  Right knee complex tear posterior horn/root medial meniscus  3.  Grade II-III chondromalacia medial femoral condyle  4.  Grade II chondromalacia patella and trochlea        Procedure(s):  1.  Right knee arthroscopically assisted percutaneous treatment of right medial condylar insufficiency fracture with Aylin subchondral plasty  2.  Right knee partial medial meniscectomy of posterior horn and root  3.  Right knee abrasion chondroplasty medial femoral condyle  4.  Abrasion chondroplasty patella and trochlea    Surgeon(s):  Kade Jacob MD    Anesthesia: General  Anesthesiologist: Den Muniz MD  CRNA: Violeta Oakley CRNA    Staff:   Circulator: Jena Howard RN  Scrub Person: Norberto Valdes Heather  Vendor Representative: Rayshawn Greene  Assistants : none      Estimated Blood Loss: Minimal    Tourniquet Time: 34 minutes    Specimens: none      Drains: None    Findings: See Dictation    Complications: None      Indications for procedure: Patient is had a history of progressive right knee pain and subsequent mechanical injury with MRI evidence of medial femoral condylar stress/insufficiency fracture and medial meniscal tear with chondromalacial changes of the patella.  She has been unimproved with conservative measures and although no guarantees could be given mutual decision was made to proceed with operative treatment.  She and her family voiced clear understanding the operative procedure with associated risk benefits potential outcomes and complications.            Description of  procedure:    Preoperative informed consent and anesthesia evaluation were obtained.  IV antibiotics were administered in the surgical site was marked.  Patient was brought to the operating room placed in supine position anesthesia was induced and LMA was positioned. Well-padded tourniquet was placed  right proximal thigh after exam under anesthesia showed full range of motion with stable ligamentous exam.  right leg was carefully positioned with a lateral post.  X-rays were taken in the AP and lateral view and confirmed appropriate imaging of the medial femoral condyle..     right leg was prepped and draped in sterile fashion and surgical timeout was performed.  I used multiple AP and lateral projections to delineate the area of the medial femoral condyle as per preoperative templating on MRI of the area of insufficiency fracture ensuring adequate positioning in the AP and lateral projections.  The skin was incised 1 cm over this area that had been demarcated and spread bluntly down onto the medial femoral condyle.  The right medical delivery trocar was then carefully positioned in the central portion of the medial femoral condyle on the lateral view and positioned on the AP view such that it just above the area of stress fracture.  Appropriate positioning was confirmed and the trocar was advanced and found to be contained on AP and lateral views.    Then under fluoroscopic guidance the subchondral plasty matrix was delivered carefully expressing this inferiorly and directing the cannula at 45 degree angles anteriorly and posteriorly.  This was done sequentially under x-ray guidance and there was good blush within the bone with no evidence of extraosseous or intra-articular extravasation.  Once this was complete the subchondral plasty matrix was allowed to cure and the trocar was removed.  X-rays showed good containment of the subchondral plasty matrix.          Leg was exsanguinated and pneumatic tourniquet  inflated to 250 mmHg.  Anterolateral portal was established and arthroscope was introduced.    I inspected the area of the medial femoral condyle and medial gutter and there was no evidence of extravasation.  There was however noted to be grade 2-3 chondromalacial changes over the lateral aspect and somewhat central aspect of the medial femoral condyle with loose cartilaginous edges.  There was no evidence of intra-articular extravasation of the subchondral plasty matrix or full-thickness cartilage defect.    The anteromedial portal was established under spinal needle localization in the area of loose cartilaginous fragments of the medial femoral condyle were debrided back to stable margin with abrasion chondroplasty.    The medial compartment was examined and there was found to be a complex tear at the root extending across the posterior horn the medial meniscus.  This was sequentially debrided back to stable margin using biter shaver and judicious use of electrocautery leaving about a 50% peripheral rim that was probed and found to be stable posteriorly and along the posterior horn.  The remainder of the meniscus was intact.  The notch was inspected and the ACL was found to be intact.  The lateral compartment was then examined and there was some mild softening of the cartilaginous surfaces and some inner rim fraying but no clear tear.    Attention was then turned to the patellofemoral joint where there was quite a bit of thickened synovium that was debrided to enhance visualization.  There was found to be grade 2 changes the patella and the trochlea with some mild finding that was debrided with a shaver back to stable margin.    The medial compartment was again examined and found to have no further loose cartilaginous edges of the medial femoral condyle or the meniscus.         Arthroscopic instruments were then removed and portals and medial wound were closed with 3-0 nylon suture.  The knee and portals and  medial wound were injected with a total of 30 cc half percent Marcaine with epinephrine.  Tourniquet was released,  wounds were hemostatic, and thigh and calf compartments are soft.  Sterile bulky dressings were applied and Ace bandages were gently placed from the toes to the upper thigh.  Patient was awakend,  transferred to stretcher and taken to recovery in stable condition

## 2022-05-13 NOTE — BRIEF OP NOTE
KNEE ARTHROSCOPY  Progress Note    Judi Sy  5/13/2022    Pre-op Diagnosis:   Stress fracture of other site of femur due to multiple or repetitive stress, right, initial encounter [M84.351A]  Tear of medial meniscus of right knee, current, unspecified tear type, subsequent encounter [S83.241D]  Chondromalacia of right knee [M94.261]       Post-Op Diagnosis Codes:     * Stress fracture of other site of femur due to multiple or repetitive stress, right, initial encounter [M84.351A]     * Tear of medial meniscus of right knee, current, unspecified tear type, subsequent encounter [S83.241D]     * Chondromalacia of right knee [M94.261]    Procedure/CPT® Codes:        Procedure(s):  right KNEE ARTHROSCOPY with debridement and subchondral plasty of medial femoral condyle    Surgeon(s):  Kade Jacob MD    Anesthesia: General    Staff:   Circulator: Jena Howard RN  Scrub Person: Norberto Valdes Heather  Vendor Representative: Rayshawn Greene         Estimated Blood Loss: minimal    Urine Voided: * No values recorded between 5/13/2022  7:17 AM and 5/13/2022  9:15 AM *    Specimens:                None    TT 34 min      Drains: * No LDAs found *    Findings: see dict        Complications: none          Kade Jacob MD     Date: 5/13/2022  Time: 09:15 EDT

## 2022-05-23 ENCOUNTER — OFFICE VISIT (OUTPATIENT)
Dept: ORTHOPEDIC SURGERY | Facility: CLINIC | Age: 77
End: 2022-05-23

## 2022-05-23 VITALS — BODY MASS INDEX: 28.32 KG/M2 | TEMPERATURE: 97.1 F | HEIGHT: 65 IN | WEIGHT: 170 LBS

## 2022-05-23 DIAGNOSIS — M84.351D STRESS FRACTURE OF OTHER SITE OF RIGHT OF FEMUR DUE TO MULTIPLE OR REPETITIVE STRESS WITH ROUTINE HEALING, SUBSEQUENT ENCOUNTER: Primary | ICD-10-CM

## 2022-05-23 DIAGNOSIS — E55.9 VITAMIN D DEFICIENCY: ICD-10-CM

## 2022-05-23 DIAGNOSIS — S83.241D TEAR OF MEDIAL MENISCUS OF RIGHT KNEE, CURRENT, UNSPECIFIED TEAR TYPE, SUBSEQUENT ENCOUNTER: ICD-10-CM

## 2022-05-23 DIAGNOSIS — M94.261 CHONDROMALACIA OF RIGHT KNEE: ICD-10-CM

## 2022-05-23 PROCEDURE — 73562 X-RAY EXAM OF KNEE 3: CPT | Performed by: ORTHOPAEDIC SURGERY

## 2022-05-23 PROCEDURE — 99024 POSTOP FOLLOW-UP VISIT: CPT | Performed by: ORTHOPAEDIC SURGERY

## 2022-05-23 NOTE — PROGRESS NOTES
"Knee Exam      Patient: Judi Sy    YOB: 1945 76 y.o. female    Chief Complaints: knee sore but doing okay    History of Present Illness: Patient underwent right knee scope with partial medial meniscectomy of the posterior horn and root and abrasion chondroplasty of the medial femoral condyle patella and trochlea.  She also had some chondroplasty of medial femoral condylar stress fracture on 5/13/2022.    She has been been doing limited weight with a walker with mild aching pain in the right knee.  She has occasionally used a cane when just going to the bathroom.  She has resumed the Plavix that she was on preoperatively through her PCP.  HPI    ROS: knee pain  Past Medical History:   Diagnosis Date   • Aortic heart valve prolapse    • Arthritis    • Asthma due to seasonal allergies    • Carotid artery stenosis     MILD   • COVID-19 12/2021   • High cholesterol    • History of atrial fibrillation    • Knee swelling    • PFO (patent foramen ovale) 2016    HX CLOSURE   • Right knee pain    • Tear of meniscus of knee    • Tendinitis of knee    • TIA (transient ischemic attack)     PRIOR TO PFO CLOSURE   • Vasovagal syncope        Physical Exam:   Vitals:    05/23/22 1436   Temp: 97.1 °F (36.2 °C)   Weight: 77.1 kg (170 lb)   Height: 165.1 cm (65\")   PainSc:   6     Well developed with normal mood.  She is with her .  Right knee shows mild swelling but no sign of infection.  There was some bruising over the anteromedial aspect of the knee.  She was able to actively fully extend and flex to about 100 degrees.  There was some mild discomfort of the medial joint line and medial femoral condyle.  Right calf and thigh are nontender without sign of DVT      Radiology: 3 views of the right knee ordered evaluate postoperative alignment reviewed and compared to previous x-rays.  There appears to be good filling and containment of the subchondral plasty matrix at the distal central aspect of the " medial femoral condyle in the area of noted stress fracture on the MRI.  There is not appear to be any intra-articular extravasation.    Vitamin D 5/4/2022 55.6          Assessment/Plan: Status post right knee surgery as outlined above.    Overall she seems to be doing well and will continue with her walker for another 2 weeks and then transition to a cane initially in the house for a week and then out of the house.    We will get her started in some physical therapy and she may do an exercise bike.    She is currently on vitamin D 2000 units daily my of her continue as such.    We will see her back in 3 weeks with x-rays of her right knee.

## 2022-05-25 NOTE — PROGRESS NOTES
"    Physical Therapy Initial Evaluation and Plan of Care    Patient Name: Judi Sy          :  1945  Referring Physician: Kade Jacob MD  Diagnosis: Stress fracture of other site of right of femur due to multiple or repetitive stress with routine healing, subsequent encounter [M84.351D]    Date of Evaluation: 2022  ______________________________________________________________________    Subjective Evaluation    History of Present Illness  Onset date: -Easter weekend 2022.  Date of surgery: 2022  Mechanism of injury: Long h/o of knee pain - worsening after washing floor on knees then went to zoo - Easter weekend 2022 -     PT ORDER:   Ambulatory Referral to Physical Therapy Evaluate and treat, POST OP; Strengthening, ROM, Stretching; Partial weight bearing 2022  Per MD note 2022: \"  continue with her walker for another 2 weeks and then transition to a cane initially in the house for a week and then out of the house.\"    Per Op note: 2022  \"Patient is had a history of progressive right knee pain and subsequent mechanical injury with MRI evidence of medial femoral condylar stress/insufficiency fracture and medial meniscal tear with chondromalacial changes of the patella.  She has been unimproved with conservative measures -\"     Pre-op Diagnosis: 1.  Right knee medial condylar stress fracture  2.  Right knee complex tear posterior horn medial meniscus  3.  Right knee chondromalacia patella  Post-op Diagnosis: 1.  Right knee medial condylar stress fracture  2.  Right knee complex tear posterior horn/root medial meniscus  3.  Grade II-III chondromalacia medial femoral condyle  4.  Grade II chondromalacia patella and trochlea      Procedure(s):  1.  Right knee arthroscopically assisted percutaneous treatment of right medial condylar insufficiency fracture with Aylin subchondral plasty  2.  Right knee partial medial meniscectomy of posterior horn and root  3.  Right " knee abrasion chondroplasty medial femoral condyle  4.  Abrasion chondroplasty patella and trochlea  2022      PMHX  *          Aortic heart valve prolapse     • Arthritis    • Asthma due to seasonal allergies    • Carotid artery stenosis      MILD  • COVID-19 2021  • High cholesterol    • History of atrial fibrillation    • Knee swelling    • PFO (patent foramen ovale) 2016    HX CLOSURE  • Right knee pain    • Tear of meniscus of knee    • Tendinitis of knee    • TIA (transient ischemic attack)      PRIOR TO PFO CLOSURE  • Vasovagal syncope    Since surgery ;    Not bad - Limited knee flexion - painful at times  Can't get into tub -         Patient Occupation: Retired - Active - multiple grandchildren -  Pain  Current pain rating: 3  At worst pain ratin  Location: Medial knee (L) and gastroc / lat at times -   Alleviating factors: Aspirin, rest, elevation, ICE -  Exacerbated by: WB-ing / standing; Twisting; Bending   Progression: improved    Social Support  Patient lives at: Home w/ stairs (can avoid) w/ .    Diagnostic Tests  MRI studies: abnormal    Treatments  Current treatment: medication  Patient Goals  Patient/family treatment goals: Pain alleviation; Mobility, strength, gait, endurance to allow ADLs, etc. - Get into tub -         ___________________________________________________  Objective          Observations     Additional Knee Observation Details  Pt presents ambulating w/ st cane LUE - PWB RLE w/ flexed (R) knee, decreased stride length  -   (R) knee flexed posture - WS to (L)  Bruising medial knee and swelling (mod)-(R)      Tenderness     Additional Tenderness Details  (R) Knee: Very tender medial knee , medial >> lat PF Jt   Palpable Ballotment (R) Patella -    Active Range of Motion     Additional Active Range of Motion Details  (R) Knee (Start) 30-90-deg;  (after MT, etc) 15 to 115-deg    Strength/Myotome Testing     Right Knee   Flexion: 4  Extension: 4  Quadriceps  contraction: fair    Tests     Additional Tests Details  NA - Calf non-tender -     Swelling     Right Knee Girth Measurement (cm)   Joint line: 2cm > (R)        See Treatment Flow sheet for Exercises, Manual therapy, and modalities.   SELF CARE:    · TAPING / BRACING: K-tape to 1) Unload PF jt w/ extra lateral component; 2) 2 Strips to unload Medial knee (R) -   · Gait work PWB - fitting of AD, safety, fall prevention -   · Edema control education;   · Discussed option for grab bars om bath tub -   · Jt protection, ADL modification; Posture and     ___________________________________________________  Assessment & Plan     Assessment    Assessment details: 77 yo female:   S/P 1.  Right knee arthroscopically assisted percutaneous treatment of right medial condylar insufficiency fracture with Aylin subchondral plasty  2.  Right knee partial medial meniscectomy of posterior horn and root  3.  Right knee abrasion chondroplasty medial femoral condyle  4.  Abrasion chondroplasty patella and trochlea  2022    Much improved ROM after MT and improved gait / WB-ing w/ taping -     PROBLEMS: Pain; Limited ROM; Weakness; Abnormal gait; Intolerance to ADLs;   PROGNOSIS: Good    GOALS:   SHORT TERM GOALS: 2 weeks:  1) HEP Initiated; 2) Pain decreased 50%:   3) AROM (R) knee  Increased -de) Improved gait / functional ability with taping / ADL;     LONG TERM GOALS: 4 weeks (or at time of DISCHARGE): 1) (I) HEP; 2) AROM WFL and pain free; 3) Strength / mobility to be able to perform all ADL's ; 4) Gait / endurance to allow ADLs -         Plan  Planned therapy interventions: flexibility, gait training, home exercise program, manual therapy, neuromuscular re-education, soft tissue mobilization, strengthening, stretching and therapeutic activities (Modalities prn; Taping / bracing prn; )  Frequency: 2x week  Duration in weeks: 4  Treatment plan discussed with:  patient      ___________________________________________________  Manual Therapy:    15     mins  92027;  Therapeutic Exercise:    15     mins  49082;     Neuromuscular Lea:        mins  49295;     Self Care:                      15     mins  44982  Ultrasound:          mins  92686;  Electrical Stimulation:         mins  70522 ( );    Eval:   20   mins    Timed Treatment:   45   mins                  Total Treatment:     75   mins    PT SIGNATURE:   Salazar Conteh, MARYJO  DATE TREATMENT INITIATED: 5/26/2022  ___________________________________________________  Initial Certification  Certification Period: 8/23/2022  I certify that the therapy services are furnished while this patient is under my care.  The services outlined above are required by this patient, and will be reviewed every 90 days.     PHYSICIAN: ________________________________  DATE: ______  Kade Jacob MD        Please sign and return via fax to [phone].. Thank you, [facility].  ______________________________________________________________________  [address] [place] [address]  Phone: [phone] Fax: [phone]

## 2022-05-26 ENCOUNTER — TREATMENT (OUTPATIENT)
Dept: PHYSICAL THERAPY | Facility: CLINIC | Age: 77
End: 2022-05-26

## 2022-05-26 DIAGNOSIS — M94.261 CHONDROMALACIA OF RIGHT KNEE: ICD-10-CM

## 2022-05-26 DIAGNOSIS — Z98.890 S/P RIGHT KNEE ARTHROSCOPY: ICD-10-CM

## 2022-05-26 DIAGNOSIS — M84.351D STRESS FRACTURE OF OTHER SITE OF RIGHT OF FEMUR DUE TO MULTIPLE OR REPETITIVE STRESS WITH ROUTINE HEALING, SUBSEQUENT ENCOUNTER: Primary | ICD-10-CM

## 2022-05-26 DIAGNOSIS — S83.241D TEAR OF MEDIAL MENISCUS OF RIGHT KNEE, CURRENT, UNSPECIFIED TEAR TYPE, SUBSEQUENT ENCOUNTER: ICD-10-CM

## 2022-05-26 PROCEDURE — 97140 MANUAL THERAPY 1/> REGIONS: CPT | Performed by: PHYSICAL THERAPIST

## 2022-05-26 PROCEDURE — 97110 THERAPEUTIC EXERCISES: CPT | Performed by: PHYSICAL THERAPIST

## 2022-05-26 PROCEDURE — 97535 SELF CARE MNGMENT TRAINING: CPT | Performed by: PHYSICAL THERAPIST

## 2022-05-26 PROCEDURE — 97162 PT EVAL MOD COMPLEX 30 MIN: CPT | Performed by: PHYSICAL THERAPIST

## 2022-05-26 NOTE — PATIENT INSTRUCTIONS
Continue HEP  Keep tape on unless skin burns or itches or increases pain.  Use AD / PWB (R) for gait -

## 2022-05-31 ENCOUNTER — TREATMENT (OUTPATIENT)
Dept: PHYSICAL THERAPY | Facility: CLINIC | Age: 77
End: 2022-05-31

## 2022-05-31 DIAGNOSIS — M94.261 CHONDROMALACIA OF RIGHT KNEE: ICD-10-CM

## 2022-05-31 DIAGNOSIS — Z98.890 S/P RIGHT KNEE ARTHROSCOPY: Primary | ICD-10-CM

## 2022-05-31 DIAGNOSIS — S83.241D TEAR OF MEDIAL MENISCUS OF RIGHT KNEE, CURRENT, UNSPECIFIED TEAR TYPE, SUBSEQUENT ENCOUNTER: ICD-10-CM

## 2022-05-31 DIAGNOSIS — M84.351D STRESS FRACTURE OF OTHER SITE OF RIGHT OF FEMUR DUE TO MULTIPLE OR REPETITIVE STRESS WITH ROUTINE HEALING, SUBSEQUENT ENCOUNTER: ICD-10-CM

## 2022-05-31 PROCEDURE — 97140 MANUAL THERAPY 1/> REGIONS: CPT | Performed by: PHYSICAL THERAPIST

## 2022-05-31 PROCEDURE — 97110 THERAPEUTIC EXERCISES: CPT | Performed by: PHYSICAL THERAPIST

## 2022-05-31 PROCEDURE — 97530 THERAPEUTIC ACTIVITIES: CPT | Performed by: PHYSICAL THERAPIST

## 2022-06-02 ENCOUNTER — TREATMENT (OUTPATIENT)
Dept: PHYSICAL THERAPY | Facility: CLINIC | Age: 77
End: 2022-06-02

## 2022-06-02 DIAGNOSIS — M84.351D STRESS FRACTURE OF OTHER SITE OF RIGHT OF FEMUR DUE TO MULTIPLE OR REPETITIVE STRESS WITH ROUTINE HEALING, SUBSEQUENT ENCOUNTER: ICD-10-CM

## 2022-06-02 DIAGNOSIS — M94.261 CHONDROMALACIA OF RIGHT KNEE: ICD-10-CM

## 2022-06-02 DIAGNOSIS — S83.241D TEAR OF MEDIAL MENISCUS OF RIGHT KNEE, CURRENT, UNSPECIFIED TEAR TYPE, SUBSEQUENT ENCOUNTER: ICD-10-CM

## 2022-06-02 DIAGNOSIS — Z98.890 S/P RIGHT KNEE ARTHROSCOPY: Primary | ICD-10-CM

## 2022-06-02 PROCEDURE — 97110 THERAPEUTIC EXERCISES: CPT | Performed by: PHYSICAL THERAPIST

## 2022-06-02 PROCEDURE — 97140 MANUAL THERAPY 1/> REGIONS: CPT | Performed by: PHYSICAL THERAPIST

## 2022-06-02 PROCEDURE — 97530 THERAPEUTIC ACTIVITIES: CPT | Performed by: PHYSICAL THERAPIST

## 2022-06-02 NOTE — PROGRESS NOTES
Physical Therapy Daily Progress Note    Patient Name: Judi Sy         :  1945  Referring Physician: Kade Jacob MD  Treatment Date: 2022  Date of Initial Visit: No linked episodes    Visit Diagnoses:    ICD-10-CM ICD-9-CM   1. S/P right knee arthroscopy  Z98.890 V45.89   2. Stress fracture of other site of right of femur due to multiple or repetitive stress with routine healing, subsequent encounter  M84.351D V54.89   3. Tear of medial meniscus of right knee, current, unspecified tear type, subsequent encounter  S83.241D V58.89     836.0   4. Chondromalacia of right knee  M94.261 717.7       _____________________________________________________    Subjective   Judi Sy reports: knee was feeling pretty good until rain moved in - (R) knee feels stiff -   After last session - sore for a short time -   Notes her (R) knee renny - no pain, just renny at times - not as much with Walker -     Objective   Pt ambulating w/ flexed (R) knee w/ st cane - Tight swollen posterior knee - 10-deg ext to start  Near 0 at end - 130-deg flex after MT and gentle stretching    See Exercise, Manual, and Modality Logs for complete treatment.     Functional / Therapeutic Activities:    · TAPING / BRACING: NA  · SEE EXERCISE FLOW SHEET -   · Jt protection, ADL modification; Posture and      Assessment/Plan  75 yo female:   S/P 1.  Right knee arthroscopically assisted percutaneous treatment of right medial condylar insufficiency fracture with Aylin subchondral plasty  2.  Right knee partial medial meniscectomy of posterior horn and root  3.  Right knee abrasion chondroplasty medial femoral condyle  4.  Abrasion chondroplasty patella and trochlea  2022    Much improved ROM after MT and improved gait / WB-ing      Progress per Plan of Care -      _________________________________________________     Manual Therapy:            15     mins  29035;  Therapeutic Exercise:    28    mins   19730;     Neuromuscular Lea:        mins  32433;    Therapeutic Activity:     10      mins  86184;     Ultrasound:                          mins  32183;  Iontophoresis:                     mins  78724;    Electrical Stimulation:         mins  58733 ( );  Mechanical Traction:          mins  46354  Dry Needling                       mins self-pay     Timed Treatment:   53    mins                  Total Treatment:     65    mins      Salazar Conteh PT  Physical Therapist  Lic # [id number]

## 2022-06-07 ENCOUNTER — TREATMENT (OUTPATIENT)
Dept: PHYSICAL THERAPY | Facility: CLINIC | Age: 77
End: 2022-06-07

## 2022-06-07 DIAGNOSIS — M84.351D STRESS FRACTURE OF OTHER SITE OF RIGHT OF FEMUR DUE TO MULTIPLE OR REPETITIVE STRESS WITH ROUTINE HEALING, SUBSEQUENT ENCOUNTER: ICD-10-CM

## 2022-06-07 DIAGNOSIS — Z98.890 S/P RIGHT KNEE ARTHROSCOPY: Primary | ICD-10-CM

## 2022-06-07 DIAGNOSIS — S83.241D TEAR OF MEDIAL MENISCUS OF RIGHT KNEE, CURRENT, UNSPECIFIED TEAR TYPE, SUBSEQUENT ENCOUNTER: ICD-10-CM

## 2022-06-07 DIAGNOSIS — M94.261 CHONDROMALACIA OF RIGHT KNEE: ICD-10-CM

## 2022-06-07 PROCEDURE — 97110 THERAPEUTIC EXERCISES: CPT | Performed by: PHYSICAL THERAPIST

## 2022-06-07 PROCEDURE — 97140 MANUAL THERAPY 1/> REGIONS: CPT | Performed by: PHYSICAL THERAPIST

## 2022-06-07 PROCEDURE — 97530 THERAPEUTIC ACTIVITIES: CPT | Performed by: PHYSICAL THERAPIST

## 2022-06-07 NOTE — PROGRESS NOTES
Physical Therapy Daily Progress Note    Patient Name: Judi Sy         :  1945  Referring Physician: Kade Jacob MD  Treatment Date: 2022  Date of Initial Visit: No linked episodes    Visit Diagnoses:    ICD-10-CM ICD-9-CM   1. S/P right knee arthroscopy  Z98.890 V45.89   2. Stress fracture of other site of right of femur due to multiple or repetitive stress with routine healing, subsequent encounter  M84.351D V54.89   3. Tear of medial meniscus of right knee, current, unspecified tear type, subsequent encounter  S83.241D V58.89     836.0   4. Chondromalacia of right knee  M94.261 717.7       _____________________________________________________    Subjective   Judi Sy [place] [employer] [place] and did well -     Objective   Pt ambulating w/ st cane - flexed (R) knee - cuing to focus on extending her (R) knee with gait    See Exercise, Manual, and Modality Logs for complete treatment.     Functional / Therapeutic Activities:    · TAPING / BRACING: NA  · SEE EXERCISE FLOW SHEET -   · cuing to focus on extending her (R) knee with gait and practice in clinic  · Jt protection, ADL modification; Posture and       Assessment/Plan  77 yo female:   S/P 1.  Right knee arthroscopically assisted percutaneous treatment of right medial condylar insufficiency fracture with Aylin subchondral plasty  2.  Right knee partial medial meniscectomy of posterior horn and root  3.  Right knee abrasion chondroplasty medial femoral condyle  4.  Abrasion chondroplasty patella and trochlea  2022    Much improved ROM after MT and improved gait / WB-ing      Progress per Plan of Care -      _________________________________________________     Manual Therapy:            10     mins  21918;  Therapeutic Exercise:    30    mins  51115;     Neuromuscular Lea:  05      mins  14687;    Therapeutic Activity:     10      mins  21297;     Ultrasound:                          mins   56312;  Iontophoresis:                     mins  16308;    Electrical Stimulation:         mins  40352 ( );  Mechanical Traction:          mins  90957  Dry Needling                       mins self-pay     Timed Treatment:   55    mins                  Total Treatment:     65    mins      Salazar Conteh PT  Physical Therapist  Lic # [id number]

## 2022-06-10 ENCOUNTER — TREATMENT (OUTPATIENT)
Dept: PHYSICAL THERAPY | Facility: CLINIC | Age: 77
End: 2022-06-10

## 2022-06-10 DIAGNOSIS — M84.351D STRESS FRACTURE OF OTHER SITE OF RIGHT OF FEMUR DUE TO MULTIPLE OR REPETITIVE STRESS WITH ROUTINE HEALING, SUBSEQUENT ENCOUNTER: Primary | ICD-10-CM

## 2022-06-10 DIAGNOSIS — S83.241D TEAR OF MEDIAL MENISCUS OF RIGHT KNEE, CURRENT, UNSPECIFIED TEAR TYPE, SUBSEQUENT ENCOUNTER: ICD-10-CM

## 2022-06-10 DIAGNOSIS — Z98.890 S/P RIGHT KNEE ARTHROSCOPY: ICD-10-CM

## 2022-06-10 PROCEDURE — 97110 THERAPEUTIC EXERCISES: CPT | Performed by: PHYSICAL THERAPIST

## 2022-06-10 PROCEDURE — 97530 THERAPEUTIC ACTIVITIES: CPT | Performed by: PHYSICAL THERAPIST

## 2022-06-10 NOTE — PROGRESS NOTES
Physical Therapy Daily Progress Note    Patient Name: Judi Sy         :  1945  Referring Physician: Kade Jacob MD  Treatment Date: 6/10/2022  Date of Initial Visit: Type: THERAPY  Noted: 2022    Visit Diagnoses:  No diagnosis found.    _____________________________________________________    Subjective   Judi Sy reports: working on walking more at home - does better w/ her walker - walked multiple thousands of steps recently - working on straightening her (L) knee -     Objective   Pt ambulating w/ flexed (R) knee, but improved overall - requires cuing for TKE w/ ambulation -   W/ st cane -   Pt demonstrated good ROM w/ PT activities -     See Exercise, Manual, and Modality Logs for complete treatment.     Functional / Therapeutic Activities:    · TAPING / BRACING: NA  · SEE EXERCISE FLOW SHEET -   · cuing to focus on extending her (R) knee with gait and practice in clinic  · Jt protection, ADL modification; Posture and       Assessment/Plan  75 yo female:   S/P 1.  Right knee arthroscopically assisted percutaneous treatment of right medial condylar insufficiency fracture with Aylin subchondral plasty  2.  Right knee partial medial meniscectomy of posterior horn and root  3.  Right knee abrasion chondroplasty medial femoral condyle  4.  Abrasion chondroplasty patella and trochlea  2022    Much improved ROM and improved gait w/ more consistent TKE w/ less frequent cuing -      Progress per Plan of Care - progress as tolerated - ADD standing TKE w/ tubing, etc. To facilitate TKE w/ gait -       _________________________________________________     Manual Therapy:                 mins  63936;  Therapeutic Exercise:    30    mins  59468;     Neuromuscular Lea:  05      mins  90002;    Therapeutic Activity:     10      mins  29351;     Ultrasound:                          mins  10564;  Iontophoresis:                     mins  46037;    Electrical  Stimulation:         mins  77675 ( );  Mechanical Traction:          mins  12821  Dry Needling                       mins self-pay     Timed Treatment:   45    mins                  Total Treatment:     60    mins      Salazar Conteh PT  Physical Therapist  Lic # [id number]

## 2022-06-15 NOTE — PROGRESS NOTES
Physical Therapy Daily Progress Note    Visit Diagnoses:    ICD-10-CM ICD-9-CM   1. Stress fracture of other site of right of femur due to multiple or repetitive stress with routine healing, subsequent encounter  M84.351D V54.89       VISIT#: 6      Judi Sy reports: she went to her surgeon yesterday and he took an xray. All looked good. He instructed her to use a cane instead of her walker when she is out in the community. He wants her to return to him in 4 weeks She is getting more confident to walk without clutching something. She needs to train her dog not to pull her. Pt reports when she bend her L knee a little she is able to straighten her R knee better. She expressed concern about a leg length discrepancy.   Current Pain Level:    0/10; Worst:   5-6/10 when walking without her cane or when walking the dog also early morning; Best:  0/10  Location Of Pain: R medial and lateral knee  Quality of Pain: achy with exercises and sharp  Response to Previous Session: Good. No issues  Functional Deficits/Irritating Factors: Prolonged ambulation, prolonged standing, squatting,   Progression: Improving  Compliance with HEP Reported: Yes    Objective  Presents: Pt ambulating w/ flexed (R) knee, st cane. Lacking TKE  Increased sets/reps of:  none   Increased resistance on:  none  Added to Program: none    KT tape on R knee is spider pattern for edema  R knee extension = lacking 4 deg; Flexion = 126 deg  L ASIS to medial L ankle measured one cm greater than R ASIS to R medial ankle but L ASIS is higher than R.     See Exercise, Manual, and Modality Logs for complete treatment.     Patient Education: Pt was educated on exercise biomechanical correctness, intensity, and speed.     Assessment:  Pt continues to lack terminal knee extension during ambulation/standing. Her L ASIS is superior to her R ASIS and measures 1 cm longer from there to medial ankle. Pt may benefit from a heel lift. She completed her exercise flow  sheet without any issues but did note knees going into genu valgus during sit<>stands. Cues given to improve NMC..  Pt will continue to benefit from skilled PT interventions to address current functional deficits and impairments.       Plan: Progress to/Continue with current program.         Manual Therapy:    20     mins  63004;  Ultrasound:   0 mins 78025  Electrical Stimulation: __0____ mins 92332/  Iontophoresis: 0 mins 19296  Traction: 0 mins  89751  Work Conditionin mins 51514  Therapeutic Exercise:    15/70     mins  18746;     Neuromuscular Lea:    0    mins  07582;    Therapeutic Activity:     0     mins  08199;     Timed Treatment:   35   mins   Total Treatment:     90   mins    REMA [employer] License # [id number]  Physical Therapist Assistant

## 2022-06-16 ENCOUNTER — OFFICE VISIT (OUTPATIENT)
Dept: ORTHOPEDIC SURGERY | Facility: CLINIC | Age: 77
End: 2022-06-16

## 2022-06-16 VITALS — HEIGHT: 65 IN | BODY MASS INDEX: 27.99 KG/M2 | TEMPERATURE: 96.5 F | WEIGHT: 168 LBS

## 2022-06-16 DIAGNOSIS — S83.241D TEAR OF MEDIAL MENISCUS OF RIGHT KNEE, CURRENT, UNSPECIFIED TEAR TYPE, SUBSEQUENT ENCOUNTER: ICD-10-CM

## 2022-06-16 DIAGNOSIS — M94.261 CHONDROMALACIA OF RIGHT KNEE: ICD-10-CM

## 2022-06-16 DIAGNOSIS — M84.351D STRESS FRACTURE OF OTHER SITE OF RIGHT OF FEMUR DUE TO MULTIPLE OR REPETITIVE STRESS WITH ROUTINE HEALING, SUBSEQUENT ENCOUNTER: Primary | ICD-10-CM

## 2022-06-16 DIAGNOSIS — E55.9 VITAMIN D DEFICIENCY: ICD-10-CM

## 2022-06-16 PROCEDURE — 73562 X-RAY EXAM OF KNEE 3: CPT | Performed by: ORTHOPAEDIC SURGERY

## 2022-06-16 PROCEDURE — 99024 POSTOP FOLLOW-UP VISIT: CPT | Performed by: ORTHOPAEDIC SURGERY

## 2022-06-16 NOTE — PROGRESS NOTES
"Knee Exam      Patient: Judi Sy    YOB: 1945 76 y.o. female    Chief Complaints: knee \"doing good\"    History of Present Illness:Patient underwent right knee scope with partial medial meniscectomy of the posterior horn and root and abrasion chondroplasty of the medial femoral condyle patella and trochlea.  She also had subchondroplasty of medial femoral condylar stress fracture on 5/13/2022.     She was seen on 5/23/2022 and had been doing limited weight with a walker with mild aching pain in the right knee.  She had occasionally used a cane when just going to the bathroom.  She had resumed the Plavix that she was on preoperatively through her PCP.    She is instructed to continue with walker for another 2 weeks then start transitioning to a cane initially in the house then out of the house and referred to physical therapy.  She is currently on vitamin D 2000 units daily and instructed to continue as such.    She is seen back today stating that she is doing well.  She walked up a hill with her walker which she said helped to \"stretch out her knee and does not have much of any pain in the right knee except with weather changes and rising after prolonged sitting.  Overall she feels better than she did before surgery and feels that therapy is going well.  She been using her walker for prolonged distances otherwise been using her cane.  Pain today is rated 0 out of 10  HPI    ROS: knee pain  Past Medical History:   Diagnosis Date   • Aortic heart valve prolapse    • Arthritis    • Asthma due to seasonal allergies    • Carotid artery stenosis     MILD   • COVID-19 12/2021   • High cholesterol    • History of atrial fibrillation    • Knee swelling    • PFO (patent foramen ovale) 2016    HX CLOSURE   • Right knee pain    • Tear of meniscus of knee    • Tendinitis of knee    • TIA (transient ischemic attack)     PRIOR TO PFO CLOSURE   • Vasovagal syncope        Physical Exam:   Vitals:    06/16/22 " "0951   Temp: 96.5 °F (35.8 °C)   TempSrc: Temporal   Weight: 76.2 kg (168 lb)   Height: 165.1 cm (65\")   PainSc: 0-No pain     Well developed with normal mood.  She is with her .  She really did not exhibit any focal discomfort of the medial femoral condyle with range of motion to the knee.  Minimal discomfort of the medial joint line but no exacerbation of pain with Bernie's.  Right calf and thigh are nontender without sign of DVT.      Radiology: 3 views of the right knee ordered evaluate postoperative alignment reviewed and compared to previous x-rays.  There remains some mild joint space narrowing and subchondral plasty matrix appears to remain well contained.  There is patellofemoral changes as well.    Vitamin D 5/4/2022 55.6    Assessment/Plan: Status post right knee surgery as outlined above    Overall she seems be doing well and feels better than she did before surgery.    She is going to continue with physical therapy and continue with use of her cane for another 2 to 3 weeks and start gradual weaning off of this.    She will continue with vitamin D 2000 units daily and recommend that she have her level rechecked in the next few weeks and is going to be seeing her PCP for this as well.    We will see her back in about 6 weeks no x-rays when she is having increased pain in the right knee.    She told me she thought I did a \"good job\"   "

## 2022-06-17 ENCOUNTER — TREATMENT (OUTPATIENT)
Dept: PHYSICAL THERAPY | Facility: CLINIC | Age: 77
End: 2022-06-17

## 2022-06-17 DIAGNOSIS — M84.351D STRESS FRACTURE OF OTHER SITE OF RIGHT OF FEMUR DUE TO MULTIPLE OR REPETITIVE STRESS WITH ROUTINE HEALING, SUBSEQUENT ENCOUNTER: Primary | ICD-10-CM

## 2022-06-17 PROCEDURE — 97110 THERAPEUTIC EXERCISES: CPT | Performed by: PHYSICAL THERAPIST

## 2022-06-17 PROCEDURE — 97140 MANUAL THERAPY 1/> REGIONS: CPT | Performed by: PHYSICAL THERAPIST

## 2022-06-21 ENCOUNTER — TELEPHONE (OUTPATIENT)
Dept: FAMILY MEDICINE CLINIC | Facility: CLINIC | Age: 77
End: 2022-06-21

## 2022-06-21 DIAGNOSIS — E55.9 VITAMIN D DEFICIENCY: Primary | ICD-10-CM

## 2022-06-21 NOTE — TELEPHONE ENCOUNTER
Caller: Judi Sy    Relationship: Self    Best call back number:     What orders are you requesting (i.e. lab or imaging): D3 LABS    In what timeframe would the patient need to come in:     Where will you receive your lab/imaging services:     Additional notes: PATIENT IS CALLING IN TO GET ORDERS TO HAVE A D3 LAB ON June 22, 2022 HERE AT THE OFFICE LAB.  SHE WANTS TO BE CALLED ONCE THIS IS DONE AND HER APPOINTMENT HAS BEEN MADE.

## 2022-06-23 ENCOUNTER — TREATMENT (OUTPATIENT)
Dept: PHYSICAL THERAPY | Facility: CLINIC | Age: 77
End: 2022-06-23

## 2022-06-23 ENCOUNTER — TELEPHONE (OUTPATIENT)
Dept: FAMILY MEDICINE CLINIC | Facility: CLINIC | Age: 77
End: 2022-06-23

## 2022-06-23 DIAGNOSIS — Z98.890 S/P RIGHT KNEE ARTHROSCOPY: ICD-10-CM

## 2022-06-23 DIAGNOSIS — S83.241D TEAR OF MEDIAL MENISCUS OF RIGHT KNEE, CURRENT, UNSPECIFIED TEAR TYPE, SUBSEQUENT ENCOUNTER: ICD-10-CM

## 2022-06-23 DIAGNOSIS — M84.351D STRESS FRACTURE OF OTHER SITE OF RIGHT OF FEMUR DUE TO MULTIPLE OR REPETITIVE STRESS WITH ROUTINE HEALING, SUBSEQUENT ENCOUNTER: Primary | ICD-10-CM

## 2022-06-23 PROCEDURE — 97110 THERAPEUTIC EXERCISES: CPT | Performed by: PHYSICAL THERAPIST

## 2022-06-23 PROCEDURE — 97530 THERAPEUTIC ACTIVITIES: CPT | Performed by: PHYSICAL THERAPIST

## 2022-06-23 NOTE — PROGRESS NOTES
Physical Therapy Daily Progress Note  RE-ASSESSMENT    Patient Name: Judi Sy         :  1945  Referring Physician: Kade Jacob MD  Treatment Date: 2022  Date of Initial Visit: Type: THERAPY  Noted: 2022    Visit Diagnoses:    ICD-10-CM ICD-9-CM   1. Stress fracture of other site of right of femur due to multiple or repetitive stress with routine healing, subsequent encounter  M84.351D V54.89   2. Tear of medial meniscus of right knee, current, unspecified tear type, subsequent encounter  S83.241D V58.89     836.0   3. S/P right knee arthroscopy  Z98.890 V45.89       _____________________________________________________    Subjective   Judi Sy reports: feels like she has to bend her (L) knee to walk normally - Notes continued fatique since COVID in January - Knee not doing bad -     Objective   Pt ambulates w/ flexed and IR (R) knee / LE -  Swelling anterior knee (R) > (L)  (L) ilium / ASIS / PSIS higher in standing w/ (B) knees fully extended -   Supine Pelvis level, but (L) LE appears longer - Measured from ASIS to medial maleolus: (L) 88cm; (R) 86.5cm  Discussed heel lift to level out leg length and improve gait   Fitted w/ heel lift lift + one layer in (R) shoe -   Gait improved - Limited (R) knee extension and apparent hip flexor tightness (R)   GAIT: Flexed (R) knee, but improved w/ cuing  AROM: (R) knee; Full knee flexion and near full extension passively -   STRENGTH: Strong manual resistance knee flex/ext;  Gross weakness glute medius as seen w/ trendelenburg w/ sLS (R) LE -   PALPATION: Minimal tenderhess    See Exercise, Manual, and Modality Logs for complete treatment.     Functional / Therapeutic Activities:    · TAPING / BRACING: NA  · SEE EXERCISE FLOW SHEET -   · ALot of work on ambulation in clinic w/o AD emphasizing TKE (R)   · Functional assessment   · Jt protection, ADL modification; Posture and      Assessment/Plan  75 yo female:   S/P 1.   Right knee arthroscopically assisted percutaneous treatment of right medial condylar insufficiency fracture with Aylin subchondral plasty  2.  Right knee partial medial meniscectomy of posterior horn and root  3.  Right knee abrasion chondroplasty medial femoral condyle  4.  Abrasion chondroplasty patella and trochlea  5/13/2022   GOAL STATUS:   STG: All met  LTG: Progressing towards Function, gait, mobility goals -   Judi would benefit from continued Physical Therapy -to address functional deficits -     Flexed knee posture possibly due to possible leg length discrepancy - Heel lift?     Progress strengthening /stabilization /functional activity 2x/wk x 4 weeks        _________________________________________________    Manual Therapy:                 mins  16406;  Therapeutic Exercise:    28    mins  80514;     Neuromuscular Lea:  05      mins  55893;    Therapeutic Activity:     25      mins  08955;     Ultrasound:                          mins  96182;  Iontophoresis:                     mins  80136;    Electrical Stimulation:         mins  04672 ( );  Mechanical Traction:          mins  59650  Dry Needling                       mins self-pay     Timed Treatment:   58    mins                  Total Treatment:     70    mins        Salazar Conteh PT  Physical Therapist  Lic # [id number]

## 2022-06-23 NOTE — TELEPHONE ENCOUNTER
Hub staff attempted to follow warm transfer process and was unsuccessful     Caller: Judi Sy    Relationship to patient: Self    Best call back number: [phone]     Patient is needing: PATIENT CALLED TO CONFIRM A LAB APPOINTMENT THAT THIS OFFICE HAD CONTACTED HER ABOUT.    HUB STAFF UNABLE TO FIND ANY SCHEDULED LAB APPOINTMENTS FOR THE PATIENT, SHE WOULD LIKE TO BE CONTACTED TO FIGURE OUT WHAT IS GOING ON, AND WHEN SHE IS SUPPOSED TO HAVE THIS APPOINTMENT.

## 2022-06-25 LAB — 25(OH)D3+25(OH)D2 SERPL-MCNC: 54.1 NG/ML (ref 30–100)

## 2022-06-28 ENCOUNTER — OFFICE VISIT (OUTPATIENT)
Dept: FAMILY MEDICINE CLINIC | Facility: CLINIC | Age: 77
End: 2022-06-28

## 2022-06-28 VITALS
OXYGEN SATURATION: 96 % | BODY MASS INDEX: 27.49 KG/M2 | RESPIRATION RATE: 18 BRPM | HEART RATE: 98 BPM | WEIGHT: 165 LBS | DIASTOLIC BLOOD PRESSURE: 80 MMHG | TEMPERATURE: 97.5 F | SYSTOLIC BLOOD PRESSURE: 123 MMHG | HEIGHT: 65 IN

## 2022-06-28 DIAGNOSIS — J30.89 ENVIRONMENTAL AND SEASONAL ALLERGIES: Primary | ICD-10-CM

## 2022-06-28 DIAGNOSIS — M81.0 AGE-RELATED OSTEOPOROSIS WITHOUT CURRENT PATHOLOGICAL FRACTURE: ICD-10-CM

## 2022-06-28 DIAGNOSIS — M94.261 CHONDROMALACIA OF RIGHT KNEE: ICD-10-CM

## 2022-06-28 DIAGNOSIS — R03.0 PRE-HYPERTENSION: ICD-10-CM

## 2022-06-28 DIAGNOSIS — K21.9 GASTROESOPHAGEAL REFLUX DISEASE WITHOUT ESOPHAGITIS: ICD-10-CM

## 2022-06-28 DIAGNOSIS — E55.9 VITAMIN D DEFICIENCY: ICD-10-CM

## 2022-06-28 DIAGNOSIS — E78.2 MIXED HYPERLIPIDEMIA: ICD-10-CM

## 2022-06-28 PROCEDURE — 99214 OFFICE O/P EST MOD 30 MIN: CPT | Performed by: INTERNAL MEDICINE

## 2022-06-28 NOTE — PROGRESS NOTES
Subjective   Judi Sy is a 76 y.o. female. Patient is here today for follow-up on her environmental and seasonal allergies, history of patent foramen ovale and paroxysmal atrial fibrillation, hyperlipidemia, pre-hypertension, vitamin D deficiency, GERD and history of TIA.  Patient had no neurologic symptoms and generally feels well.  She did have arthroscopy on her right knee with good result.  She also saw pulmonary and has some mild asthma and is on Advair and albuterol as needed and doing well.    Chief Complaint   Patient presents with   • Hyperlipidemia     6mo fu          Vitals:    22 1016   BP: 123/80   Pulse: 98   Resp: 18   Temp: 97.5 °F (36.4 °C)   SpO2: 96%     Body mass index is 27.46 kg/m².  The following portions of the patient's history were reviewed and updated as appropriate: allergies, current medications, past family history, past medical history, past social history, past surgical history and problem list.    Past Medical History:   Diagnosis Date   • Aortic heart valve prolapse    • Arthritis    • Asthma due to seasonal allergies    • Carotid artery stenosis     MILD   • COVID-19 2021   • High cholesterol    • History of atrial fibrillation    • Knee swelling    • PFO (patent foramen ovale) 2016    HX CLOSURE   • Right knee pain    • Tear of meniscus of knee    • Tendinitis of knee    • TIA (transient ischemic attack)     PRIOR TO PFO CLOSURE   • Vasovagal syncope       Allergies   Allergen Reactions   • Milk-Related Compounds Other (See Comments)     ACID REFLUX   • Latex Rash   • Tape Rash      Social History     Socioeconomic History   • Marital status:    Tobacco Use   • Smoking status: Former Smoker     Packs/day: 0.50     Types: Cigarettes, Cigarettes     Quit date: 2016     Years since quittin.4   • Smokeless tobacco: Never Used   • Tobacco comment: STARTED SMOKING AGE 18   Vaping Use   • Vaping Use: Never used   Substance and Sexual Activity   • Alcohol  use: No     Comment: caffeine use- coffee   • Drug use: No   • Sexual activity: Not Currently     Partners: Male        Current Outpatient Medications:   •  Advair Diskus 100-50 MCG/DOSE DISKUS, Inhale 1 puff Every Morning., Disp: , Rfl:   •  albuterol sulfate  (90 Base) MCG/ACT inhaler, Inhale 2 puffs Every 4 (Four) Hours As Needed., Disp: , Rfl:   •  aspirin  MG tablet, Take 1 tablet by mouth Daily., Disp: 30 tablet, Rfl: 0  •  atorvastatin (LIPITOR) 20 MG tablet, TAKE ONE TABLET BY MOUTH DAILY (Patient taking differently: Take 20 mg by mouth Daily.), Disp: 90 tablet, Rfl: 2  •  Calcium Citrate-Vitamin D 250-200 MG-UNIT tablet, Take 1 tablet by mouth Every Morning., Disp: , Rfl:   •  cetirizine (zyrTEC) 10 MG tablet, Take 10 mg by mouth Daily., Disp: , Rfl:   •  Cyanocobalamin (VITAMIN B12 PO), Take 1 tablet by mouth Daily., Disp: , Rfl:   •  montelukast (SINGULAIR) 10 MG tablet, Take 10 mg by mouth Every Morning., Disp: , Rfl:   •  Polyethyl Glycol-Propyl Glycol (SYSTANE PRESERVATIVE FREE OP), Apply 1 drop to eye(s) as directed by provider 2 (Two) Times a Day., Disp: , Rfl:      Objective     History of Present Illness     Review of Systems   All other systems reviewed and are negative.      Physical Exam  Vitals and nursing note reviewed.   Constitutional:       General: She is not in acute distress.     Appearance: Normal appearance. She is not ill-appearing.   HENT:      Head: Normocephalic and atraumatic.   Cardiovascular:      Rate and Rhythm: Normal rate.      Heart sounds: Normal heart sounds.   Pulmonary:      Effort: Pulmonary effort is normal. No respiratory distress.      Breath sounds: Normal breath sounds. No wheezing or rales.   Musculoskeletal:         General: Normal range of motion.   Skin:     General: Skin is warm and dry.   Neurological:      General: No focal deficit present.      Mental Status: She is alert and oriented to person, place, and time.   Psychiatric:         Mood and  Affect: Mood normal.         Behavior: Behavior normal.         ASSESSMENT CBC from May was normal.  Vitamin D level remains normal at 54.1.  Bone density test done in October showed stable osteoporosis.  #1-seasonal asthma with environmental and seasonal allergies, stable on medications  #2-hyperlipidemia, asymptomatic  #3-pre-hypertension with acceptable blood pressure today  #4-vitamin D deficiency corrected with supplement  #5-osteoporosis, asymptomatic and stable       Problems Addressed this Visit        Allergies and Adverse Reactions    Environmental and seasonal allergies - Primary       Cardiac and Vasculature    Hyperlipidemia    Pre-hypertension       Endocrine and Metabolic    Vitamin D deficiency       Gastrointestinal Abdominal     Gastroesophageal reflux disease without esophagitis       Musculoskeletal and Injuries    Osteoporosis    Chondromalacia of right knee      Diagnoses       Codes Comments    Environmental and seasonal allergies    -  Primary ICD-10-CM: J30.89  ICD-9-CM: 477.8     Mixed hyperlipidemia     ICD-10-CM: E78.2  ICD-9-CM: 272.2     Pre-hypertension     ICD-10-CM: R03.0  ICD-9-CM: 796.2     Vitamin D deficiency     ICD-10-CM: E55.9  ICD-9-CM: 268.9     Gastroesophageal reflux disease without esophagitis     ICD-10-CM: K21.9  ICD-9-CM: 530.81     Chondromalacia of right knee     ICD-10-CM: M94.261  ICD-9-CM: 717.7     Age-related osteoporosis without current pathological fracture     ICD-10-CM: M81.0  ICD-9-CM: 733.01           PLAN the patient will continue current medicines as now.  I recommended a flu shot in October and a COVID-19 vaccination when appropriate.  I plan on rechecking her in 6 months with laboratory studies    There are no Patient Instructions on file for this visit.  No follow-ups on file.

## 2022-07-01 ENCOUNTER — TREATMENT (OUTPATIENT)
Dept: PHYSICAL THERAPY | Facility: CLINIC | Age: 77
End: 2022-07-01

## 2022-07-01 DIAGNOSIS — M84.351D STRESS FRACTURE OF OTHER SITE OF RIGHT OF FEMUR DUE TO MULTIPLE OR REPETITIVE STRESS WITH ROUTINE HEALING, SUBSEQUENT ENCOUNTER: Primary | ICD-10-CM

## 2022-07-01 PROCEDURE — 97110 THERAPEUTIC EXERCISES: CPT | Performed by: PHYSICAL THERAPIST

## 2022-07-01 PROCEDURE — 97140 MANUAL THERAPY 1/> REGIONS: CPT | Performed by: PHYSICAL THERAPIST

## 2022-07-05 ENCOUNTER — TREATMENT (OUTPATIENT)
Dept: PHYSICAL THERAPY | Facility: CLINIC | Age: 77
End: 2022-07-05

## 2022-07-05 DIAGNOSIS — M84.351D STRESS FRACTURE OF OTHER SITE OF RIGHT OF FEMUR DUE TO MULTIPLE OR REPETITIVE STRESS WITH ROUTINE HEALING, SUBSEQUENT ENCOUNTER: ICD-10-CM

## 2022-07-05 DIAGNOSIS — M94.261 CHONDROMALACIA OF RIGHT KNEE: ICD-10-CM

## 2022-07-05 DIAGNOSIS — Z98.890 S/P RIGHT KNEE ARTHROSCOPY: Primary | ICD-10-CM

## 2022-07-05 DIAGNOSIS — S83.241D TEAR OF MEDIAL MENISCUS OF RIGHT KNEE, CURRENT, UNSPECIFIED TEAR TYPE, SUBSEQUENT ENCOUNTER: ICD-10-CM

## 2022-07-05 PROCEDURE — 97110 THERAPEUTIC EXERCISES: CPT | Performed by: PHYSICAL THERAPIST

## 2022-07-05 PROCEDURE — 97530 THERAPEUTIC ACTIVITIES: CPT | Performed by: PHYSICAL THERAPIST

## 2022-07-05 NOTE — PROGRESS NOTES
Physical Therapy Daily Progress Note    Patient Name: Judi Sy         :  1945  Referring Physician: Kade Jacob MD  Treatment Date: 2022  Date of Initial Visit: No linked episodes    Visit Diagnoses:    ICD-10-CM ICD-9-CM   1. S/P right knee arthroscopy  Z98.890 V45.89   2. Stress fracture of other site of right of femur due to multiple or repetitive stress with routine healing, subsequent encounter  M84.351D V54.89   3. Tear of medial meniscus of right knee, current, unspecified tear type, subsequent encounter  S83.241D V58.89     836.0   4. Chondromalacia of right knee  M94.261 717.7       _____________________________________________________    Subjective   Judi Sy reports: continued improvement - heel lift very helpful - Feels straighter - feeling normal -     Objective   Pt demonstrating much improved gait w/ increased knee extension and WB-ing RLE -   Grossly decreased swelling - good ROM and control w/ PT activities -     See Exercise, Manual, and Modality Logs for complete treatment.     Functional / Therapeutic Activities:    · TAPING / BRACING: NA  · SEE EXERCISE FLOW SHEET -   · Jt protection, ADL modification; Posture and       Assessment/Plan  75 yo female:   S/P 1.  Right knee arthroscopically assisted percutaneous treatment of right medial condylar insufficiency fracture with Aylin subchondral plasty  2.  Right knee partial medial meniscectomy of posterior horn and root  3.  Right knee abrasion chondroplasty medial femoral condyle  4.  Abrasion chondroplasty patella and trochlea  2022     Improving w/ increased ROM, improved gait and strength/endurance -     Progress strengthening /stabilization /functional activity       _________________________________________________    Manual Therapy:                 mins  66142;  Therapeutic Exercise:    25    mins  08721;     Neuromuscular Lea:   08     mins  05931;    Therapeutic Activity:     12      mins  67552;     Ultrasound:                          mins  91773;  Iontophoresis:                     mins  43344;    Electrical Stimulation:         mins  86865 ( );  Mechanical Traction:          mins  04878  Dry Needling                       mins self-pay     Timed Treatment:   45    mins                  Total Treatment:     55    mins        Salazar Conteh PT  Physical Therapist  Lic # [id number]

## 2022-07-07 ENCOUNTER — TREATMENT (OUTPATIENT)
Dept: PHYSICAL THERAPY | Facility: CLINIC | Age: 77
End: 2022-07-07

## 2022-07-07 DIAGNOSIS — S83.241D TEAR OF MEDIAL MENISCUS OF RIGHT KNEE, CURRENT, UNSPECIFIED TEAR TYPE, SUBSEQUENT ENCOUNTER: ICD-10-CM

## 2022-07-07 DIAGNOSIS — M84.351D STRESS FRACTURE OF OTHER SITE OF RIGHT OF FEMUR DUE TO MULTIPLE OR REPETITIVE STRESS WITH ROUTINE HEALING, SUBSEQUENT ENCOUNTER: ICD-10-CM

## 2022-07-07 DIAGNOSIS — Z98.890 S/P RIGHT KNEE ARTHROSCOPY: Primary | ICD-10-CM

## 2022-07-07 DIAGNOSIS — M94.261 CHONDROMALACIA OF RIGHT KNEE: ICD-10-CM

## 2022-07-07 PROCEDURE — 97110 THERAPEUTIC EXERCISES: CPT | Performed by: PHYSICAL THERAPIST

## 2022-07-07 PROCEDURE — 97112 NEUROMUSCULAR REEDUCATION: CPT | Performed by: PHYSICAL THERAPIST

## 2022-07-07 NOTE — PROGRESS NOTES
Physical Therapy Daily Progress Note    Patient Name: Judi Sy         :  1945  Referring Physician: Kade Jacob MD  Treatment Date: 2022  Date of Initial Visit: Type: THERAPY  Noted: 2022    Visit Diagnoses:    ICD-10-CM ICD-9-CM   1. S/P right knee arthroscopy  Z98.890 V45.89   2. Stress fracture of other site of right of femur due to multiple or repetitive stress with routine healing, subsequent encounter  M84.351D V54.89   3. Tear of medial meniscus of right knee, current, unspecified tear type, subsequent encounter  S83.241D V58.89     836.0   4. Chondromalacia of right knee  M94.261 717.7       _____________________________________________________    Subjective   Judi Sy reports: continued improvement - no pain and improved gait / function    Objective   Pt demonstrating much improve gait and good mobility w/ PT activities -     See Exercise, Manual, and Modality Logs for complete treatment.-      Functional / Therapeutic Activities:    · TAPING / BRACING: NA  · SEE EXERCISE FLOW SHEET -   · Jt protection, ADL modification; Posture and       Assessment/Plan  75 yo female:   S/P 1.  Right knee arthroscopically assisted percutaneous treatment of right medial condylar insufficiency fracture with Aylin subchondral plasty  2.  Right knee partial medial meniscectomy of posterior horn and root  3.  Right knee abrasion chondroplasty medial femoral condyle  4.  Abrasion chondroplasty patella and trochlea  2022      Much improved w/ increased ROM, improved gait and strength/endurance and function -      Progress strengthening /stabilization /functional activity  Anticipate DC after last appt on 2022 to [facility]P.      _________________________________________________     Manual Therapy:                 mins  07390;  Therapeutic Exercise:    28    mins  43839;     Neuromuscular Lea:   06     mins  12944;    Therapeutic Activity:     05     mins  48367;      Ultrasound:                          mins  14208;  Iontophoresis:                     mins  87682;    Electrical Stimulation:         mins  09161 ( );  Mechanical Traction:          mins  91990  Dry Needling                       mins self-pay     Timed Treatment:   39    mins                  Total Treatment:     45    mins    Salazar Conteh PT  Physical Therapist  Lic # [id number]

## 2022-07-14 ENCOUNTER — TREATMENT (OUTPATIENT)
Dept: PHYSICAL THERAPY | Facility: CLINIC | Age: 77
End: 2022-07-14

## 2022-07-14 DIAGNOSIS — S83.241D TEAR OF MEDIAL MENISCUS OF RIGHT KNEE, CURRENT, UNSPECIFIED TEAR TYPE, SUBSEQUENT ENCOUNTER: ICD-10-CM

## 2022-07-14 DIAGNOSIS — Z98.890 S/P RIGHT KNEE ARTHROSCOPY: Primary | ICD-10-CM

## 2022-07-14 DIAGNOSIS — M84.351D STRESS FRACTURE OF OTHER SITE OF RIGHT OF FEMUR DUE TO MULTIPLE OR REPETITIVE STRESS WITH ROUTINE HEALING, SUBSEQUENT ENCOUNTER: ICD-10-CM

## 2022-07-14 DIAGNOSIS — M94.261 CHONDROMALACIA OF RIGHT KNEE: ICD-10-CM

## 2022-07-14 PROCEDURE — 97110 THERAPEUTIC EXERCISES: CPT | Performed by: PHYSICAL THERAPIST

## 2022-07-14 PROCEDURE — 97112 NEUROMUSCULAR REEDUCATION: CPT | Performed by: PHYSICAL THERAPIST

## 2022-07-14 NOTE — PROGRESS NOTES
Physical Therapy Daily Progress Note    Patient Name: Judi Sy         :  1945  Referring Physician: Kade Jacob MD  Treatment Date: 2022  Date of Initial Visit: No linked episodes    Visit Diagnoses:    ICD-10-CM ICD-9-CM   1. S/P right knee arthroscopy  Z98.890 V45.89   2. Stress fracture of other site of right of femur due to multiple or repetitive stress with routine healing, subsequent encounter  M84.351D V54.89   3. Tear of medial meniscus of right knee, current, unspecified tear type, subsequent encounter  S83.241D V58.89     836.0   4. Chondromalacia of right knee  M94.261 717.7       _____________________________________________________    Subjective   Judi Sy reports: continued improvement - walking to the store now w/o problems     Objective   Pt ambulating w/ gait WNL for this Pt - no pain w/ good stride and pace -   Pt demonstrated good mobility w/ PT activities -     See Exercise, Manual, and Modality Logs for complete treatment.     Functional / Therapeutic Activities:    · TAPING / BRACING: NA  · SEE EXERCISE FLOW SHEET -   · Jt protection, ADL modification; Posture and       Assessment/Plan  77 yo female:   S/P 1.  Right knee arthroscopically assisted percutaneous treatment of right medial condylar insufficiency fracture with Aylin subchondral plasty  2.  Right knee partial medial meniscectomy of posterior horn and root  3.  Right knee abrasion chondroplasty medial femoral condyle  4.  Abrasion chondroplasty patella and trochlea  2022      Much improved w/ increased ROM, improved gait and strength/endurance and function -      Progress strengthening /stabilization /functional activity  Anticipate DC to HEP after next on 2022.      _________________________________________________     Manual Therapy:                 mins  58749;  Therapeutic Exercise:   25    mins  48643;     Neuromuscular Lea:   10     mins  93764;    Therapeutic  Activity:     05     mins  68071;     Ultrasound:                          mins  38812;  Iontophoresis:                     mins  93252;    Electrical Stimulation:         mins  95587 ( );  Mechanical Traction:          mins  99627  Dry Needling                       mins self-pay     Timed Treatment:   40    mins                  Total Treatment:     45    mins      Salazar Conteh PT  Physical Therapist  Lic # [id number]

## 2022-07-19 ENCOUNTER — TREATMENT (OUTPATIENT)
Dept: PHYSICAL THERAPY | Facility: CLINIC | Age: 77
End: 2022-07-19

## 2022-07-19 DIAGNOSIS — Z98.890 S/P RIGHT KNEE ARTHROSCOPY: Primary | ICD-10-CM

## 2022-07-19 DIAGNOSIS — M94.261 CHONDROMALACIA OF RIGHT KNEE: ICD-10-CM

## 2022-07-19 DIAGNOSIS — S83.241D TEAR OF MEDIAL MENISCUS OF RIGHT KNEE, CURRENT, UNSPECIFIED TEAR TYPE, SUBSEQUENT ENCOUNTER: ICD-10-CM

## 2022-07-19 DIAGNOSIS — M84.351D STRESS FRACTURE OF OTHER SITE OF RIGHT OF FEMUR DUE TO MULTIPLE OR REPETITIVE STRESS WITH ROUTINE HEALING, SUBSEQUENT ENCOUNTER: ICD-10-CM

## 2022-07-19 PROCEDURE — 97110 THERAPEUTIC EXERCISES: CPT | Performed by: PHYSICAL THERAPIST

## 2022-07-19 PROCEDURE — 97112 NEUROMUSCULAR REEDUCATION: CPT | Performed by: PHYSICAL THERAPIST

## 2022-07-19 PROCEDURE — 97530 THERAPEUTIC ACTIVITIES: CPT | Performed by: PHYSICAL THERAPIST

## 2022-07-19 NOTE — PROGRESS NOTES
Physical Therapy Daily Progress Note  RE-ASSESSMENT / DISCHARGE    Patient Name: Judi Sy         :  1945  Referring Physician: Kade Jacob MD  Treatment Date: 2022  Date of Initial Visit:  2022  12 Visits    Visit Diagnoses:    ICD-10-CM ICD-9-CM   1. S/P right knee arthroscopy  Z98.890 V45.89   2. Stress fracture of other site of right of femur due to multiple or repetitive stress with routine healing, subsequent encounter  M84.351D V54.89   3. Tear of medial meniscus of right knee, current, unspecified tear type, subsequent encounter  S83.241D V58.89     836.0   4. Chondromalacia of right knee  M94.261 717.7       _____________________________________________________    Subjective   Judi Sy reports: doing well - continued improvement - notes minimal functional limitations - sore yesterday - due to rain?   Rides exercise bike daily -     Objective   Pt demonstrating gait WFL - AROM WNL; Normal strength - (R) knee -   Non-tender    See Exercise, Manual, and Modality Logs for complete treatment.     Functional / Therapeutic Activities:    · TAPING / BRACING: NA  · SEE EXERCISE FLOW SHEET -   · FUNCTIONAL ASSESSMENT -   · Jt protection, ADL modification; Posture and       Assessment/Plan  77 yo female:   S/P 1.  Right knee arthroscopically assisted percutaneous treatment of right medial condylar insufficiency fracture with Aylin subchondral plasty  2.  Right knee partial medial meniscectomy of posterior horn and root  3.  Right knee abrasion chondroplasty medial femoral condyle  4.  Abrasion chondroplasty patella and trochlea  2022      Much improved w/ increased ROM, improved gait and strength/endurance and function -   GOAL STATUS: ALL MET  Judi would do well continuing her HEP and discontinuing formal Physical Therapy at this time -     P:  DC PT to HEP        _________________________________________________     Manual Therapy:                 mins   14206;  Therapeutic Exercise:   25    mins  53117;     Neuromuscular Lea:   10     mins  31124;    Therapeutic Activity:     10     mins  78454;     Ultrasound:                          mins  98608;  Iontophoresis:                     mins  83703;    Electrical Stimulation:         mins  02217 ( );  Mechanical Traction:          mins  44503  Dry Needling                       mins self-pay     Timed Treatment:   40    mins                  Total Treatment:     45    mins      Salazar Conteh PT  Physical Therapist  Lic # [id number]

## 2022-07-28 ENCOUNTER — OFFICE VISIT (OUTPATIENT)
Dept: ORTHOPEDIC SURGERY | Facility: CLINIC | Age: 77
End: 2022-07-28

## 2022-07-28 VITALS — BODY MASS INDEX: 27.49 KG/M2 | WEIGHT: 165 LBS | HEIGHT: 65 IN | TEMPERATURE: 98 F

## 2022-07-28 DIAGNOSIS — M94.261 CHONDROMALACIA OF RIGHT KNEE: ICD-10-CM

## 2022-07-28 DIAGNOSIS — S83.241D TEAR OF MEDIAL MENISCUS OF RIGHT KNEE, CURRENT, UNSPECIFIED TEAR TYPE, SUBSEQUENT ENCOUNTER: ICD-10-CM

## 2022-07-28 DIAGNOSIS — M84.351D STRESS FRACTURE OF OTHER SITE OF RIGHT OF FEMUR DUE TO MULTIPLE OR REPETITIVE STRESS WITH ROUTINE HEALING, SUBSEQUENT ENCOUNTER: Primary | ICD-10-CM

## 2022-07-28 DIAGNOSIS — E55.9 VITAMIN D DEFICIENCY: ICD-10-CM

## 2022-07-28 PROCEDURE — 99024 POSTOP FOLLOW-UP VISIT: CPT | Performed by: ORTHOPAEDIC SURGERY

## 2022-07-29 NOTE — PROGRESS NOTES
"Knee Exam      Patient: Judi Sy    YOB: 1945 76 y.o. female    Chief Complaints: knee feels okay    History of Present Illness:Patient underwent right knee scope with partial medial meniscectomy of the posterior horn and root and abrasion chondroplasty of the medial femoral condyle patella and trochlea.  She also had subchondroplasty of medial femoral condylar stress fracture on 5/13/2022.     She was seen on 5/23/2022 and had been doing limited weight with a walker with mild aching pain in the right knee.  She had occasionally used a cane when just going to the bathroom.  She had resumed the Plavix that she was on preoperatively through her PCP.     She is instructed to continue with walker for another 2 weeks then start transitioning to a cane initially in the house then out of the house and referred to physical therapy.  She is currently on vitamin D 2000 units daily and instructed to continue as such.     She was seen on 6/16/2022 stating that she was doing well.  She had walked up a hill with her walker which she said helped to \"stretch out \"her knee and did not have much of any pain in the right knee except with weather changes and rising after prolonged sitting.  Overall she felt better than she did before surgery and felt that therapy was going well.  She had been using her walker for prolonged distances otherwise been using her cane.  Pain was rated 0 out of 10.    Overall she is better than she was before surgery and instructed to continue with therapy and continue with her cane and wean off of it if possible.    She was instructed to continue with vitamin D 2000 units daily and have her level rechecked in the next several weeks, plan to be seeing her PCP for that as well.    She is seen back today stating that she is continue to improve did some slight achiness in the morning with the first few steps or walking downhill.  She was however able to walk 2 miles to the grocery and back " "using a walker that she was using to carry her groceries.    She has occasionally been using her cane for prolonged distances as well.  She has finished therapy and is doing home exercises.  Pain today is rated 0 out of 10  HPI    ROS: knee pain  Past Medical History:   Diagnosis Date   • Aortic heart valve prolapse    • Arthritis    • Asthma due to seasonal allergies    • Carotid artery stenosis     MILD   • COVID-19 12/2021   • High cholesterol    • History of atrial fibrillation    • Knee swelling    • PFO (patent foramen ovale) 2016    HX CLOSURE   • Right knee pain    • Tear of meniscus of knee    • Tendinitis of knee    • TIA (transient ischemic attack)     PRIOR TO PFO CLOSURE   • Vasovagal syncope        Physical Exam:   Vitals:    07/28/22 1113   Temp: 98 °F (36.7 °C)   Weight: 74.8 kg (165 lb)   Height: 165.1 cm (65\")   PainSc: 0-No pain     Well developed with normal mood.  She is with her .  She has no focal discomfort over the medial femoral condyle nor the medial or lateral joint line to palpation or with range of motion.      Radiology: None performed    Vitamin D 6/24/2022 54.1 down from 55.6 on 5/4/2022      Assessment/Plan: Status post right knee surgery as outlined above.    Overall she seems to be doing well and pleased with her outcome.  All of she and her 's questions were answered to her full satisfaction.  She will continue using her cane or walker if needed and continue with home strengthening exercises.    Should continue with vitamin D 2000 units daily and follow-up with her PCP regarding further dosing and monitoring.    She understands that she does have arthritic change in the knee and may have intermittent symptoms thereof that could be managed with injections and may eventually require knee replacement I do not think she is anywhere close to that at this point.    We had a pleasant visit and by mutual agreement I will see her back as needed   "

## 2022-09-29 ENCOUNTER — TELEPHONE (OUTPATIENT)
Dept: FAMILY MEDICINE CLINIC | Facility: CLINIC | Age: 77
End: 2022-09-29

## 2022-09-29 NOTE — TELEPHONE ENCOUNTER
Caller: Judi Sy    Relationship to patient: Self    Best call back number: [phone]    COVID19 symptoms: COUGH,FATIGUE,NO APPETITE     Additional information or concerns: PATIENT WAS JUST WANTING TO LET  AWARE, AND SEE WHAT TO DO     What is the patients preferred pharmacy: [facility] [id number] [facility] [address] [address] AT [facility] & [facility] [phone] [facility] [phone] FX

## 2022-09-29 NOTE — TELEPHONE ENCOUNTER
Just symptomatic treatment, over-the-counter cough medicine Tylenol etc.  It is too late to try the paxlovid

## 2023-01-11 ENCOUNTER — OFFICE VISIT (OUTPATIENT)
Dept: FAMILY MEDICINE CLINIC | Facility: CLINIC | Age: 78
End: 2023-01-11
Payer: MEDICARE

## 2023-01-11 ENCOUNTER — TELEPHONE (OUTPATIENT)
Dept: FAMILY MEDICINE CLINIC | Facility: CLINIC | Age: 78
End: 2023-01-11

## 2023-01-11 VITALS
TEMPERATURE: 97.6 F | HEART RATE: 88 BPM | WEIGHT: 171.8 LBS | SYSTOLIC BLOOD PRESSURE: 120 MMHG | OXYGEN SATURATION: 99 % | DIASTOLIC BLOOD PRESSURE: 80 MMHG | HEIGHT: 65 IN | BODY MASS INDEX: 28.62 KG/M2 | RESPIRATION RATE: 18 BRPM

## 2023-01-11 DIAGNOSIS — M81.0 AGE-RELATED OSTEOPOROSIS WITHOUT CURRENT PATHOLOGICAL FRACTURE: ICD-10-CM

## 2023-01-11 DIAGNOSIS — J30.89 ENVIRONMENTAL AND SEASONAL ALLERGIES: Primary | ICD-10-CM

## 2023-01-11 DIAGNOSIS — K21.9 GASTROESOPHAGEAL REFLUX DISEASE WITHOUT ESOPHAGITIS: ICD-10-CM

## 2023-01-11 DIAGNOSIS — R03.0 PRE-HYPERTENSION: ICD-10-CM

## 2023-01-11 DIAGNOSIS — M81.0 AGE-RELATED OSTEOPOROSIS WITHOUT CURRENT PATHOLOGICAL FRACTURE: Primary | ICD-10-CM

## 2023-01-11 DIAGNOSIS — E55.9 VITAMIN D DEFICIENCY: ICD-10-CM

## 2023-01-11 DIAGNOSIS — E78.2 MIXED HYPERLIPIDEMIA: ICD-10-CM

## 2023-01-11 PROCEDURE — 99214 OFFICE O/P EST MOD 30 MIN: CPT | Performed by: INTERNAL MEDICINE

## 2023-01-11 NOTE — TELEPHONE ENCOUNTER
PT WAS IN TODAY TO SEE DR LAWTON AND DR LAWTON WANTS HER TO START ON PROLIA INJECTION WHICH PT WILL NEED TO GET AT [place] LOCATION.     NEED ORDER PUT IN FOR     AMB REFERRAL TO [facility]     AND   THERAPY PLAN REFERRAL     THANKS

## 2023-01-11 NOTE — PROGRESS NOTES
Subjective   Judi Sy is a 77 y.o. female. Patient is here today for follow-up on her environmental and seasonal allergies, hyperlipidemia, pre-hypertension, vitamin D deficiency, GERD, osteoporosis.  She generally is stable and has no acute complaints.  Chief Complaint   Patient presents with   • Results     PT HERE FOR FOLLOW UP ON LABS          Vitals:    23 1350   BP: 120/80   Pulse: 88   Resp: 18   Temp: 97.6 °F (36.4 °C)   SpO2: 99%     Body mass index is 28.59 kg/m².  The following portions of the patient's history were reviewed and updated as appropriate: allergies, current medications, past family history, past medical history, past social history, past surgical history and problem list.    Past Medical History:   Diagnosis Date   • Aortic heart valve prolapse    • Arthritis    • Asthma due to seasonal allergies    • Carotid artery stenosis     MILD   • COVID-19 2021   • High cholesterol    • History of atrial fibrillation    • Knee swelling    • PFO (patent foramen ovale) 2016    HX CLOSURE   • Right knee pain    • Tear of meniscus of knee    • Tendinitis of knee    • TIA (transient ischemic attack)     PRIOR TO PFO CLOSURE   • Vasovagal syncope       Allergies   Allergen Reactions   • Milk-Related Compounds Other (See Comments)     ACID REFLUX   • Latex Rash   • Tape Rash      Social History     Socioeconomic History   • Marital status:    Tobacco Use   • Smoking status: Former     Packs/day: 0.50     Types: Cigarettes     Quit date: 2016     Years since quittin.0   • Smokeless tobacco: Never   • Tobacco comments:     STARTED SMOKING AGE 18   Vaping Use   • Vaping Use: Never used   Substance and Sexual Activity   • Alcohol use: No     Comment: caffeine use- coffee   • Drug use: No   • Sexual activity: Not Currently     Partners: Male        Current Outpatient Medications:   •  Advair Diskus 100-50 MCG/DOSE DISKUS, Inhale 1 puff Every Morning., Disp: , Rfl:   •  albuterol  sulfate  (90 Base) MCG/ACT inhaler, Inhale 2 puffs Every 4 (Four) Hours As Needed., Disp: , Rfl:   •  aspirin  MG tablet, Take 1 tablet by mouth Daily., Disp: 30 tablet, Rfl: 0  •  atorvastatin (LIPITOR) 20 MG tablet, TAKE ONE TABLET BY MOUTH DAILY (Patient taking differently: Take 20 mg by mouth Daily.), Disp: 90 tablet, Rfl: 2  •  Calcium Citrate-Vitamin D 250-200 MG-UNIT tablet, Take 1 tablet by mouth Every Morning., Disp: , Rfl:   •  cetirizine (zyrTEC) 10 MG tablet, Take 10 mg by mouth Daily., Disp: , Rfl:   •  Cyanocobalamin (VITAMIN B12 PO), Take 1 tablet by mouth Daily., Disp: , Rfl:   •  montelukast (SINGULAIR) 10 MG tablet, Take 10 mg by mouth Every Morning., Disp: , Rfl:   •  Polyethyl Glycol-Propyl Glycol (SYSTANE PRESERVATIVE FREE OP), Apply 1 drop to eye(s) as directed by provider 2 (Two) Times a Day., Disp: , Rfl:      Objective     History of Present Illness     Review of Systems    Physical Exam  Vitals and nursing note reviewed.   Constitutional:       General: She is not in acute distress.     Appearance: Normal appearance. She is not ill-appearing.   HENT:      Head: Normocephalic and atraumatic.   Cardiovascular:      Rate and Rhythm: Normal rate and regular rhythm.      Heart sounds: Normal heart sounds.   Pulmonary:      Effort: Pulmonary effort is normal. No respiratory distress.      Breath sounds: Normal breath sounds. No wheezing or rales.   Musculoskeletal:         General: Normal range of motion.   Skin:     General: Skin is warm and dry.   Neurological:      General: No focal deficit present.      Mental Status: She is alert and oriented to person, place, and time.   Psychiatric:         Mood and Affect: Mood normal.         Behavior: Behavior normal.         ASSESSMENT CBC is normal.  CMP was also normal and lipid panel is stable with total cholesterol 178, HDL 64, .  TSH is normal and vitamin D level remains normal on supplement  #1-environmental and seasonal  allergies, stable on medication  #2-hyperlipidemia controlled on medication  #3-pre-hypertension with acceptable reading today  #4-vitamin D deficiency corrected with supplement  #5-osteoporosis on bone density test, resistant to Prolia     Problems Addressed this Visit        Allergies and Adverse Reactions    Environmental and seasonal allergies - Primary       Cardiac and Vasculature    Hyperlipidemia    Pre-hypertension       Endocrine and Metabolic    Vitamin D deficiency       Musculoskeletal and Injuries    Osteoporosis   Diagnoses       Codes Comments    Environmental and seasonal allergies    -  Primary ICD-10-CM: J30.89  ICD-9-CM: 477.8     Mixed hyperlipidemia     ICD-10-CM: E78.2  ICD-9-CM: 272.2     Pre-hypertension     ICD-10-CM: R03.0  ICD-9-CM: 796.2     Vitamin D deficiency     ICD-10-CM: E55.9  ICD-9-CM: 268.9     Age-related osteoporosis without current pathological fracture     ICD-10-CM: M81.0  ICD-9-CM: 733.01           PLAN the patient will continue current medicines as now.  I did recommend she consider Prolia injections for her osteoporosis.  I have plan on rechecking her in 6 months with a wellness visit and a CBC, CMP, lipid panel and vitamin D    There are no Patient Instructions on file for this visit.  Return in about 6 months (around 7/11/2023) for with labs.

## 2023-01-12 ENCOUNTER — TELEPHONE (OUTPATIENT)
Dept: FAMILY MEDICINE CLINIC | Facility: CLINIC | Age: 78
End: 2023-01-12

## 2023-01-12 NOTE — TELEPHONE ENCOUNTER
Caller: Judi Sy    Relationship: Self    Best call back number: [phone]    Who are you requesting to speak with (clinical staff, provider,  specific staff member): CLINICAL STAFF   What was the call regarding: WANTING TO SPEAK TO SOMEONE ABOUT RESULTS OF BONE DENSITY SCAN AND TO GET CALCIUM  TESTING DONE     Do you require a callback: YES

## 2023-01-17 ENCOUNTER — TELEPHONE (OUTPATIENT)
Dept: FAMILY MEDICINE CLINIC | Facility: CLINIC | Age: 78
End: 2023-01-17

## 2023-01-17 NOTE — TELEPHONE ENCOUNTER
Caller: Judi Sy    Relationship: Self    Best call back number: [phone]    What orders are you requesting (i.e. lab or imaging): BLOOD CALCIUM LEVEL    In what timeframe would the patient need to come in: ASAP    Where will you receive your lab/imaging services: IN OFFICE    Additional notes: PATIENT STATED EVERYTHING BUT THIS WAS TESTED 01-, AND RESULTS WERE DISCUSSED 01-. SHE WOULD LIKE TO KNOW HER BLOOD CALCIUM LEVELS BEFORE MAKING A DECISION ON TREATMENT.    PLEASE CALL TO DISCUSS, AND SCHEDULE WHEN ORDERS ARE ENTERED

## 2023-01-18 ENCOUNTER — TELEPHONE (OUTPATIENT)
Dept: ORTHOPEDIC SURGERY | Facility: CLINIC | Age: 78
End: 2023-01-18
Payer: MEDICARE

## 2023-01-18 NOTE — TELEPHONE ENCOUNTER
Provider: JAIME COLEMAN MD  Caller: NINFA RANDOLPH   Relationship to Patient: PATIENT   Pharmacy: [facility]   Phone Number: [phone]  Reason for Call: AFTER BONE DENSITY ORDERED BY DR DINESH LAWTON  DX  FINDINGS OF OSTEOPORSIS.  PATIENT WANTED YOU TO KNOW.   When was the patient last seen: 07/28/2022    PATIENT WANTS TO KNOW WHAT ARE NEXT STEPS WITH DR COLEMAN?

## 2023-01-19 NOTE — TELEPHONE ENCOUNTER
PT RETURNED DR. COLEMAN CALL AND IS REQUESTING IF POSSIBLE ANOTHER CALL BACK TODAY -[phone]. PT SAID SHE WILL BE CLOSE TO HER PHONE ALL DAY.

## 2023-01-19 NOTE — TELEPHONE ENCOUNTER
I returned patient's call.  She says she was recently seen by her PCP with diagnosis of osteoporosis.  She is asking about my recommendations read regarding that.  I told her that I really do not do anything as far as treatment work-up etc. for osteoporosis and would recommend that she follow whenever her primary care provider recommends.  I reviewed his notes and looks like he recommended Prolia shot which I think would be a good idea for opacities recommending as well as any other supplements    She said her knee is doing pretty well gets little achy with weather changes but not enough for an injection.    She appreciated my advice and recommendations and will follow up with her PCP regarding further treatment recommendations for osteoporosis.

## 2023-01-25 ENCOUNTER — TELEPHONE (OUTPATIENT)
Dept: FAMILY MEDICINE CLINIC | Facility: CLINIC | Age: 78
End: 2023-01-25
Payer: MEDICARE

## 2023-01-25 NOTE — TELEPHONE ENCOUNTER
Caller: Judi Sy    Relationship: Self    Best call back number: [phone]    Who is your current provider: DR. LAWTON    Who would you like your new provider to be: DR MONTIEL    What are your reasons for transferring care: WANTS FEMALE  Additional notes: PATIENT IS CALLING TO REQUEST A CHANGE OF PRIMARY CARE PROVIDER.  SHE WOULD LIKE A FEMALE.    PLEASE ADVISE.

## 2023-01-25 NOTE — TELEPHONE ENCOUNTER
Pt called back and I advised ok switch pcp to Dr. Garza and rescheduled appts to Dr. aGrza's schedule and updated pt's pcp

## 2023-05-16 RX ORDER — ATORVASTATIN CALCIUM 20 MG/1
TABLET, FILM COATED ORAL
Qty: 90 TABLET | Refills: 2 | Status: SHIPPED | OUTPATIENT
Start: 2023-05-16

## 2023-07-11 ENCOUNTER — TELEPHONE (OUTPATIENT)
Dept: FAMILY MEDICINE CLINIC | Facility: CLINIC | Age: 78
End: 2023-07-11

## 2023-07-11 NOTE — TELEPHONE ENCOUNTER
Caller: Judi Sy    Relationship: Self    Best call back number: [phone]    What was the call regarding: PATIENT HAS FASTING LABS SCHEDULED FOR 07/12/2023 AND AN APPOINTMENT WITH DR MONTIEL ON 08/04/2023. PATIENT IS ASKING IF THE FASTING LABS ARE BEING DONE TOO FAR IN ADVANCE. PLEASE ADVISE.

## 2023-07-26 ENCOUNTER — TELEPHONE (OUTPATIENT)
Dept: FAMILY MEDICINE CLINIC | Facility: CLINIC | Age: 78
End: 2023-07-26
Payer: MEDICARE

## 2023-07-26 NOTE — TELEPHONE ENCOUNTER
Pt called in stating that she needed to reschedule her appointment for 8/4. Informed the HUB that Dr. Garza is out of the office till 8/11. Pt had hung up by the time that I had explained that to the HUB member.

## 2023-08-01 ENCOUNTER — HOSPITAL ENCOUNTER (OUTPATIENT)
Facility: HOSPITAL | Age: 78
Discharge: HOME | End: 2023-08-01
Attending: STUDENT IN AN ORGANIZED HEALTH CARE EDUCATION/TRAINING PROGRAM | Admitting: STUDENT IN AN ORGANIZED HEALTH CARE EDUCATION/TRAINING PROGRAM
Payer: MEDICARE

## 2023-08-01 ENCOUNTER — APPOINTMENT (OUTPATIENT)
Dept: GENERAL RADIOLOGY | Facility: HOSPITAL | Age: 78
End: 2023-08-01
Payer: MEDICARE

## 2023-08-01 VITALS
HEIGHT: 65 IN | RESPIRATION RATE: 18 BRPM | WEIGHT: 179 LBS | BODY MASS INDEX: 29.82 KG/M2 | TEMPERATURE: 98.2 F | OXYGEN SATURATION: 96 % | HEART RATE: 89 BPM | DIASTOLIC BLOOD PRESSURE: 89 MMHG | SYSTOLIC BLOOD PRESSURE: 136 MMHG

## 2023-08-01 DIAGNOSIS — M79.601 PAIN OF RIGHT UPPER EXTREMITY: ICD-10-CM
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
## 2023-08-01 DIAGNOSIS — S41.111A LACERATION OF RIGHT UPPER EXTREMITY, INITIAL ENCOUNTER: ICD-10-CM

## 2023-08-01 DIAGNOSIS — S80.212A ABRASION OF LEFT KNEE, INITIAL ENCOUNTER: ICD-10-CM

## 2023-08-01 PROCEDURE — G0463 HOSPITAL OUTPT CLINIC VISIT: HCPCS | Performed by: STUDENT IN AN ORGANIZED HEALTH CARE EDUCATION/TRAINING PROGRAM

## 2023-08-01 PROCEDURE — 73130 X-RAY EXAM OF HAND: CPT

## 2023-08-01 NOTE — DISCHARGE INSTRUCTIONS
Please keep the open wounds clean with soap and water. Do not scrub at the area that is glued or it will come off on its own. Please ice the areas to help with pain and swelling. Take tylenol for pain. Follow up with your PCP

## 2023-08-01 NOTE — FSED PROVIDER NOTE
Subjective   History of Present Illness  76yo F p/w L hand pain after falling when the dog jumped behind her. Patient thinks she braced herself with L knee and L hand. Dog scratched her R arm. Reports R arm onlly hurts when she keeps it straight. Denies hitting head. No LOC. Was able to get up afterwards. No pain to the L knee.    History provided by:  Patient    Review of Systems   Musculoskeletal:         L hand pain, R arm wounds, L knee abrasion   All other systems reviewed and are negative.    Past Medical History:   Diagnosis Date    Aortic heart valve prolapse     Arthritis     Asthma due to seasonal allergies     Carotid artery stenosis     MILD    COVID-19 12/2021    High cholesterol     History of atrial fibrillation     Knee swelling     PFO (patent foramen ovale) 2016    HX CLOSURE    Right knee pain     Tear of meniscus of knee     Tendinitis of knee     TIA (transient ischemic attack)     PRIOR TO PFO CLOSURE    Vasovagal syncope        Allergies   Allergen Reactions    Milk-Related Compounds Other (See Comments)     ACID REFLUX    Latex Rash    Tape Rash       Past Surgical History:   Procedure Laterality Date    BREAST BIOPSY Left     15 years ago; benign    CATARACT EXTRACTION Left     KNEE ARTHROSCOPY Left 03/09/2018    Procedure: LEFT KNEE ARTHROSCOPY, PARTIAL MEDIAL MENISCECTOMY, AND MEDIAL FEMORAL SUBCHONDROPLASTY;  Surgeon: Kade Jacob MD;  Location: [facility] [place];  Service: Orthopedics    KNEE ARTHROSCOPY Right 05/13/2022    Procedure: right KNEE ARTHROSCOPY with debridement and subchondral plasty of medial femoral condyle;  Surgeon: Kade Jacob MD;  Location: [facility] [place];  Service: Orthopedics;  Laterality: Right;    PATENT FORAMEN OVALE CLOSURE      TONSILLECTOMY      TUBAL ABDOMINAL LIGATION         Family History   Problem Relation Age of Onset    Heart disease Father     Heart disease Maternal Grandmother     Breast cancer Mother         50's    Mal  Hyperthermia Neg Hx        Social History     Socioeconomic History    Marital status:    Tobacco Use    Smoking status: Former     Packs/day: 0.50     Types: Cigarettes     Quit date: 2016     Years since quittin.5    Smokeless tobacco: Never    Tobacco comments:     STARTED SMOKING AGE 18   Vaping Use    Vaping Use: Never used   Substance and Sexual Activity    Alcohol use: No     Comment: caffeine use- coffee    Drug use: No    Sexual activity: Not Currently     Partners: Male           Objective   Physical Exam  Vitals and nursing note reviewed.   Constitutional:       General: She is not in acute distress.     Appearance: Normal appearance. She is not ill-appearing.   HENT:      Head: Normocephalic and atraumatic.      Nose: Nose normal.      Mouth/Throat:      Mouth: Mucous membranes are moist.   Eyes:      Extraocular Movements: Extraocular movements intact.      Pupils: Pupils are equal, round, and reactive to light.   Pulmonary:      Effort: Pulmonary effort is normal.   Musculoskeletal:      Cervical back: Normal range of motion. No rigidity or tenderness.      Comments: RUE - full ROM, 3mm linear laceration superficial R lateral upper arm, small skin tear medial forearm, no bony tenderness ot deformities, 2+ radial pulse b/l    L hand - swelling to the 4th MCP with ttp, full ROM at MCP, sensation LUE intact    L knee - abrasion to knee, no ttp, full ROM of knee, no swelling   Skin:     General: Skin is warm and dry.      Capillary Refill: Capillary refill takes less than 2 seconds.   Neurological:      Mental Status: She is alert.       Laceration Repair    Date/Time: 2023 12:45 PM  Performed by: Debra Colon MD  Authorized by: Debra Colon MD     Consent:     Consent obtained:  Verbal    Consent given by:  Patient    Risks, benefits, and alternatives were discussed: yes      Risks discussed:  Infection and pain    Alternatives discussed:  No treatment  Universal protocol:      Procedure explained and questions answered to patient or proxy's satisfaction: yes      Patient identity confirmed:  Verbally with patient  Anesthesia:     Anesthesia method:  None  Laceration details:     Location:  Shoulder/arm    Shoulder/arm location:  R upper arm    Length (cm):  3    Depth (mm):  1  Pre-procedure details:     Preparation:  Patient was prepped and draped in usual sterile fashion  Exploration:     Limited defect created (wound extended): no      Contaminated: no    Treatment:     Area cleansed with:  Saline    Amount of cleaning:  Standard    Irrigation solution:  Sterile water    Irrigation method:  Syringe    Debridement:  None  Skin repair:     Repair method:  Tissue adhesive  Approximation:     Approximation:  Close  Repair type:     Repair type:  Simple  Post-procedure details:     Dressing:  Open (no dressing)    Procedure completion:  Tolerated well, no immediate complications           ED Course  ED Course as of 08/01/23 1245   Tue Aug 01, 2023   1236 XR with dorsal swelling, osteopenia, no fracture, discussed ice, tylenol, motrin, compression, wound care, laceration dermabonded [SH]      ED Course User Index  [SH] Debra Colon MD                                           Medical Decision Making  78yo F p/w L hand pain after falling when the dog jumped behind her. No tenderness to L knee or RUE, only with swelling and ecchymosis L 4th MCP, will obtain xr to eval for fracture. Also with superficial laceration RUE, will clean and dermabond.    Problems Addressed:  Abrasion of left knee, initial encounter: complicated acute illness or injury  Fall, initial encounter: complicated acute illness or injury  Laceration of right upper extremity, initial encounter: complicated acute illness or injury  Pain of right upper extremity: complicated acute illness or injury    Amount and/or Complexity of Data Reviewed  Radiology: ordered and independent interpretation performed. Decision-making details  documented in ED Course.        Final diagnoses:   None       ED Disposition  ED Disposition       None            No follow-up provider specified.       Medication List      No changes were made to your prescriptions during this visit.

## 2023-09-08 ENCOUNTER — OFFICE VISIT (OUTPATIENT)
Dept: FAMILY MEDICINE CLINIC | Facility: CLINIC | Age: 78
End: 2023-09-08
Payer: MEDICARE

## 2023-09-08 VITALS
BODY MASS INDEX: 28.74 KG/M2 | HEIGHT: 65 IN | TEMPERATURE: 97.7 F | OXYGEN SATURATION: 94 % | HEART RATE: 95 BPM | DIASTOLIC BLOOD PRESSURE: 74 MMHG | RESPIRATION RATE: 12 BRPM | SYSTOLIC BLOOD PRESSURE: 118 MMHG | WEIGHT: 172.5 LBS

## 2023-09-08 DIAGNOSIS — E55.9 VITAMIN D DEFICIENCY: ICD-10-CM

## 2023-09-08 DIAGNOSIS — Z00.00 ENCOUNTER FOR SUBSEQUENT ANNUAL WELLNESS VISIT (AWV) IN MEDICARE PATIENT: Primary | ICD-10-CM

## 2023-09-08 DIAGNOSIS — Z00.00 ENCOUNTER FOR PREVENTIVE CARE: ICD-10-CM

## 2023-09-08 DIAGNOSIS — J30.89 ENVIRONMENTAL AND SEASONAL ALLERGIES: ICD-10-CM

## 2023-09-08 DIAGNOSIS — E78.2 MIXED HYPERLIPIDEMIA: ICD-10-CM

## 2023-09-08 DIAGNOSIS — M81.0 AGE-RELATED OSTEOPOROSIS WITHOUT CURRENT PATHOLOGICAL FRACTURE: ICD-10-CM

## 2023-09-08 DIAGNOSIS — Z12.31 ENCOUNTER FOR SCREENING MAMMOGRAM FOR MALIGNANT NEOPLASM OF BREAST: ICD-10-CM

## 2023-09-08 PROCEDURE — 1160F RVW MEDS BY RX/DR IN RCRD: CPT | Performed by: STUDENT IN AN ORGANIZED HEALTH CARE EDUCATION/TRAINING PROGRAM

## 2023-09-08 PROCEDURE — 1159F MED LIST DOCD IN RCRD: CPT | Performed by: STUDENT IN AN ORGANIZED HEALTH CARE EDUCATION/TRAINING PROGRAM

## 2023-09-08 PROCEDURE — G0439 PPPS, SUBSEQ VISIT: HCPCS | Performed by: STUDENT IN AN ORGANIZED HEALTH CARE EDUCATION/TRAINING PROGRAM

## 2023-09-08 RX ORDER — MAGNESIUM CARB/ALUMINUM HYDROX 105-160MG
TABLET,CHEWABLE ORAL ONCE
COMMUNITY

## 2023-09-08 RX ORDER — EPINEPHRINE 0.3 MG/.3ML
INJECTION SUBCUTANEOUS
COMMUNITY
Start: 2023-07-12

## 2023-09-08 NOTE — PROGRESS NOTES
The ABCs of the Annual Wellness Visit  Subsequent Medicare Wellness Visit    Subjective    Judi Sy is a 78 y.o. female who presents for a Subsequent Medicare Wellness Visit.    The following portions of the patient's history were reviewed and   updated as appropriate: allergies, current medications, past family history, past medical history, past social history, past surgical history, and problem list.    Compared to one year ago, the patient feels her physical   health is better.    Compared to one year ago, the patient feels her mental   health is better.    Recent Hospitalizations:  This patient has had a [facility] admission record on file within the last 365 days.    Current Medical Providers:  Patient Care Team:  Aniket Garza MD as PCP - General (Internal Medicine)  Ravi Griffin MD as PCP - Family Medicine  Den Morgan Jr., MD as Consulting Physician (Cardiology)    Outpatient Medications Prior to Visit   Medication Sig Dispense Refill    Advair Diskus 100-50 MCG/DOSE DISKUS Inhale 1 puff Every Morning.      albuterol sulfate  (90 Base) MCG/ACT inhaler Inhale 2 puffs Every 4 (Four) Hours As Needed.      aspirin  MG tablet Take 1 tablet by mouth Daily. 30 tablet 0    atorvastatin (LIPITOR) 20 MG tablet TAKE ONE TABLET BY MOUTH DAILY 90 tablet 2    Calcium Citrate-Vitamin D 250-200 MG-UNIT tablet Take 1 tablet by mouth Every Morning.      cetirizine (zyrTEC) 10 MG tablet Take 1 tablet by mouth Daily.      Cyanocobalamin (VITAMIN B12 PO) Take 1 tablet by mouth Daily.      EPINEPHrine (EPIPEN) 0.3 MG/0.3ML solution auto-injector injection       magnesium citrate 1.745 GM/30ML solution solution Take  by mouth 1 (One) Time.      montelukast (SINGULAIR) 10 MG tablet Take 1 tablet by mouth Every Morning.      Polyethyl Glycol-Propyl Glycol (SYSTANE PRESERVATIVE FREE OP) Apply 1 drop to eye(s) as directed by provider 2 (Two) Times a Day.       No facility-administered  "medications prior to visit.       No opioid medication identified on active medication list. I have reviewed chart for other potential  high risk medication/s and harmful drug interactions in the elderly.        Aspirin is on active medication list. Aspirin use is indicated based on review of current medical condition/s. Pros and cons of this therapy have been discussed today. Benefits of this medication outweigh potential harm.  Patient has been encouraged to continue taking this medication.  .      Patient Active Problem List   Diagnosis    Gastroesophageal reflux disease without esophagitis    Hiatal hernia    Hyperlipidemia    Pre-hypertension    Temporary cerebral vascular dysfunction    Osteoporosis    Closed displaced avulsion fracture of left talus    Acute meniscal tear of knee, left, sequela    Primary localized osteoarthrosis of right lower leg    Tear of medial meniscus of right knee, current    Closed nondisplaced fracture of condyle of left femur    Chondromalacia of right knee    Vitamin D deficiency    Status post total left knee replacement    Environmental and seasonal allergies    Viral upper respiratory tract infection    Popliteal synovial cyst, right    Valgus deformity of both great toes    Arthritis of first metatarsophalangeal (MTP) joint of left foot    Arthritis of first metatarsophalangeal (MTP) joint of right foot    Stress fracture of other site of femur due to multiple or repetitive stress     Advance Care Planning   Advance Care Planning     Advance Directive is on file.  ACP discussion was held with the patient during this visit. Patient has an advance directive in EMR which is still valid.      Objective    Vitals:    09/08/23 1446   BP: 118/74   BP Location: Left arm   Patient Position: Sitting   Cuff Size: Adult   Pulse: 95   Resp: 12   Temp: 97.7 °F (36.5 °C)   TempSrc: Infrared   SpO2: 94%   Weight: 78.2 kg (172 lb 8 oz)   Height: 165.1 cm (65\")     Estimated body mass index is " "28.71 kg/m² as calculated from the following:    Height as of this encounter: 165.1 cm (65\").    Weight as of this encounter: 78.2 kg (172 lb 8 oz).    BMI is >= 25 and <30. (Overweight) The following options were offered after discussion;: exercise counseling/recommendations and nutrition counseling/recommendations      Does the patient have evidence of cognitive impairment? No    Lab Results   Component Value Date    CHLPL 153 2023    TRIG 63 2023    HDL 60 2023    LDL 80 2023    VLDL 13 2023        HEALTH RISK ASSESSMENT    Smoking Status:  Social History     Tobacco Use   Smoking Status Former    Packs/day: 0.50    Types: Cigarettes    Quit date: 2016    Years since quittin.7   Smokeless Tobacco Never   Tobacco Comments    STARTED SMOKING AGE 18     Alcohol Consumption:  Social History     Substance and Sexual Activity   Alcohol Use No    Comment: caffeine use- coffee     Fall Risk Screen:    ELODIAADI Fall Risk Assessment was completed, and patient is at LOW risk for falls.Assessment completed on:2023    Depression Screenin/8/2023     3:00 PM   PHQ-2/PHQ-9 Depression Screening   Little Interest or Pleasure in Doing Things 1-->several days   Feeling Down, Depressed or Hopeless 1-->several days   Trouble Falling or Staying Asleep, or Sleeping Too Much 2-->more than half the days   Feeling Tired or Having Little Energy 0-->not at all   Poor Appetite or Overeating 0-->not at all   Feeling Bad about Yourself - or that You are a Failure or Have Let Yourself or Your Family Down 0-->not at all   Trouble Concentrating on Things, Such as Reading the Newspaper or Watching Television 0-->not at all   Moving or Speaking So Slowly that Other People Could Have Noticed? Or the Opposite - Being So Fidgety 0-->not at all   Thoughts that You Would be Better Off Dead or of Hurting Yourself in Some Way 0-->not at all   PHQ-9: Brief Depression Severity Measure Score 4       Health " Habits and Functional and Cognitive Screenin/8/2023     2:00 PM   Functional & Cognitive Status   Do you have difficulty preparing food and eating? No   Do you have difficulty bathing yourself, getting dressed or grooming yourself? No   Do you have difficulty using the toilet? No   Do you have difficulty moving around from place to place? No   Do you have trouble with steps or getting out of a bed or a chair? No   Current Diet Well Balanced Diet   Dental Exam Up to date   Eye Exam Up to date   Exercise (times per week) 3 times per week   Current Exercises Include House Cleaning;Hiking   Do you need help using the phone?  No   Are you deaf or do you have serious difficulty hearing?  No   Do you need help to go to places out of walking distance? No   Do you need help shopping? No   Do you need help preparing meals?  No   Do you need help with housework?  No   Do you need help with laundry? No   Do you need help taking your medications? No   Do you need help managing money? No   Do you ever drive or ride in a car without wearing a seat belt? Yes   Have you felt unusual stress, anger or loneliness in the last month? Yes   Who do you live with? Spouse   If you need help, do you have trouble finding someone available to you? No   Have you been bothered in the last four weeks by sexual problems? No   Do you have difficulty concentrating, remembering or making decisions? No       Age-appropriate Screening Schedule:  Refer to the list below for future screening recommendations based on patient's age, sex and/or medical conditions. Orders for these recommended tests are listed in the plan section. The patient has been provided with a written plan.    Health Maintenance   Topic Date Due    BMI FOLLOWUP  Never done    ANNUAL WELLNESS VISIT  Never done    DXA SCAN  2023    COVID-19 Vaccine (5 - Moderna series) 2023    INFLUENZA VACCINE  10/01/2023    COLORECTAL CANCER SCREENING  2024    LIPID PANEL   "08/25/2024    TDAP/TD VACCINES (2 - Tdap) 10/18/2024    HEPATITIS C SCREENING  Completed    Pneumococcal Vaccine 65+  Completed    ZOSTER VACCINE  Completed                  CMS Preventative Services Quick Reference  Risk Factors Identified During Encounter  Urinary Incontinence: Kegel exercises discussed  The above risks/problems have been discussed with the patient.  Pertinent information has been shared with the patient in the After Visit Summary.  An After Visit Summary and PPPS were made available to the patient.    Follow Up:   Next Medicare Wellness visit to be scheduled in 1 year.       Additional E&M Note during same encounter follows:  Patient has multiple medical problems which are significant and separately identifiable that require additional work above and beyond the Medicare Wellness Visit.      Chief Complaint  Establish Care and Annual Exam (Wants to discuss getting mammogram and pap smear if necessary. )    Subjective        HPI  Judi Sy is also being seen today for wellness check up         Objective   Vital Signs:  /74 (BP Location: Left arm, Patient Position: Sitting, Cuff Size: Adult)   Pulse 95   Temp 97.7 °F (36.5 °C) (Infrared)   Resp 12   Ht 165.1 cm (65\")   Wt 78.2 kg (172 lb 8 oz)   SpO2 94%   BMI 28.71 kg/m²     Physical Exam  HENT:      Head: Normocephalic and atraumatic.      Mouth/Throat:      Mouth: Mucous membranes are moist.      Pharynx: Oropharynx is clear.   Eyes:      Extraocular Movements: Extraocular movements intact.      Conjunctiva/sclera: Conjunctivae normal.      Pupils: Pupils are equal, round, and reactive to light.   Cardiovascular:      Rate and Rhythm: Normal rate and regular rhythm.   Pulmonary:      Effort: Pulmonary effort is normal.      Breath sounds: Normal breath sounds.   Abdominal:      General: Bowel sounds are normal.      Palpations: Abdomen is soft.   Musculoskeletal:         General: Normal range of motion.      Cervical back: " Neck supple.   Skin:     General: Skin is warm.      Capillary Refill: Capillary refill takes less than 2 seconds.   Neurological:      General: No focal deficit present.      Mental Status: She is alert and oriented to person, place, and time. Mental status is at baseline.   Psychiatric:         Mood and Affect: Mood normal.        The following data was reviewed by: Aniket Garza MD on 09/08/2023:  CMP          1/4/2023    09:57 8/25/2023    09:06   CMP   Glucose 91  98    BUN 13  11    Creatinine 0.75  0.78    Sodium 142  143    Potassium 4.0  4.8    Chloride 103  104    Calcium 9.6  9.7    Total Protein 6.7  6.3    Albumin 4.8  4.4    Globulin 1.9  1.9    Total Bilirubin 0.3  0.3    Alkaline Phosphatase 90  94    AST (SGOT) 18  16    ALT (SGPT) 12  13    BUN/Creatinine Ratio 17.3  14.1      CBC          1/4/2023    09:57 8/25/2023    09:06   CBC   WBC 6.07  6.63    RBC 4.44  4.37    Hemoglobin 13.9  13.7    Hematocrit 42.1  41.1    MCV 94.8  94.1    MCH 31.3  31.4    MCHC 33.0  33.3    RDW 13.2  12.7    Platelets 214  203      Lipid Panel          1/4/2023    09:57 8/25/2023    09:06   Lipid Panel   Total Cholesterol 178  153    Triglycerides 72  63    HDL Cholesterol 64  60    VLDL Cholesterol 14  13    LDL Cholesterol  100  80    LDL/HDL Ratio 1.56       TSH          1/4/2023    09:57   TSH   TSH 2.830                 Assessment and Plan   Diagnoses and all orders for this visit:    1. Encounter for subsequent annual wellness visit (AWV) in Medicare patient (Primary)    2. Encounter for screening mammogram for malignant neoplasm of breast  -     Mammo screening digital tomosynthesis bilateral w CAD    3. Mixed hyperlipidemia    4. Vitamin D deficiency    5. Environmental and seasonal allergies    6. Age-related osteoporosis without current pathological fracture    7. Encounter for preventive care             # Seasonal allergies  #Hyperlipidemia, continue lipitor   #Pre hypertension  #Vit D  def  #Osteoporosis used to take fosamax, not able to tolerate it so stopped, recommended prolia but patient declined after reading about its side effects.     Patient encouraged to partake of healthy diet rich in fresh fruits and vegetables as well as lean proteins.  Patient encouraged to participate in daily exercise with goal of 30 min sustained activity.  Wear seatbelt when driving  Flu shot annually        Follow Up   No follow-ups on file.  Patient was given instructions and counseling regarding her condition or for health maintenance advice. Please see specific information pulled into the AVS if appropriate.

## 2023-10-10 ENCOUNTER — HOSPITAL ENCOUNTER (OUTPATIENT)
Dept: MAMMOGRAPHY | Facility: HOSPITAL | Age: 78
Discharge: HOME | End: 2023-10-10
Admitting: STUDENT IN AN ORGANIZED HEALTH CARE EDUCATION/TRAINING PROGRAM
Payer: MEDICARE

## 2023-10-10 PROCEDURE — 77063 BREAST TOMOSYNTHESIS BI: CPT

## 2023-10-10 PROCEDURE — 77067 SCR MAMMO BI INCL CAD: CPT

## 2023-10-18 ENCOUNTER — APPOINTMENT (OUTPATIENT)
Dept: GENERAL RADIOLOGY | Facility: HOSPITAL | Age: 78
End: 2023-10-18
Payer: MEDICARE

## 2023-10-18 ENCOUNTER — HOSPITAL ENCOUNTER (OUTPATIENT)
Facility: HOSPITAL | Age: 78
Discharge: HOME | End: 2023-10-18
Attending: STUDENT IN AN ORGANIZED HEALTH CARE EDUCATION/TRAINING PROGRAM | Admitting: STUDENT IN AN ORGANIZED HEALTH CARE EDUCATION/TRAINING PROGRAM
Payer: MEDICARE

## 2023-10-18 VITALS
BODY MASS INDEX: 27.32 KG/M2 | DIASTOLIC BLOOD PRESSURE: 63 MMHG | HEART RATE: 85 BPM | RESPIRATION RATE: 18 BRPM | WEIGHT: 164 LBS | TEMPERATURE: 98.5 F | SYSTOLIC BLOOD PRESSURE: 109 MMHG | HEIGHT: 65 IN | OXYGEN SATURATION: 95 %

## 2023-10-18 DIAGNOSIS — M25.511 ACUTE PAIN OF RIGHT SHOULDER: Primary | ICD-10-CM

## 2023-10-18 PROCEDURE — G0463 HOSPITAL OUTPT CLINIC VISIT: HCPCS | Performed by: STUDENT IN AN ORGANIZED HEALTH CARE EDUCATION/TRAINING PROGRAM

## 2023-10-18 PROCEDURE — 73030 X-RAY EXAM OF SHOULDER: CPT

## 2023-10-19 NOTE — FSED PROVIDER NOTE
Subjective   History of Present Illness  78-year-old female presents to the emergency department with left shoulder pain.  She reports approximately 3 weeks ago she was walking her dog when the dog pulled and she fell landing on her left shoulder.  She was evaluated at that time, however she reports symptoms have continued and progressively worsened.  She reports pain is worse with movement.  No repeat injury.  She has been trying over-the-counter medications with moderate relief.    History provided by:  Patient      Review of Systems   Constitutional:  Negative for chills and fever.   HENT:  Negative for congestion and sore throat.    Eyes:  Negative for pain and redness.   Respiratory:  Negative for cough and shortness of breath.    Cardiovascular:  Negative for chest pain and palpitations.   Gastrointestinal:  Negative for abdominal pain, nausea and vomiting.   Genitourinary:  Negative for difficulty urinating and dysuria.   Musculoskeletal:  Positive for arthralgias. Negative for back pain and neck pain.   Skin:  Negative for rash and wound.   Neurological:  Negative for weakness, numbness and headaches.   All other systems reviewed and are negative.      Past Medical History:   Diagnosis Date    Aortic heart valve prolapse     Arthritis     Asthma due to seasonal allergies     Carotid artery stenosis     MILD    COVID-19 12/2021    High cholesterol     History of atrial fibrillation     Knee swelling     PFO (patent foramen ovale) 2016    HX CLOSURE    Right knee pain     Tear of meniscus of knee     Tendinitis of knee     TIA (transient ischemic attack)     PRIOR TO PFO CLOSURE    Vasovagal syncope        Allergies   Allergen Reactions    Milk-Related Compounds Other (See Comments)     ACID REFLUX    Latex Rash    Tape Rash       Past Surgical History:   Procedure Laterality Date    BREAST BIOPSY Left     over 15 years ago; benign    CATARACT EXTRACTION Left     KNEE ARTHROSCOPY Left 03/09/2018    Procedure:  LEFT KNEE ARTHROSCOPY, PARTIAL MEDIAL MENISCECTOMY, AND MEDIAL FEMORAL SUBCHONDROPLASTY;  Surgeon: Kade Jacob MD;  Location: [facility];  Service: Orthopedics    KNEE ARTHROSCOPY Right 2022    Procedure: right KNEE ARTHROSCOPY with debridement and subchondral plasty of medial femoral condyle;  Surgeon: Kade Jacob MD;  Location: [facility] OR [facility];  Service: Orthopedics;  Laterality: Right;    PATENT FORAMEN OVALE CLOSURE      TONSILLECTOMY      TUBAL ABDOMINAL LIGATION         Family History   Problem Relation Age of Onset    Heart disease Father     Heart disease Maternal Grandmother     Breast cancer Mother         50's    Malig Hyperthermia Neg Hx        Social History     Socioeconomic History    Marital status:    Tobacco Use    Smoking status: Former     Packs/day: .5     Types: Cigarettes     Quit date: 2016     Years since quittin.8    Smokeless tobacco: Never    Tobacco comments:     STARTED SMOKING AGE 18   Vaping Use    Vaping Use: Never used   Substance and Sexual Activity    Alcohol use: No     Comment: caffeine use- coffee    Drug use: No    Sexual activity: Not Currently     Partners: Male           Objective   Physical Exam  Vitals and nursing note reviewed.   Constitutional:       General: She is not in acute distress.     Appearance: Normal appearance.   HENT:      Head: Normocephalic and atraumatic.      Mouth/Throat:      Mouth: Mucous membranes are moist.   Eyes:      Extraocular Movements: Extraocular movements intact.      Conjunctiva/sclera: Conjunctivae normal.      Pupils: Pupils are equal, round, and reactive to light.   Cardiovascular:      Rate and Rhythm: Normal rate and regular rhythm.      Pulses: Normal pulses.   Pulmonary:      Effort: Pulmonary effort is normal. No respiratory distress.   Abdominal:      General: There is no distension.      Palpations: Abdomen is soft.   Musculoskeletal:         General: Normal range of motion.       Cervical back: Normal range of motion and neck supple.      Comments: Mild tenderness to palpation surrounding anterior left shoulder joint.  Soft compartments.  2+ radial pulses bilaterally.  No evidence deformity.  No overlying skin changes.  Full range of motion.   Skin:     General: Skin is warm.   Neurological:      General: No focal deficit present.      Mental Status: She is alert.   Psychiatric:         Mood and Affect: Mood normal.         Behavior: Behavior normal.         Procedures           ED Course                                           Medical Decision Making  78-year-old female presents to the emergency department left shoulder pain.  This occurred after injury.  I do not suspect cardiac etiology at this time.  X-ray negative for any acute bony abnormality such as fracture or dislocation.  This likely represents rotator cuff injury.  I discussed with the patient following up with orthopedic surgery.  Return to the ED for any new or worsening symptoms.  Patient expressed understanding and agreement with this plan.  Patient discharged home.    Problems Addressed:  Acute pain of right shoulder: acute illness or injury    Amount and/or Complexity of Data Reviewed  Radiology: ordered.        Final diagnoses:   Acute pain of right shoulder       ED Disposition  ED Disposition       ED Disposition   Discharge    Condition   Stable    Comment   --               Aniket Garza MD  [id number] [facility] [id number] [phone]    Schedule an appointment as soon as possible for a visit in 3 days      [facility]  [id number] [place] [phone]    As needed, If symptoms worsen         Medication List      No changes were made to your prescriptions during this visit.
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
## 2023-10-20 DIAGNOSIS — S46.911D RIGHT SHOULDER STRAIN, SUBSEQUENT ENCOUNTER: Primary | ICD-10-CM

## 2023-10-26 ENCOUNTER — TREATMENT (OUTPATIENT)
Dept: PHYSICAL THERAPY | Facility: CLINIC | Age: 78
End: 2023-10-26
Payer: MEDICARE

## 2023-10-26 DIAGNOSIS — R68.89 IMPAIRED FUNCTION OF UPPER EXTREMITY: ICD-10-CM

## 2023-10-26 DIAGNOSIS — S46.911D STRAIN OF RIGHT SHOULDER, SUBSEQUENT ENCOUNTER: Primary | ICD-10-CM

## 2023-10-26 NOTE — PROGRESS NOTES
Physical Therapy Initial Evaluation and Plan of Care  Psychiatric Physical Therapy - [facility]  [address], [address]  [place] [address]    Patient: Judi Sy   : 1945  Diagnosis/ICD-10 Code:  Strain of right shoulder, subsequent encounter [S46.911D]  Referring practitioner: Aniket Garza MD  Today's Date: 10/26/2023    Subjective Evaluation    History of Present Illness  Mechanism of injury: Late September was walking her dog, she pulled caused her to fall on her to fall on outstretched arms.  Had to have some abrasions addressed but did not check out the shoulder  Few weeks later the pain increased.  Went to ER 10/18/23 - had xrays of the shoulder - no fracture or dislocation - sling  Called PMD - referred to therapy  Walks the dog, does housework, walks  Takes Tylenol prn    Pain  Current pain ratin  At best pain ratin  At worst pain rating: 3  Location: Entire shoulder ache - no specific sharp pain, no popping or catching  Relieving factors: support, rest and relaxation  Aggravating factors: outstretched reach, overhead activity, sleeping, lifting and prolonged positioning (pulling out of the bathtub)  Progression: improved    Social Support  Lives in: condominium    Hand dominance: right    Diagnostic Tests  X-ray: normal    Treatments  Previous treatment: medication  Current treatment: medication and physical therapy  Patient Goals  Patient goal: Learn soem exercises to strengthen the arm           Objective          Observations     Additional Shoulder Observation Details  Slight protraction of shoulders  Holds arm down ot side, decreased arm swing, no bruising    Palpation   Left   No palpable tenderness to the biceps.     Right Tenderness of the biceps and supraspinatus.     Tenderness     Right Shoulder  Tenderness in the biceps tendon (proximal), bicipital groove and supraspinatus tendon.     Cervical/Thoracic Screen   Cervical range of motion within  normal limits with the following exceptions: Slight tightness in right cervical PVM and upper traps    Neurological Testing     Sensation     Shoulder     Right Shoulder   Intact: Light touch    Active Range of Motion     Right Shoulder   Flexion: 150 degrees   Extension: 50 degrees   Abduction: 163 degrees   External rotation 45°: 80 degrees   Internal rotation 45°: 75 degrees     Strength/Myotome Testing     Right Shoulder     Planes of Motion   Flexion: 4   Extension: 4+   Abduction: 4   External rotation at 0°: 4   Internal rotation at 0°: 4+     Tests     Right Shoulder   Positive painful arc and Speed's.   Negative drop arm, empty can and Hawkin's.           Assessment & Plan       Assessment  Impairments: abnormal muscle firing, abnormal muscle tone, abnormal or restricted ROM, activity intolerance, impaired physical strength, lacks appropriate home exercise program, pain with function and safety issue   Functional limitations: carrying objects, lifting, pulling, pushing, reaching behind back, reaching overhead and unable to perform repetitive tasks   Assessment details: 78 y.o. female with right shoulder strain after falling while walking her dog presents with : 1. Intermittent, resolving right shoulder pain, 2. Decreased shoulder AROM,  3. Slight tenderness to palpation in right biceps tendon, 4. Slightly decreased shoulder strength, 5. No signs of internal derangement or significant RTC pathology, 6. Decreased tolerance for many normal ADL's to include walking her dog  Prognosis: good    Goals  Plan Goals: Short Term Goals: 2 weeks  Patient will be able to tolerate initial exercises  Patient will have pain <3/10  Patient will be able to reach into upper kitchen cabinets without pain  Patient will be able to push up out of bathtub with 50% less pain    Long Term Goals: 4 weeks  Patient will be independent in performing home exercise program.  Patient will have functional pain free shoulder AROM  Patient  will be able to get out of her bathtub pulling up with the right arm without pain  Patient will be able to place and retrieve all usual items from upper kitchen cabinets       Plan  Therapy options: will be seen for skilled therapy services  Planned modality interventions: cryotherapy  Planned therapy interventions: manual therapy, strengthening, stretching, therapeutic activities, home exercise program and postural training  Frequency: 2x week  Duration in visits: 6  Duration in weeks: 3  Treatment plan discussed with: patient  Plan details: Access Code: [email or web address]  URL: [email or web address]  Date: 10/26/2023  Prepared by: Dayan Antony    Exercises  - Supine Shoulder Alphabet  - 1 x daily - 7 x weekly - 1 sets - 2 reps  - Supine Single Arm Shoulder Protraction  - 1 x daily - 7 x weekly - 3 sets - 10 reps  - Sidelying Shoulder External Rotation  - 1 x daily - 7 x weekly - 3 sets - 10 reps  - Standing Shoulder Row with Anchored Resistance  - 1 x daily - 7 x weekly - 3 sets - 10 reps  - Shoulder extension with resistance - Neutral  - 1 x daily - 7 x weekly - 3 sets - 10 reps  - Shoulder External Rotation with Anchored Resistance  - 1 x daily - 7 x weekly - 3 sets - 10 reps  - Shoulder Internal Rotation with Resistance  - 1 x daily - 7 x weekly - 3 sets - 10 reps        Manual Therapy:    10     mins  07716;  Therapeutic Exercise:    20     mins  39708;     Neuromuscular Lea:    0    mins  86112;    Therapeutic Activity:     0     mins  61420;     Ultrasound                  __0_  mins  24082  Iontophoresis                 0    mins  98906    Electrical Stimulation     0    mins  52480 ([id number])  Traction                         _0  mins  49830     Evaluation Time:     20  mins  Timed Treatment:   30   mins   Total Treatment:     55   mins    PT: Dayan Antony, PT     License Number: [place] [id number]  Electronically signed by Dayan Antony, PT, 10/26/23, 9:42 AM EDT    Certification Period: 10/26/2023 thru  1/23/2024  I certify that the therapy services are furnished while this patient is under my care.  The services outlined above are required by this patient, and will be reviewed every 90 days.         Physician Signature:__________________________________________________    PHYSICIAN: Aniket Garza MD      DATE:     Please sign and return via fax to [email or web address] . Thank you, [facility].    PT SIGNATURE: Dayan Antony, PT   [place] LICENSE:  [id number]

## 2023-10-29 NOTE — PROGRESS NOTES
Physical Therapy Daily Treatment Note    Visit Diagnoses:    ICD-10-CM ICD-9-CM   1. Strain of right shoulder, subsequent encounter  S46.911D V58.89     840.9   2. Impaired function of upper extremity  R68.89 V49.5       VISIT#: 2      Judi Sy reports: She has been going pretty easy on her arm. She did do her HEP.  She believes her shoulder is back at her baseline. She is able to reach into her upper kitchen cabinets without pain. She is able to push up out of her bathtub without pain.   Current Pain Level:    0/10; Worst:   0/10; Best:  0/10  Affected Area:  R shoulder    Quality of Pain: none  Functional Deficits/Irritating Factors: states she is not having any functional issues now  Progression: improving  Compliance with HEP Reported: yes    Objective    Increased sets/reps of:  none   Increased resistance on:  none  Added to Program: Rhythmic stabilization      PROM R shoulder is WNL in all planes of motion, no pain with exception of mild pain at end range flexion (about 180 deg).    Mild tenderness in long head of biceps tendon    See Exercise, Manual, and Modality Logs for complete treatment.     Patient Education: Pt was educated on exercise biomechanical correctness, intensity, and speed.     Assessment:  Pt doing better since initial evaluation. She is not having any pain today or the past few days. She reports no functional deficits, ie problems reaching or getting out of the tub. She does have some tenderness still in biceps tendon. May be ready for d/c next session if she continues with no pain and good function of her shoulder.  Pt will continue to benefit from skilled PT interventions to address current functional deficits and impairments.       Plan: Progress to/Continue with current program.       Timed:  Manual Therapy:            10_    mins  67698;  Ultrasound:                     0      mins  00665;   Therapeutic Exercise:    25     mins  92710;     Neuromuscular Lea:   0     mins   39064;    Therapeutic Activity:      0     mins  36623;      Iontophoresis              _0__   mins  Dry Needling               _0____   mins         Untimed:  Work Conditioning: __0__ mins 86059  Electrical Stimulation:    0    mins  07367 ( );  Mechanical Traction:       0        mins  67614;   Paraffin                       __0___  mins   Ice/Heat: __0__ mins      Timed Treatment:   35   mins   Total Treatment:     35   mins          Dee Valdes PTA  [place] License # [id number]  Physical Therapist Assistant

## 2023-10-30 ENCOUNTER — TREATMENT (OUTPATIENT)
Dept: PHYSICAL THERAPY | Facility: CLINIC | Age: 78
End: 2023-10-30
Payer: MEDICARE

## 2023-10-30 DIAGNOSIS — S46.911D STRAIN OF RIGHT SHOULDER, SUBSEQUENT ENCOUNTER: Primary | ICD-10-CM

## 2023-10-30 DIAGNOSIS — R68.89 IMPAIRED FUNCTION OF UPPER EXTREMITY: ICD-10-CM

## 2023-10-30 PROCEDURE — 97140 MANUAL THERAPY 1/> REGIONS: CPT | Performed by: PHYSICAL THERAPIST

## 2023-10-30 PROCEDURE — 97110 THERAPEUTIC EXERCISES: CPT | Performed by: PHYSICAL THERAPIST

## 2023-11-02 ENCOUNTER — TREATMENT (OUTPATIENT)
Dept: PHYSICAL THERAPY | Facility: CLINIC | Age: 78
End: 2023-11-02
Payer: MEDICARE

## 2023-11-02 DIAGNOSIS — S46.911D STRAIN OF RIGHT SHOULDER, SUBSEQUENT ENCOUNTER: Primary | ICD-10-CM

## 2023-11-02 DIAGNOSIS — R68.89 IMPAIRED FUNCTION OF UPPER EXTREMITY: ICD-10-CM

## 2023-11-02 NOTE — PROGRESS NOTES
Physical Therapy Daily Treatment Note - Discharge Note  [facility] - [place]  [address], [address]  [place] [address]    Patient: Judi Sy   : 1945  Referring practitioner: Aniket Garza MD  Today's Date: 2023  Patient seen for 3 sessions    Visit Diagnoses:    ICD-10-CM ICD-9-CM   1. Strain of right shoulder, subsequent encounter  S46.911D V58.89     840.9   2. Impaired function of upper extremity  R68.89 V49.5       Subjective   Judi Sy reports: that her shoulder is feeling much better.  Is basically using the mario for all of her normal activities without increased symptoms.      Objective   Shoulder AROM - symmetrical to opposite arm    See Exercise, Manual, and Modality Logs for complete treatment.     Patient Education: cont HEP.  Added scapular retraction    Assessment/Plan  Patient has demonstrated significant  improvement since the initiation of therapy.  The pain has  essentially resolved.  The motion has increased to her normal status.  The activity tolerances have increased to desired levels. I feel that the patient would benefit from continuing her HEP but stopping formal therapy as all goals have been met..        Dc to HEP           Timed:  Manual Therapy:    10     mins  41385;  Therapeutic Exercise:    20     mins  98317;     Neuromuscular Lea:    0    mins  24910;    Therapeutic Activity:     0     mins  77571;     Ultrasound:      0     mins  29431;    Iontophoresis              __0_   mins  Dry Needling               _____   mins        Untimed:  Electrical Stimulation:     0    mins  55133 (MC );  Mechanical Traction:             mins  37964;   Paraffin                       _____  mins     Timed Treatment:   30   mins   Total Treatment:     35   mins    Dayan Antony PT  [place] License # [id number]  Physical Therapist    Electronically signed by Dayan Antony PT, 23, 3:22 PM EDT

## 2023-11-09 ENCOUNTER — TELEPHONE (OUTPATIENT)
Dept: FAMILY MEDICINE CLINIC | Facility: CLINIC | Age: 78
End: 2023-11-09
Payer: MEDICARE

## 2023-11-09 NOTE — TELEPHONE ENCOUNTER
Pt's  called and stated that he has not heard anything regarding the [employer] message and would like to know what Dr. Garza would like pt to do. Informed spouse that Dr. Garza has the message and will either reach out to the pt or would have one of the MA's here in the office reach out to them. Spouse voiced understanding.

## 2023-11-15 ENCOUNTER — OFFICE VISIT (OUTPATIENT)
Dept: INTERNAL MEDICINE | Age: 78
End: 2023-11-15
Payer: MEDICARE

## 2023-11-15 VITALS
TEMPERATURE: 98.6 F | BODY MASS INDEX: 28.76 KG/M2 | WEIGHT: 172.6 LBS | HEIGHT: 65 IN | SYSTOLIC BLOOD PRESSURE: 134 MMHG | HEART RATE: 82 BPM | OXYGEN SATURATION: 96 % | DIASTOLIC BLOOD PRESSURE: 86 MMHG

## 2023-11-15 DIAGNOSIS — E78.5 HYPERLIPIDEMIA, UNSPECIFIED HYPERLIPIDEMIA TYPE: ICD-10-CM

## 2023-11-15 DIAGNOSIS — Z12.11 SCREEN FOR COLON CANCER: ICD-10-CM

## 2023-11-15 DIAGNOSIS — I67.82 TEMPORARY CEREBRAL VASCULAR DYSFUNCTION: ICD-10-CM

## 2023-11-15 DIAGNOSIS — J30.89 ENVIRONMENTAL AND SEASONAL ALLERGIES: ICD-10-CM

## 2023-11-15 DIAGNOSIS — J90 PLEURAL EFFUSION, BILATERAL: Primary | ICD-10-CM

## 2023-11-15 DIAGNOSIS — M81.0 AGE-RELATED OSTEOPOROSIS WITHOUT CURRENT PATHOLOGICAL FRACTURE: ICD-10-CM

## 2023-11-15 DIAGNOSIS — J45.909 ASTHMA, UNSPECIFIED ASTHMA SEVERITY, UNSPECIFIED WHETHER COMPLICATED, UNSPECIFIED WHETHER PERSISTENT: ICD-10-CM

## 2023-11-15 DIAGNOSIS — I65.22 LEFT CAROTID ARTERY STENOSIS: ICD-10-CM

## 2023-11-15 RX ORDER — ALBUTEROL SULFATE 90 UG/1
2 AEROSOL, METERED RESPIRATORY (INHALATION) EVERY 4 HOURS PRN
Qty: 6.7 G | Refills: 0 | Status: ON HOLD | OUTPATIENT
Start: 2023-11-15

## 2023-11-15 RX ORDER — AZITHROMYCIN 250 MG/1
TABLET, FILM COATED ORAL
Qty: 6 TABLET | Refills: 0 | Status: ON HOLD | OUTPATIENT
Start: 2023-11-15 | End: 2023-11-18

## 2023-11-15 RX ORDER — CEFDINIR 300 MG/1
300 CAPSULE ORAL 2 TIMES DAILY
Qty: 14 CAPSULE | Refills: 0 | Status: ON HOLD | OUTPATIENT
Start: 2023-11-15 | End: 2023-11-22

## 2023-11-15 NOTE — PROGRESS NOTES
I N T E R N A L  M E D I C I N E  [facility]      ENCOUNTER DATE:  11/15/2023    Judi Smiley Sy / 78 y.o. / female      CHIEF COMPLAINT / REASON FOR OFFICE VISIT     Establish Care, Pleural Effusion, Hyperlipidemia, Allergies, Asthma, carotid stenosis, and Osteoporosis    ASSESSMENT & PLAN     Problem List Items Addressed This Visit          Allergies and Adverse Reactions    Environmental and seasonal allergies    Overview     Managed by family allergy and asthma, stable on Advair, Singulair, cetirizine, allergy injections            Cardiac and Vasculature    Hyperlipidemia    Relevant Medications    atorvastatin (LIPITOR) 20 MG tablet    Other Relevant Orders    Duplex Carotid Ultrasound CAR    Left carotid artery stenosis    Overview     Statin and baby aspirin         Relevant Orders    Duplex Carotid Ultrasound CAR       Gastrointestinal Abdominal     Screen for colon cancer    Relevant Orders    Cologuard - Stool, Per Rectum       Musculoskeletal and Injuries    Osteoporosis    Overview     Failed Fosamax due to side effects.  Declines Prolia due to potential side effects.  Taking vitamin D.            Neuro    Temporary cerebral vascular dysfunction    Overview     Statin and baby aspirin, declines need for neurology follow-up.            Pulmonary and Pneumonias    Asthma    Relevant Medications    montelukast (SINGULAIR) 10 MG tablet    Advair Diskus 100-50 MCG/DOSE DISKUS    albuterol sulfate  (90 Base) MCG/ACT inhaler    Other Relevant Orders    Ambulatory Referral to Pulmonology (Completed)    Pleural effusion, bilateral - Primary    Relevant Medications    cetirizine (zyrTEC) 10 MG tablet    montelukast (SINGULAIR) 10 MG tablet    Advair Diskus 100-50 MCG/DOSE DISKUS    albuterol sulfate  (90 Base) MCG/ACT inhaler    Other Relevant Orders    Ambulatory Referral to Pulmonology (Completed)    XR Chest 2 View     Orders Placed This Encounter   Procedures    XR Chest 2 View  "   Cologuard - Stool, Per Rectum    Ambulatory Referral to Pulmonology     New Medications Ordered This Visit   Medications    albuterol sulfate  (90 Base) MCG/ACT inhaler     Sig: Inhale 2 puffs Every 4 (Four) Hours As Needed for Shortness of Air.     Dispense:  6.7 g     Refill:  0    azithromycin (Zithromax Z-Clint) 250 MG tablet     Sig: Take 2 tablets by mouth on day 1, then 1 tablet daily on days 2-5     Dispense:  6 tablet     Refill:  0    cefdinir (OMNICEF) 300 MG capsule     Sig: Take 1 capsule by mouth 2 (Two) Times a Day for 7 days.     Dispense:  14 capsule     Refill:  0       SUMMARY/DISCUSSION  Patient will follow-up in 1 month after obtaining chest x-ray prior to visit for pleural effusion.  Recommend incentive spirometer that she can obtain at a local pharmacy practicing 10 times every 2 hours while awake.  Add Mucinex to her regimen.  We will treat with antibiotic at this time.  Referral to pulmonology in case problem does not improve.  Trace pleural effusion at this time do not believe thoracic surgery referral is needed.    Next Appointment with me: Visit date not found    Return in about 1 month (around 12/15/2023) for Next scheduled follow up; medicare 09/09 or after .    VITAL SIGNS     Visit Vitals  /86 (BP Location: Right arm, Patient Position: Sitting, Cuff Size: Adult)   Pulse 82   Temp 98.6 °F (37 °C) (Temporal)   Ht 165.1 cm (65\")   Wt 78.3 kg (172 lb 9.6 oz)   SpO2 96%   BMI 28.72 kg/m²       Wt Readings from Last 3 Encounters:   11/15/23 78.3 kg (172 lb 9.6 oz)   10/18/23 74.4 kg (164 lb)   09/08/23 78.2 kg (172 lb 8 oz)     Body mass index is 28.72 kg/m².    MEDICATIONS AT THE TIME OF OFFICE VISIT     Current Outpatient Medications on File Prior to Visit   Medication Sig    Advair Diskus 100-50 MCG/DOSE DISKUS Inhale 1 puff Every Morning.    aspirin  MG tablet Take 1 tablet by mouth Daily.    atorvastatin (LIPITOR) 20 MG tablet TAKE ONE TABLET BY MOUTH DAILY    " Calcium Citrate-Vitamin D 250-200 MG-UNIT tablet Take 1 tablet by mouth Every Morning.    cetirizine (zyrTEC) 10 MG tablet Take 1 tablet by mouth Daily.    Cyanocobalamin (VITAMIN B12 PO) Take 1 tablet by mouth Daily.    EPINEPHrine (EPIPEN) 0.3 MG/0.3ML solution auto-injector injection     magnesium citrate 1.745 GM/30ML solution solution Take  by mouth 1 (One) Time.    montelukast (SINGULAIR) 10 MG tablet Take 1 tablet by mouth Every Morning.    Polyethyl Glycol-Propyl Glycol (SYSTANE PRESERVATIVE FREE OP) Apply 1 drop to eye(s) as directed by provider 2 (Two) Times a Day.    [DISCONTINUED] albuterol sulfate  (90 Base) MCG/ACT inhaler Inhale 2 puffs Every 4 (Four) Hours As Needed.     No current facility-administered medications on file prior to visit.      HISTORY OF PRESENT ILLNESS     Patient presents to establish care with new provider in office.  Previous patient of Dr. Garza last seen September 8, 2023 for Medicare wellness.  She is followed for hyperlipidemia, vitamin D deficiency, environmental seasonal allergies, osteoporosis.    Asthma and environmental allergies are managed by family allergy and asthma in which she receives routine allergy injections.  For asthma she is on Advair daily, rescue inhaler as needed, did note she needs new rescue inhaler prescribed today, Singulair, Zyrtec. Typically stable and controlled but she has had recent increasing cough due to pleural effusion.    Hyperlipidemia no myalgias with atorvastatin 20 mg daily.  Last LDL of 80.  No elevation in liver enzymes.     Osteoporosis: Used to take Fosamax for osteoporosis but was not able to tolerate it so stopped.  Recommended Prolia but patient declined.    Patient has seen cardiology in the past May 2022 by Dr. Morgan for PFO and carotid stenosis of the left which has been asymptomatic.  Closure of PFO in 2016.  Recurrent TIA and hyperlipidemia. Patient had worsening osteoarthritis and needed right knee replacement and  wanted to come off Plavix.  Plavix was able to be held for surgery as it was only on board for neurological reasons.  Last echocardiogram showed no evidence of residual PFO and there is no indication for beta-blocker.  Last EKG showed sinus rhythm with border line left axis deviation and borderline left atrial abnormality.  It was recommended she have a repeat carotid ultrasound in 1 year which would have been May 2023.  She is also scheduled with cardiology again November 21 of this year for follow-up.    Surgery May 2022 with right knee arthroscopy with debridement of med femoral condyle by Dr. Jacob.     Acute pain of right shoulder October 18, 2023 referred to physical therapy.  X-ray was unremarkable.  Symptoms have improved with physical therapy.    Last mammogram was completed October 10, 2023 with benign findings.  Last DEXA scan was completed from February 2022 which showed osteoporosis.  Last colonoscopy was in 2014.  Her previous primary care ordered her what sounds to be an occult stool kit for home.    Followed by ophthalmology, Dr. Jauregui for nuclear cataract, macular degeneration, dry.     She has been seen by vascular in the past she does have a history of DVT.  Reflux seen in the left lower extremity December 2021.  No DVT on updated imaging.  Chronic varicose veins but no issues at this time.    History of TIA before PFO.  No longer on Plavix, on aspirin and atorvastatin.  She has not had any issues since.    Recent diagnosis of pleural effusion on November 8 when traveling to [facility].  They presented to urgent care [facility] emergency room.  She was having episodic sharp chest pain with inspiration.  X-ray showed pleural effusion without overt edema, bibasilar opacities, possible atelectasis or other inflammatory but no discrete dense consolidation.  She had no pneumothorax and some cardiomegaly.  What sounds to be an elevated D-dimer a CT of the chest was completed  but ruled out PE.  Continue to show bilateral mild atelectasis along with trace pleural effusions.  Troponins were negative.  Today she is still having significant cough.  Mild shortness of breath but no pleurisy.  This is resolved.  She is not taking anything over-the-counter to help her symptoms.    84 minutes were spent in reviewing history, assessment plan and documentation.     REVIEW OF SYSTEMS     Constitutional neg except per HPI   Resp cough   CV neg     PHYSICAL EXAMINATION     Physical Exam  Constitutional  No distress  Cardiovascular Rate  normal . Rhythm: regular . Heart sounds:  normal  Pulmonary/Chest  Effort normal. Breath sounds:  diminished LLL  Psychiatric  Alert. Judgment and thought content normal. Mood normal      REVIEWED DATA     Labs:   Lab Results   Component Value Date    GLUCOSE 98 08/25/2023    BUN 11 08/25/2023    CREATININE 0.78 08/25/2023    EGFRRESULT 79.3 08/25/2023    EGFR 90.9 05/11/2022    BCR 14.1 08/25/2023    K 4.8 08/25/2023    CO2 28.4 08/25/2023    CALCIUM 9.7 08/25/2023    PROTENTOTREF 6.3 08/25/2023    ALBUMIN 4.4 08/25/2023    BILITOT 0.3 08/25/2023    AST 16 08/25/2023    ALT 13 08/25/2023     Lab Results   Component Value Date    TSH 2.830 01/04/2023     Lab Results   Component Value Date    CHLPL 153 08/25/2023    TRIG 63 08/25/2023    HDL 60 08/25/2023    LDL 80 08/25/2023     Lab Results   Component Value Date    WBC 6.63 08/25/2023    HGB 13.7 08/25/2023    HCT 41.1 08/25/2023    MCV 94.1 08/25/2023     08/25/2023     Lab Results   Component Value Date    HGBA1C 5.50 09/13/2017     Brief Urine Lab Results       None           Imaging:           Medical Tests:           Summary of old records / correspondence / consultant report:           Request outside records:           *Dragon dictation used for documentation.

## 2023-11-16 ENCOUNTER — TELEPHONE (OUTPATIENT)
Dept: INTERNAL MEDICINE | Age: 78
End: 2023-11-16

## 2023-11-16 ENCOUNTER — HOSPITAL ENCOUNTER (OUTPATIENT)
Dept: CARDIOLOGY | Facility: HOSPITAL | Age: 78
Discharge: HOME | End: 2023-11-16
Payer: MEDICARE

## 2023-11-16 DIAGNOSIS — E78.5 HYPERLIPIDEMIA, UNSPECIFIED HYPERLIPIDEMIA TYPE: ICD-10-CM

## 2023-11-16 DIAGNOSIS — I65.22 LEFT CAROTID ARTERY STENOSIS: ICD-10-CM

## 2023-11-16 LAB
BH CV XLRA MEAS LEFT DIST CCA EDV: 14.1 CM/SEC
BH CV XLRA MEAS LEFT DIST CCA PSV: 62.5 CM/SEC
BH CV XLRA MEAS LEFT DIST ICA EDV: -31.8 CM/SEC
BH CV XLRA MEAS LEFT DIST ICA PSV: -91.1 CM/SEC
BH CV XLRA MEAS LEFT ICA/CCA RATIO: 1.46
BH CV XLRA MEAS LEFT MID ICA EDV: -30.2 CM/SEC
BH CV XLRA MEAS LEFT MID ICA PSV: -88.8 CM/SEC
BH CV XLRA MEAS LEFT PROX CCA EDV: -18.5 CM/SEC
BH CV XLRA MEAS LEFT PROX CCA PSV: -79.4 CM/SEC
BH CV XLRA MEAS LEFT PROX ECA EDV: 18.8 CM/SEC
BH CV XLRA MEAS LEFT PROX ECA PSV: 121 CM/SEC
BH CV XLRA MEAS LEFT PROX ICA EDV: -17.3 CM/SEC
BH CV XLRA MEAS LEFT PROX ICA PSV: -67.2 CM/SEC
BH CV XLRA MEAS LEFT PROX SCLA PSV: 93.8 CM/SEC
BH CV XLRA MEAS LEFT VERTEBRAL A EDV: 19.9 CM/SEC
BH CV XLRA MEAS LEFT VERTEBRAL A PSV: 92 CM/SEC
BH CV XLRA MEAS RIGHT DIST CCA EDV: 17.4 CM/SEC
BH CV XLRA MEAS RIGHT DIST CCA PSV: 61.1 CM/SEC
BH CV XLRA MEAS RIGHT DIST ICA EDV: -29.1 CM/SEC
BH CV XLRA MEAS RIGHT DIST ICA PSV: -94.1 CM/SEC
BH CV XLRA MEAS RIGHT ICA/CCA RATIO: 2.23
BH CV XLRA MEAS RIGHT MID ICA EDV: 25.1 CM/SEC
BH CV XLRA MEAS RIGHT MID ICA PSV: 87.8 CM/SEC
BH CV XLRA MEAS RIGHT PROX CCA EDV: -14.5 CM/SEC
BH CV XLRA MEAS RIGHT PROX CCA PSV: -69.1 CM/SEC
BH CV XLRA MEAS RIGHT PROX ECA EDV: -13.4 CM/SEC
BH CV XLRA MEAS RIGHT PROX ECA PSV: -112 CM/SEC
BH CV XLRA MEAS RIGHT PROX ICA EDV: -28.1 CM/SEC
BH CV XLRA MEAS RIGHT PROX ICA PSV: -136 CM/SEC
BH CV XLRA MEAS RIGHT PROX SCLA PSV: 65.8 CM/SEC
BH CV XLRA MEAS RIGHT VERTEBRAL A EDV: -10.4 CM/SEC
BH CV XLRA MEAS RIGHT VERTEBRAL A PSV: -54.1 CM/SEC

## 2023-11-16 PROCEDURE — 93880 EXTRACRANIAL BILAT STUDY: CPT

## 2023-11-16 NOTE — TELEPHONE ENCOUNTER
Caller: Marciano Sy    Relationship: Emergency Contact    Best call back number: [phone]     What orders are you requesting (i.e. lab or imaging): CHEST XRAY     In what timeframe would the patient need to come in: ASAP     Where will you receive your lab/imaging services: SOMEWHERE CLOSE TO [place]     Additional notes: PLEASE CALL AND ADVISE

## 2023-11-17 DIAGNOSIS — E78.5 HYPERLIPIDEMIA, UNSPECIFIED HYPERLIPIDEMIA TYPE: Primary | ICD-10-CM

## 2023-11-17 DIAGNOSIS — E55.9 VITAMIN D DEFICIENCY: ICD-10-CM

## 2023-11-18 ENCOUNTER — HOSPITAL ENCOUNTER (INPATIENT)
Facility: HOSPITAL | Age: 78
LOS: 10 days | Discharge: NURSING, REHAB OR LONG-TERM CARE | DRG: 291 | End: 2023-11-28
Attending: EMERGENCY MEDICINE | Admitting: HOSPITALIST
Payer: MEDICARE

## 2023-11-18 ENCOUNTER — APPOINTMENT (OUTPATIENT)
Dept: GENERAL RADIOLOGY | Facility: HOSPITAL | Age: 78
DRG: 291 | End: 2023-11-18
Payer: MEDICARE

## 2023-11-18 DIAGNOSIS — I48.91 ATRIAL FIBRILLATION WITH RVR: Primary | ICD-10-CM

## 2023-11-18 PROBLEM — J96.01 ACUTE RESPIRATORY FAILURE WITH HYPOXIA: Status: ACTIVE | Noted: 2023-11-18

## 2023-11-18 LAB
ALBUMIN SERPL-MCNC: 3.5 G/DL (ref 3.5–5.2)
ALBUMIN/GLOB SERPL: 1.3 G/DL
ALP SERPL-CCNC: 131 U/L (ref 39–117)
ALT SERPL W P-5'-P-CCNC: 12 U/L (ref 1–33)
ANION GAP SERPL CALCULATED.3IONS-SCNC: 11.8 MMOL/L (ref 5–15)
AST SERPL-CCNC: 10 U/L (ref 1–32)
BASOPHILS # BLD AUTO: 0.02 10*3/MM3 (ref 0–0.2)
BASOPHILS NFR BLD AUTO: 0.1 % (ref 0–1.5)
BILIRUB SERPL-MCNC: 0.4 MG/DL (ref 0–1.2)
BUN SERPL-MCNC: 14 MG/DL (ref 8–23)
BUN/CREAT SERPL: 18.2 (ref 7–25)
CALCIUM SPEC-SCNC: 9.5 MG/DL (ref 8.6–10.5)
CHLORIDE SERPL-SCNC: 97 MMOL/L (ref 98–107)
CO2 SERPL-SCNC: 25.2 MMOL/L (ref 22–29)
CREAT SERPL-MCNC: 0.77 MG/DL (ref 0.57–1)
D-LACTATE SERPL-SCNC: 1.4 MMOL/L (ref 0.5–2)
DEPRECATED RDW RBC AUTO: 47.9 FL (ref 37–54)
EGFRCR SERPLBLD CKD-EPI 2021: 80.1 ML/MIN/1.73
EOSINOPHIL # BLD AUTO: 0.02 10*3/MM3 (ref 0–0.4)
EOSINOPHIL NFR BLD AUTO: 0.1 % (ref 0.3–6.2)
ERYTHROCYTE [DISTWIDTH] IN BLOOD BY AUTOMATED COUNT: 13.4 % (ref 12.3–15.4)
GLOBULIN UR ELPH-MCNC: 2.8 GM/DL
GLUCOSE SERPL-MCNC: 144 MG/DL (ref 65–99)
HCT VFR BLD AUTO: 42.2 % (ref 34–46.6)
HGB BLD-MCNC: 13.4 G/DL (ref 12–15.9)
IMM GRANULOCYTES # BLD AUTO: 0.07 10*3/MM3 (ref 0–0.05)
IMM GRANULOCYTES NFR BLD AUTO: 0.5 % (ref 0–0.5)
LYMPHOCYTES # BLD AUTO: 0.97 10*3/MM3 (ref 0.7–3.1)
LYMPHOCYTES NFR BLD AUTO: 6.4 % (ref 19.6–45.3)
MAGNESIUM SERPL-MCNC: 2 MG/DL (ref 1.6–2.4)
MCH RBC QN AUTO: 30.2 PG (ref 26.6–33)
MCHC RBC AUTO-ENTMCNC: 31.8 G/DL (ref 31.5–35.7)
MCV RBC AUTO: 95 FL (ref 79–97)
MONOCYTES # BLD AUTO: 1.18 10*3/MM3 (ref 0.1–0.9)
MONOCYTES NFR BLD AUTO: 7.8 % (ref 5–12)
NEUTROPHILS NFR BLD AUTO: 12.91 10*3/MM3 (ref 1.7–7)
NEUTROPHILS NFR BLD AUTO: 85.1 % (ref 42.7–76)
NT-PROBNP SERPL-MCNC: 2474 PG/ML (ref 0–1800)
PLATELET # BLD AUTO: 329 10*3/MM3 (ref 140–450)
PMV BLD AUTO: 11.3 FL (ref 6–12)
POTASSIUM SERPL-SCNC: 4.4 MMOL/L (ref 3.5–5.2)
PROCALCITONIN SERPL-MCNC: 0.1 NG/ML (ref 0–0.25)
PROT SERPL-MCNC: 6.3 G/DL (ref 6–8.5)
QT INTERVAL: 280 MS
QTC INTERVAL: 432 MS
RBC # BLD AUTO: 4.44 10*6/MM3 (ref 3.77–5.28)
SODIUM SERPL-SCNC: 134 MMOL/L (ref 136–145)
TROPONIN T SERPL HS-MCNC: 11 NG/L
TROPONIN T SERPL HS-MCNC: 11 NG/L
WBC NRBC COR # BLD AUTO: 15.17 10*3/MM3 (ref 3.4–10.8)

## 2023-11-18 PROCEDURE — 99285 EMERGENCY DEPT VISIT HI MDM: CPT

## 2023-11-18 PROCEDURE — 0202U NFCT DS 22 TRGT SARS-COV-2: CPT | Performed by: NURSE PRACTITIONER

## 2023-11-18 PROCEDURE — 93005 ELECTROCARDIOGRAM TRACING: CPT | Performed by: PHYSICIAN ASSISTANT

## 2023-11-18 PROCEDURE — 80053 COMPREHEN METABOLIC PANEL: CPT | Performed by: EMERGENCY MEDICINE

## 2023-11-18 PROCEDURE — 87040 BLOOD CULTURE FOR BACTERIA: CPT | Performed by: NURSE PRACTITIONER

## 2023-11-18 PROCEDURE — 83735 ASSAY OF MAGNESIUM: CPT | Performed by: PHYSICIAN ASSISTANT

## 2023-11-18 PROCEDURE — 84484 ASSAY OF TROPONIN QUANT: CPT | Performed by: EMERGENCY MEDICINE

## 2023-11-18 PROCEDURE — 93010 ELECTROCARDIOGRAM REPORT: CPT | Performed by: INTERNAL MEDICINE

## 2023-11-18 PROCEDURE — 84145 PROCALCITONIN (PCT): CPT | Performed by: NURSE PRACTITIONER

## 2023-11-18 PROCEDURE — 99284 EMERGENCY DEPT VISIT MOD MDM: CPT | Performed by: PHYSICIAN ASSISTANT

## 2023-11-18 PROCEDURE — 25810000003 SODIUM CHLORIDE 0.9 % SOLUTION: Performed by: PHYSICIAN ASSISTANT

## 2023-11-18 PROCEDURE — 25010000002 ONDANSETRON PER 1 MG: Performed by: PHYSICIAN ASSISTANT

## 2023-11-18 PROCEDURE — 36415 COLL VENOUS BLD VENIPUNCTURE: CPT

## 2023-11-18 PROCEDURE — 25010000002 ENOXAPARIN PER 10 MG: Performed by: PHYSICIAN ASSISTANT

## 2023-11-18 PROCEDURE — 85025 COMPLETE CBC W/AUTO DIFF WBC: CPT

## 2023-11-18 PROCEDURE — 84484 ASSAY OF TROPONIN QUANT: CPT | Performed by: PHYSICIAN ASSISTANT

## 2023-11-18 PROCEDURE — 71045 X-RAY EXAM CHEST 1 VIEW: CPT

## 2023-11-18 PROCEDURE — 83605 ASSAY OF LACTIC ACID: CPT | Performed by: NURSE PRACTITIONER

## 2023-11-18 PROCEDURE — 83880 ASSAY OF NATRIURETIC PEPTIDE: CPT | Performed by: EMERGENCY MEDICINE

## 2023-11-18 PROCEDURE — 25010000002 MORPHINE PER 10 MG: Performed by: PHYSICIAN ASSISTANT

## 2023-11-18 PROCEDURE — 87641 MR-STAPH DNA AMP PROBE: CPT | Performed by: NURSE PRACTITIONER

## 2023-11-18 PROCEDURE — 93005 ELECTROCARDIOGRAM TRACING: CPT | Performed by: EMERGENCY MEDICINE

## 2023-11-18 PROCEDURE — 25010000002 CEFTRIAXONE PER 250 MG: Performed by: PHYSICIAN ASSISTANT

## 2023-11-18 RX ORDER — DILTIAZEM HYDROCHLORIDE 5 MG/ML
20 INJECTION INTRAVENOUS ONCE
Status: COMPLETED | OUTPATIENT
Start: 2023-11-18 | End: 2023-11-18

## 2023-11-18 RX ORDER — ENOXAPARIN SODIUM 100 MG/ML
1 INJECTION SUBCUTANEOUS ONCE
Status: COMPLETED | OUTPATIENT
Start: 2023-11-18 | End: 2023-11-18

## 2023-11-18 RX ORDER — BUMETANIDE 0.25 MG/ML
1 INJECTION INTRAMUSCULAR; INTRAVENOUS ONCE
Status: COMPLETED | OUTPATIENT
Start: 2023-11-18 | End: 2023-11-18

## 2023-11-18 RX ORDER — SODIUM CHLORIDE 0.9 % (FLUSH) 0.9 %
10 SYRINGE (ML) INJECTION AS NEEDED
Status: DISCONTINUED | OUTPATIENT
Start: 2023-11-18 | End: 2023-11-28 | Stop reason: HOSPADM

## 2023-11-18 RX ORDER — ACETAMINOPHEN 325 MG/1
650 TABLET ORAL EVERY 4 HOURS PRN
Status: DISCONTINUED | OUTPATIENT
Start: 2023-11-18 | End: 2023-11-28 | Stop reason: HOSPADM

## 2023-11-18 RX ORDER — NITROGLYCERIN 0.4 MG/1
0.4 TABLET SUBLINGUAL
Status: DISCONTINUED | OUTPATIENT
Start: 2023-11-18 | End: 2023-11-28 | Stop reason: HOSPADM

## 2023-11-18 RX ORDER — ONDANSETRON 2 MG/ML
4 INJECTION INTRAMUSCULAR; INTRAVENOUS ONCE
Status: COMPLETED | OUTPATIENT
Start: 2023-11-18 | End: 2023-11-18

## 2023-11-18 RX ORDER — MORPHINE SULFATE 4 MG/ML
4 INJECTION, SOLUTION INTRAMUSCULAR; INTRAVENOUS ONCE
Status: COMPLETED | OUTPATIENT
Start: 2023-11-18 | End: 2023-11-18

## 2023-11-18 RX ORDER — ONDANSETRON 2 MG/ML
4 INJECTION INTRAMUSCULAR; INTRAVENOUS EVERY 6 HOURS PRN
Status: DISCONTINUED | OUTPATIENT
Start: 2023-11-18 | End: 2023-11-28 | Stop reason: HOSPADM

## 2023-11-18 RX ORDER — ALUMINA, MAGNESIA, AND SIMETHICONE 2400; 2400; 240 MG/30ML; MG/30ML; MG/30ML
15 SUSPENSION ORAL EVERY 6 HOURS PRN
Status: DISCONTINUED | OUTPATIENT
Start: 2023-11-18 | End: 2023-11-28 | Stop reason: HOSPADM

## 2023-11-18 RX ORDER — ONDANSETRON 4 MG/1
4 TABLET, FILM COATED ORAL EVERY 6 HOURS PRN
Status: DISCONTINUED | OUTPATIENT
Start: 2023-11-18 | End: 2023-11-28 | Stop reason: HOSPADM

## 2023-11-18 RX ADMIN — SODIUM CHLORIDE 500 ML: 9 INJECTION, SOLUTION INTRAVENOUS at 11:24

## 2023-11-18 RX ADMIN — CEFTRIAXONE SODIUM 1000 MG: 1 INJECTION, POWDER, FOR SOLUTION INTRAMUSCULAR; INTRAVENOUS at 14:30

## 2023-11-18 RX ADMIN — DILTIAZEM HYDROCHLORIDE 20 MG: 5 INJECTION INTRAVENOUS at 11:38

## 2023-11-18 RX ADMIN — SODIUM CHLORIDE 5 MG/HR: 900 INJECTION, SOLUTION INTRAVENOUS at 11:57

## 2023-11-18 RX ADMIN — ENOXAPARIN SODIUM 80 MG: 100 INJECTION SUBCUTANEOUS at 13:15

## 2023-11-18 RX ADMIN — BUMETANIDE 1 MG: 0.25 INJECTION INTRAMUSCULAR; INTRAVENOUS at 13:17

## 2023-11-18 RX ADMIN — SODIUM CHLORIDE 7.5 MG/HR: 900 INJECTION, SOLUTION INTRAVENOUS at 23:15

## 2023-11-18 RX ADMIN — MORPHINE SULFATE 4 MG: 4 INJECTION, SOLUTION INTRAMUSCULAR; INTRAVENOUS at 11:35

## 2023-11-18 RX ADMIN — ONDANSETRON 4 MG: 2 INJECTION INTRAMUSCULAR; INTRAVENOUS at 11:35

## 2023-11-18 NOTE — FSED PROVIDER NOTE
Subjective   History of Present Illness  Patient is a 78-year-old female who presents the emergency room with ongoing dyspnea.  They were in [facility] about a week and a half ago and she was having a lot of shortness of breath that gradually worsened and says she was getting dizzy.  She was seen at an urgent care there and then sent to [facility] where she spent most of the day.  They did a CT study that was negative and has not says the troponin was unremarkable but he does not know about the rest of the blood work.    He followed up with their provider and there was concern for pneumonia so she started him on erythromycin and cefdinir a few days ago.  Things are getting worse and now she is got diarrhea.  Their main concern today is that she is dehydrated and needs fluids.  She will also had this random sharp stabbing pain on the left side of her chest that is sudden and then goes away quickly      Review of Systems   Constitutional: Negative.    HENT: Negative.     Respiratory:  Positive for shortness of breath.    Cardiovascular:  Positive for chest pain. Negative for palpitations and leg swelling.   Gastrointestinal:  Positive for diarrhea. Negative for abdominal pain and vomiting.   Genitourinary: Negative.    Musculoskeletal: Negative.    Skin: Negative.    Neurological:  Positive for light-headedness.   Psychiatric/Behavioral: Negative.     All other systems reviewed and are negative.      Past Medical History:   Diagnosis Date    A-fib     Allergic     Aortic heart valve prolapse     Arthritis     Asthma due to seasonal allergies     Carotid artery stenosis     MILD    COVID-19 12/2021    High cholesterol     History of atrial fibrillation     Knee swelling     PFO (patent foramen ovale) 2016    HX CLOSURE    Right knee pain     Screen for colon cancer 11/15/2023    Stress fracture of other site of femur due to multiple or repetitive stress 05/04/2022    Tear of meniscus of knee     Tendinitis  of knee     TIA (transient ischemic attack)     PRIOR TO PFO CLOSURE    Vasovagal syncope        Allergies   Allergen Reactions    Milk-Related Compounds Other (See Comments)     ACID REFLUX    Latex Rash    Tape Rash       Past Surgical History:   Procedure Laterality Date    BREAST BIOPSY Left     over 15 years ago; benign    CARDIAC SURGERY      CATARACT EXTRACTION Left     KNEE ARTHROSCOPY Left 2018    Procedure: LEFT KNEE ARTHROSCOPY, PARTIAL MEDIAL MENISCECTOMY, AND MEDIAL FEMORAL SUBCHONDROPLASTY;  Surgeon: Kade Jacob MD;  Location: [facility] [place];  Service: Orthopedics    KNEE ARTHROSCOPY Right 2022    Procedure: right KNEE ARTHROSCOPY with debridement and subchondral plasty of medial femoral condyle;  Surgeon: Kade Jacob MD;  Location: [facility] [place];  Service: Orthopedics;  Laterality: Right;    PATENT FORAMEN OVALE CLOSURE      TONSILLECTOMY      TUBAL ABDOMINAL LIGATION         Family History   Problem Relation Age of Onset    Heart disease Father     Heart disease Maternal Grandmother     Breast cancer Mother         50's    Malig Hyperthermia Neg Hx        Social History     Socioeconomic History    Marital status:    Tobacco Use    Smoking status: Former     Packs/day: 0.50     Years: 10.00     Additional pack years: 0.00     Total pack years: 5.00     Types: Cigarettes     Quit date: 2012     Years since quittin.8    Smokeless tobacco: Never    Tobacco comments:     STARTED SMOKING AGE 18   Vaping Use    Vaping Use: Never used   Substance and Sexual Activity    Alcohol use: No     Comment: caffeine use- coffee    Drug use: Never    Sexual activity: Not Currently     Partners: Male           Objective   Physical Exam  Vitals reviewed.   Constitutional:       Appearance: She is well-developed.   Cardiovascular:      Rate and Rhythm: Tachycardia present. Rhythm irregular.      Pulses: Normal pulses.      Heart sounds: Normal heart sounds.    Pulmonary:      Effort: Pulmonary effort is normal.      Breath sounds: Normal breath sounds.   Chest:      Chest wall: No mass, tenderness or edema.   Abdominal:      General: Bowel sounds are normal.      Palpations: Abdomen is soft.   Musculoskeletal:         General: Normal range of motion.   Skin:     General: Skin is warm and dry.   Neurological:      General: No focal deficit present.      Mental Status: She is alert.   Psychiatric:         Mood and Affect: Mood normal.         Behavior: Behavior normal.         ECG 12 Lead      Date/Time: 11/18/2023 10:34 AM    Performed by: Gladys Mejia PA-C  Authorized by: Ronnie Gomez MD  Interpreted by physician  Comparison: compared with previous ECG from 3/6/2018  Comparison to previous ECG: A-fib is new  Rhythm: atrial fibrillation  Rate: tachycardic  BPM: 143  QRS axis: normal  Clinical impression: abnormal ECG    ECG 12 Lead      Date/Time: 11/18/2023 5:41 PM    Performed by: Gladys Mejia PA-C  Authorized by: Ronnie Gomez MD  Interpreted by physician  Comparison: compared with previous ECG from 11/18/2023  Rhythm: atrial fibrillation  Rate: tachycardic  BPM: 106  QRS axis: normal  Clinical impression: abnormal ECG               ED Course                                           Medical Decision Making  Presentation patient is in A-fib with RVR with heart rate in the 140s primarily.  She does appear dry.  She was given Cardizem bolus and drip to help bring down her heart rate and it was maintained around 100-1 12 when he in the ER primarily while maintaining a blood pressure of about 100.  She was placed on oxygen although her oxygen levels here are staying in the low 90s.  She was given Lovenox for because of the A-fib.  Her chest x-ray shows a left pleural effusion.  Because of this fluids were given very slowly.  There is question of possible infection and she is already on erythromycin and cefdinir so we added Rocephin.    I do not have a cause  for the pleural effusion at this time.  She has been stable here and family is in agreement for transfer to [facility] for further evaluation.  Dr. Fernandez excepts patient.  She will be sent via ambulance.    Problems Addressed:  Atrial fibrillation with RVR: complicated acute illness or injury    Amount and/or Complexity of Data Reviewed  Labs: ordered.  Radiology: ordered.  ECG/medicine tests: ordered.    Risk  Prescription drug management.  Decision regarding hospitalization.        Final diagnoses:   Atrial fibrillation with RVR       ED Disposition  ED Disposition       ED Disposition   Decision to Admit    Condition   --    Comment   Level of Care: Stepdown [25]   Diagnosis: Atrial fibrillation [427.31.ICD-9-CM]   Admitting Physician: ZACH FERNANDEZ [[id number]]   Certification: I Certify That Inpatient Hospital Services Are Medically Necessary For Greater Than 2 Midnights                 No follow-up provider specified.       Medication List      No changes were made to your prescriptions during this visit.

## 2023-11-18 NOTE — ED NOTES
Kathy with [facility] returned call. States first available pickup time is 1930. Primary RN notified.

## 2023-11-18 NOTE — ED NOTES
Nursing report ED to floor  Judi Sy  78 y.o.  female    HPI :   Chief Complaint   Patient presents with    Shortness of Breath    Dizziness       Admitting doctor:   Trell Almaraz MD    Admitting diagnosis:   The encounter diagnosis was Atrial fibrillation with RVR.    Code status:   Current Code Status       Date Active Code Status Order ID Comments User Context       Not on file            Allergies:   Milk-related compounds, Latex, and Tape    Isolation:   Enhanced Droplet/Contact     Intake and Output  No intake or output data in the 24 hours ending 11/18/23 1419    Weight:       11/18/23  1030   Weight: 78 kg (172 lb)       Most recent vitals:   Vitals:    11/18/23 1312 11/18/23 1317 11/18/23 1330 11/18/23 1400   BP: 110/65  109/75 107/70   Patient Position:       Pulse: 113  108 107   Resp: 18  16 17   Temp:       SpO2: (!) 88% 92% 92% 92%   Weight:       Height:           Active LDAs/IV Access:   Lines, Drains & Airways       Active LDAs       Name Placement date Placement time Site Days    Peripheral IV 11/18/23 1044 Right Antecubital 11/18/23  1044  Antecubital  less than 1    Peripheral IV 11/18/23 1123 Posterior;Right Hand 11/18/23  1123  Hand  less than 1                    Labs (abnormal labs have a star):   Labs Reviewed   COMPREHENSIVE METABOLIC PANEL - Abnormal; Notable for the following components:       Result Value    Glucose 144 (*)     Sodium 134 (*)     Chloride 97 (*)     Alkaline Phosphatase 131 (*)     All other components within normal limits    Narrative:     GFR Normal >60  Chronic Kidney Disease <60  Kidney Failure <15    The GFR formula is only valid for adults with stable renal function between ages 18 and 70.   BNP (IN-HOUSE) - Abnormal; Notable for the following components:    proBNP 2,474.0 (*)     All other components within normal limits    Narrative:     This assay is used as an aid in the diagnosis of individuals suspected of having heart failure. It can be  used as an aid in the diagnosis of acute decompensated heart failure (ADHF) in patients presenting with signs and symptoms of ADHF to the emergency department (ED). In addition, NT-proBNP of <300 pg/mL indicates ADHF is not likely.    Age Range Result Interpretation  NT-proBNP Concentration (pg/mL:      <50             Positive            >450                   Gray                 300-450                    Negative             <300    50-75           Positive            >900                  Gray                300-900                  Negative            <300      >75             Positive            >1800                  Gray                300-1800                  Negative            <300   CBC WITH AUTO DIFFERENTIAL - Abnormal; Notable for the following components:    WBC 15.17 (*)     Neutrophil % 85.1 (*)     Lymphocyte % 6.4 (*)     Eosinophil % 0.1 (*)     Neutrophils, Absolute 12.91 (*)     Monocytes, Absolute 1.18 (*)     Immature Grans, Absolute 0.07 (*)     All other components within normal limits   SINGLE HSTROPONIN T - Normal    Narrative:     High Sensitive Troponin T Reference Range:  <14.0 ng/L- Negative Female for AMI  <22.0 ng/L- Negative Male for AMI  >=14 - Abnormal Female indicating possible myocardial injury.  >=22 - Abnormal Male indicating possible myocardial injury.   Clinicians would have to utilize clinical acumen, EKG, Troponin, and serial changes to determine if it is an Acute Myocardial Infarction or myocardial injury due to an underlying chronic condition.        MAGNESIUM - Normal   CBC AND DIFFERENTIAL    Narrative:     The following orders were created for panel order CBC & Differential.  Procedure                               Abnormality         Status                     ---------                               -----------         ------                     CBC Auto Differential[[id number]]        Abnormal            Final result                 Please view results for these  tests on the individual orders.       EKG:   ECG 12 Lead ED Triage Standing Order; SOA   Preliminary Result   HEART RATE= 143  bpm   RR Interval= 420  ms   ID Interval=   ms   P Horizontal Axis=   deg   P Front Axis=   deg   QRSD Interval= 82  ms   QT Interval= 280  ms   QTcB= 432  ms   QRS Axis= 27  deg   T Wave Axis=   deg   - ABNORMAL ECG -   Atrial fibrillation with rapid V-rate   Borderline low voltage, extremity leads   Borderline T abnormalities, diffuse leads   Electronically Signed By:    Date and Time of Study: 2023 10:34:26          Meds given in ED:   Medications   sodium chloride 0.9 % flush 10 mL (has no administration in time range)   dilTIAZem (CARDIZEM) 100 mg in 100 mL NS infusion (ADV) (7.5 mg/hr Intravenous Rate/Dose Change 23 123)   cefTRIAXone (ROCEPHIN) 1,000 mg in sodium chloride 0.9 % 100 mL IVPB-VTB (has no administration in time range)   sodium chloride 0.9 % bolus 500 mL (0 mL Intravenous Stopped 23 123)   dilTIAZem (CARDIZEM) injection 20 mg (20 mg Intravenous Given 23 1138)   Morphine sulfate (PF) injection 4 mg (4 mg Intravenous Given 23 1135)   ondansetron (ZOFRAN) injection 4 mg (4 mg Intravenous Given 23 1135)   bumetanide (BUMEX) injection 1 mg (1 mg Intravenous Given 23 1317)   Enoxaparin Sodium (LOVENOX) syringe 80 mg (80 mg Subcutaneous Given 23 1315)       Imaging results:  XR Chest 1 View    Result Date: 2023  As described.    This report was finalized on 2023 12:40 PM by Dr. Silvano Lam M.D on Workstation: [id number]       Ambulatory status:   - x1 assist    Social issues:   Social History     Socioeconomic History    Marital status:    Tobacco Use    Smoking status: Former     Packs/day: 0.50     Years: 10.00     Additional pack years: 0.00     Total pack years: 5.00     Types: Cigarettes     Quit date: 2012     Years since quittin.8    Smokeless tobacco: Never    Tobacco comments:      STARTED SMOKING AGE 18   Vaping Use    Vaping Use: Never used   Substance and Sexual Activity    Alcohol use: No     Comment: caffeine use- coffee    Drug use: Never    Sexual activity: Not Currently     Partners: Male       NIH Stroke Scale:       Cade Sevilla RN  11/18/23 14:19 EST

## 2023-11-18 NOTE — ED NOTES
Call placed to [facility] for patient transport to [facility] at this time.

## 2023-11-19 ENCOUNTER — APPOINTMENT (OUTPATIENT)
Dept: CARDIOLOGY | Facility: HOSPITAL | Age: 78
DRG: 291 | End: 2023-11-19
Payer: MEDICARE

## 2023-11-19 ENCOUNTER — APPOINTMENT (OUTPATIENT)
Dept: CT IMAGING | Facility: HOSPITAL | Age: 78
DRG: 291 | End: 2023-11-19
Payer: MEDICARE

## 2023-11-19 PROBLEM — J18.9 CAP (COMMUNITY ACQUIRED PNEUMONIA): Status: ACTIVE | Noted: 2023-11-19

## 2023-11-19 LAB
AORTIC DIMENSIONLESS INDEX: 0.7 (DI)
B PARAPERT DNA SPEC QL NAA+PROBE: NOT DETECTED
B PERT DNA SPEC QL NAA+PROBE: NOT DETECTED
BH CV ECHO MEAS - AO MAX PG: 6.1 MMHG
BH CV ECHO MEAS - AO MEAN PG: 3.3 MMHG
BH CV ECHO MEAS - AO ROOT DIAM: 3.1 CM
BH CV ECHO MEAS - AO V2 MAX: 123.6 CM/SEC
BH CV ECHO MEAS - AO V2 VTI: 18.3 CM
BH CV ECHO MEAS - AVA(I,D): 2.21 CM2
BH CV ECHO MEAS - EDV(CUBED): 60.9 ML
BH CV ECHO MEAS - EDV(MOD-SP2): 92 ML
BH CV ECHO MEAS - EDV(MOD-SP4): 86 ML
BH CV ECHO MEAS - EF(MOD-BP): 63.4 %
BH CV ECHO MEAS - EF(MOD-SP2): 62 %
BH CV ECHO MEAS - EF(MOD-SP4): 65.1 %
BH CV ECHO MEAS - ESV(CUBED): 13.2 ML
BH CV ECHO MEAS - ESV(MOD-SP2): 35 ML
BH CV ECHO MEAS - ESV(MOD-SP4): 30 ML
BH CV ECHO MEAS - FS: 39.9 %
BH CV ECHO MEAS - IVS/LVPW: 0.78 CM
BH CV ECHO MEAS - IVSD: 0.91 CM
BH CV ECHO MEAS - LAT PEAK E' VEL: 6.7 CM/SEC
BH CV ECHO MEAS - LV DIASTOLIC VOL/BSA (35-75): 45.8 CM2
BH CV ECHO MEAS - LV MASS(C)D: 130.7 GRAMS
BH CV ECHO MEAS - LV MAX PG: 1.98 MMHG
BH CV ECHO MEAS - LV MEAN PG: 1.07 MMHG
BH CV ECHO MEAS - LV SYSTOLIC VOL/BSA (12-30): 16 CM2
BH CV ECHO MEAS - LV V1 MAX: 70.3 CM/SEC
BH CV ECHO MEAS - LV V1 VTI: 12.2 CM
BH CV ECHO MEAS - LVIDD: 3.9 CM
BH CV ECHO MEAS - LVIDS: 2.36 CM
BH CV ECHO MEAS - LVOT AREA: 3.3 CM2
BH CV ECHO MEAS - LVOT DIAM: 2.06 CM
BH CV ECHO MEAS - LVPWD: 1.17 CM
BH CV ECHO MEAS - MED PEAK E' VEL: 8.4 CM/SEC
BH CV ECHO MEAS - MR MAX PG: 109.8 MMHG
BH CV ECHO MEAS - MR MAX VEL: 523.9 CM/SEC
BH CV ECHO MEAS - MV DEC SLOPE: 491 CM/SEC2
BH CV ECHO MEAS - MV DEC TIME: 204 SEC
BH CV ECHO MEAS - MV E MAX VEL: 99.8 CM/SEC
BH CV ECHO MEAS - MV MAX PG: 4 MMHG
BH CV ECHO MEAS - MV MEAN PG: 1.97 MMHG
BH CV ECHO MEAS - MV P1/2T: 60.2 MSEC
BH CV ECHO MEAS - MV V2 VTI: 11.8 CM
BH CV ECHO MEAS - MVA(P1/2T): 3.7 CM2
BH CV ECHO MEAS - MVA(VTI): 3.4 CM2
BH CV ECHO MEAS - PA ACC TIME: 0.09 SEC
BH CV ECHO MEAS - PA V2 MAX: 86.3 CM/SEC
BH CV ECHO MEAS - RAP SYSTOLE: 3 MMHG
BH CV ECHO MEAS - RV MAX PG: 1.65 MMHG
BH CV ECHO MEAS - RV V1 MAX: 64.3 CM/SEC
BH CV ECHO MEAS - RV V1 VTI: 10.5 CM
BH CV ECHO MEAS - RVSP: 30.2 MMHG
BH CV ECHO MEAS - SI(MOD-SP2): 30.4 ML/M2
BH CV ECHO MEAS - SI(MOD-SP4): 29.9 ML/M2
BH CV ECHO MEAS - SV(LVOT): 40.5 ML
BH CV ECHO MEAS - SV(MOD-SP2): 57 ML
BH CV ECHO MEAS - SV(MOD-SP4): 56 ML
BH CV ECHO MEAS - TAPSE (>1.6): 1 CM
BH CV ECHO MEAS - TR MAX PG: 27.2 MMHG
BH CV ECHO MEAS - TR MAX VEL: 260.6 CM/SEC
BH CV ECHO MEASUREMENTS AVERAGE E/E' RATIO: 13.22
BH CV XLRA - RV BASE: 2.7 CM
BH CV XLRA - TDI S': 8.7 CM/SEC
C PNEUM DNA NPH QL NAA+NON-PROBE: NOT DETECTED
FLUAV SUBTYP SPEC NAA+PROBE: NOT DETECTED
FLUBV RNA ISLT QL NAA+PROBE: NOT DETECTED
HADV DNA SPEC NAA+PROBE: NOT DETECTED
HCOV 229E RNA SPEC QL NAA+PROBE: NOT DETECTED
HCOV HKU1 RNA SPEC QL NAA+PROBE: NOT DETECTED
HCOV NL63 RNA SPEC QL NAA+PROBE: NOT DETECTED
HCOV OC43 RNA SPEC QL NAA+PROBE: NOT DETECTED
HMPV RNA NPH QL NAA+NON-PROBE: NOT DETECTED
HPIV1 RNA ISLT QL NAA+PROBE: NOT DETECTED
HPIV2 RNA SPEC QL NAA+PROBE: NOT DETECTED
HPIV3 RNA NPH QL NAA+PROBE: NOT DETECTED
HPIV4 P GENE NPH QL NAA+PROBE: NOT DETECTED
L PNEUMO1 AG UR QL IA: NEGATIVE
LEFT ATRIUM VOLUME INDEX: 15.6 ML/M2
M PNEUMO IGG SER IA-ACNC: NOT DETECTED
MRSA DNA SPEC QL NAA+PROBE: NORMAL
QT INTERVAL: 404 MS
QT INTERVAL: 419 MS
QTC INTERVAL: 527 MS
QTC INTERVAL: 557 MS
RHINOVIRUS RNA SPEC NAA+PROBE: NOT DETECTED
RSV RNA NPH QL NAA+NON-PROBE: NOT DETECTED
S PNEUM AG SPEC QL LA: NEGATIVE
SARS-COV-2 RNA NPH QL NAA+NON-PROBE: NOT DETECTED

## 2023-11-19 PROCEDURE — 93005 ELECTROCARDIOGRAM TRACING: CPT | Performed by: NURSE PRACTITIONER

## 2023-11-19 PROCEDURE — 93306 TTE W/DOPPLER COMPLETE: CPT

## 2023-11-19 PROCEDURE — 25010000002 CEFTRIAXONE PER 250 MG: Performed by: NURSE PRACTITIONER

## 2023-11-19 PROCEDURE — 25010000002 ENOXAPARIN PER 10 MG: Performed by: HOSPITALIST

## 2023-11-19 PROCEDURE — 87449 NOS EACH ORGANISM AG IA: CPT | Performed by: NURSE PRACTITIONER

## 2023-11-19 PROCEDURE — 25010000002 FUROSEMIDE PER 20 MG: Performed by: HOSPITALIST

## 2023-11-19 PROCEDURE — 99222 1ST HOSP IP/OBS MODERATE 55: CPT | Performed by: INTERNAL MEDICINE

## 2023-11-19 PROCEDURE — 94799 UNLISTED PULMONARY SVC/PX: CPT

## 2023-11-19 PROCEDURE — 25510000001 PERFLUTREN (DEFINITY) 8.476 MG IN SODIUM CHLORIDE (PF) 0.9 % 10 ML INJECTION: Performed by: NURSE PRACTITIONER

## 2023-11-19 PROCEDURE — 87899 AGENT NOS ASSAY W/OPTIC: CPT | Performed by: NURSE PRACTITIONER

## 2023-11-19 PROCEDURE — 93010 ELECTROCARDIOGRAM REPORT: CPT | Performed by: INTERNAL MEDICINE

## 2023-11-19 PROCEDURE — 94664 DEMO&/EVAL PT USE INHALER: CPT

## 2023-11-19 PROCEDURE — 93306 TTE W/DOPPLER COMPLETE: CPT | Performed by: INTERNAL MEDICINE

## 2023-11-19 RX ORDER — FUROSEMIDE 10 MG/ML
40 INJECTION INTRAMUSCULAR; INTRAVENOUS
Status: COMPLETED | OUTPATIENT
Start: 2023-11-19 | End: 2023-11-19

## 2023-11-19 RX ORDER — DILTIAZEM HYDROCHLORIDE 60 MG/1
60 TABLET, FILM COATED ORAL EVERY 6 HOURS SCHEDULED
Status: DISCONTINUED | OUTPATIENT
Start: 2023-11-19 | End: 2023-11-19

## 2023-11-19 RX ORDER — ASPIRIN 325 MG
325 TABLET, DELAYED RELEASE (ENTERIC COATED) ORAL DAILY
Status: DISCONTINUED | OUTPATIENT
Start: 2023-11-19 | End: 2023-11-28 | Stop reason: HOSPADM

## 2023-11-19 RX ORDER — ATORVASTATIN CALCIUM 20 MG/1
20 TABLET, FILM COATED ORAL DAILY
Status: DISCONTINUED | OUTPATIENT
Start: 2023-11-19 | End: 2023-11-28 | Stop reason: HOSPADM

## 2023-11-19 RX ORDER — CETIRIZINE HYDROCHLORIDE 10 MG/1
10 TABLET ORAL DAILY
Status: DISCONTINUED | OUTPATIENT
Start: 2023-11-19 | End: 2023-11-28 | Stop reason: HOSPADM

## 2023-11-19 RX ORDER — MONTELUKAST SODIUM 10 MG/1
10 TABLET ORAL EVERY MORNING
Status: DISCONTINUED | OUTPATIENT
Start: 2023-11-19 | End: 2023-11-28 | Stop reason: HOSPADM

## 2023-11-19 RX ORDER — ALBUTEROL SULFATE 2.5 MG/3ML
2.5 SOLUTION RESPIRATORY (INHALATION) EVERY 6 HOURS PRN
Status: DISCONTINUED | OUTPATIENT
Start: 2023-11-19 | End: 2023-11-28 | Stop reason: HOSPADM

## 2023-11-19 RX ORDER — BUDESONIDE AND FORMOTEROL FUMARATE DIHYDRATE 160; 4.5 UG/1; UG/1
1 AEROSOL RESPIRATORY (INHALATION)
Status: DISCONTINUED | OUTPATIENT
Start: 2023-11-19 | End: 2023-11-28 | Stop reason: HOSPADM

## 2023-11-19 RX ORDER — ENOXAPARIN SODIUM 100 MG/ML
1 INJECTION SUBCUTANEOUS EVERY 12 HOURS
Status: DISCONTINUED | OUTPATIENT
Start: 2023-11-19 | End: 2023-11-22

## 2023-11-19 RX ORDER — POTASSIUM CHLORIDE 750 MG/1
20 TABLET, FILM COATED, EXTENDED RELEASE ORAL ONCE
Status: COMPLETED | OUTPATIENT
Start: 2023-11-19 | End: 2023-11-19

## 2023-11-19 RX ADMIN — MONTELUKAST SODIUM 10 MG: 10 TABLET, FILM COATED ORAL at 06:43

## 2023-11-19 RX ADMIN — BUDESONIDE AND FORMOTEROL FUMARATE DIHYDRATE 1 PUFF: 160; 4.5 AEROSOL RESPIRATORY (INHALATION) at 19:51

## 2023-11-19 RX ADMIN — CETIRIZINE HYDROCHLORIDE 10 MG: 10 TABLET ORAL at 09:11

## 2023-11-19 RX ADMIN — DILTIAZEM HYDROCHLORIDE 60 MG: 60 TABLET, FILM COATED ORAL at 14:36

## 2023-11-19 RX ADMIN — PERFLUTREN 2 ML: 6.52 INJECTION, SUSPENSION INTRAVENOUS at 10:25

## 2023-11-19 RX ADMIN — ENOXAPARIN SODIUM 80 MG: 100 INJECTION SUBCUTANEOUS at 20:55

## 2023-11-19 RX ADMIN — BUDESONIDE AND FORMOTEROL FUMARATE DIHYDRATE 1 PUFF: 160; 4.5 AEROSOL RESPIRATORY (INHALATION) at 08:57

## 2023-11-19 RX ADMIN — CEFTRIAXONE SODIUM 1000 MG: 1 INJECTION, POWDER, FOR SOLUTION INTRAMUSCULAR; INTRAVENOUS at 15:21

## 2023-11-19 RX ADMIN — POTASSIUM CHLORIDE 20 MEQ: 750 TABLET, EXTENDED RELEASE ORAL at 09:11

## 2023-11-19 RX ADMIN — ATORVASTATIN CALCIUM 20 MG: 20 TABLET, FILM COATED ORAL at 09:11

## 2023-11-19 RX ADMIN — FUROSEMIDE 40 MG: 10 INJECTION, SOLUTION INTRAMUSCULAR; INTRAVENOUS at 09:10

## 2023-11-19 RX ADMIN — ASPIRIN 325 MG: 325 TABLET, COATED ORAL at 09:11

## 2023-11-19 RX ADMIN — ENOXAPARIN SODIUM 80 MG: 100 INJECTION SUBCUTANEOUS at 09:10

## 2023-11-19 RX ADMIN — FUROSEMIDE 40 MG: 10 INJECTION, SOLUTION INTRAMUSCULAR; INTRAVENOUS at 17:52

## 2023-11-19 NOTE — CONSULTS
Patient Name: Judi Sy  :1945  78 y.o.    Date of Admission: 2023  Encounter Provider: Violeta Delatorre MD  Date of Encounter Visit: 23  Place of Service: [facility]  Referring Provider: RAMON Merino  Patient Care Team:  Blue Sam APRN as PCP - General (Internal Medicine)  Ravi Griffin MD as PCP - Family Medicine  Den Morgan Jr., MD as Consulting Physician (Cardiology)      Chief complaint:  Shortness of breath    History of Present Illness:    This is a patient of Dr. Morgan.  She has hyperlipidemia.  She had a PFO closure in 2016 by Dr. Helms.  She has some carotid artery disease and hypertension.    She was in [facility] was having a lot of shortness of breath.  She was worked up there and reportedly had a CT negative for a PE.  There was no mention of atrial fibrillation to the patient during that visit.  She came home and was started on antibiotics by her primary provider.  Unfortunately she got really bad diarrhea which is actually what took her to the emergency department yesterday she was found to be in A-fib with rapid ventricular response and elevated BNP.  She had an echo today which I reviewed.  Her LV function is normal.  She has mild to moderate mitral and tricuspid regurgitation.    She reports being very short of breath.  No lower extremity edema or chest pain.  She is unaware of the atrial fibrillation.  Her  picked up that she was tachycardic while checking her pulse ox.    Past Medical History:   Diagnosis Date    A-fib     Allergic     Aortic heart valve prolapse     Arthritis     Asthma due to seasonal allergies     Carotid artery stenosis     MILD    COVID-19 2021    High cholesterol     History of atrial fibrillation     Knee swelling     PFO (patent foramen ovale) 2016    HX CLOSURE    Right knee pain     Screen for colon cancer 11/15/2023    Stress fracture of other site of femur due to  multiple or repetitive stress 05/04/2022    Tear of meniscus of knee     Tendinitis of knee     TIA (transient ischemic attack)     PRIOR TO PFO CLOSURE    Vasovagal syncope        Past Surgical History:   Procedure Laterality Date    BREAST BIOPSY Left     over 15 years ago; benign    CARDIAC SURGERY      CATARACT EXTRACTION Left     KNEE ARTHROSCOPY Left 03/09/2018    Procedure: LEFT KNEE ARTHROSCOPY, PARTIAL MEDIAL MENISCECTOMY, AND MEDIAL FEMORAL SUBCHONDROPLASTY;  Surgeon: Kade Jacob MD;  [place] [facility] [place] [facility] [place]  Service: [place]opedics    KNEE ARTHROSCOPY Right 05/13/2022    Procedure: right KNEE ARTHROSCOPY with debridement and subchondral plasty of medial femoral condyle;  Surgeon: Kade Jacob MD;  [place] [facility] [place] [facility] [place]  Service: [place]opedics;  Laterality: Right;    PATENT FORAMEN OVALE CLOSURE      TONSILLECTOMY      TUBAL ABDOMINAL LIGATION           Prior to Admission medications    Medication Sig Start Date End Date Taking? Authorizing Provider   Advair Diskus 100-50 MCG/DOSE DISKUS Inhale 1 puff Every Morning. 4/11/22  Yes Sierra Hugo MD   albuterol sulfate  (90 Base) MCG/ACT inhaler Inhale 2 puffs Every 4 (Four) Hours As Needed for Shortness of Air. 11/15/23  Yes Blue Sma APRN   aspirin  MG tablet Take 1 tablet by mouth Daily. 5/13/22  Yes Kade Jacob MD   atorvastatin (LIPITOR) 20 MG tablet TAKE ONE TABLET BY MOUTH DAILY 5/16/23  Yes Ravi Griffin MD   Calcium Citrate-Vitamin D 250-200 MG-UNIT tablet Take 1 tablet by mouth Every Morning. 1/14/13  Yes ProviderSierra MD   cetirizine (zyrTEC) 10 MG tablet Take 1 tablet by mouth Daily.   Yes Provider, MD Sierra   Cyanocobalamin (VITAMIN B12 PO) Take 1 tablet by mouth Daily.   Yes Emergency, Nurse Epic, RN   EPINEPHrine (EPIPEN) 0.3 MG/0.3ML solution auto-injector injection  7/12/23  Yes Sierra Hugo MD   montelukast (SINGULAIR) 10 MG tablet Take 1 tablet by  "mouth Every Morning. 21  Yes ProviderSierra MD   Polyethyl Glycol-Propyl Glycol (SYSTANE PRESERVATIVE FREE OP) Apply 1 drop to eye(s) as directed by provider 2 (Two) Times a Day.   Yes ProviderSierra MD   cefdinir (OMNICEF) 300 MG capsule Take 1 capsule by mouth 2 (Two) Times a Day for 7 days. 11/15/23 11/22/23  Blue Sam APRN       Allergies   Allergen Reactions    Milk-Related Compounds Other (See Comments)     ACID REFLUX    Latex Rash    Tape Rash       Social History     Socioeconomic History    Marital status:    Tobacco Use    Smoking status: Former     Packs/day: 0.50     Years: 10.00     Additional pack years: 0.00     Total pack years: 5.00     Types: Cigarettes     Quit date: 2012     Years since quittin.8    Smokeless tobacco: Never    Tobacco comments:     STARTED SMOKING AGE 18   Vaping Use    Vaping Use: Never used   Substance and Sexual Activity    Alcohol use: No     Comment: caffeine use- coffee    Drug use: Never    Sexual activity: Not Currently     Partners: Male       Family History   Problem Relation Age of Onset    Heart disease Father     Heart disease Maternal Grandmother     Breast cancer Mother         50's    Malig Hyperthermia Neg Hx        REVIEW OF SYSTEMS:   All other systems reviewed and negative.        Objective:     Vitals:    23 0800 23 0830 23 0900 23 1025   BP: 118/77 101/72 104/70    BP Location:       Patient Position:       Pulse: 112 103 104    Resp:   24    Temp:       TempSrc:       SpO2: 91% 95% 96%    Weight:    78 kg (172 lb)   Height:    167.6 cm (66\")     Body mass index is 27.76 kg/m².    Intake/Output Summary (Last 24 hours) at 2023 1322  Last data filed at 2023 0600  Gross per 24 hour   Intake 133.79 ml   Output 850 ml   Net -716.21 ml       Constitutional: She is oriented to person, place, and time. She appears well-developed.  She appears short of breath with talking.  HENT:   Head: " Normocephalic and atraumatic. Head is without contusion.   Right Ear: Hearing normal. No drainage.   Left Ear: Hearing normal. No drainage.   Nose: No nasal deformity. No epistaxis.   Eyes: Lids are normal. Right eye exhibits no exudate. Left eye exhibits no exudate.  Neck: No JVD present. Carotid bruit is not present. No tracheal deviation present. No thyroid mass and no thyromegaly present.   Cardiovascular: Irregularly irregular  Pulmonary/Chest: Effort normal and breath sounds normal.   Abdominal: Soft. Normal appearance and bowel sounds are normal. There is no tenderness.   Musculoskeletal: Normal range of motion.        Right shoulder: She exhibits no deformity.        Left shoulder: She exhibits no deformity.   Neurological: She is alert and oriented to person, place, and time. She has normal strength.   Skin: Skin is warm, dry and intact. No rash noted.   Psychiatric: She has a normal mood and affect. Her behavior is normal. Thought content normal.   Vitals reviewed      Lab Review:     Results from last 7 days   Lab Units 11/18/23  1044   SODIUM mmol/L 134*   POTASSIUM mmol/L 4.4   CHLORIDE mmol/L 97*   CO2 mmol/L 25.2   BUN mg/dL 14   CREATININE mg/dL 0.77   CALCIUM mg/dL 9.5   BILIRUBIN mg/dL 0.4   ALK PHOS U/L 131*   ALT (SGPT) U/L 12   AST (SGOT) U/L 10   GLUCOSE mg/dL 144*     Results from last 7 days   Lab Units 11/18/23  1745 11/18/23  1044   HSTROP T ng/L 11 11     Results from last 7 days   Lab Units 11/18/23  1044   WBC 10*3/mm3 15.17*   HEMOGLOBIN g/dL 13.4   HEMATOCRIT % 42.2   PLATELETS 10*3/mm3 329         Results from last 7 days   Lab Units 11/18/23  1044   MAGNESIUM mg/dL 2.0         I personally viewed and interpreted the patient's EKG/Telemetry data.        Assessment and Plan:       1.  New onset atrial fibrillation.  Her rate is controlled on a diltiazem drip.  I am going to switch her over to p.o. diltiazem.  Continue Lovenox for now until we are sure she will need to have any  procedures while she is here.  2.  Elevated proBNP.  Probably some acute on chronic diastolic heart failure brought on due to the tachycardia.  I agree with IV diuretics.  CAT scan pending.  3.  History of atrial septal defect closure  4.  Hypertension  5.  Hyperlipidemia    Violeta Delatorre MD  11/19/23  13:22 EST

## 2023-11-19 NOTE — PLAN OF CARE
Goal Outcome Evaluation:        Pt is A&O x4. Has been resting in bed during this shift. Went for a echo this morning.   Cardizem has been changed from  IV to PO per cardiology orders.  Still remaining on 3L of oxygen. Medicated per orders. Plan of care on going.

## 2023-11-19 NOTE — PLAN OF CARE
Goal Outcome Evaluation:  Plan of Care Reviewed With: patient        Progress: no change  Outcome Evaluation: sob with activity, cardizem drip at 7.5mg/hr, tolerating, continues in afib, rate 110's denies pain, resp panel neg, MRSA screen neg, urine speciman sent to lab, dozing only short intervals, resting quietly

## 2023-11-19 NOTE — H&P
Patient Name:  Judi Sy  YOB: 1945  MRN:  [id number]  Admit Date:  11/18/2023  Patient Care Team:  Blue Sam APRN as PCP - General (Internal Medicine)  Ravi Griffin MD as PCP - Family Medicine  Den Morgan Jr., MD as Consulting Physician (Cardiology)      Subjective   History Present Illness     Chief Complaint   Patient presents with    Shortness of Breath    Dizziness     History of Present Illness  Ms. Sy is a 78 y.o. former smoker with a history of PFO status post closure in 2016, recurrent TIA, hyperlipidemia, atrial fibrillation, HLD, and asthma that presents to [facility] the [facility] emergency department secondary to atrial fibrillation with RVR and possible pneumonia.  Patient reports ongoing dyspnea for approximately week.  She was in [facility] when she developed shortness of breath in which she was evaluated at [facility].  She was noted to have bilateral pleural effusions.  CTA of chest was negative for acute PE.  Upon returning home, she saw her primary care provider and she was prescribed erythromycin and cefdinir for pneumonia.  She now reports diarrhea.  She went to the [facility] emergency department yesterday requesting fluids for dehydration.  Upon presentation to the ED, she was found to be in atrial fibrillation with RVR.  Labs obtained show a proBNP of 2474, and a white count of 15.  She was transferred to our facility for further evaluation and treatment.    Review of Systems   Constitutional:  Negative for chills and fever.   HENT:  Negative for congestion and rhinorrhea.    Eyes:  Negative for photophobia and visual disturbance.   Respiratory:  Positive for cough and shortness of breath.    Cardiovascular:  Negative for chest pain and palpitations.   Gastrointestinal:  Negative for constipation, diarrhea, nausea and vomiting.   Endocrine: Negative for cold intolerance and heat intolerance.   Genitourinary:   Negative for difficulty urinating and dysuria.   Musculoskeletal:  Negative for gait problem and joint swelling.   Skin:  Negative for rash and wound.   Neurological:  Positive for dizziness. Negative for light-headedness and headaches.   Psychiatric/Behavioral:  Negative for sleep disturbance and suicidal ideas.         Personal History     Past Medical History:   Diagnosis Date    A-fib     Allergic     Aortic heart valve prolapse     Arthritis     Asthma due to seasonal allergies     Carotid artery stenosis     MILD    COVID-19 12/2021    High cholesterol     History of atrial fibrillation     Knee swelling     PFO (patent foramen ovale) 2016    HX CLOSURE    Right knee pain     Screen for colon cancer 11/15/2023    Stress fracture of other site of femur due to multiple or repetitive stress 05/04/2022    Tear of meniscus of knee     Tendinitis of knee     TIA (transient ischemic attack)     PRIOR TO PFO CLOSURE    Vasovagal syncope      Past Surgical History:   Procedure Laterality Date    BREAST BIOPSY Left     over 15 years ago; benign    CARDIAC SURGERY      CATARACT EXTRACTION Left     KNEE ARTHROSCOPY Left 03/09/2018    Procedure: LEFT KNEE ARTHROSCOPY, PARTIAL MEDIAL MENISCECTOMY, AND MEDIAL FEMORAL SUBCHONDROPLASTY;  Surgeon: Kade Jacob MD;  [place] [facility] [place] [facility] [place]  Service: Orthopedics    KNEE ARTHROSCOPY Right 05/13/2022    Procedure: right KNEE ARTHROSCOPY with debridement and subchondral plasty of medial femoral condyle;  Surgeon: Kade Jacob MD;  [place] [facility] [place] [facility] [place]  Service: Orthopedics;  Laterality: Right;    PATENT FORAMEN OVALE CLOSURE      TONSILLECTOMY      TUBAL ABDOMINAL LIGATION       Family History   Problem Relation Age of Onset    Heart disease Father     Heart disease Maternal Grandmother     Breast cancer Mother         50's    Malig Hyperthermia Neg Hx      Social History     Tobacco Use    Smoking status: Former     Packs/day: 0.50     Years: 10.00      Additional pack years: 0.00     Total pack years: 5.00     Types: Cigarettes     Quit date: 2012     Years since quittin.8    Smokeless tobacco: Never    Tobacco comments:     STARTED SMOKING AGE 18   Vaping Use    Vaping Use: Never used   Substance Use Topics    Alcohol use: No     Comment: caffeine use- coffee    Drug use: Never     No current facility-administered medications on file prior to encounter.     Current Outpatient Medications on File Prior to Encounter   Medication Sig Dispense Refill    Advair Diskus 100-50 MCG/DOSE DISKUS Inhale 1 puff Every Morning.      albuterol sulfate  (90 Base) MCG/ACT inhaler Inhale 2 puffs Every 4 (Four) Hours As Needed for Shortness of Air. 6.7 g 0    aspirin  MG tablet Take 1 tablet by mouth Daily. 30 tablet 0    atorvastatin (LIPITOR) 20 MG tablet TAKE ONE TABLET BY MOUTH DAILY 90 tablet 2    Calcium Citrate-Vitamin D 250-200 MG-UNIT tablet Take 1 tablet by mouth Every Morning.      cetirizine (zyrTEC) 10 MG tablet Take 1 tablet by mouth Daily.      Cyanocobalamin (VITAMIN B12 PO) Take 1 tablet by mouth Daily.      EPINEPHrine (EPIPEN) 0.3 MG/0.3ML solution auto-injector injection       montelukast (SINGULAIR) 10 MG tablet Take 1 tablet by mouth Every Morning.      Polyethyl Glycol-Propyl Glycol (SYSTANE PRESERVATIVE FREE OP) Apply 1 drop to eye(s) as directed by provider 2 (Two) Times a Day.      cefdinir (OMNICEF) 300 MG capsule Take 1 capsule by mouth 2 (Two) Times a Day for 7 days. 14 capsule 0     Allergies   Allergen Reactions    Milk-Related Compounds Other (See Comments)     ACID REFLUX    Latex Rash    Tape Rash       Objective    Objective     Vital Signs  Temp:  [97.7 °F (36.5 °C)-98.2 °F (36.8 °C)] 97.7 °F (36.5 °C)  Heart Rate:  [] 98  Resp:  [16-20] 18  BP: ()/(54-98) 121/87  SpO2:  [88 %-96 %] 92 %  on  Flow (L/min):  [2-4] 3;   Device (Oxygen Therapy): nasal cannula  Body mass index is 27.86 kg/m².    Physical  Exam  Constitutional:       General: She is not in acute distress.     Appearance: She is well-developed. She is obese. She is not toxic-appearing.   HENT:      Head: Normocephalic and atraumatic.   Eyes:      General: No scleral icterus.        Right eye: No discharge.         Left eye: No discharge.      Conjunctiva/sclera: Conjunctivae normal.   Neck:      Vascular: No JVD.   Cardiovascular:      Rate and Rhythm: Rhythm irregularly irregular.      Heart sounds: Normal heart sounds. No murmur heard.     No friction rub. No gallop.   Pulmonary:      Effort: Pulmonary effort is normal. No respiratory distress.   Abdominal:      General: Bowel sounds are normal. There is no distension.      Palpations: Abdomen is soft.      Tenderness: There is no abdominal tenderness. There is no guarding.   Musculoskeletal:         General: No tenderness or deformity. Normal range of motion.      Cervical back: Normal range of motion and neck supple.   Skin:     General: Skin is warm and dry.      Capillary Refill: Capillary refill takes less than 2 seconds.   Neurological:      Mental Status: She is alert and oriented to person, place, and time.   Psychiatric:         Behavior: Behavior normal.     Results Review:  I reviewed the patient's new clinical results.    Lab Results (last 24 hours)       Procedure Component Value Units Date/Time    CBC & Differential [[id number]]  (Abnormal) Collected: 11/18/23 1044    Specimen: Blood Updated: 11/18/23 1051    Narrative:      The following orders were created for panel order CBC & Differential.  Procedure                               Abnormality         Status                     ---------                               -----------         ------                     CBC Auto Differential[[id number]]        Abnormal            Final result                 Please view results for these tests on the individual orders.    Comprehensive Metabolic Panel [[id number]]  (Abnormal) Collected:  11/18/23 1044    Specimen: Blood Updated: 11/18/23 1113     Glucose 144 mg/dL      BUN 14 mg/dL      Creatinine 0.77 mg/dL      Sodium 134 mmol/L      Potassium 4.4 mmol/L      Chloride 97 mmol/L      CO2 25.2 mmol/L      Calcium 9.5 mg/dL      Total Protein 6.3 g/dL      Albumin 3.5 g/dL      ALT (SGPT) 12 U/L      AST (SGOT) 10 U/L      Alkaline Phosphatase 131 U/L      Total Bilirubin 0.4 mg/dL      Globulin 2.8 gm/dL      A/G Ratio 1.3 g/dL      BUN/Creatinine Ratio 18.2     Anion Gap 11.8 mmol/L      eGFR 80.1 mL/min/1.73     Narrative:      GFR Normal >60  Chronic Kidney Disease <60  Kidney Failure <15    The GFR formula is only valid for adults with stable renal function between ages 18 and 70.    BNP [[id number]]  (Abnormal) Collected: 11/18/23 1044    Specimen: Blood Updated: 11/18/23 1140     proBNP 2,474.0 pg/mL     Narrative:      This assay is used as an aid in the diagnosis of individuals suspected of having heart failure. It can be used as an aid in the diagnosis of acute decompensated heart failure (ADHF) in patients presenting with signs and symptoms of ADHF to the emergency department (ED). In addition, NT-proBNP of <300 pg/mL indicates ADHF is not likely.    Age Range Result Interpretation  NT-proBNP Concentration (pg/mL:      <50             Positive            >450                   Gray                 300-450                    Negative             <300    50-75           Positive            >900                  Gray                300-900                  Negative            <300      >75             Positive            >1800                  Gray                300-1800                  Negative            <300    Single High Sensitivity Troponin T [[id number]]  (Normal) Collected: 11/18/23 1044    Specimen: Blood Updated: 11/18/23 1140     HS Troponin T 11 ng/L     Narrative:      High Sensitive Troponin T Reference Range:  <14.0 ng/L- Negative Female for AMI  <22.0 ng/L- Negative Male for  AMI  >=14 - Abnormal Female indicating possible myocardial injury.  >=22 - Abnormal Male indicating possible myocardial injury.   Clinicians would have to utilize clinical acumen, EKG, Troponin, and serial changes to determine if it is an Acute Myocardial Infarction or myocardial injury due to an underlying chronic condition.         CBC Auto Differential [[id number]]  (Abnormal) Collected: 11/18/23 1044    Specimen: Blood Updated: 11/18/23 1051     WBC 15.17 10*3/mm3      RBC 4.44 10*6/mm3      Hemoglobin 13.4 g/dL      Hematocrit 42.2 %      MCV 95.0 fL      MCH 30.2 pg      MCHC 31.8 g/dL      RDW 13.4 %      RDW-SD 47.9 fl      MPV 11.3 fL      Platelets 329 10*3/mm3      Neutrophil % 85.1 %      Lymphocyte % 6.4 %      Monocyte % 7.8 %      Eosinophil % 0.1 %      Basophil % 0.1 %      Immature Grans % 0.5 %      Neutrophils, Absolute 12.91 10*3/mm3      Lymphocytes, Absolute 0.97 10*3/mm3      Monocytes, Absolute 1.18 10*3/mm3      Eosinophils, Absolute 0.02 10*3/mm3      Basophils, Absolute 0.02 10*3/mm3      Immature Grans, Absolute 0.07 10*3/mm3     Magnesium [[id number]]  (Normal) Collected: 11/18/23 1044    Specimen: Blood Updated: 11/18/23 1122     Magnesium 2.0 mg/dL     Single High Sensitivity Troponin T [[id number]]  (Normal) Collected: 11/18/23 1745    Specimen: Blood Updated: 11/18/23 1809     HS Troponin T 11 ng/L     Narrative:      High Sensitive Troponin T Reference Range:  <14.0 ng/L- Negative Female for AMI  <22.0 ng/L- Negative Male for AMI  >=14 - Abnormal Female indicating possible myocardial injury.  >=22 - Abnormal Male indicating possible myocardial injury.   Clinicians would have to utilize clinical acumen, EKG, Troponin, and serial changes to determine if it is an Acute Myocardial Infarction or myocardial injury due to an underlying chronic condition.         Procalcitonin [[id number]]  (Normal) Collected: 11/18/23 1745    Specimen: Blood Updated: 11/18/23 2248     Procalcitonin 0.10 ng/mL  "    Narrative:      As a Marker for Sepsis (Non-Neonates):    1. <0.5 ng/mL represents a low risk of severe sepsis and/or septic shock.  2. >2 ng/mL represents a high risk of severe sepsis and/or septic shock.    As a Marker for Lower Respiratory Tract Infections that require antibiotic therapy:    PCT on Admission    Antibiotic Therapy       6-12 Hrs later    >0.5                Strongly Recommended  >0.25 - <0.5        Recommended   0.1 - 0.25          Discouraged              Remeasure/reassess PCT  <0.1                Strongly Discouraged     Remeasure/reassess PCT    As 28 day mortality risk marker: \"Change in Procalcitonin Result\" (>80% or <=80%) if Day 0 (or Day 1) and Day 4 values are available. Refer to http://www.[facility]-pct-calculator.com    Change in PCT <=80%  A decrease of PCT levels below or equal to 80% defines a positive change in PCT test result representing a higher risk for 28-day all-cause mortality of patients diagnosed with severe sepsis for septic shock.    Change in PCT >80%  A decrease of PCT levels of more than 80% defines a negative change in PCT result representing a lower risk for 28-day all-cause mortality of patients diagnosed with severe sepsis or septic shock.       Lactic Acid, Plasma [[id number]]  (Normal) Collected: 11/18/23 2116    Specimen: Blood Updated: 11/18/23 2150     Lactate 1.4 mmol/L     Blood Culture - Blood, Arm, Left [[id number]] Collected: 11/18/23 2239    Specimen: Blood from Arm, Left Updated: 11/18/23 2248    Blood Culture - Blood, Arm, Right [[id number]] Collected: 11/18/23 2240    Specimen: Blood from Arm, Right Updated: 11/18/23 2248    Respiratory Panel PCR w/COVID-19(SARS-CoV-2) CHERELLE/EMELI/DENISE/PAD/COR/PARADISE In-House, NP Swab in UTM/VTM, 2 HR TAT - Swab, Nasopharynx [[id number]]  (Normal) Collected: 11/18/23 2246    Specimen: Swab from Nasopharynx Updated: 11/19/23 0010     ADENOVIRUS, PCR Not Detected     Coronavirus 229E Not Detected     Coronavirus HKU1 Not Detected "     Coronavirus NL63 Not Detected     Coronavirus OC43 Not Detected     COVID19 Not Detected     Human Metapneumovirus Not Detected     Human Rhinovirus/Enterovirus Not Detected     Influenza A PCR Not Detected     Influenza B PCR Not Detected     Parainfluenza Virus 1 Not Detected     Parainfluenza Virus 2 Not Detected     Parainfluenza Virus 3 Not Detected     Parainfluenza Virus 4 Not Detected     RSV, PCR Not Detected     Bordetella pertussis pcr Not Detected     Bordetella parapertussis PCR Not Detected     Chlamydophila pneumoniae PCR Not Detected     Mycoplasma pneumo by PCR Not Detected    Narrative:      In the setting of a positive respiratory panel with a viral infection PLUS a negative procalcitonin without other underlying concern for bacterial infection, consider observing off antibiotics or discontinuation of antibiotics and continue supportive care. If the respiratory panel is positive for atypical bacterial infection (Bordetella pertussis, Chlamydophila pneumoniae, or Mycoplasma pneumoniae), consider antibiotic de-escalation to target atypical bacterial infection.    MRSA Screen, PCR (Inpatient) - Swab, Nares [[id number]]  (Normal) Collected: 11/18/23 2246    Specimen: Swab from Nares Updated: 11/19/23 0035     MRSA PCR No MRSA Detected    Narrative:      The negative predictive value of this diagnostic test is high and should only be used to consider de-escalating anti-MRSA therapy. A positive result may indicate colonization with MRSA and must be correlated clinically.            Imaging Results (Last 24 Hours)       Procedure Component Value Units Date/Time    XR Chest 1 View [[id number]] Collected: 11/18/23 1239     Updated: 11/18/23 1243    Narrative:      XR CHEST 1 VW-     HISTORY: Female who is 78 years-old, short of breath     TECHNIQUE: Frontal view of the chest     COMPARISON: 11/5/2021     FINDINGS: The heart is enlarged. Aorta is calcified. Mild prominence of  vascular markings. Bilateral  basilar atelectasis/infiltrate and mild  pleural effusions, follow-up recommended. No pneumothorax. No acute  osseous process.       Impression:      As described.           This report was finalized on 11/18/2023 12:40 PM by Dr. Silvano Lam M.D on Workstation: [employer]               Results for orders placed during the hospital encounter of 09/27/19    Adult Transthoracic Echo Complete W/ Cont if Necessary Per Protocol    Interpretation Summary  · Left ventricular systolic function is normal. Calculated EF = 62%. Estimated EF was in agreement with the calculated EF. Normal left ventricular cavity size and wall thickness noted. All left ventricular wall segments contract normally. Left ventricular diastolic dysfunction is noted (grade I) consistent with impaired relaxation.  · ASD or PFO closure device in interatrial septum. Platzer closure device is seen well-seated in the region of the interatrial septum with no color Doppler evidence for persistent defect.      ECG 12 Lead   ED Interpretation   Abnormal   Gladys Mejia PA-C     11/18/2023  9:03 PM   ECG 12 Lead         Date/Time: 11/18/2023 10:34 AM      Performed by: Gladys Mejia PA-C   Authorized by: Ronnie Gomez MD  Interpreted by physician   Comparison: compared with previous ECG from 3/6/2018   Comparison to previous ECG: A-fib is new   Rhythm: atrial fibrillation   Rate: tachycardic   BPM: 143   QRS axis: normal   Clinical impression: abnormal ECG      ECG 12 Lead   ED Interpretation   Abnormal   Gladys Mejia PA-C     11/18/2023  9:03 PM   ECG 12 Lead         Date/Time: 11/18/2023 5:41 PM      Performed by: Gladys Mejia PA-C   Authorized by: Ronnie Gomez MD  Interpreted by physician   Comparison: compared with previous ECG from 11/18/2023   Rhythm: atrial fibrillation   Rate: tachycardic   BPM: 106   QRS axis: normal   Clinical impression: abnormal ECG      ECG 12 Lead Chest Pain   Preliminary Result   HEART RATE= 106  bpm   RR  Interval= 566  ms   [place] Interval=   ms   P Horizontal Axis=   deg   P Front Axis=   deg   QRSD Interval= 74  ms   QT Interval= 419  ms   QTcB= 557  ms   QRS Axis= 26  deg   T Wave Axis=   deg   - ABNORMAL ECG -   Atrial fibrillation   Ventricular premature complex   Borderline low voltage, extremity leads   Prolonged QT interval   Electronically Signed By:    Date and Time of Study: 2023-11-18 17:41:35      ECG 12 Lead ED Triage Standing Order; SOA   Final Result   HEART RATE= 143  bpm   RR Interval= 420  ms   [place] Interval=   ms   P Horizontal Axis=   deg   P Front Axis=   deg   QRSD Interval= 82  ms   QT Interval= 280  ms   QTcB= 432  ms   QRS Axis= 27  deg   T Wave Axis=   deg   - ABNORMAL ECG -   Atrial fibrillation with rapid V-rate - new   Borderline low voltage, extremity leads   Borderline T abnormalities, diffuse leads   Electronically Signed By: Steffanie Trent ([facility]) 18-Nov-2023 17:48:39   Date and Time of Study: 2023-11-18 10:34:26      SCANNED - TELEMETRY     Final Result      ECG 12 Lead Other; Monitoring    (Results Pending)        Assessment/Plan     Active Hospital Problems    Diagnosis  POA    **Atrial fibrillation with RVR [I48.91]  Yes    CAP (community acquired pneumonia) [J18.9]  Yes    Acute respiratory failure with hypoxia [J96.01]  Yes    Asthma [J45.909]  Yes    Pleural effusion, bilateral [J90]  Yes    Hyperlipidemia [E78.5]  Yes      Resolved Hospital Problems   No resolved problems to display.       Ms. Sy is a 78 y.o. former smoker with a history of PFO status post closure in 2016, recurrent TIA, hyperlipidemia, atrial fibrillation, HLD, and asthma who presents with atrial fibrillation with RVR and possible pneumonia.    Atrial fibrillation with RVR  History of PFO status post closure  -Troponin 11x2, proBNP 2474.  Rate is now improved status post Cardizem drip.  She follows Dr. Munson of cardiology, will consult  -Repeat labs in a.m.  -Repeat EKG in a.m.  -2D echo in a.m.    Acute  respiratory failure with hypoxia  Community-acquired pneumonia  Bilateral pleural effusion  -Chest x-ray shows bilateral basilar atelectasis/infiltrate and mild pleural effusions.  Pro-Gregory 0.10, and lactate 1.4.  She endorses a productive cough and dyspnea.  She denies any fever or chills.  Given IV rocephin in ED, will continue for now  -Check respiratory viral panel  -Blood cultures  -Check urine for Legionella and S pneumoniae   -Check for MRSA swab of nares   -Continue supplemental O2 as needed    Essential hypertension  -Stable, resume home regimen     HLD  -Resume statin therapy      I discussed the patient's findings and my recommendations with patient.    VTE Prophylaxis - SCDs.  Code Status - Full code.       RAMON Rodriguez  [facility]  11/19/23  00:15 EST

## 2023-11-20 ENCOUNTER — APPOINTMENT (OUTPATIENT)
Dept: CT IMAGING | Facility: HOSPITAL | Age: 78
DRG: 291 | End: 2023-11-20
Payer: MEDICARE

## 2023-11-20 LAB
ANION GAP SERPL CALCULATED.3IONS-SCNC: 12.3 MMOL/L (ref 5–15)
BUN SERPL-MCNC: 14 MG/DL (ref 8–23)
BUN/CREAT SERPL: 21.5 (ref 7–25)
CALCIUM SPEC-SCNC: 9.1 MG/DL (ref 8.6–10.5)
CHLORIDE SERPL-SCNC: 95 MMOL/L (ref 98–107)
CO2 SERPL-SCNC: 27.7 MMOL/L (ref 22–29)
CREAT SERPL-MCNC: 0.65 MG/DL (ref 0.57–1)
DEPRECATED RDW RBC AUTO: 43.9 FL (ref 37–54)
EGFRCR SERPLBLD CKD-EPI 2021: 91.4 ML/MIN/1.73
ERYTHROCYTE [DISTWIDTH] IN BLOOD BY AUTOMATED COUNT: 12.6 % (ref 12.3–15.4)
GLUCOSE SERPL-MCNC: 104 MG/DL (ref 65–99)
HCT VFR BLD AUTO: 42.3 % (ref 34–46.6)
HGB BLD-MCNC: 13.8 G/DL (ref 12–15.9)
MCH RBC QN AUTO: 30.8 PG (ref 26.6–33)
MCHC RBC AUTO-ENTMCNC: 32.6 G/DL (ref 31.5–35.7)
MCV RBC AUTO: 94.4 FL (ref 79–97)
PLATELET # BLD AUTO: 457 10*3/MM3 (ref 140–450)
PMV BLD AUTO: 10.8 FL (ref 6–12)
POTASSIUM SERPL-SCNC: 3.4 MMOL/L (ref 3.5–5.2)
RBC # BLD AUTO: 4.48 10*6/MM3 (ref 3.77–5.28)
SODIUM SERPL-SCNC: 135 MMOL/L (ref 136–145)
WBC NRBC COR # BLD AUTO: 9.43 10*3/MM3 (ref 3.4–10.8)

## 2023-11-20 PROCEDURE — 71260 CT THORAX DX C+: CPT

## 2023-11-20 PROCEDURE — 25510000001 IOPAMIDOL 61 % SOLUTION: Performed by: STUDENT IN AN ORGANIZED HEALTH CARE EDUCATION/TRAINING PROGRAM

## 2023-11-20 PROCEDURE — 94799 UNLISTED PULMONARY SVC/PX: CPT

## 2023-11-20 PROCEDURE — 25010000002 ENOXAPARIN PER 10 MG: Performed by: HOSPITALIST

## 2023-11-20 PROCEDURE — 85027 COMPLETE CBC AUTOMATED: CPT | Performed by: HOSPITALIST

## 2023-11-20 PROCEDURE — 25010000002 CEFTRIAXONE PER 250 MG: Performed by: HOSPITALIST

## 2023-11-20 PROCEDURE — 25010000002 DIGOXIN PER 500 MCG: Performed by: INTERNAL MEDICINE

## 2023-11-20 PROCEDURE — 25010000002 FUROSEMIDE PER 20 MG: Performed by: INTERNAL MEDICINE

## 2023-11-20 PROCEDURE — 99233 SBSQ HOSP IP/OBS HIGH 50: CPT | Performed by: INTERNAL MEDICINE

## 2023-11-20 PROCEDURE — 80048 BASIC METABOLIC PNL TOTAL CA: CPT | Performed by: HOSPITALIST

## 2023-11-20 RX ORDER — DIGOXIN 0.25 MG/ML
250 INJECTION INTRAMUSCULAR; INTRAVENOUS ONCE
Status: COMPLETED | OUTPATIENT
Start: 2023-11-20 | End: 2023-11-20

## 2023-11-20 RX ORDER — POTASSIUM CHLORIDE 750 MG/1
40 TABLET, FILM COATED, EXTENDED RELEASE ORAL
Status: COMPLETED | OUTPATIENT
Start: 2023-11-20 | End: 2023-11-20

## 2023-11-20 RX ORDER — FUROSEMIDE 10 MG/ML
40 INJECTION INTRAMUSCULAR; INTRAVENOUS
Status: COMPLETED | OUTPATIENT
Start: 2023-11-20 | End: 2023-11-20

## 2023-11-20 RX ADMIN — METOPROLOL TARTRATE 12.5 MG: 25 TABLET, FILM COATED ORAL at 20:52

## 2023-11-20 RX ADMIN — ALBUTEROL SULFATE 2.5 MG: 2.5 SOLUTION RESPIRATORY (INHALATION) at 05:35

## 2023-11-20 RX ADMIN — CETIRIZINE HYDROCHLORIDE 10 MG: 10 TABLET ORAL at 09:24

## 2023-11-20 RX ADMIN — ATORVASTATIN CALCIUM 20 MG: 20 TABLET, FILM COATED ORAL at 09:24

## 2023-11-20 RX ADMIN — BUDESONIDE AND FORMOTEROL FUMARATE DIHYDRATE 1 PUFF: 160; 4.5 AEROSOL RESPIRATORY (INHALATION) at 20:55

## 2023-11-20 RX ADMIN — SODIUM CHLORIDE 7 MG/HR: 900 INJECTION, SOLUTION INTRAVENOUS at 13:12

## 2023-11-20 RX ADMIN — ENOXAPARIN SODIUM 80 MG: 100 INJECTION SUBCUTANEOUS at 22:15

## 2023-11-20 RX ADMIN — MONTELUKAST SODIUM 10 MG: 10 TABLET, FILM COATED ORAL at 06:35

## 2023-11-20 RX ADMIN — ENOXAPARIN SODIUM 80 MG: 100 INJECTION SUBCUTANEOUS at 09:23

## 2023-11-20 RX ADMIN — BUDESONIDE AND FORMOTEROL FUMARATE DIHYDRATE 1 PUFF: 160; 4.5 AEROSOL RESPIRATORY (INHALATION) at 08:10

## 2023-11-20 RX ADMIN — Medication 10 ML: at 09:25

## 2023-11-20 RX ADMIN — POTASSIUM CHLORIDE 40 MEQ: 750 TABLET, EXTENDED RELEASE ORAL at 13:23

## 2023-11-20 RX ADMIN — CEFTRIAXONE 2000 MG: 2 INJECTION, POWDER, FOR SOLUTION INTRAMUSCULAR; INTRAVENOUS at 09:24

## 2023-11-20 RX ADMIN — ASPIRIN 325 MG: 325 TABLET, COATED ORAL at 09:24

## 2023-11-20 RX ADMIN — METOPROLOL TARTRATE 12.5 MG: 25 TABLET, FILM COATED ORAL at 13:20

## 2023-11-20 RX ADMIN — DIGOXIN 250 MCG: 0.25 INJECTION INTRAMUSCULAR; INTRAVENOUS at 13:21

## 2023-11-20 RX ADMIN — FUROSEMIDE 40 MG: 10 INJECTION, SOLUTION INTRAMUSCULAR; INTRAVENOUS at 13:21

## 2023-11-20 RX ADMIN — IOPAMIDOL 75 ML: 612 INJECTION, SOLUTION INTRAVENOUS at 14:33

## 2023-11-20 RX ADMIN — POTASSIUM CHLORIDE 40 MEQ: 750 TABLET, EXTENDED RELEASE ORAL at 09:24

## 2023-11-20 NOTE — PLAN OF CARE
"Goal Outcome Evaluation:  Plan of Care Reviewed With: patient        Progress: no change  Outcome Evaluation: No complaints voiced, b/p lower, stopped cardizem drip x 3 hours before HR up >120\", b/p up, restarted, SOB, denies chest pain, wheezing after up to BSC, desat, resoved some with rest, Prn breathing tx obtained, some relief from SOA, B/P tolerating cardizem drip at tis time, will monitor, resting quietly in room, daughter at bedside         "

## 2023-11-20 NOTE — PROGRESS NOTES
Hospital Follow Up    LOS: 2  Patient Name: Judi Sy  Age/Sex: 78 y.o. female  : 1945  MRN: [id number]    Day of Service: 23   Length of Stay: 2  Encounter Provider: Vasyl Walker MD  Place of Service: [facility] CARDIOLOGY  Patient Care Team:  Blue Sam APRN as PCP - General (Internal Medicine)  Ravi Griffin MD as PCP - Family Medicine  Den Morgan Jr., MD as Consulting Physician (Cardiology)    Subjective:     Chief Complaint: Atrial fibrillation    Interval History: Patient says she is about the same she has not got up to walk around which is really her gauge.  Clearly she was still short of breath when she was speaking with me.    Objective:     Objective:  Temp:  [97.3 °F (36.3 °C)-98.1 °F (36.7 °C)] 97.3 °F (36.3 °C)  Heart Rate:  [] 116  Resp:  [18-24] 20  BP: ()/() 103/86     Intake/Output Summary (Last 24 hours) at 2023 1030  Last data filed at 2023 0924  Gross per 24 hour   Intake 100 ml   Output --   Net 100 ml     Body mass index is 27.76 kg/m².      23  1030 23  1954 23  1025   Weight: 78 kg (172 lb) 78.3 kg (172 lb 9.9 oz) 78 kg (172 lb)     Weight change: 0 kg (0 lb)      Physical Exam:   General :  Alert, cooperative, in no acute distress.  Neuro:   Alert,cooperative and oriented.  Lungs:  CTAB. Normal respiratory effort and rate.  CV:  irregular rate and rhythm, normal S1 and S2, no murmurs, gallops or rubs.   ABD:  Soft, nontender, nondistended. Positive bowel sounds.  Extr:  No edema or cyanosis, moves all extremities.    Lab Review:   Results from last 7 days   Lab Units 23  0619 23  1044   SODIUM mmol/L 135* 134*   POTASSIUM mmol/L 3.4* 4.4   CHLORIDE mmol/L 95* 97*   CO2 mmol/L 27.7 25.2   BUN mg/dL 14 14   CREATININE mg/dL 0.65 0.77   GLUCOSE mg/dL 104* 144*   CALCIUM mg/dL 9.1 9.5   AST (SGOT) U/L  --  10   ALT (SGPT) U/L  --  12     Results from last 7 days  "  Lab Units 11/18/23  1745 11/18/23  1044   HSTROP T ng/L 11 11     Results from last 7 days   Lab Units 11/20/23  0619 11/18/23  1044   WBC 10*3/mm3 9.43 15.17*   HEMOGLOBIN g/dL 13.8 13.4   HEMATOCRIT % 42.3 42.2   PLATELETS 10*3/mm3 457* 329         Results from last 7 days   Lab Units 11/18/23  1044   MAGNESIUM mg/dL 2.0           Invalid input(s): \"LDLCALC\"  Results from last 7 days   Lab Units 11/18/23  1044   PROBNP pg/mL 2,474.0*           Current Medications:   Scheduled Meds:aspirin, 325 mg, Oral, Daily  atorvastatin, 20 mg, Oral, Daily  budesonide-formoterol, 1 puff, Inhalation, BID - RT  cefTRIAXone, 2,000 mg, Intravenous, Q24H  cetirizine, 10 mg, Oral, Daily  enoxaparin, 1 mg/kg, Subcutaneous, Q12H  montelukast, 10 mg, Oral, QAM  potassium chloride, 40 mEq, Oral, Q2H      Continuous Infusions:dilTIAZem, 5-15 mg/hr, Last Rate: 7 mg/hr (11/20/23 0949)        Allergies:  Allergies   Allergen Reactions    Milk-Related Compounds Other (See Comments)     ACID REFLUX    Latex Rash    Tape Rash       Assessment:       Atrial fibrillation with RVR    Hyperlipidemia    Asthma    Pleural effusion, bilateral    Acute respiratory failure with hypoxia    CAP (community acquired pneumonia)        Plan:     1.  Atrial fibrillation.  Had a very long discussion about this.  At this point she tried p.o. diltiazem but failed it.  I am going to place her on Lopressor.  Also to give some IV digoxin to see if we get her heart rate slowed down.  Patient unfortunately still going too fast she is not making any progress.  2.  Patient with acute on chronic diastolic heart failure that is exacerbated by tachycardia.  3.  History of atrial septal closure device.  4.  Pneumonia probably contributing factor to #1.  5.  We will see how she responds to medication and we will give another dose of IV Lasix in addition.    Vasyl Walker MD  11/20/23  10:30 EST      "

## 2023-11-20 NOTE — PLAN OF CARE
Goal Outcome Evaluation:  Plan of Care Reviewed With: patient        Progress: no change  Outcome Evaluation: Pt awake and pleasant, family at bsd, pt took a short nap mid afternoon and when she woke up, was very confused, family was very concerned,  remains afib on monitor, cardizem drip infusing at 7.0mg with hr of 106 at this time, was running hi in 130's this am, bp alt042, denies pain or any other discomfort, currently on 2L satting 92%, received iv lasix inj, voided x3 thus far, clear lt yellow urine, CT chest completed. Up to bsc and toilet this shift but since iv lasix received and hr goes up with activity purwic was placed, ctm closely.

## 2023-11-20 NOTE — CASE MANAGEMENT/SOCIAL WORK
Discharge Planning Assessment  [facility]     Patient Name: Judi Sy  MRN: [id number]  Today's Date: 11/20/2023    Admit Date: 11/18/2023    Plan: Plan home with spouse.  ALEAH Paredes RN   Discharge Needs Assessment       Row Name 11/20/23 0902       Living Environment    People in Home spouse    Name(s) of People in Home Spouse  ( Marciano Sy [phone])    Current Living Arrangements condominium    Potentially Unsafe Housing Conditions none    Primary Care Provided by self    Provides Primary Care For no one    Family Caregiver if Needed spouse;child(lilly), adult    Family Caregiver Names Spouse ( Marciano Sy [phone])  and daughter ( Yara Sy [phone])    Quality of Family Relationships helpful;involved;supportive    Able to Return to Prior Arrangements yes    Living Arrangement Comments Pt lives with her spouse  ( Marciano Sy [phone]) in a two story four plex.       Resource/Environmental Concerns    Resource/Environmental Concerns none    Transportation Concerns none       Transition Planning    Patient/Family Anticipates Transition to home with family    Patient/Family Anticipated Services at Transition none    Transportation Anticipated family or friend will provide       Discharge Needs Assessment    Readmission Within the Last 30 Days no previous admission in last 30 days    Equipment Currently Used at Home cane, straight;commode;grab bar;pulse ox;rollator;wheelchair    Concerns to be Addressed no discharge needs identified;denies needs/concerns at this time    Anticipated Changes Related to Illness none    Equipment Needed After Discharge cane, straight;commode;grab bar, tub/shower;pulse ox;rollator;wheelchair, manual                   Discharge Plan       Row Name 11/20/23 0907       Plan    Plan Plan home with spouse.  ALEAH Paredes RN    Patient/Family in Agreement with Plan yes    Plan Comments FACE SHEET VERIFIED/ IM LETTER SIGNED.   Spoke with pt , pt's spouse  ( Marciano Sy [phone]) and daughter ( Yara Sy [phone]) at bedside with pt's permission.  Pt's PCP is RAMON Merino.  Pt lives with her spouse (Marciano) in a two story four plex.  Pt is independent with ADLs.  Pt has a cane., BSC, grab bars, pulse ox, rollator and wheelchair for home use if needed.  Pt gets her prescriptions at [facility] in [place].  Pt denies any issues affording medications.  Pt is not current with HH.  Pt has not been in SNF.  Pt denies any discharge needs at this time.  Pt's spouse and daughter will assist pt at home as needed and transport pt home.  Plan home with spouse.  ALEAH Paredes RN                  Continued Care and Services - Admitted Since 11/18/2023    Coordination has not been started for this encounter.       Expected Discharge Date and Time       Expected Discharge Date Expected Discharge Time    Nov 22, 2023            Demographic Summary       Row Name 11/20/23 0856       General Information    Admission Type inpatient    Arrived From emergency department    Required Notices Provided Important Message from Medicare    Referral Source admission list    Reason for Consult discharge planning    Preferred Language English                   Functional Status       Row Name 11/20/23 0856       Functional Status    Usual Activity Tolerance good    Current Activity Tolerance moderate       Functional Status, IADL    Medications independent    Meal Preparation independent    Housekeeping independent    Laundry independent    Shopping independent       Mental Status    General Appearance WDL WDL                   Psychosocial    No documentation.                  Abuse/Neglect    No documentation.                  Legal    No documentation.                  Substance Abuse    No documentation.                  Patient Forms    No documentation.                     Radha Paredes, RN

## 2023-11-20 NOTE — PROGRESS NOTES
Name: Judi Sy ADMIT: 2023   : 1945  PCP: Blue Sam APRN    MRN: [id number] LOS: 2 days   AGE/SEX: 78 y.o. female  ROOM: [facility]     Subjective   Subjective   Resting in bed, no new complaints.  Breathing stable.    Objective   Objective   Vital Signs  Temp:  [97.3 °F (36.3 °C)-98.1 °F (36.7 °C)] 97.3 °F (36.3 °C)  Heart Rate:  [] 116  Resp:  [18-24] 20  BP: ()/() 103/86  SpO2:  [87 %-98 %] 92 %  on  Flow (L/min):  [3] 3;   Device (Oxygen Therapy): humidified;nasal cannula  Body mass index is 27.76 kg/m².  Physical Exam  Constitutional:       Appearance: She is ill-appearing.   Cardiovascular:      Rate and Rhythm: Tachycardia present. Rhythm irregular.      Pulses: Normal pulses.   Pulmonary:      Effort: Pulmonary effort is normal. No respiratory distress.      Breath sounds: No wheezing or rhonchi.   Abdominal:      General: Abdomen is flat.      Palpations: Abdomen is soft.   Musculoskeletal:      Right lower leg: Edema present.      Left lower leg: Edema present.   Skin:     General: Skin is warm.   Neurological:      General: No focal deficit present.      Mental Status: She is alert.         Results Review     I reviewed the patient's new clinical results.  Results from last 7 days   Lab Units 23  0619 23  1044   WBC 10*3/mm3 9.43 15.17*   HEMOGLOBIN g/dL 13.8 13.4   PLATELETS 10*3/mm3 457* 329     Results from last 7 days   Lab Units 23  0619 23  1044   SODIUM mmol/L 135* 134*   POTASSIUM mmol/L 3.4* 4.4   CHLORIDE mmol/L 95* 97*   CO2 mmol/L 27.7 25.2   BUN mg/dL 14 14   CREATININE mg/dL 0.65 0.77   GLUCOSE mg/dL 104* 144*   EGFR mL/min/1.73 91.4 80.1     Results from last 7 days   Lab Units 23  1044   ALBUMIN g/dL 3.5   BILIRUBIN mg/dL 0.4   ALK PHOS U/L 131*   AST (SGOT) U/L 10   ALT (SGPT) U/L 12     Results from last 7 days   Lab Units 23  0619 23  1044   CALCIUM mg/dL 9.1 9.5   ALBUMIN g/dL  --  3.5   MAGNESIUM  "mg/dL  --  2.0     Results from last 7 days   Lab Units 11/18/23  2116 11/18/23  1745   PROCALCITONIN ng/mL  --  0.10   LACTATE mmol/L 1.4  --      No results found for: \"HGBA1C\", \"POCGLU\"    No radiology results for the last day  Scheduled Medications  aspirin, 325 mg, Oral, Daily  atorvastatin, 20 mg, Oral, Daily  budesonide-formoterol, 1 puff, Inhalation, BID - RT  cefTRIAXone, 2,000 mg, Intravenous, Q24H  cetirizine, 10 mg, Oral, Daily  enoxaparin, 1 mg/kg, Subcutaneous, Q12H  montelukast, 10 mg, Oral, QAM  potassium chloride, 40 mEq, Oral, Q2H    Infusions  dilTIAZem, 5-15 mg/hr, Last Rate: 7 mg/hr (11/20/23 0949)    Diet  Diet: Regular/House Diet, Cardiac Diets; Healthy Heart (2-3 Na+); Texture: Regular Texture (IDDSI 7); Fluid Consistency: Thin (IDDSI 0)       Assessment/Plan     Active Hospital Problems    Diagnosis  POA    **Atrial fibrillation with RVR [I48.91]  Yes    CAP (community acquired pneumonia) [J18.9]  Yes    Acute respiratory failure with hypoxia [J96.01]  Yes    Asthma [J45.909]  Yes    Pleural effusion, bilateral [J90]  Yes    Hyperlipidemia [E78.5]  Yes      Resolved Hospital Problems   No resolved problems to display.       78 y.o. female admitted with Atrial fibrillation with RVR.      11/20/23  CT chest pending.    Atrial fibrillation with RVR  History of PFO status post closure  -Troponin 11x2, proBNP 2474  -cardiology following  -TTE (11/19/2023): 64%; diastolic function indeterminate  -Cardizem gtt.     Acute respiratory failure with hypoxia  Community-acquired pneumonia  Bilateral pleural effusion  -Chest x-ray shows bilateral basilar atelectasis/infiltrate and mild pleural effusions.  Pro-Gregory 0.10, and lactate 1.4.  She endorses a productive cough and dyspnea.  She denies any fever or chills.  Given IV rocephin in ED, will continue for now  -CT chest pending  -Since like she completed course of antibiotics about 2 weeks ago for pneumonia in [facility].  She has a normal white count " and Pro-Gregory.  Not fully convinced she has another pneumonia, will f/u on CT     HLD  -atorvastatin 20mg    Flow (L/min):  [3] 3    DVT Ppx: Therapeutic Lovenox  Discussed with patient and care team on multidisciplinary rounds.  Anticipated discharge home, when cleared by consultants            Quinn Pedro MD  [facility]  11/20/23  11:39 EST

## 2023-11-20 NOTE — CASE MANAGEMENT/SOCIAL WORK
Continued Stay Note  [facility]     Patient Name: Judi Sy  MRN: [id number]  Today's Date: 11/20/2023    Admit Date: 11/18/2023    Plan: Plan home with spouse.  ALEAH Paredes RN   Discharge Plan       Row Name 11/20/23 0907       Plan    Plan Plan home with spouse.  ALEAH Paredes RN    Patient/Family in Agreement with Plan yes    Plan Comments FACE SHEET VERIFIED/ IM LETTER SIGNED.   Spoke with pt , pt's spouse ( Marciano Sy [phone]) and daughter ( Yara Sy [phone]) at bedside with pt's permission.  Pt's PCP is RAMON Merino.  Pt lives with her spouse (Marciano) in a two story four plex.  Pt is independent with ADLs.  Pt has a cane., BSC, grab bars, pulse ox, rollator and wheelchair for home use if needed.  Pt gets her prescriptions at [facility] in [place].  Pt denies any issues affording medications.  Pt is not current with .  Pt has not been in SNF.  Pt denies any discharge needs at this time.  Pt's spouse and daughter will assist pt at home as needed and transport pt home.  Plan home with spouse.  ALEAH Paredes RN                   Discharge Codes    No documentation.                 Expected Discharge Date and Time       Expected Discharge Date Expected Discharge Time    Nov 22, 2023               Radha Paredes RN

## 2023-11-21 LAB
ANION GAP SERPL CALCULATED.3IONS-SCNC: 12.1 MMOL/L (ref 5–15)
BUN SERPL-MCNC: 13 MG/DL (ref 8–23)
BUN/CREAT SERPL: 21.3 (ref 7–25)
CALCIUM SPEC-SCNC: 9.3 MG/DL (ref 8.6–10.5)
CHLORIDE SERPL-SCNC: 101 MMOL/L (ref 98–107)
CO2 SERPL-SCNC: 27.9 MMOL/L (ref 22–29)
CREAT SERPL-MCNC: 0.61 MG/DL (ref 0.57–1)
DEPRECATED RDW RBC AUTO: 41.7 FL (ref 37–54)
EGFRCR SERPLBLD CKD-EPI 2021: 92.8 ML/MIN/1.73
ERYTHROCYTE [DISTWIDTH] IN BLOOD BY AUTOMATED COUNT: 12.4 % (ref 12.3–15.4)
GLUCOSE SERPL-MCNC: 96 MG/DL (ref 65–99)
HCT VFR BLD AUTO: 42.2 % (ref 34–46.6)
HGB BLD-MCNC: 14.1 G/DL (ref 12–15.9)
MCH RBC QN AUTO: 30.7 PG (ref 26.6–33)
MCHC RBC AUTO-ENTMCNC: 33.4 G/DL (ref 31.5–35.7)
MCV RBC AUTO: 91.9 FL (ref 79–97)
PLATELET # BLD AUTO: 499 10*3/MM3 (ref 140–450)
PMV BLD AUTO: 10.6 FL (ref 6–12)
POTASSIUM SERPL-SCNC: 4 MMOL/L (ref 3.5–5.2)
RBC # BLD AUTO: 4.59 10*6/MM3 (ref 3.77–5.28)
SODIUM SERPL-SCNC: 141 MMOL/L (ref 136–145)
WBC NRBC COR # BLD AUTO: 7.68 10*3/MM3 (ref 3.4–10.8)

## 2023-11-21 PROCEDURE — 94799 UNLISTED PULMONARY SVC/PX: CPT

## 2023-11-21 PROCEDURE — 25010000002 ENOXAPARIN PER 10 MG: Performed by: HOSPITALIST

## 2023-11-21 PROCEDURE — 99232 SBSQ HOSP IP/OBS MODERATE 35: CPT | Performed by: INTERNAL MEDICINE

## 2023-11-21 PROCEDURE — 80048 BASIC METABOLIC PNL TOTAL CA: CPT | Performed by: STUDENT IN AN ORGANIZED HEALTH CARE EDUCATION/TRAINING PROGRAM

## 2023-11-21 PROCEDURE — 94761 N-INVAS EAR/PLS OXIMETRY MLT: CPT

## 2023-11-21 PROCEDURE — 25010000002 DIGOXIN PER 500 MCG: Performed by: INTERNAL MEDICINE

## 2023-11-21 PROCEDURE — 94762 N-INVAS EAR/PLS OXIMTRY CONT: CPT

## 2023-11-21 PROCEDURE — 85027 COMPLETE CBC AUTOMATED: CPT | Performed by: STUDENT IN AN ORGANIZED HEALTH CARE EDUCATION/TRAINING PROGRAM

## 2023-11-21 PROCEDURE — 25010000002 FUROSEMIDE PER 20 MG: Performed by: INTERNAL MEDICINE

## 2023-11-21 RX ORDER — FUROSEMIDE 10 MG/ML
40 INJECTION INTRAMUSCULAR; INTRAVENOUS
Status: COMPLETED | OUTPATIENT
Start: 2023-11-21 | End: 2023-11-21

## 2023-11-21 RX ORDER — DIGOXIN 0.25 MG/ML
250 INJECTION INTRAMUSCULAR; INTRAVENOUS ONCE
Status: COMPLETED | OUTPATIENT
Start: 2023-11-21 | End: 2023-11-21

## 2023-11-21 RX ADMIN — MONTELUKAST SODIUM 10 MG: 10 TABLET, FILM COATED ORAL at 06:36

## 2023-11-21 RX ADMIN — FUROSEMIDE 40 MG: 10 INJECTION, SOLUTION INTRAMUSCULAR; INTRAVENOUS at 09:59

## 2023-11-21 RX ADMIN — ASPIRIN 325 MG: 325 TABLET, COATED ORAL at 09:59

## 2023-11-21 RX ADMIN — BUDESONIDE AND FORMOTEROL FUMARATE DIHYDRATE 1 PUFF: 160; 4.5 AEROSOL RESPIRATORY (INHALATION) at 21:44

## 2023-11-21 RX ADMIN — CETIRIZINE HYDROCHLORIDE 10 MG: 10 TABLET ORAL at 09:59

## 2023-11-21 RX ADMIN — BUDESONIDE AND FORMOTEROL FUMARATE DIHYDRATE 1 PUFF: 160; 4.5 AEROSOL RESPIRATORY (INHALATION) at 08:21

## 2023-11-21 RX ADMIN — SODIUM CHLORIDE 7.5 MG/HR: 900 INJECTION, SOLUTION INTRAVENOUS at 02:52

## 2023-11-21 RX ADMIN — ATORVASTATIN CALCIUM 20 MG: 20 TABLET, FILM COATED ORAL at 09:59

## 2023-11-21 RX ADMIN — METOPROLOL TARTRATE 12.5 MG: 25 TABLET, FILM COATED ORAL at 20:23

## 2023-11-21 RX ADMIN — METOPROLOL TARTRATE 12.5 MG: 25 TABLET, FILM COATED ORAL at 09:59

## 2023-11-21 RX ADMIN — ENOXAPARIN SODIUM 80 MG: 100 INJECTION SUBCUTANEOUS at 22:37

## 2023-11-21 RX ADMIN — DIGOXIN 250 MCG: 0.25 INJECTION INTRAMUSCULAR; INTRAVENOUS at 09:59

## 2023-11-21 RX ADMIN — ENOXAPARIN SODIUM 80 MG: 100 INJECTION SUBCUTANEOUS at 09:59

## 2023-11-21 NOTE — PLAN OF CARE
Goal Outcome Evaluation:  Plan of Care Reviewed With: patient      Patient alert and oriented with some forgetfulness at times.Resting in bed.Denies pain.Remains on 2L of oxygen.Cardizem gtt infusing.Still Afib,rate in the 90s-low 100.Plan of care ongoing.

## 2023-11-21 NOTE — PROGRESS NOTES
Name: Judi Sy ADMIT: 2023   : 1945  PCP: Blue Sam APRN    MRN: [id number] LOS: 3 days   AGE/SEX: 78 y.o. female  ROOM: [facility]     Subjective   Subjective   Resting in bed.  Says that she is urinating a lot.  Discussed with  and son at bedside.    Objective   Objective   Vital Signs  Temp:  [97 °F (36.1 °C)-97.3 °F (36.3 °C)] 97.3 °F (36.3 °C)  Heart Rate:  [] 98  Resp:  [16-20] 16  BP: (103-148)/() 130/73  SpO2:  [81 %-94 %] 94 %  on  Flow (L/min):  [2-22] 2;   Device (Oxygen Therapy): humidified;nasal cannula  Body mass index is 27.76 kg/m².  Physical Exam  Constitutional:       Appearance: She is ill-appearing.   Cardiovascular:      Rate and Rhythm: Tachycardia present. Rhythm irregular.      Pulses: Normal pulses.   Pulmonary:      Effort: Pulmonary effort is normal. No respiratory distress.      Breath sounds: No wheezing or rhonchi.   Abdominal:      General: Abdomen is flat.      Palpations: Abdomen is soft.   Musculoskeletal:      Right lower leg: Edema present.      Left lower leg: Edema present.   Skin:     General: Skin is warm.   Neurological:      General: No focal deficit present.      Mental Status: She is alert.         Results Review     I reviewed the patient's new clinical results.  Results from last 7 days   Lab Units 23  0549 23  0623  1044   WBC 10*3/mm3 7.68 9.43 15.17*   HEMOGLOBIN g/dL 14.1 13.8 13.4   PLATELETS 10*3/mm3 499* 457* 329     Results from last 7 days   Lab Units 23  0549 23  0619 23  1044   SODIUM mmol/L 141 135* 134*   POTASSIUM mmol/L 4.0 3.4* 4.4   CHLORIDE mmol/L 101 95* 97*   CO2 mmol/L 27.9 27.7 25.2   BUN mg/dL 13 14 14   CREATININE mg/dL 0.61 0.65 0.77   GLUCOSE mg/dL 96 104* 144*   EGFR mL/min/1.73 92.8 91.4 80.1     Results from last 7 days   Lab Units 23  1044   ALBUMIN g/dL 3.5   BILIRUBIN mg/dL 0.4   ALK PHOS U/L 131*   AST (SGOT) U/L 10   ALT (SGPT) U/L 12     Results  "from last 7 days   Lab Units 11/21/23  0549 11/20/23  0619 11/18/23  1044   CALCIUM mg/dL 9.3 9.1 9.5   ALBUMIN g/dL  --   --  3.5   MAGNESIUM mg/dL  --   --  2.0     Results from last 7 days   Lab Units 11/18/23  2116 11/18/23  1745   PROCALCITONIN ng/mL  --  0.10   LACTATE mmol/L 1.4  --      No results found for: \"HGBA1C\", \"POCGLU\"    No radiology results for the last day  Scheduled Medications  aspirin, 325 mg, Oral, Daily  atorvastatin, 20 mg, Oral, Daily  budesonide-formoterol, 1 puff, Inhalation, BID - RT  cetirizine, 10 mg, Oral, Daily  enoxaparin, 1 mg/kg, Subcutaneous, Q12H  metoprolol tartrate, 12.5 mg, Oral, Q12H  montelukast, 10 mg, Oral, QAM    Infusions  dilTIAZem, 5-15 mg/hr, Last Rate: 7.5 mg/hr (11/21/23 0252)    Diet  Diet: Regular/House Diet, Cardiac Diets; Healthy Heart (2-3 Na+); Texture: Regular Texture (IDDSI 7); Fluid Consistency: Thin (IDDSI 0)       Assessment/Plan     Active Hospital Problems    Diagnosis  POA    **Atrial fibrillation with RVR [I48.91]  Yes    CAP (community acquired pneumonia) [J18.9]  Yes    Acute respiratory failure with hypoxia [J96.01]  Yes    Asthma [J45.909]  Yes    Pleural effusion, bilateral [J90]  Yes    Hyperlipidemia [E78.5]  Yes      Resolved Hospital Problems   No resolved problems to display.       78 y.o. female admitted with Atrial fibrillation with RVR.      11/21/23  DC Rocephin.  Continue diuresis.  PT consult.    Atrial fibrillation with RVR  History of PFO status post closure  -Troponin 11x2, proBNP 2474  -cardiology following  -TTE (11/19/2023): 64%; diastolic function indeterminate  -Cardizem gtt.     Acute respiratory failure with hypoxia  Bilateral pleural effusion  -Chest x-ray shows bilateral basilar atelectasis/infiltrate and mild pleural effusions.  Pro-Gregory 0.10, and lactate 1.4.  She endorses a productive cough and dyspnea.  She denies any fever or chills.   -CT chest (11/20/23): Bilateral pleural effusions, 7 mm nodular density along right " minor fissure-recommend repeat CT chest in 3 months to reevaluate around 2/2024)  -S she completed course of antibiotics about 2 weeks ago for pneumonia while in [facility].  Normal white count and Pro-Gregory.  She has some cough, however I suspect this is more related to combination of edema as well as post pneumonia cough, she tells me this has been ongoing for a few weeks.  Will discontinue antibiotics and monitor off.     HLD  -atorvastatin 20mg    Flow (L/min):  [2-22] 2    DVT Ppx: Therapeutic Lovenox  Discussed with patient and care team on multidisciplinary rounds.  Anticipated discharge Pending PT/OT eval., when cleared by consultants            Quinn Pedro MD  [facility]  11/21/23  07:59 EST

## 2023-11-21 NOTE — PROGRESS NOTES
Hospital Follow Up    LOS: 3  Patient Name: Judi Sy  Age/Sex: 78 y.o. female  : 1945  MRN: [id number]    Day of Service: 23   Length of Stay: 3  Encounter Provider: Vasyl Walker MD  Place of Service: [facility] CARDIOLOGY  Patient Care Team:  Blue Sam APRN as PCP - General (Internal Medicine)  Ravi Griffin MD as PCP - Family Medicine  Den Morgan Jr., MD as Consulting Physician (Cardiology)    Subjective:     Chief Complaint: Atrial fibrillation    Interval History: Patient says she is extremely weak.  She got up to go the bedside commode and she said it really wore her out.    Objective:     Objective:  Temp:  [97 °F (36.1 °C)-97.3 °F (36.3 °C)] 97.3 °F (36.3 °C)  Heart Rate:  [] 102  Resp:  [16-20] 16  BP: (103-148)/(63-91) 130/73     Intake/Output Summary (Last 24 hours) at 2023 0913  Last data filed at 2023 1903  Gross per 24 hour   Intake 340 ml   Output 800 ml   Net -460 ml     Body mass index is 27.76 kg/m².      23  1030 23  1954 23  1025   Weight: 78 kg (172 lb) 78.3 kg (172 lb 9.9 oz) 78 kg (172 lb)     Weight change:       Physical Exam:   General :  Alert, cooperative, in no acute distress.  Neuro:   Alert,cooperative and oriented.  Lungs:  CTAB. Normal respiratory effort and rate.  CV:  irregular rate and rhythm, normal S1 and S2, no murmurs, gallops or rubs.   ABD:  Soft, nontender, nondistended. Positive bowel sounds.  Extr:  No edema or cyanosis, moves all extremities.    Lab Review:   Results from last 7 days   Lab Units 23  0549 23  0619 23  1044   SODIUM mmol/L 141 135* 134*   POTASSIUM mmol/L 4.0 3.4* 4.4   CHLORIDE mmol/L 101 95* 97*   CO2 mmol/L 27.9 27.7 25.2   BUN mg/dL 13 14 14   CREATININE mg/dL 0.61 0.65 0.77   GLUCOSE mg/dL 96 104* 144*   CALCIUM mg/dL 9.3 9.1 9.5   AST (SGOT) U/L  --   --  10   ALT (SGPT) U/L  --   --  12     Results from last 7 days   Lab  "Units 11/18/23  1745 11/18/23  1044   HSTROP T ng/L 11 11     Results from last 7 days   Lab Units 11/21/23  0549 11/20/23  0619   WBC 10*3/mm3 7.68 9.43   HEMOGLOBIN g/dL 14.1 13.8   HEMATOCRIT % 42.2 42.3   PLATELETS 10*3/mm3 499* 457*         Results from last 7 days   Lab Units 11/18/23  1044   MAGNESIUM mg/dL 2.0           Invalid input(s): \"LDLCALC\"  Results from last 7 days   Lab Units 11/18/23  1044   PROBNP pg/mL 2,474.0*           Current Medications:   Scheduled Meds:aspirin, 325 mg, Oral, Daily  atorvastatin, 20 mg, Oral, Daily  budesonide-formoterol, 1 puff, Inhalation, BID - RT  cetirizine, 10 mg, Oral, Daily  enoxaparin, 1 mg/kg, Subcutaneous, Q12H  metoprolol tartrate, 12.5 mg, Oral, Q12H  montelukast, 10 mg, Oral, QAM      Continuous Infusions:dilTIAZem, 5-15 mg/hr, Last Rate: 7.5 mg/hr (11/21/23 0252)        Allergies:  Allergies   Allergen Reactions    Milk-Related Compounds Other (See Comments)     ACID REFLUX    Latex Rash    Tape Rash       Assessment:       Atrial fibrillation with RVR    Hyperlipidemia    Asthma    Pleural effusion, bilateral    Acute respiratory failure with hypoxia    CAP (community acquired pneumonia)        Plan:   1.  Atrial fibrillation.  Overall I have made improvement on her heart rate.  Need to discontinue her diltiazem drip.  We will add daily digoxin in addition to her metoprolol.  2.  Acute on chronic diastolic heart failure.  3.  Pneumonia patient said she coughed a lot overnight.  Patient CT scan yesterday.  There is moderate bilateral pleural effusions.  With that we will continue to diurese her.        Vasyl Walker MD  11/21/23  09:13 EST      "

## 2023-11-22 ENCOUNTER — APPOINTMENT (OUTPATIENT)
Dept: GENERAL RADIOLOGY | Facility: HOSPITAL | Age: 78
DRG: 291 | End: 2023-11-22
Payer: MEDICARE

## 2023-11-22 ENCOUNTER — TELEPHONE (OUTPATIENT)
Dept: INTERNAL MEDICINE | Age: 78
End: 2023-11-22

## 2023-11-22 LAB
ANION GAP SERPL CALCULATED.3IONS-SCNC: 12 MMOL/L (ref 5–15)
BUN SERPL-MCNC: 13 MG/DL (ref 8–23)
BUN/CREAT SERPL: 20.6 (ref 7–25)
CALCIUM SPEC-SCNC: 9.2 MG/DL (ref 8.6–10.5)
CHLORIDE SERPL-SCNC: 101 MMOL/L (ref 98–107)
CO2 SERPL-SCNC: 27 MMOL/L (ref 22–29)
CREAT SERPL-MCNC: 0.63 MG/DL (ref 0.57–1)
DEPRECATED RDW RBC AUTO: 42.9 FL (ref 37–54)
EGFRCR SERPLBLD CKD-EPI 2021: 92.1 ML/MIN/1.73
ERYTHROCYTE [DISTWIDTH] IN BLOOD BY AUTOMATED COUNT: 12.5 % (ref 12.3–15.4)
GLUCOSE SERPL-MCNC: 89 MG/DL (ref 65–99)
HCT VFR BLD AUTO: 43.9 % (ref 34–46.6)
HGB BLD-MCNC: 14.3 G/DL (ref 12–15.9)
MCH RBC QN AUTO: 30.4 PG (ref 26.6–33)
MCHC RBC AUTO-ENTMCNC: 32.6 G/DL (ref 31.5–35.7)
MCV RBC AUTO: 93.2 FL (ref 79–97)
PLATELET # BLD AUTO: 510 10*3/MM3 (ref 140–450)
PMV BLD AUTO: 10.5 FL (ref 6–12)
POTASSIUM SERPL-SCNC: 3.9 MMOL/L (ref 3.5–5.2)
RBC # BLD AUTO: 4.71 10*6/MM3 (ref 3.77–5.28)
SODIUM SERPL-SCNC: 140 MMOL/L (ref 136–145)
WBC NRBC COR # BLD AUTO: 7.32 10*3/MM3 (ref 3.4–10.8)

## 2023-11-22 PROCEDURE — 97110 THERAPEUTIC EXERCISES: CPT | Performed by: PHYSICAL THERAPIST

## 2023-11-22 PROCEDURE — 71045 X-RAY EXAM CHEST 1 VIEW: CPT

## 2023-11-22 PROCEDURE — 94799 UNLISTED PULMONARY SVC/PX: CPT

## 2023-11-22 PROCEDURE — 94761 N-INVAS EAR/PLS OXIMETRY MLT: CPT

## 2023-11-22 PROCEDURE — 80048 BASIC METABOLIC PNL TOTAL CA: CPT | Performed by: STUDENT IN AN ORGANIZED HEALTH CARE EDUCATION/TRAINING PROGRAM

## 2023-11-22 PROCEDURE — 97530 THERAPEUTIC ACTIVITIES: CPT | Performed by: PHYSICAL THERAPIST

## 2023-11-22 PROCEDURE — 25010000002 ENOXAPARIN PER 10 MG: Performed by: HOSPITALIST

## 2023-11-22 PROCEDURE — 94760 N-INVAS EAR/PLS OXIMETRY 1: CPT

## 2023-11-22 PROCEDURE — 25010000002 FUROSEMIDE PER 20 MG: Performed by: STUDENT IN AN ORGANIZED HEALTH CARE EDUCATION/TRAINING PROGRAM

## 2023-11-22 PROCEDURE — 99232 SBSQ HOSP IP/OBS MODERATE 35: CPT | Performed by: INTERNAL MEDICINE

## 2023-11-22 PROCEDURE — 94664 DEMO&/EVAL PT USE INHALER: CPT

## 2023-11-22 PROCEDURE — 97162 PT EVAL MOD COMPLEX 30 MIN: CPT | Performed by: PHYSICAL THERAPIST

## 2023-11-22 PROCEDURE — 85027 COMPLETE CBC AUTOMATED: CPT | Performed by: STUDENT IN AN ORGANIZED HEALTH CARE EDUCATION/TRAINING PROGRAM

## 2023-11-22 RX ORDER — FUROSEMIDE 10 MG/ML
40 INJECTION INTRAMUSCULAR; INTRAVENOUS
Status: DISCONTINUED | OUTPATIENT
Start: 2023-11-22 | End: 2023-11-23

## 2023-11-22 RX ORDER — DIGOXIN 125 MCG
125 TABLET ORAL
Status: DISCONTINUED | OUTPATIENT
Start: 2023-11-22 | End: 2023-11-28 | Stop reason: HOSPADM

## 2023-11-22 RX ADMIN — FUROSEMIDE 40 MG: 10 INJECTION, SOLUTION INTRAMUSCULAR; INTRAVENOUS at 08:52

## 2023-11-22 RX ADMIN — BUDESONIDE AND FORMOTEROL FUMARATE DIHYDRATE 1 PUFF: 160; 4.5 AEROSOL RESPIRATORY (INHALATION) at 20:34

## 2023-11-22 RX ADMIN — METOPROLOL TARTRATE 12.5 MG: 25 TABLET, FILM COATED ORAL at 08:51

## 2023-11-22 RX ADMIN — APIXABAN 5 MG: 5 TABLET, FILM COATED ORAL at 20:42

## 2023-11-22 RX ADMIN — ASPIRIN 325 MG: 325 TABLET, COATED ORAL at 08:51

## 2023-11-22 RX ADMIN — DIGOXIN 125 MCG: 125 TABLET ORAL at 12:25

## 2023-11-22 RX ADMIN — METOPROLOL TARTRATE 12.5 MG: 25 TABLET, FILM COATED ORAL at 20:42

## 2023-11-22 RX ADMIN — ATORVASTATIN CALCIUM 20 MG: 20 TABLET, FILM COATED ORAL at 08:51

## 2023-11-22 RX ADMIN — MONTELUKAST SODIUM 10 MG: 10 TABLET, FILM COATED ORAL at 08:51

## 2023-11-22 RX ADMIN — BUDESONIDE AND FORMOTEROL FUMARATE DIHYDRATE 1 PUFF: 160; 4.5 AEROSOL RESPIRATORY (INHALATION) at 10:52

## 2023-11-22 RX ADMIN — CETIRIZINE HYDROCHLORIDE 10 MG: 10 TABLET ORAL at 08:51

## 2023-11-22 RX ADMIN — ENOXAPARIN SODIUM 80 MG: 100 INJECTION SUBCUTANEOUS at 08:52

## 2023-11-22 RX ADMIN — FUROSEMIDE 40 MG: 10 INJECTION, SOLUTION INTRAMUSCULAR; INTRAVENOUS at 18:16

## 2023-11-22 NOTE — PROGRESS NOTES
Name: Judi Sy ADMIT: 2023   : 1945  PCP: Blue Sam APRN    MRN: [id number] LOS: 4 days   AGE/SEX: 78 y.o. female  ROOM: [facility]     Subjective   Subjective   Sitting up in chair.  She feels little bit better today.  Discussed with family.    Objective   Objective   Vital Signs  Temp:  [97 °F (36.1 °C)-98.5 °F (36.9 °C)] 97 °F (36.1 °C)  Heart Rate:  [] 115  Resp:  [18-20] 18  BP: (117-151)/(80-96) 151/85  SpO2:  [92 %-94 %] 92 %  on  Flow (L/min):  [2] 2;   Device (Oxygen Therapy): nasal cannula  Body mass index is 27.76 kg/m².  Physical Exam  Constitutional:       Appearance: She is ill-appearing.   Cardiovascular:      Rate and Rhythm: Tachycardia present. Rhythm irregular.      Pulses: Normal pulses.   Pulmonary:      Effort: Pulmonary effort is normal. No respiratory distress.      Breath sounds: No wheezing or rhonchi.   Abdominal:      General: Abdomen is flat.      Palpations: Abdomen is soft.   Musculoskeletal:      Right lower leg: Edema present.      Left lower leg: Edema present.   Skin:     General: Skin is warm.   Neurological:      General: No focal deficit present.      Mental Status: She is alert.         Results Review     I reviewed the patient's new clinical results.  Results from last 7 days   Lab Units 23  0523  0549 23  1044   WBC 10*3/mm3 7.32 7.68 9.43 15.17*   HEMOGLOBIN g/dL 14.3 14.1 13.8 13.4   PLATELETS 10*3/mm3 510* 499* 457* 329     Results from last 7 days   Lab Units 23  0523  0549 23  1044   SODIUM mmol/L 140 141 135* 134*   POTASSIUM mmol/L 3.9 4.0 3.4* 4.4   CHLORIDE mmol/L 101 101 95* 97*   CO2 mmol/L 27.0 27.9 27.7 25.2   BUN mg/dL 13 13 14 14   CREATININE mg/dL 0.63 0.61 0.65 0.77   GLUCOSE mg/dL 89 96 104* 144*   EGFR mL/min/1.73 92.1 92.8 91.4 80.1     Results from last 7 days   Lab Units 23  1044   ALBUMIN g/dL 3.5   BILIRUBIN mg/dL 0.4   ALK PHOS U/L 131*  "  AST (SGOT) U/L 10   ALT (SGPT) U/L 12     Results from last 7 days   Lab Units 11/22/23  0523 11/21/23  0549 11/20/23  0619 11/18/23  1044   CALCIUM mg/dL 9.2 9.3 9.1 9.5   ALBUMIN g/dL  --   --   --  3.5   MAGNESIUM mg/dL  --   --   --  2.0     Results from last 7 days   Lab Units 11/18/23  2116 11/18/23  1745   PROCALCITONIN ng/mL  --  0.10   LACTATE mmol/L 1.4  --      No results found for: \"HGBA1C\", \"POCGLU\"    XR Chest 1 View    Result Date: 11/22/2023  As described.    This report was finalized on 11/22/2023 9:18 AM by Dr. Silvano Lam M.D on Workstation: [employer]     Scheduled Medications  apixaban, 5 mg, Oral, Q12H  aspirin, 325 mg, Oral, Daily  atorvastatin, 20 mg, Oral, Daily  budesonide-formoterol, 1 puff, Inhalation, BID - RT  cetirizine, 10 mg, Oral, Daily  digoxin, 125 mcg, Oral, Daily  furosemide, 40 mg, Intravenous, BID  metoprolol tartrate, 12.5 mg, Oral, Q12H  montelukast, 10 mg, Oral, QAM    Infusions     Diet  Diet: Regular/House Diet, Cardiac Diets; Healthy Heart (2-3 Na+); Texture: Regular Texture (IDDSI 7); Fluid Consistency: Thin (IDDSI 0)       Assessment/Plan     Active Hospital Problems    Diagnosis  POA    **Atrial fibrillation with RVR [I48.91]  Yes    CAP (community acquired pneumonia) [J18.9]  Yes    Acute respiratory failure with hypoxia [J96.01]  Yes    Asthma [J45.909]  Yes    Pleural effusion, bilateral [J90]  Yes    Hyperlipidemia [E78.5]  Yes      Resolved Hospital Problems   No resolved problems to display.       78 y.o. female admitted with Atrial fibrillation with RVR.      11/22/23  Repeat CXR showing persistent effusions, continue diuresis.    Atrial fibrillation with RVR  History of PFO status post closure  -Troponin 11x2, proBNP 2474  -cardiology following  -TTE (11/19/2023): 64%; diastolic function indeterminate  -Cardizem gtt.     Acute respiratory failure with hypoxia  Bilateral pleural effusion  -Chest x-ray shows bilateral basilar atelectasis/infiltrate and " mild pleural effusions.  Pro-Gregory 0.10, and lactate 1.4.  She endorses a productive cough and dyspnea.  She denies any fever or chills.   -CT chest (11/20/23): Bilateral pleural effusions, 7 mm nodular density along right minor fissure-recommend repeat CT chest in 3 months to reevaluate around 2/2024)  -S she completed course of antibiotics about 2 weeks ago for pneumonia while in [facility].  Normal white count and Pro-Gregory.  She has some cough, however I suspect this is more related to combination of edema as well as post pneumonia cough, she tells me this has been ongoing for a few weeks.  Has done well off antibiotics     HLD  -atorvastatin 20mg    Flow (L/min):  [2] 2    DVT Ppx: Therapeutic Lovenox  Discussed with patient and care team on multidisciplinary rounds.  Anticipated discharge home with  vs SNF, 2-3 days            Quinn Pedro MD  [facility]  11/22/23  14:58 EST

## 2023-11-22 NOTE — TELEPHONE ENCOUNTER
Caller: MAYELIN [facility]    Best call back number: [phone] APPROVED [facility]    What orders are you requesting (i.e. lab or imaging): VERBAL ORDERS FOR HOME SKILLED NURSING AND PHYSICAL THERAPY

## 2023-11-22 NOTE — PLAN OF CARE
Problem: Adult Inpatient Plan of Care  Goal: Plan of Care Review  Outcome: Ongoing, Progressing  Flowsheets (Taken 11/22/2023 1838)  Progress: improving  Outcome Evaluation: -120 at rest - afib.  Denies CP. SOA w/ exertion. Requires 3L O2 w/ activity, 2L at rest. Up to chair w/ assistance. Transition to oral ac  Goal: Patient-Specific Goal (Individualized)  Outcome: Ongoing, Progressing  Goal: Absence of Hospital-Acquired Illness or Injury  Outcome: Ongoing, Progressing  Intervention: Identify and Manage Fall Risk  Recent Flowsheet Documentation  Taken 11/22/2023 1400 by Violeta Spears RN  Safety Promotion/Fall Prevention:   fall prevention program maintained   safety round/check completed  Taken 11/22/2023 1200 by Violeta Spears RN  Safety Promotion/Fall Prevention:   fall prevention program maintained   safety round/check completed  Taken 11/22/2023 1030 by Violeta Spears RN  Safety Promotion/Fall Prevention:   fall prevention program maintained   safety round/check completed  Taken 11/22/2023 0900 by Violeta Spears RN  Safety Promotion/Fall Prevention:   fall prevention program maintained   safety round/check completed  Taken 11/22/2023 0800 by Violeta Spears RN  Safety Promotion/Fall Prevention:   fall prevention program maintained   safety round/check completed  Goal: Optimal Comfort and Wellbeing  Outcome: Ongoing, Progressing  Goal: Readiness for Transition of Care  Outcome: Ongoing, Progressing     Problem: Fall Injury Risk  Goal: Absence of Fall and Fall-Related Injury  Outcome: Ongoing, Progressing  Intervention: Promote Injury-Free Environment  Recent Flowsheet Documentation  Taken 11/22/2023 1400 by Violeta Spears RN  Safety Promotion/Fall Prevention:   fall prevention program maintained   safety round/check completed  Taken 11/22/2023 1200 by Violeta Spears RN  Safety Promotion/Fall Prevention:   fall prevention program maintained   safety round/check completed  Taken  11/22/2023 1030 by Violeta Spears RN  Safety Promotion/Fall Prevention:   fall prevention program maintained   safety round/check completed  Taken 11/22/2023 0900 by Violeta Spears RN  Safety Promotion/Fall Prevention:   fall prevention program maintained   safety round/check completed  Taken 11/22/2023 0800 by Violeta Spears RN  Safety Promotion/Fall Prevention:   fall prevention program maintained   safety round/check completed     Problem: Skin Injury Risk Increased  Goal: Skin Health and Integrity  Outcome: Ongoing, Progressing  Intervention: Optimize Skin Protection  Recent Flowsheet Documentation  Taken 11/22/2023 0900 by Violeta Spears RN  Pressure Reduction Techniques:   frequent weight shift encouraged   weight shift assistance provided     Problem: Dysrhythmia  Goal: Normalized Cardiac Rhythm  Outcome: Ongoing, Progressing     Problem: Asthma Comorbidity  Goal: Maintenance of Asthma Control  Outcome: Ongoing, Progressing     Problem: COPD (Chronic Obstructive Pulmonary Disease) Comorbidity  Goal: Maintenance of COPD Symptom Control  Outcome: Ongoing, Progressing   Goal Outcome Evaluation:           Progress: improving  Outcome Evaluation: -120 at rest - afib.  Denies CP. SOA w/ exertion. Requires 3L O2 w/ activity, 2L at rest. Up to chair w/ assistance. Transition to oral ac

## 2023-11-22 NOTE — PLAN OF CARE
Goal Outcome Evaluation:         Pt is alert and oriented. Pt remains on 2 L NC. Pt denies pain. Pt is resting in bed. Family remained at bedside throughout the night. VSS. NAD. Plan of care ongoing.

## 2023-11-22 NOTE — PROGRESS NOTES
Hospital Follow Up    LOS: 4  Patient Name: Judi Sy  Age/Sex: 78 y.o. female  : 1945  MRN: [id number]    Day of Service: 23   Length of Stay: 4  Encounter Provider: Vasyl Walker MD  Place of Service: [facility]  Patient Care Team:  Blue Sam APRN as PCP - General (Internal Medicine)  Ravi Griffin MD as PCP - Family Medicine  Den Morgan Jr., MD as Consulting Physician (Cardiology)    Subjective:     Chief Complaint: Atrial fibrillation    Interval History: Patient still is very weak.  Overall her heart rate is much better.  Checks x-ray shows last congestion.    Objective:     Objective:  Temp:  [97 °F (36.1 °C)-98.5 °F (36.9 °C)] 97 °F (36.1 °C)  Heart Rate:  [] 106  Resp:  [16-20] 18  BP: (112-151)/(75-96) 151/85   No intake or output data in the 24 hours ending 23 1026  Body mass index is 27.76 kg/m².      23  1030 23  1954 23  1025   Weight: 78 kg (172 lb) 78.3 kg (172 lb 9.9 oz) 78 kg (172 lb)     Weight change:       Physical Exam:   General :  Alert, cooperative, in no acute distress.  Neuro:   Alert,cooperative and oriented.  Lungs:  CTAB. Normal respiratory effort and rate.  CV:  irregular rate and rhythm, normal S1 and S2, no murmurs, gallops or rubs.   ABD:  Soft, nontender, nondistended. Positive bowel sounds.  Extr:  No edema or cyanosis, moves all extremities.    Lab Review:   Results from last 7 days   Lab Units 23  0523 23  0549 23  0619 23  1044   SODIUM mmol/L 140 141   < > 134*   POTASSIUM mmol/L 3.9 4.0   < > 4.4   CHLORIDE mmol/L 101 101   < > 97*   CO2 mmol/L 27.0 27.9   < > 25.2   BUN mg/dL 13 13   < > 14   CREATININE mg/dL 0.63 0.61   < > 0.77   GLUCOSE mg/dL 89 96   < > 144*   CALCIUM mg/dL 9.2 9.3   < > 9.5   AST (SGOT) U/L  --   --   --  10   ALT (SGPT) U/L  --   --   --  12    < > = values in this interval not displayed.     Results from last 7  "days   Lab Units 11/18/23  1745 11/18/23  1044   HSTROP T ng/L 11 11     Results from last 7 days   Lab Units 11/22/23  0523 11/21/23  0549   WBC 10*3/mm3 7.32 7.68   HEMOGLOBIN g/dL 14.3 14.1   HEMATOCRIT % 43.9 42.2   PLATELETS 10*3/mm3 510* 499*         Results from last 7 days   Lab Units 11/18/23  1044   MAGNESIUM mg/dL 2.0           Invalid input(s): \"LDLCALC\"  Results from last 7 days   Lab Units 11/18/23  1044   PROBNP pg/mL 2,474.0*           Current Medications:   Scheduled Meds:aspirin, 325 mg, Oral, Daily  atorvastatin, 20 mg, Oral, Daily  budesonide-formoterol, 1 puff, Inhalation, BID - RT  cetirizine, 10 mg, Oral, Daily  enoxaparin, 1 mg/kg, Subcutaneous, Q12H  furosemide, 40 mg, Intravenous, BID  metoprolol tartrate, 12.5 mg, Oral, Q12H  montelukast, 10 mg, Oral, QAM      Continuous Infusions:     Allergies:  Allergies   Allergen Reactions    Milk-Related Compounds Other (See Comments)     ACID REFLUX    Latex Rash    Tape Rash       Assessment:       Atrial fibrillation with RVR    Hyperlipidemia    Asthma    Pleural effusion, bilateral    Acute respiratory failure with hypoxia    CAP (community acquired pneumonia)        Plan:   1.  Atrial fibrillation.  Overall heart rate is significantly better I am going to continue the same for now.  2.  Acute on chronic diastolic heart failure.  Again she is getting IV diuretics she still pretty weak.  I would transition to p.o. Lasix.  3.  Patient's weakness is out of proportion for current clinical scenario.  I would also transition patient to a p.o. anticoagulant such as Xarelto or Eliquis.  From my standpoint she is getting near discharge however given the weakness is quite impressive she says she can only walk to a bedside commode she was incredibly active prior to this illness.        Vasyl Walker MD  11/22/23  10:26 EST      "

## 2023-11-22 NOTE — PLAN OF CARE
Goal Outcome Evaluation:  Plan of Care Reviewed With: patient           Outcome Evaluation: Pt was in bed and awake when PT arrived. Pt was agreeabel to therapy with encouragement. She was very anxious initially and very shorrt with therapist. She stood and pivoted to [facility] [place]. O2 dropped to 88 % on 2 L and she required extendined time to recover. She then stood to walk to chair and made it 2-3 steps before having to sit at end of bed. O2 increased to 3 L for activities at this point. Pt then stood and pivoted to recliner. After a rest break, she felt better on 3 L O2 and anxiety seemed to decrease. Pt was able to walk 15 ft inthe room with Rwx. O2 dropped to 85 % on 3 Lo2, but she recovered quickly once she was seated. Pt tolerated BLe exercises with frequent rests. Pt presents with generalized weakness, decreased endurance, TAN and unsteady gait. She would benefit from PT to address these impairments. pt would like to d/c home, but is open to rehab if needed.      Anticipated Discharge Disposition (PT): home with assist, home with home health, skilled nursing facility

## 2023-11-22 NOTE — CASE MANAGEMENT/SOCIAL WORK
Continued Stay Note  [facility]     Patient Name: Judi Sy  MRN: [id number]  Today's Date: 11/22/2023    Admit Date: 11/18/2023    Plan: Plan home with HH or skilled care at accepting facility.  ALEAH Paredes RN   Discharge Plan       Row Name 11/22/23 1524       Plan    Plan Plan home with HH or skilled care at accepting facility.  ALEAH Paredes RN    Patient/Family in Agreement with Plan yes    Plan Comments Spoke with pt , pt's spouse ( Marciano Sy [phone]) and daughter ( Yara Sy [phone]) at bedside with pt's permission.  Pt worked with PT this am.  PT recommending skilled care.  Pt's and family's first choice is [facility]  ( [phone]) called to follow.  Pt's second choice is [facility] .  Debby  ( [phone]) called to follow. Pt's choice for HH is [facility].  Ольга  ( [phone]) called to follow. Plan home with HH or skilled care at accepting facility.   ALEAH Paredes RN                   Discharge Codes    No documentation.                 Expected Discharge Date and Time       Expected Discharge Date Expected Discharge Time    Nov 24, 2023               Radha Paredes RN

## 2023-11-22 NOTE — DISCHARGE PLACEMENT REQUEST
"Judi Randolph (78 y.o. Female)       Date of Birth   1945    Social Security Number       Address   [address] [place], [place] [address]    Home Phone   [phone]    MRN   [id number]       Taoist   None    Marital Status                               Admission Date   11/18/23    Admission Type   Urgent    Admitting Provider   Valeriano Nixon MD    Attending Provider   Quinn Pedro MD    Department, Room/Bed   [address], E[address]/1       Discharge Date       Discharge Disposition       Discharge Destination                                 Attending Provider: Quinn Pedro MD    Allergies: Milk-related Compounds, Latex, Tape    Isolation: None   Infection: None   Code Status: CPR    Ht: 167.6 cm (66\")   Wt: 78 kg (172 lb)    Admission Cmt: None   Principal Problem: Atrial fibrillation with RVR [I48.91]                   Active Insurance as of 11/18/2023       Primary Coverage       Payor Plan Insurance Group Employer/Plan Group    MEDICARE MEDICARE A & B        Payor Plan Address Payor Plan Phone Number Payor Plan Fax Number Effective Dates    [address] [phone]  9/1/2012 - None Entered    [facility] [id number]         Subscriber Name Subscriber Birth Date Member ID       JUDI RANDOLPH 1945 [id number]               Secondary Coverage       Payor Plan Insurance Group Employer/Plan Group    ANTH BLUE CROSS [facility] SUPP [id number]       Payor Plan Address Payor Plan Phone Number Payor Plan Fax Number Effective Dates    [address]   12/1/2016 - None Entered    [facility] [address]         Subscriber Name Subscriber Birth Date Member ID       JUDI RANDOLPH 1945 [id number]                     Emergency Contacts        (Rel.) Home Phone Work Phone Mobile Phone    Marciano Randolph (Spouse) [phone] -- [phone]                "

## 2023-11-22 NOTE — THERAPY EVALUATION
Patient Name: Judi Sy  : 1945    MRN: [id number]                              Today's Date: 2023       Admit Date: 2023    Visit Dx:     ICD-10-CM ICD-9-CM   1. Atrial fibrillation with RVR  I48.91 427.31     Patient Active Problem List   Diagnosis    Gastroesophageal reflux disease without esophagitis    Hiatal hernia    Hyperlipidemia    Pre-hypertension    Temporary cerebral vascular dysfunction    Osteoporosis    Closed displaced avulsion fracture of left talus    Acute meniscal tear of knee, left, sequela    Primary localized osteoarthrosis of right lower leg    Tear of medial meniscus of right knee, current    Closed nondisplaced fracture of condyle of left femur    Chondromalacia of right knee    Vitamin D deficiency    Status post total left knee replacement    Environmental and seasonal allergies    Viral upper respiratory tract infection    Popliteal synovial cyst, right    Valgus deformity of both great toes    Arthritis of first metatarsophalangeal (MTP) joint of left foot    Arthritis of first metatarsophalangeal (MTP) joint of right foot    Stress fracture of other site of femur due to multiple or repetitive stress    Screen for colon cancer    Asthma    Pleural effusion, bilateral    Left carotid artery stenosis    Atrial fibrillation with RVR    Acute respiratory failure with hypoxia    CAP (community acquired pneumonia)     Past Medical History:   Diagnosis Date    A-fib     Allergic     Aortic heart valve prolapse     Arthritis     Asthma due to seasonal allergies     Carotid artery stenosis     MILD    COVID-19 2021    High cholesterol     History of atrial fibrillation     Knee swelling     PFO (patent foramen ovale) 2016    HX CLOSURE    Right knee pain     Screen for colon cancer 11/15/2023    Stress fracture of other site of femur due to multiple or repetitive stress 2022    Tear of meniscus of knee     Tendinitis of knee     TIA (transient ischemic  attack)     PRIOR TO PFO CLOSURE    Vasovagal syncope      Past Surgical History:   Procedure Laterality Date    BREAST BIOPSY Left     over 15 years ago; benign    CARDIAC SURGERY      CATARACT EXTRACTION Left     KNEE ARTHROSCOPY Left 03/09/2018    Procedure: LEFT KNEE ARTHROSCOPY, PARTIAL MEDIAL MENISCECTOMY, AND MEDIAL FEMORAL SUBCHONDROPLASTY;  Surgeon: Kade Jacob MD;  Location: [facility];  Service: Orthopedics    KNEE ARTHROSCOPY Right 05/13/2022    Procedure: right KNEE ARTHROSCOPY with debridement and subchondral plasty of medial femoral condyle;  Surgeon: Kade Jacob MD;  Location: [facility];  Service: Orthopedics;  Laterality: Right;    PATENT FORAMEN OVALE CLOSURE      TONSILLECTOMY      TUBAL ABDOMINAL LIGATION        General Information       Row Name 11/22/23 1309          Physical Therapy Time and Intention    Document Type evaluation  -     Mode of Treatment individual therapy;physical therapy  -       Row Name 11/22/23 1309          General Information    Patient Profile Reviewed yes  -     Prior Level of Function independent:  decreased in past week PTA  -     Existing Precautions/Restrictions fall  -     Barriers to Rehab medically complex  -       Row Name 11/22/23 1309          Living Environment    People in Home spouse  -       Row Name 11/22/23 1309          Home Main Entrance    Number of Stairs, Main Entrance one  -       Row Name 11/22/23 1309          Cognition    Orientation Status (Cognition) oriented x 3  -       Row Name 11/22/23 1309          Safety Issues, Functional Mobility    Safety Issues Affecting Function (Mobility) impulsivity  -     Impairments Affecting Function (Mobility) balance;endurance/activity tolerance;strength  -               User Key  (r) = Recorded By, (t) = Taken By, (c) = Cosigned By      Initials Name Provider Type    Chloe Reis, PT Physical Therapist                   Mobility       Row  Name 11/22/23 1310          Bed Mobility    Bed Mobility supine-sit  -     Supine-Sit [place] (Bed Mobility) standby assist  -     Assistive Device (Bed Mobility) bed rails;head of bed elevated  -       Row Name 11/22/23 1310          Bed-Chair Transfer    Bed-Chair [place] (Transfers) minimum assist (75% patient effort)  -       Row Name 11/22/23 1310          Sit-Stand Transfer    Sit-Stand [place] (Transfers) contact guard;minimum assist (75% patient effort)  -     Comment, (Sit-Stand Transfer) STS x4 min on intial stand, CGA on subsequent  -       Row Name 11/22/23 1310          Gait/Stairs (Locomotion)    [place] Level (Gait) minimum assist (75% patient effort)  -     Assistive Device (Gait) walker, front-wheeled  -     Distance in Feet (Gait) 5 ft, 10 ft, 15 ft with seated rest between  -     Comment, (Gait/Stairs) slow guard initially, but improved step length throughout  -               User Key  (r) = Recorded By, (t) = Taken By, (c) = Cosigned By      Initials Name Provider Type     Chloe Felix, PT Physical Therapist                   Obj/Interventions       Row Name 11/22/23 1313          Range of Motion Comprehensive    Comment, General Range of Motion WFL  -[facility] Name 11/22/23 1313          Strength Comprehensive (MMT)    Comment, General Manual Muscle Testing (MMT) Assessment generalized weakness  -[facility] Name 11/22/23 1313          Motor Skills    Therapeutic Exercise --  BLE AP, HS, SLR- 2 setsx 5 reps with rest between  -[facility] Name 11/22/23 1313          Balance    Balance Assessment sitting static balance;sitting dynamic balance;standing static balance;standing dynamic balance  -     Static Sitting Balance standby assist  -     Dynamic Sitting Balance contact guard  -     Position, Sitting Balance unsupported;sitting edge of bed  -     Static Standing Balance contact guard;minimal assist  -     Dynamic Standing  Balance minimal assist  -     Position/Device Used, Standing Balance walker, front-wheeled  -               User Key  (r) = Recorded By, (t) = Taken By, (c) = Cosigned By      Initials Name Provider Type    Chloe Reis, PT Physical Therapist                   Goals/Plan       Row Name 11/22/23 1319          Bed Mobility Goal 1 (PT)    Activity/Assistive Device (Bed Mobility Goal 1, PT) bed mobility activities, all  -KH     [place] Level/Cues Needed (Bed Mobility Goal 1, PT) independent  -     Time Frame (Bed Mobility Goal 1, PT) 10 days  -       Row Name 11/22/23 1319          Transfer Goal 1 (PT)    Activity/Assistive Device (Transfer Goal 1, PT) transfers, all  -KH     [place] Level/Cues Needed (Transfer Goal 1, PT) independent  -     Time Frame (Transfer Goal 1, PT) 10 days  -       Row Name 11/22/23 1319          Gait Training Goal 1 (PT)    Activity/Assistive Device (Gait Training Goal 1, PT) gait (walking locomotion)  -     [place] Level (Gait Training Goal 1, PT) standby assist  -     Time Frame (Gait Training Goal 1, PT) 10 days  -       Row Name 11/22/23 1319          Stairs Goal 1 (PT)    Activity/Assistive Device (Stairs Goal 1, PT) stairs, all skills  -     [place] Level/Cues Needed (Stairs Goal 1, PT) contact guard required  -     Number of Stairs (Stairs Goal 1, PT) 1  -KH     Time Frame (Stairs Goal 1, PT) 10 days  -       Row Name 11/22/23 1319          Patient Education Goal (PT)    Activity (Patient Education Goal, PT) HEP  -     [place]/Cues/Accuracy (Memory Goal 2, PT) demonstrates adequately  -     Time Frame (Patient Education Goal, PT) 10 days  -       Row Name 11/22/23 1319          Therapy Assessment/Plan (PT)    Planned Therapy Interventions (PT) balance training;bed mobility training;gait training;home exercise program;patient/family education;strengthening;stair training;transfer training  -               User Key   (r) = Recorded By, (t) = Taken By, (c) = Cosigned By      Initials Name Provider Type     Chloe Felix, PT Physical Therapist                   Clinical Impression       Row Name 11/22/23 9894          Pain    Pretreatment Pain Rating 3/10  -     Posttreatment Pain Rating 4/10  -     Pain Location - Side/Orientation Right  -     Pain Location upper  -     Pain Location - extremity  -     Pain Intervention(s) Repositioned;Rest  -       Row Name 11/22/23 3264          Plan of Care Review    Plan of Care Reviewed With patient  -     Outcome Evaluation Pt was in bed and awake when PT arrived. Pt was agreeabel to therapy with encouragement. She was very anxious initially and very shorrt with therapist. She stood and pivoted to [facility] with min A. O2 dropped to 88 % on 2 L and she required extendined time to recover. She then stood to walk to chair and made it 2-3 steps before having to sit at end of bed. O2 increased to 3 L for activities at this point. Pt then stood and pivoted to recliner. After a rest break, she felt better on 3 L O2 and anxiety seemed to decrease. Pt was able to walk 15 ft inthe room with Rwx. O2 dropped to 85 % on 3 Lo2, but she recovered quickly once she was seated. Pt tolerated BLe exercises with frequent rests. Pt presents with generalized weakness, decreased endurance, TAN and unsteady gait. She would benefit from PT to address these impairments. pt would like to d/c home, but is open to rehab if needed.  -       Row Name 11/22/23 4604          Therapy Assessment/Plan (PT)    Patient/Family Therapy Goals Statement (PT) return to OF  -     Rehab Potential (PT) good, to achieve stated therapy goals  -     Criteria for Skilled Interventions Met (PT) yes  -     Therapy Frequency (PT) 6 times/wk  -       Row Name 11/22/23 0613          Positioning and Restraints    Pre-Treatment Position in bed  -     Post Treatment Position chair  -     In Chair reclined;call  light within reach;encouraged to call for assist;exit alarm on;with family/caregiver;notified nsg  -               User Key  (r) = Recorded By, (t) = Taken By, (c) = Cosigned By      Initials Name Provider Type    Chloe Reis, PT Physical Therapist                   Outcome Measures       Row Name 11/22/23 1320          How much help from another person do you currently need...    Turning from your back to your side while in flat bed without using bedrails? 4  -KH     Moving from lying on back to sitting on the side of a flat bed without bedrails? 4  -KH     Moving to and from a bed to a chair (including a wheelchair)? 3  -KH     Standing up from a chair using your arms (e.g., wheelchair, bedside chair)? 3  -KH     Climbing 3-5 steps with a railing? 2  -KH     To walk in hospital room? 3  -KH     AM-PAC 6 Clicks Score (PT) 19  -KH     Highest Level of Mobility Goal 6 --> Walk 10 steps or more  -       Row Name 11/22/23 1320          Functional Assessment    Outcome Measure Options AM-PAC 6 Clicks Basic Mobility (PT)  -               User Key  (r) = Recorded By, (t) = Taken By, (c) = Cosigned By      Initials Name Provider Type    Chloe Reis, PT Physical Therapist                                 Physical Therapy Education       Title: PT OT SLP Therapies (Not Started)       Topic: Physical Therapy (Not Started)       Point: Mobility training (Not Started)       Learner Progress:  Not documented in this visit.              Point: Home exercise program (Not Started)       Learner Progress:  Not documented in this visit.              Point: Body mechanics (Not Started)       Learner Progress:  Not documented in this visit.              Point: Precautions (Not Started)       Learner Progress:  Not documented in this visit.                                  PT Recommendation and Plan  Planned Therapy Interventions (PT): balance training, bed mobility training, gait training, home  exercise program, patient/family education, strengthening, stair training, transfer training  Plan of Care Reviewed With: patient  Outcome Evaluation: Pt was in bed and awake when PT arrived. Pt was agreeabel to therapy with encouragement. She was very anxious initially and very shorrt with therapist. She stood and pivoted to [facility] with min A. O2 dropped to 88 % on 2 L and she required extendined time to recover. She then stood to walk to chair and made it 2-3 steps before having to sit at end of bed. O2 increased to 3 L for activities at this point. Pt then stood and pivoted to recliner. After a rest break, she felt better on 3 L O2 and anxiety seemed to decrease. Pt was able to walk 15 ft inthe room with Rwx. O2 dropped to 85 % on 3 Lo2, but she recovered quickly once she was seated. Pt tolerated BLe exercises with frequent rests. Pt presents with generalized weakness, decreased endurance, TAN and unsteady gait. She would benefit from PT to address these impairments. pt would like to d/c home, but is open to rehab if needed.     Time Calculation:         PT Charges       Row Name 11/22/23 1321 11/22/23 1320          Time Calculation    Start Time -- 1102  -     Stop Time -- 1148  -     Time Calculation (min) -- 46 min  -     PT Received On -- 11/22/23  -     PT - Next Appointment -- 11/23/23  -     PT Goal Re-Cert Due Date 12/02/23  - --        Time Calculation- PT    Total Timed Code Minutes- PT -- 34 minute(s)  -        Timed Charges    52836 - PT Therapeutic Exercise Minutes -- 10  -KH     92778 - PT Therapeutic Activity Minutes -- 24  -KH        Untimed Charges    PT Eval/Re-eval Minutes -- 12  -KH        Total Minutes    Timed Charges Total Minutes -- 34  -KH     Untimed Charges Total Minutes -- 12  -KH      Total Minutes -- 46  -KH               User Key  (r) = Recorded By, (t) = Taken By, (c) = Cosigned By      Initials Name Provider Type    Chloe Reis, PT Physical Therapist                   Therapy Charges for Today       Code Description Service Date Service Provider Modifiers Qty    62373128596 HC PT THER PROC EA 15 MIN 11/22/2023 Chloe Felix, PT GP 1    08573511192 HC PT THERAPEUTIC ACT EA 15 MIN 11/22/2023 Chloe Felix, PT GP 1    82354708259 HC PT EVAL MOD COMPLEXITY 3 11/22/2023 Chloe Felix, PT GP 1            PT G-Codes  Outcome Measure Options: AM-PAC 6 Clicks Basic Mobility (PT)  AM-PAC 6 Clicks Score (PT): 19  PT Discharge Summary  Anticipated Discharge Disposition (PT): home with assist, home with home health, skilled nursing facility    Chloe Felix, PT  11/22/2023

## 2023-11-23 LAB
ANION GAP SERPL CALCULATED.3IONS-SCNC: 11 MMOL/L (ref 5–15)
BACTERIA SPEC AEROBE CULT: NORMAL
BACTERIA SPEC AEROBE CULT: NORMAL
BUN SERPL-MCNC: 17 MG/DL (ref 8–23)
BUN/CREAT SERPL: 19.3 (ref 7–25)
CALCIUM SPEC-SCNC: 9.5 MG/DL (ref 8.6–10.5)
CHLORIDE SERPL-SCNC: 96 MMOL/L (ref 98–107)
CO2 SERPL-SCNC: 32 MMOL/L (ref 22–29)
CREAT SERPL-MCNC: 0.88 MG/DL (ref 0.57–1)
DEPRECATED RDW RBC AUTO: 38.3 FL (ref 37–54)
EGFRCR SERPLBLD CKD-EPI 2021: 68.2 ML/MIN/1.73
ERYTHROCYTE [DISTWIDTH] IN BLOOD BY AUTOMATED COUNT: 12 % (ref 12.3–15.4)
GLUCOSE SERPL-MCNC: 105 MG/DL (ref 65–99)
HCT VFR BLD AUTO: 43.9 % (ref 34–46.6)
HGB BLD-MCNC: 14.4 G/DL (ref 12–15.9)
MCH RBC QN AUTO: 29.3 PG (ref 26.6–33)
MCHC RBC AUTO-ENTMCNC: 32.8 G/DL (ref 31.5–35.7)
MCV RBC AUTO: 89.2 FL (ref 79–97)
PLATELET # BLD AUTO: 586 10*3/MM3 (ref 140–450)
PMV BLD AUTO: 10.4 FL (ref 6–12)
POTASSIUM SERPL-SCNC: 3.7 MMOL/L (ref 3.5–5.2)
RBC # BLD AUTO: 4.92 10*6/MM3 (ref 3.77–5.28)
SODIUM SERPL-SCNC: 139 MMOL/L (ref 136–145)
WBC NRBC COR # BLD AUTO: 10.14 10*3/MM3 (ref 3.4–10.8)

## 2023-11-23 PROCEDURE — 94799 UNLISTED PULMONARY SVC/PX: CPT

## 2023-11-23 PROCEDURE — 97530 THERAPEUTIC ACTIVITIES: CPT

## 2023-11-23 PROCEDURE — 99232 SBSQ HOSP IP/OBS MODERATE 35: CPT | Performed by: NURSE PRACTITIONER

## 2023-11-23 PROCEDURE — 80048 BASIC METABOLIC PNL TOTAL CA: CPT | Performed by: STUDENT IN AN ORGANIZED HEALTH CARE EDUCATION/TRAINING PROGRAM

## 2023-11-23 PROCEDURE — 94761 N-INVAS EAR/PLS OXIMETRY MLT: CPT

## 2023-11-23 PROCEDURE — 85027 COMPLETE CBC AUTOMATED: CPT | Performed by: STUDENT IN AN ORGANIZED HEALTH CARE EDUCATION/TRAINING PROGRAM

## 2023-11-23 PROCEDURE — 25010000002 FUROSEMIDE PER 20 MG: Performed by: STUDENT IN AN ORGANIZED HEALTH CARE EDUCATION/TRAINING PROGRAM

## 2023-11-23 PROCEDURE — 94760 N-INVAS EAR/PLS OXIMETRY 1: CPT

## 2023-11-23 PROCEDURE — 94664 DEMO&/EVAL PT USE INHALER: CPT

## 2023-11-23 RX ORDER — FUROSEMIDE 40 MG/1
40 TABLET ORAL
Status: DISCONTINUED | OUTPATIENT
Start: 2023-11-23 | End: 2023-11-28 | Stop reason: HOSPADM

## 2023-11-23 RX ADMIN — APIXABAN 5 MG: 5 TABLET, FILM COATED ORAL at 09:23

## 2023-11-23 RX ADMIN — MONTELUKAST SODIUM 10 MG: 10 TABLET, FILM COATED ORAL at 09:22

## 2023-11-23 RX ADMIN — METOPROLOL TARTRATE 25 MG: 25 TABLET, FILM COATED ORAL at 20:22

## 2023-11-23 RX ADMIN — APIXABAN 5 MG: 5 TABLET, FILM COATED ORAL at 20:22

## 2023-11-23 RX ADMIN — CETIRIZINE HYDROCHLORIDE 10 MG: 10 TABLET ORAL at 09:23

## 2023-11-23 RX ADMIN — ATORVASTATIN CALCIUM 20 MG: 20 TABLET, FILM COATED ORAL at 09:23

## 2023-11-23 RX ADMIN — FUROSEMIDE 40 MG: 40 TABLET ORAL at 16:43

## 2023-11-23 RX ADMIN — DIGOXIN 125 MCG: 125 TABLET ORAL at 12:14

## 2023-11-23 RX ADMIN — BUDESONIDE AND FORMOTEROL FUMARATE DIHYDRATE 1 PUFF: 160; 4.5 AEROSOL RESPIRATORY (INHALATION) at 08:08

## 2023-11-23 RX ADMIN — FUROSEMIDE 40 MG: 10 INJECTION, SOLUTION INTRAMUSCULAR; INTRAVENOUS at 09:23

## 2023-11-23 RX ADMIN — ASPIRIN 325 MG: 325 TABLET, COATED ORAL at 09:22

## 2023-11-23 RX ADMIN — MONTELUKAST SODIUM 10 MG: 10 TABLET, FILM COATED ORAL at 07:01

## 2023-11-23 RX ADMIN — METOPROLOL TARTRATE 12.5 MG: 25 TABLET, FILM COATED ORAL at 12:14

## 2023-11-23 RX ADMIN — BUDESONIDE AND FORMOTEROL FUMARATE DIHYDRATE 1 PUFF: 160; 4.5 AEROSOL RESPIRATORY (INHALATION) at 19:10

## 2023-11-23 RX ADMIN — METOPROLOL TARTRATE 12.5 MG: 25 TABLET, FILM COATED ORAL at 09:22

## 2023-11-23 NOTE — PLAN OF CARE
Goal Outcome Evaluation:  Plan of Care Reviewed With: patient, spouse        Progress: improving  Outcome Evaluation: Pt seen by PT this AM for tx. Pt sat up to the EOB w/ SBA. Pt then stood and amb short distance to standing and then back to EOB req CGA and use of fww. Pt rested approx 2 min w/ noted desat to 87% on 3L of portable o2. Pt amb around the bed to the recliner and was steady w/ no overt LOB. Pt then performed ther ex for strengthening in chair. When asked, pt stated she has been amb to BR and also sitting in chair regularly. Pt was encouraged to continue these activities.  PT will prog as pt james.

## 2023-11-23 NOTE — PROGRESS NOTES
"    Name: Judi Sy ADMIT: 2023   : 1945  PCP: Blue Sam APRN    MRN: [id number] LOS: 5 days   AGE/SEX: 78 y.o. female  ROOM: [facility]     Subjective   Subjective   She is feeling a little bit better today.  Feels like she is \"getting stronger every day.\"  Discussed with son at bedside.    Objective   Objective   Vital Signs  Temp:  [97.9 °F (36.6 °C)-98.4 °F (36.9 °C)] 97.9 °F (36.6 °C)  Heart Rate:  [103-116] 104  Resp:  [18-20] 18  BP: (103-149)/(75-89) 116/75  SpO2:  [91 %-94 %] 91 %  on  Flow (L/min):  [2] 2;   Device (Oxygen Therapy): humidified;nasal cannula  Body mass index is 27.76 kg/m².  Physical Exam  Constitutional:       Appearance: She is ill-appearing.   Cardiovascular:      Rate and Rhythm: Tachycardia present. Rhythm irregular.      Pulses: Normal pulses.   Pulmonary:      Effort: Pulmonary effort is normal. No respiratory distress.      Breath sounds: No wheezing or rhonchi.   Abdominal:      General: Abdomen is flat.      Palpations: Abdomen is soft.   Musculoskeletal:      Right lower leg: Edema present.      Left lower leg: Edema present.   Skin:     General: Skin is warm.   Neurological:      General: No focal deficit present.      Mental Status: She is alert.         Results Review     I reviewed the patient's new clinical results.  Results from last 7 days   Lab Units 23   WBC 10*3/mm3 10.14 7.32 7.68 9.43   HEMOGLOBIN g/dL 14.4 14.3 14.1 13.8   PLATELETS 10*3/mm3 586* 510* 499* 457*     Results from last 7 days   Lab Units 2349 23   SODIUM mmol/L 139 140 141 135*   POTASSIUM mmol/L 3.7 3.9 4.0 3.4*   CHLORIDE mmol/L 96* 101 101 95*   CO2 mmol/L 32.0* 27.0 27.9 27.7   BUN mg/dL 17 13 13 14   CREATININE mg/dL 0.88 0.63 0.61 0.65   GLUCOSE mg/dL 105* 89 96 104*   EGFR mL/min/1.73 68.2 92.1 92.8 91.4     Results from last 7 days   Lab Units 23  1044 " "  ALBUMIN g/dL 3.5   BILIRUBIN mg/dL 0.4   ALK PHOS U/L 131*   AST (SGOT) U/L 10   ALT (SGPT) U/L 12     Results from last 7 days   Lab Units 11/23/23  0417 11/22/23  0523 11/21/23  0549 11/20/23  0619 11/18/23  1044   CALCIUM mg/dL 9.5 9.2 9.3 9.1 9.5   ALBUMIN g/dL  --   --   --   --  3.5   MAGNESIUM mg/dL  --   --   --   --  2.0     Results from last 7 days   Lab Units 11/18/23  2116 11/18/23  1745   PROCALCITONIN ng/mL  --  0.10   LACTATE mmol/L 1.4  --      No results found for: \"HGBA1C\", \"POCGLU\"    XR Chest 1 View    Result Date: 11/22/2023  As described.    This report was finalized on 11/22/2023 9:18 AM by Dr. Silvano Lam M.D on Workstation: [id number]     Scheduled Medications  apixaban, 5 mg, Oral, Q12H  aspirin, 325 mg, Oral, Daily  atorvastatin, 20 mg, Oral, Daily  budesonide-formoterol, 1 puff, Inhalation, BID - RT  cetirizine, 10 mg, Oral, Daily  digoxin, 125 mcg, Oral, Daily  furosemide, 40 mg, Intravenous, BID  metoprolol tartrate, 12.5 mg, Oral, Q12H  montelukast, 10 mg, Oral, QAM    Infusions     Diet  Diet: Regular/House Diet, Cardiac Diets; Healthy Heart (2-3 Na+); Texture: Regular Texture (IDDSI 7); Fluid Consistency: Thin (IDDSI 0)       Assessment/Plan     Active Hospital Problems    Diagnosis  POA    **Atrial fibrillation with RVR [I48.91]  Yes    CAP (community acquired pneumonia) [J18.9]  Yes    Acute respiratory failure with hypoxia [J96.01]  Yes    Asthma [J45.909]  Yes    Pleural effusion, bilateral [J90]  Yes    Hyperlipidemia [E78.5]  Yes      Resolved Hospital Problems   No resolved problems to display.       78 y.o. female admitted with Atrial fibrillation with RVR.      11/23/23  Continue diuresis today as kidney function is holding.  Need more accurate ins and outs.  Would like her as dry as possible prior to discharge.    Atrial fibrillation with RVR  History of PFO status post closure  -Troponin 11x2, proBNP 2474  -cardiology following  -TTE (11/19/2023): 64%; diastolic " function indeterminate  -RC: Metoprolol 12.5 mg twice daily, digoxin 125 mcg daily  -AC: Apixaban     Acute respiratory failure with hypoxia  Bilateral pleural effusion  -Chest x-ray shows bilateral basilar atelectasis/infiltrate and mild pleural effusions.  Pro-Gregory 0.10, and lactate 1.4.  She endorses a productive cough and dyspnea.  She denies any fever or chills.   -CT chest (11/20/23): Bilateral pleural effusions, 7 mm nodular density along right minor fissure-recommend repeat CT chest in 3 months to reevaluate around 2/2024)  -she completed course of antibiotics about 2 weeks ago for pneumonia while in [facility].  Has done well off antibiotics     HLD  -atorvastatin 20mg    Flow (L/min):  [2] 2    DVT Ppx: Therapeutic Lovenox  Discussed with patient, family, and nursing staff.  Anticipated discharge home with HH vs SNF, 2-3 days            Quinn Pedro MD  [facility]  11/23/23  08:47 EST

## 2023-11-23 NOTE — THERAPY TREATMENT NOTE
Patient Name: Judi Sy  : 1945    MRN: [id number]                              Today's Date: 2023       Admit Date: 2023    Visit Dx:     ICD-10-CM ICD-9-CM   1. Atrial fibrillation with RVR  I48.91 427.31     Patient Active Problem List   Diagnosis    Gastroesophageal reflux disease without esophagitis    Hiatal hernia    Hyperlipidemia    Pre-hypertension    Temporary cerebral vascular dysfunction    Osteoporosis    Closed displaced avulsion fracture of left talus    Acute meniscal tear of knee, left, sequela    Primary localized osteoarthrosis of right lower leg    Tear of medial meniscus of right knee, current    Closed nondisplaced fracture of condyle of left femur    Chondromalacia of right knee    Vitamin D deficiency    Status post total left knee replacement    Environmental and seasonal allergies    Viral upper respiratory tract infection    Popliteal synovial cyst, right    Valgus deformity of both great toes    Arthritis of first metatarsophalangeal (MTP) joint of left foot    Arthritis of first metatarsophalangeal (MTP) joint of right foot    Stress fracture of other site of femur due to multiple or repetitive stress    Screen for colon cancer    Asthma    Pleural effusion, bilateral    Left carotid artery stenosis    Atrial fibrillation with RVR    Acute respiratory failure with hypoxia    CAP (community acquired pneumonia)     Past Medical History:   Diagnosis Date    A-fib     Allergic     Aortic heart valve prolapse     Arthritis     Asthma due to seasonal allergies     Carotid artery stenosis     MILD    COVID-19 2021    High cholesterol     History of atrial fibrillation     Knee swelling     PFO (patent foramen ovale) 2016    HX CLOSURE    Right knee pain     Screen for colon cancer 11/15/2023    Stress fracture of other site of femur due to multiple or repetitive stress 2022    Tear of meniscus of knee     Tendinitis of knee     TIA (transient ischemic  attack)     PRIOR TO PFO CLOSURE    Vasovagal syncope      Past Surgical History:   Procedure Laterality Date    BREAST BIOPSY Left     over 15 years ago; benign    CARDIAC SURGERY      CATARACT EXTRACTION Left     KNEE ARTHROSCOPY Left 03/09/2018    Procedure: LEFT KNEE ARTHROSCOPY, PARTIAL MEDIAL MENISCECTOMY, AND MEDIAL FEMORAL SUBCHONDROPLASTY;  Surgeon: Kade Jacob MD;  Location[place] [facility] [place] [facility] [place];  Service: Orthopedics    KNEE ARTHROSCOPY Right 05/13/2022    Procedure: right KNEE ARTHROSCOPY with debridement and subchondral plasty of medial femoral condyle;  Surgeon: Kade Jacob MD;  Location: [facility] [place];  Service: Orthopedics;  Laterality: Right;    PATENT FORAMEN OVALE CLOSURE      TONSILLECTOMY      TUBAL ABDOMINAL LIGATION        General Information       Row Name 11/23/23 1153          Physical Therapy Time and Intention    Document Type therapy note (daily note)  -PH     Mode of Treatment physical therapy  -PH       Row Name 11/23/23 1153          General Information    Existing Precautions/Restrictions fall  -PH     Barriers to Rehab medically complex  -       Row Name 11/23/23 1153          Safety Issues, Functional Mobility    Impairments Affecting Function (Mobility) balance;endurance/activity tolerance;strength  -PH     Comment, Safety Issues/Impairments (Mobility) gt belt and non skid socks  -PH               User Key  (r) = Recorded By, (t) = Taken By, (c) = Cosigned By      Initials Name Provider Type    PH Key Jeong PTA Physical Therapist Assistant                   Mobility       Row Name 11/23/23 1153          Bed Mobility    Bed Mobility supine-sit  -PH     Supine-Sit Spink (Bed Mobility) standby assist  -PH     Assistive Device (Bed Mobility) head of bed elevated  -PH     Comment, (Bed Mobility) mildly slow to EOB  -PH       Row Name 11/23/23 1153          Sit-Stand Transfer    Sit-Stand Spink (Transfers) contact guard;verbal cues  -PH      Assistive Device (Sit-Stand Transfers) walker, front-wheeled  -PH     Comment, (Sit-Stand Transfer) STS 2x from EOB  -PH       Row Name 11/23/23 1153          Gait/Stairs (Locomotion)    Section Level (Gait) contact guard;verbal cues  -PH     Assistive Device (Gait) walker, front-wheeled  -PH     Distance in Feet (Gait) 1' to scale then back to EOB; 15' around bed to chair  -PH     Deviations/Abnormal Patterns (Gait) kelsie decreased;gait speed decreased;stride length decreased  -PH     Bilateral Gait Deviations forward flexed posture  -PH     Section Level (Stairs) unable to assess  -PH     Comment, (Gait/Stairs) slow although fairly steady w/ no overt LOB; pt desatted to 87% w/ O2 incr from 2l to 3L during gait. Pt req a few min rest after seated in chair prior to ther ex  -PH               User Key  (r) = Recorded By, (t) = Taken By, (c) = Cosigned By      Initials Name Provider Type     Key Jeong PTA Physical Therapist Assistant                   Obj/Interventions       Row Name 11/23/23 1156          Motor Skills    Therapeutic Exercise other (see comments)  BAP, LAQ, seated march; x 15 reps each  -PH       Row Name 11/23/23 1156          Balance    Balance Assessment sitting static balance;standing static balance  -PH     Static Sitting Balance standby assist  -PH     Static Standing Balance contact guard  -PH     Position/Device Used, Standing Balance walker, front-wheeled  -PH     Comment, Balance fairly steady w/ use of fww or holding arms of scale  -PH               User Key  (r) = Recorded By, (t) = Taken By, (c) = Cosigned By      Initials Name Provider Type     Key Jeong PTA Physical Therapist Assistant                   Goals/Plan    No documentation.                  Clinical Impression       Row Name 11/23/23 1203          Plan of Care Review    Plan of Care Reviewed With patient;spouse  -PH     Progress improving  -PH     Outcome Evaluation Pt seen by PT  this AM for tx. Pt sat up to the EOB w/ SBA. Pt then stood and amb short distance to standing and then back to EOB req CGA and use of fww. Pt rested approx 2 min w/ noted desat to 87% on 3L of portable o2. Pt amb around the bed to the recliner and was steady w/ no overt LOB. Pt then performed ther ex for strengthening in chair. When asked, pt stated she has been amb to BR and also sitting in chair regularly. Pt was encouraged to continue these activities.  PT will prog as pt james.  -PH       Row Name 11/23/23 1205          Vital Signs    Pre SpO2 (%) 89  -PH     O2 Delivery Pre Treatment nasal cannula  -PH     Intra SpO2 (%) 87  -PH     O2 Delivery Intra Treatment nasal cannula  -PH     Post SpO2 (%) 90  -PH     O2 Delivery Post Treatment nasal cannula  -PH       Row Name 11/23/23 1205          Positioning and Restraints    Pre-Treatment Position in bed  -PH     Post Treatment Position chair  -PH     In Chair reclined;call light within reach;encouraged to call for assist;exit alarm on;with family/caregiver;notified nsg  -PH               User Key  (r) = Recorded By, (t) = Taken By, (c) = Cosigned By      Initials Name Provider Type    PH Key Jeong PTA Physical Therapist Assistant                   Outcome Measures       Row Name 11/23/23 1208 11/23/23 0926       How much help from another person do you currently need...    Turning from your back to your side while in flat bed without using bedrails? 4  -PH 4  -RW    Moving from lying on back to sitting on the side of a flat bed without bedrails? 4  -PH 4  -RW    Moving to and from a bed to a chair (including a wheelchair)? 3  -PH 3  -RW    Standing up from a chair using your arms (e.g., wheelchair, bedside chair)? 3  -PH 3  -RW    Climbing 3-5 steps with a railing? 2  -PH 2  -RW    To walk in hospital room? 3  -PH 3  -RW    AM-PAC 6 Clicks Score (PT) 19  -PH 19  -RW    Highest Level of Mobility Goal 6 --> Walk 10 steps or more  -PH 6 --> Walk 10 steps  or more  -      Row Name 11/23/23 1208          Functional Assessment    Outcome Measure Options AM-PAC 6 Clicks Basic Mobility (PT)  -               User Key  (r) = Recorded By, (t) = Taken By, (c) = Cosigned By      Initials Name Provider Type    RW Khanh Duran, RN Registered Nurse     Key Jeong PTA Physical Therapist Assistant                                 Physical Therapy Education       Title: PT OT SLP Therapies (Done)       Topic: Physical Therapy (Done)       Point: Mobility training (Done)       Learning Progress Summary             Patient Acceptance, E,TB,D, VU by  at 11/23/2023 1209                         Point: Home exercise program (Done)       Learning Progress Summary             Patient Acceptance, E,TB,D, VU by  at 11/23/2023 1209                         Point: Body mechanics (Done)       Learning Progress Summary             Patient Acceptance, E,TB,D, VU by  at 11/23/2023 1209                         Point: Precautions (Done)       Learning Progress Summary             Patient Acceptance, E,TB,D, VU by  at 11/23/2023 1209                                         User Key       Initials Effective Dates Name Provider Type Discipline     06/16/21 -  Key Jeong PTA Physical Therapist Assistant PT                  PT Recommendation and Plan     Plan of Care Reviewed With: patient, spouse  Progress: improving  Outcome Evaluation: Pt seen by PT this AM for tx. Pt sat up to the EOB w/ SBA. Pt then stood and amb short distance to standing and then back to EOB req CGA and use of fww. Pt rested approx 2 min w/ noted desat to 87% on 3L of portable o2. Pt amb around the bed to the recliner and was steady w/ no overt LOB. Pt then performed ther ex for strengthening in chair. When asked, pt stated she has been amb to BR and also sitting in chair regularly. Pt was encouraged to continue these activities.  PT will prog as pt james.     Time Calculation:         PT  Charges       Row Name 11/23/23 1209             Time Calculation    Start Time 1126  -PH      Stop Time 1146  -PH      Time Calculation (min) 20 min  -PH      PT Received On 11/23/23  -PH      PT - Next Appointment 11/24/23  -PH         Timed Charges    97665 - PT Therapeutic Exercise Minutes 4  -PH      88859 - PT Therapeutic Activity Minutes 11  -PH         Total Minutes    Timed Charges Total Minutes 15  -PH       Total Minutes 15  -PH                User Key  (r) = Recorded By, (t) = Taken By, (c) = Cosigned By      Initials Name Provider Type    Key Dorsey PTA Physical Therapist Assistant                  Therapy Charges for Today       Code Description Service Date Service Provider Modifiers Qty    52302257722 HC PT THERAPEUTIC ACT EA 15 MIN 11/23/2023 Key Jeong PTA GP 1            PT G-Codes  Outcome Measure Options: AM-PAC 6 Clicks Basic Mobility (PT)  AM-PAC 6 Clicks Score (PT): 19       Key Jeong PTA  11/23/2023

## 2023-11-23 NOTE — PLAN OF CARE
Goal Outcome Evaluation:  Plan of Care Reviewed With: patient           Outcome Evaluation: Lasix changed to PO BID, Metoprolol changed to 25mg BID.  Pt cont to be on 2lpn via n/c.  Oxygen levels still drop when ambulates.  Sitting still, 93%.  Pt worked with PT and desats.  VSS. Afib on the monitor.

## 2023-11-23 NOTE — PLAN OF CARE
Goal Outcome Evaluation:         Pt is alert and oriented and very pleasant. Pt has been resting well. Pt's son is by the bedside. Pt HR remains in Afib on the monitor. Pt VSS. NAD.

## 2023-11-23 NOTE — PROGRESS NOTES
Hospital Follow Up    LOS:  LOS: 5 days   Patient Name: Judi Sy  Age/Sex: 78 y.o. female  : 1945  MRN: [id number]    Day of Service: 23   Length of Stay: 5  Encounter Provider: RAMON Simon  Place of Service: [facility] CARDIOLOGY  Patient Care Team:  Blue Sam APRN as PCP - General (Internal Medicine)  Ravi Griffin MD as PCP - Family Medicine  Den Morgan Jr., MD as Consulting Physician (Cardiology)    Subjective:     Chief Complaint: afib    Interval History: Resting comfortably. Daughter at bedside    Objective:     Objective:  Temp:  [97.7 °F (36.5 °C)-98.4 °F (36.9 °C)] 97.7 °F (36.5 °C)  Heart Rate:  [103-116] 105  Resp:  [18-20] 18  BP: (103-149)/(65-89) 126/65     Intake/Output Summary (Last 24 hours) at 2023 1349  Last data filed at 2023 1014  Gross per 24 hour   Intake 120 ml   Output 300 ml   Net -180 ml     Body mass index is 25.92 kg/m².      23  1954 23  1025 23  1146   Weight: 78.3 kg (172 lb 9.9 oz) 78 kg (172 lb) 72.8 kg (160 lb 9.6 oz)     Weight change:     Physical Exam:   General Appearance:    Awake alert and oriented in no acute distress.   Color:  Skin:  Neuro:  HEENT:    Lungs:     Pink  Warm and dry  No focal, motor or sensory deficits  Neck supple, pupils equal, round and reactive. No JVD, No Bruit  Clear to auscultation,respirations regular, even and                  unlabored    Heart:    Irreg/irreg rate and rhythm, S1 and S2, no murmur, no gallop, no rub. No edema, DP/PT pulses are 2+   Chest Wall:    No abnormalities observed   Abdomen:     Normal bowel sounds, no masses, no organomegaly, soft        non-tender, non-distended, no guarding, no ascites noted   Extremities:   Moves all extremities well, no edema, no cyanosis, no redness       Lab Review:   Results from last 7 days   Lab Units 23  0417 23  0523 23  0619 23  1044   SODIUM mmol/L 139 140    "< > 134*   POTASSIUM mmol/L 3.7 3.9   < > 4.4   CHLORIDE mmol/L 96* 101   < > 97*   CO2 mmol/L 32.0* 27.0   < > 25.2   BUN mg/dL 17 13   < > 14   CREATININE mg/dL 0.88 0.63   < > 0.77   GLUCOSE mg/dL 105* 89   < > 144*   CALCIUM mg/dL 9.5 9.2   < > 9.5   AST (SGOT) U/L  --   --   --  10   ALT (SGPT) U/L  --   --   --  12    < > = values in this interval not displayed.     Results from last 7 days   Lab Units 11/18/23  1745 11/18/23  1044   HSTROP T ng/L 11 11     Results from last 7 days   Lab Units 11/23/23  0417 11/22/23  0523   WBC 10*3/mm3 10.14 7.32   HEMOGLOBIN g/dL 14.4 14.3   HEMATOCRIT % 43.9 43.9   PLATELETS 10*3/mm3 586* 510*         Results from last 7 days   Lab Units 11/18/23  1044   MAGNESIUM mg/dL 2.0           Invalid input(s): \"LDLCALC\"  Results from last 7 days   Lab Units 11/18/23  1044   PROBNP pg/mL 2,474.0*         I reviewed the patient's new clinical results.  I personally viewed and interpreted the patient's EKG  Current Medications:   Scheduled Meds:apixaban, 5 mg, Oral, Q12H  aspirin, 325 mg, Oral, Daily  atorvastatin, 20 mg, Oral, Daily  budesonide-formoterol, 1 puff, Inhalation, BID - RT  cetirizine, 10 mg, Oral, Daily  digoxin, 125 mcg, Oral, Daily  furosemide, 40 mg, Oral, BID  metoprolol tartrate, 25 mg, Oral, Q12H  montelukast, 10 mg, Oral, QAM      Continuous Infusions:     Allergies:  Allergies   Allergen Reactions    Milk-Related Compounds Other (See Comments)     ACID REFLUX    Latex Rash    Tape Rash       Assessment:       Atrial fibrillation with RVR    Hyperlipidemia    Asthma    Pleural effusion, bilateral    Acute respiratory failure with hypoxia    CAP (community acquired pneumonia)      Afib with RVR- Hr improving. Sometimes in the 100's. Will increase metoprolol to 25mg BID. On Eliquis  Acute on chronic diastolic CHF. Volume status looks better. Switch to oral  Generalize weakness- Needs Pt/OT   Plan:           Alba Castro, APRN  11/23/23  13:49 EST  Electronically " signed by Alba Castro, APRN, 11/23/23, 1:49 PM EST.

## 2023-11-24 ENCOUNTER — APPOINTMENT (OUTPATIENT)
Dept: GENERAL RADIOLOGY | Facility: HOSPITAL | Age: 78
DRG: 291 | End: 2023-11-24
Payer: MEDICARE

## 2023-11-24 LAB
ANION GAP SERPL CALCULATED.3IONS-SCNC: 10.4 MMOL/L (ref 5–15)
BUN SERPL-MCNC: 17 MG/DL (ref 8–23)
BUN/CREAT SERPL: 21.5 (ref 7–25)
CALCIUM SPEC-SCNC: 9.3 MG/DL (ref 8.6–10.5)
CHLORIDE SERPL-SCNC: 99 MMOL/L (ref 98–107)
CO2 SERPL-SCNC: 31.6 MMOL/L (ref 22–29)
CREAT SERPL-MCNC: 0.79 MG/DL (ref 0.57–1)
DEPRECATED RDW RBC AUTO: 38.7 FL (ref 37–54)
EGFRCR SERPLBLD CKD-EPI 2021: 77.6 ML/MIN/1.73
ERYTHROCYTE [DISTWIDTH] IN BLOOD BY AUTOMATED COUNT: 12 % (ref 12.3–15.4)
GLUCOSE SERPL-MCNC: 102 MG/DL (ref 65–99)
HCT VFR BLD AUTO: 43 % (ref 34–46.6)
HGB BLD-MCNC: 13.9 G/DL (ref 12–15.9)
MCH RBC QN AUTO: 29.1 PG (ref 26.6–33)
MCHC RBC AUTO-ENTMCNC: 32.3 G/DL (ref 31.5–35.7)
MCV RBC AUTO: 90 FL (ref 79–97)
PLATELET # BLD AUTO: 531 10*3/MM3 (ref 140–450)
PMV BLD AUTO: 10.3 FL (ref 6–12)
POTASSIUM SERPL-SCNC: 3.7 MMOL/L (ref 3.5–5.2)
RBC # BLD AUTO: 4.78 10*6/MM3 (ref 3.77–5.28)
SODIUM SERPL-SCNC: 141 MMOL/L (ref 136–145)
WBC NRBC COR # BLD AUTO: 8.11 10*3/MM3 (ref 3.4–10.8)

## 2023-11-24 PROCEDURE — 94799 UNLISTED PULMONARY SVC/PX: CPT

## 2023-11-24 PROCEDURE — 71046 X-RAY EXAM CHEST 2 VIEWS: CPT

## 2023-11-24 PROCEDURE — 99232 SBSQ HOSP IP/OBS MODERATE 35: CPT | Performed by: NURSE PRACTITIONER

## 2023-11-24 PROCEDURE — 80048 BASIC METABOLIC PNL TOTAL CA: CPT | Performed by: STUDENT IN AN ORGANIZED HEALTH CARE EDUCATION/TRAINING PROGRAM

## 2023-11-24 PROCEDURE — 85027 COMPLETE CBC AUTOMATED: CPT | Performed by: STUDENT IN AN ORGANIZED HEALTH CARE EDUCATION/TRAINING PROGRAM

## 2023-11-24 RX ADMIN — APIXABAN 5 MG: 5 TABLET, FILM COATED ORAL at 21:14

## 2023-11-24 RX ADMIN — CETIRIZINE HYDROCHLORIDE 10 MG: 10 TABLET ORAL at 09:10

## 2023-11-24 RX ADMIN — BUDESONIDE AND FORMOTEROL FUMARATE DIHYDRATE 1 PUFF: 160; 4.5 AEROSOL RESPIRATORY (INHALATION) at 20:28

## 2023-11-24 RX ADMIN — MONTELUKAST SODIUM 10 MG: 10 TABLET, FILM COATED ORAL at 07:48

## 2023-11-24 RX ADMIN — ASPIRIN 325 MG: 325 TABLET, COATED ORAL at 09:10

## 2023-11-24 RX ADMIN — BUDESONIDE AND FORMOTEROL FUMARATE DIHYDRATE 1 PUFF: 160; 4.5 AEROSOL RESPIRATORY (INHALATION) at 07:26

## 2023-11-24 RX ADMIN — FUROSEMIDE 40 MG: 40 TABLET ORAL at 17:56

## 2023-11-24 RX ADMIN — APIXABAN 5 MG: 5 TABLET, FILM COATED ORAL at 09:12

## 2023-11-24 RX ADMIN — METOPROLOL TARTRATE 25 MG: 25 TABLET, FILM COATED ORAL at 21:14

## 2023-11-24 RX ADMIN — METOPROLOL TARTRATE 25 MG: 25 TABLET, FILM COATED ORAL at 09:10

## 2023-11-24 RX ADMIN — DIGOXIN 125 MCG: 125 TABLET ORAL at 13:41

## 2023-11-24 RX ADMIN — ATORVASTATIN CALCIUM 20 MG: 20 TABLET, FILM COATED ORAL at 09:09

## 2023-11-24 RX ADMIN — Medication 10 ML: at 09:11

## 2023-11-24 RX ADMIN — FUROSEMIDE 40 MG: 40 TABLET ORAL at 09:10

## 2023-11-24 NOTE — PROGRESS NOTES
Hospital Follow Up    LOS:  LOS: 6 days   Patient Name: Judi Sy  Age/Sex: 78 y.o. female  : 1945  MRN: [id number]    Day of Service: 23   Length of Stay: 6  Encounter Provider: RAMON Simon  Place of Service: [facility] CARDIOLOGY  Patient Care Team:  Blue Sam APRN as PCP - General (Internal Medicine)  Ravi Griffin MD as PCP - Family Medicine  Den Morgan Jr., MD as Consulting Physician (Cardiology)    Subjective:     Chief Complaint: afib     Interval History: No complaints today    Objective:     Objective:  Temp:  [97.4 °F (36.3 °C)-97.8 °F (36.6 °C)] 97.7 °F (36.5 °C)  Heart Rate:  [] 92  Resp:  [18] 18  BP: (120-146)/(79-88) 120/81     Intake/Output Summary (Last 24 hours) at 2023 1421  Last data filed at 2023 0922  Gross per 24 hour   Intake 360 ml   Output 800 ml   Net -440 ml     Body mass index is 27.01 kg/m².      23  1025 23  1146 23  0500   Weight: 78 kg (172 lb) 72.8 kg (160 lb 9.6 oz) 75.9 kg (167 lb 5.3 oz)     Weight change:     Physical Exam:   General Appearance:    Awake alert and oriented in no acute distress.   Color:  Skin:  Neuro:  HEENT:    Lungs:     Pink  Warm and dry  No focal, motor or sensory deficits  Neck supple, pupils equal, round and reactive. No JVD, No Bruit  Clear to auscultation,respirations regular, even and                  unlabored    Heart:    Irreg/irreg rate and rhythm, S1 and S2, no murmur, no gallop, no rub. No edema, DP/PT pulses are 2+   Chest Wall:    No abnormalities observed   Abdomen:     Normal bowel sounds, no masses, no organomegaly, soft        non-tender, non-distended, no guarding, no ascites noted   Extremities:   Moves all extremities well, no edema, no cyanosis, no redness       Lab Review:   Results from last 7 days   Lab Units 23  0510 23  0417 23  0619 23  1044   SODIUM mmol/L 141 139   < > 134*   POTASSIUM  "mmol/L 3.7 3.7   < > 4.4   CHLORIDE mmol/L 99 96*   < > 97*   CO2 mmol/L 31.6* 32.0*   < > 25.2   BUN mg/dL 17 17   < > 14   CREATININE mg/dL 0.79 0.88   < > 0.77   GLUCOSE mg/dL 102* 105*   < > 144*   CALCIUM mg/dL 9.3 9.5   < > 9.5   AST (SGOT) U/L  --   --   --  10   ALT (SGPT) U/L  --   --   --  12    < > = values in this interval not displayed.     Results from last 7 days   Lab Units 11/18/23  1745 11/18/23  1044   HSTROP T ng/L 11 11     Results from last 7 days   Lab Units 11/24/23  0510 11/23/23  0417   WBC 10*3/mm3 8.11 10.14   HEMOGLOBIN g/dL 13.9 14.4   HEMATOCRIT % 43.0 43.9   PLATELETS 10*3/mm3 531* 586*         Results from last 7 days   Lab Units 11/18/23  1044   MAGNESIUM mg/dL 2.0           Invalid input(s): \"LDLCALC\"  Results from last 7 days   Lab Units 11/18/23  1044   PROBNP pg/mL 2,474.0*         I reviewed the patient's new clinical results.  I personally viewed and interpreted the patient's EKG  Current Medications:   Scheduled Meds:apixaban, 5 mg, Oral, Q12H  aspirin, 325 mg, Oral, Daily  atorvastatin, 20 mg, Oral, Daily  budesonide-formoterol, 1 puff, Inhalation, BID - RT  cetirizine, 10 mg, Oral, Daily  digoxin, 125 mcg, Oral, Daily  furosemide, 40 mg, Oral, BID  metoprolol tartrate, 25 mg, Oral, Q12H  montelukast, 10 mg, Oral, QAM      Continuous Infusions:     Allergies:  Allergies   Allergen Reactions    Milk-Related Compounds Other (See Comments)     ACID REFLUX    Latex Rash    Tape Rash       Assessment:       Atrial fibrillation with RVR    Hyperlipidemia    Asthma    Pleural effusion, bilateral    Acute respiratory failure with hypoxia    CAP (community acquired pneumonia)        1. Afib with RVR- Hr improving. Sometimes in the 100's. Will increase metoprolol to 25mg BID. On Eliquis. HR stable on current dose  2. Acute on chronic diastolic CHF. Volume status looks better. Switch to oral  3. Generalize weakness- Needs Pt/OT   Plan:           Alba Castro, APRN  11/24/23  14:21 " EST  Electronically signed by Alba Castro, RAMON, 11/24/23, 2:21 PM EST.

## 2023-11-24 NOTE — PLAN OF CARE
Goal Outcome Evaluation:      Pt is alert and oriented. Pt has been resting with  at bedside, pt denies pain or discomfort. Pt has ambulated, x 1 assist to the bsc. Pt VSS. NAD. Plan of care ongoing.

## 2023-11-24 NOTE — PLAN OF CARE
Goal Outcome Evaluation:  Plan of Care Reviewed With: patient        Progress: no change  Outcome Evaluation: Pt using bsc for bowel and bladder needs stand by assist only, strength improving, hr remains afib but controlled, , O2 down to 1l this shift, satting 95%, eating better, needs more encouragement, bm lg this shift, nad, family at bsd.

## 2023-11-24 NOTE — PROGRESS NOTES
Name: Judi Sy ADMIT: 2023   : 1945  PCP: Blue Sam APRN    MRN: [id number] LOS: 6 days   AGE/SEX: 78 y.o. female  ROOM: [facility]     Subjective   Subjective   Resting in bed.  Has some right ankle pain, though does not limit her mobility.    Objective   Objective   Vital Signs  Temp:  [97.4 °F (36.3 °C)-97.8 °F (36.6 °C)] 97.5 °F (36.4 °C)  Heart Rate:  [] 96  Resp:  [18] 18  BP: (123-146)/(65-88) 123/88  SpO2:  [93 %-95 %] 94 %  on  Flow (L/min):  [2] 2;   Device (Oxygen Therapy): nasal cannula  Body mass index is 27.01 kg/m².  Physical Exam  Constitutional:       Appearance: She is ill-appearing.   Cardiovascular:      Rate and Rhythm: Tachycardia present. Rhythm irregular.      Pulses: Normal pulses.   Pulmonary:      Effort: Pulmonary effort is normal. No respiratory distress.      Breath sounds: No wheezing or rhonchi.   Abdominal:      General: Abdomen is flat.      Palpations: Abdomen is soft.   Musculoskeletal:      Right lower leg: Edema present.      Left lower leg: Edema present.   Skin:     General: Skin is warm.   Neurological:      General: No focal deficit present.      Mental Status: She is alert.         Results Review     I reviewed the patient's new clinical results.  Results from last 7 days   Lab Units 23  0510 23  0549   WBC 10*3/mm3 8.11 10.14 7.32 7.68   HEMOGLOBIN g/dL 13.9 14.4 14.3 14.1   PLATELETS 10*3/mm3 531* 586* 510* 499*     Results from last 7 days   Lab Units 23  0510 23  0523  0549   SODIUM mmol/L 141 139 140 141   POTASSIUM mmol/L 3.7 3.7 3.9 4.0   CHLORIDE mmol/L 99 96* 101 101   CO2 mmol/L 31.6* 32.0* 27.0 27.9   BUN mg/dL 17 17 13 13   CREATININE mg/dL 0.79 0.88 0.63 0.61   GLUCOSE mg/dL 102* 105* 89 96   EGFR mL/min/1.73 77.6 68.2 92.1 92.8     Results from last 7 days   Lab Units 23  1044   ALBUMIN g/dL 3.5   BILIRUBIN mg/dL 0.4   ALK PHOS U/L 131*   AST  "(SGOT) U/L 10   ALT (SGPT) U/L 12     Results from last 7 days   Lab Units 11/24/23  0510 11/23/23  0417 11/22/23  0523 11/21/23  0549 11/20/23  0619 11/18/23  1044   CALCIUM mg/dL 9.3 9.5 9.2 9.3   < > 9.5   ALBUMIN g/dL  --   --   --   --   --  3.5   MAGNESIUM mg/dL  --   --   --   --   --  2.0    < > = values in this interval not displayed.     Results from last 7 days   Lab Units 11/18/23  2116 11/18/23  1745   PROCALCITONIN ng/mL  --  0.10   LACTATE mmol/L 1.4  --      No results found for: \"HGBA1C\", \"POCGLU\"    No radiology results for the last day  Scheduled Medications  apixaban, 5 mg, Oral, Q12H  aspirin, 325 mg, Oral, Daily  atorvastatin, 20 mg, Oral, Daily  budesonide-formoterol, 1 puff, Inhalation, BID - RT  cetirizine, 10 mg, Oral, Daily  digoxin, 125 mcg, Oral, Daily  furosemide, 40 mg, Oral, BID  metoprolol tartrate, 25 mg, Oral, Q12H  montelukast, 10 mg, Oral, QAM    Infusions     Diet  Diet: Regular/House Diet, Cardiac Diets; Healthy Heart (2-3 Na+); Texture: Regular Texture (IDDSI 7); Fluid Consistency: Thin (IDDSI 0)       Assessment/Plan     Active Hospital Problems    Diagnosis  POA    **Atrial fibrillation with RVR [I48.91]  Yes    CAP (community acquired pneumonia) [J18.9]  Yes    Acute respiratory failure with hypoxia [J96.01]  Yes    Asthma [J45.909]  Yes    Pleural effusion, bilateral [J90]  Yes    Hyperlipidemia [E78.5]  Yes      Resolved Hospital Problems   No resolved problems to display.       78 y.o. female admitted with Atrial fibrillation with RVR.      11/24/23  Check two-view x-ray today to reevaluate effusion.    Atrial fibrillation with RVR  History of PFO status post closure  -Troponin 11x2, proBNP 2474  -cardiology following  -TTE (11/19/2023): 64%; diastolic function indeterminate  -RC: Metoprolol 12.5 mg twice daily, digoxin 125 mcg daily  -AC: Apixaban     Acute respiratory failure with hypoxia  Bilateral pleural effusion  -Chest x-ray shows bilateral basilar " atelectasis/infiltrate and mild pleural effusions.  Pro-Gregory 0.10, and lactate 1.4.  She endorses a productive cough and dyspnea.  She denies any fever or chills.   -CT chest (11/20/23): Bilateral pleural effusions, 7 mm nodular density along right minor fissure-recommend repeat CT chest in 3 months to reevaluate around 2/2024)  -she completed course of antibiotics about 2 weeks ago for pneumonia while in [facility].  Has done well off antibiotics     HLD  -atorvastatin 20mg    Flow (L/min):  [2] 2    DVT Ppx: Eliquis (home med)  Discussed with patient, family, and nursing staff.  Anticipated discharge home with HH vs SNF, 2-3 days            Quinn Pedro MD  [facility]  11/24/23  09:30 EST

## 2023-11-25 LAB
ANION GAP SERPL CALCULATED.3IONS-SCNC: 10 MMOL/L (ref 5–15)
BUN SERPL-MCNC: 14 MG/DL (ref 8–23)
BUN/CREAT SERPL: 20.9 (ref 7–25)
CALCIUM SPEC-SCNC: 9.5 MG/DL (ref 8.6–10.5)
CHLORIDE SERPL-SCNC: 97 MMOL/L (ref 98–107)
CO2 SERPL-SCNC: 32 MMOL/L (ref 22–29)
CREAT SERPL-MCNC: 0.67 MG/DL (ref 0.57–1)
DEPRECATED RDW RBC AUTO: 43 FL (ref 37–54)
EGFRCR SERPLBLD CKD-EPI 2021: 90.7 ML/MIN/1.73
ERYTHROCYTE [DISTWIDTH] IN BLOOD BY AUTOMATED COUNT: 12.5 % (ref 12.3–15.4)
GLUCOSE SERPL-MCNC: 109 MG/DL (ref 65–99)
HCT VFR BLD AUTO: 42.7 % (ref 34–46.6)
HGB BLD-MCNC: 14.1 G/DL (ref 12–15.9)
MCH RBC QN AUTO: 30.7 PG (ref 26.6–33)
MCHC RBC AUTO-ENTMCNC: 33 G/DL (ref 31.5–35.7)
MCV RBC AUTO: 92.8 FL (ref 79–97)
PLATELET # BLD AUTO: 461 10*3/MM3 (ref 140–450)
PMV BLD AUTO: 10.8 FL (ref 6–12)
POTASSIUM SERPL-SCNC: 3.9 MMOL/L (ref 3.5–5.2)
RBC # BLD AUTO: 4.6 10*6/MM3 (ref 3.77–5.28)
SODIUM SERPL-SCNC: 139 MMOL/L (ref 136–145)
WBC NRBC COR # BLD AUTO: 9.93 10*3/MM3 (ref 3.4–10.8)

## 2023-11-25 PROCEDURE — 99232 SBSQ HOSP IP/OBS MODERATE 35: CPT | Performed by: INTERNAL MEDICINE

## 2023-11-25 PROCEDURE — 85027 COMPLETE CBC AUTOMATED: CPT | Performed by: STUDENT IN AN ORGANIZED HEALTH CARE EDUCATION/TRAINING PROGRAM

## 2023-11-25 PROCEDURE — 94799 UNLISTED PULMONARY SVC/PX: CPT

## 2023-11-25 PROCEDURE — 80048 BASIC METABOLIC PNL TOTAL CA: CPT | Performed by: STUDENT IN AN ORGANIZED HEALTH CARE EDUCATION/TRAINING PROGRAM

## 2023-11-25 PROCEDURE — 63710000001 DIPHENHYDRAMINE PER 50 MG: Performed by: STUDENT IN AN ORGANIZED HEALTH CARE EDUCATION/TRAINING PROGRAM

## 2023-11-25 PROCEDURE — 94664 DEMO&/EVAL PT USE INHALER: CPT

## 2023-11-25 RX ORDER — AMMONIUM LACTATE 120 MG/G
1 CREAM TOPICAL EVERY 12 HOURS PRN
Status: DISCONTINUED | OUTPATIENT
Start: 2023-11-25 | End: 2023-11-28 | Stop reason: HOSPADM

## 2023-11-25 RX ORDER — DIPHENHYDRAMINE HCL 25 MG
25 CAPSULE ORAL EVERY 6 HOURS PRN
Status: DISCONTINUED | OUTPATIENT
Start: 2023-11-25 | End: 2023-11-28 | Stop reason: HOSPADM

## 2023-11-25 RX ADMIN — BUDESONIDE AND FORMOTEROL FUMARATE DIHYDRATE 1 PUFF: 160; 4.5 AEROSOL RESPIRATORY (INHALATION) at 09:41

## 2023-11-25 RX ADMIN — METOPROLOL TARTRATE 25 MG: 25 TABLET, FILM COATED ORAL at 22:06

## 2023-11-25 RX ADMIN — FUROSEMIDE 40 MG: 40 TABLET ORAL at 09:05

## 2023-11-25 RX ADMIN — ATORVASTATIN CALCIUM 20 MG: 20 TABLET, FILM COATED ORAL at 09:06

## 2023-11-25 RX ADMIN — FUROSEMIDE 40 MG: 40 TABLET ORAL at 17:54

## 2023-11-25 RX ADMIN — ASPIRIN 325 MG: 325 TABLET, COATED ORAL at 09:06

## 2023-11-25 RX ADMIN — DIPHENHYDRAMINE HYDROCHLORIDE 25 MG: 25 CAPSULE ORAL at 17:54

## 2023-11-25 RX ADMIN — BUDESONIDE AND FORMOTEROL FUMARATE DIHYDRATE 1 PUFF: 160; 4.5 AEROSOL RESPIRATORY (INHALATION) at 19:41

## 2023-11-25 RX ADMIN — DIGOXIN 125 MCG: 125 TABLET ORAL at 13:32

## 2023-11-25 RX ADMIN — ACETAMINOPHEN 650 MG: 325 TABLET, FILM COATED ORAL at 22:05

## 2023-11-25 RX ADMIN — ACETAMINOPHEN 650 MG: 325 TABLET, FILM COATED ORAL at 09:05

## 2023-11-25 RX ADMIN — MONTELUKAST SODIUM 10 MG: 10 TABLET, FILM COATED ORAL at 06:16

## 2023-11-25 RX ADMIN — Medication 10 ML: at 09:07

## 2023-11-25 RX ADMIN — APIXABAN 5 MG: 5 TABLET, FILM COATED ORAL at 22:05

## 2023-11-25 RX ADMIN — CETIRIZINE HYDROCHLORIDE 10 MG: 10 TABLET ORAL at 09:05

## 2023-11-25 RX ADMIN — APIXABAN 5 MG: 5 TABLET, FILM COATED ORAL at 09:06

## 2023-11-25 RX ADMIN — METOPROLOL TARTRATE 25 MG: 25 TABLET, FILM COATED ORAL at 09:06

## 2023-11-25 NOTE — PROGRESS NOTES
Name: Judi Sy ADMIT: 2023   : 1945  PCP: Blue Sam APRN    MRN: [id number] LOS: 7 days   AGE/SEX: 78 y.o. female  ROOM: [facility]     Subjective   Subjective   No acute events overnight.  Patient has been weaned to room air by time of my evaluation.  States that she is overall feeling better.  CXR obtained yesterday revealed stable pleural effusions.    Objective   Objective   Vital Signs  Temp:  [98.1 °F (36.7 °C)-98.6 °F (37 °C)] 98.1 °F (36.7 °C)  Heart Rate:  [85-96] 85  Resp:  [16-18] 18  BP: (113-150)/(67-90) 113/67  SpO2:  [90 %-96 %] 90 %  on  Flow (L/min):  [1] 1;   Device (Oxygen Therapy): room air  Body mass index is 26.87 kg/m².  General: Alert, no acute distress.  Somewhat ill-appearing.  ENT: No conjunctival injection or scleral icterus. Moist mucous membranes.   Neuro: Eyes open and moving in all directions, strength normal in all extremities, no focal deficits.   Lungs: Breath sounds decreased bilaterally at bases. No wheeze or crackles. No distress.   Heart: RRR, no murmurs. No edema.  Abdomen: Soft, non-tender, non-distended. Normal bowel sounds.   Ext: Warm and well-perfused.  Mild edema of bilateral lower extremities.  Skin: No rashes or lesions. IV site without swelling or erythema.     Results Review     I reviewed the patient's new clinical results.  Results from last 7 days   Lab Units 23  0626 23  0510 23  05   WBC 10*3/mm3 9.93 8.11 10.14 7.32   HEMOGLOBIN g/dL 14.1 13.9 14.4 14.3   PLATELETS 10*3/mm3 461* 531* 586* 510*     Results from last 7 days   Lab Units 23  0532 23  0510 23  05   SODIUM mmol/L 139 141 139 140   POTASSIUM mmol/L 3.9 3.7 3.7 3.9   CHLORIDE mmol/L 97* 99 96* 101   CO2 mmol/L 32.0* 31.6* 32.0* 27.0   BUN mg/dL 14 17 17 13   CREATININE mg/dL 0.67 0.79 0.88 0.63   GLUCOSE mg/dL 109* 102* 105* 89   EGFR mL/min/1.73 90.7 77.6 68.2 92.1           Results from last 7 days   Lab  "Units 11/25/23  0532 11/24/23  0510 11/23/23  0417 11/22/23  0523   CALCIUM mg/dL 9.5 9.3 9.5 9.2     Results from last 7 days   Lab Units 11/18/23  2116 11/18/23  1745   PROCALCITONIN ng/mL  --  0.10   LACTATE mmol/L 1.4  --      No results found for: \"HGBA1C\", \"POCGLU\"    XR Chest PA & Lateral    Result Date: 11/24/2023  As described.  This report was finalized on 11/24/2023 10:50 AM by Dr. Silvano Lam M.D on Workstation: [employer]     Scheduled Medications  apixaban, 5 mg, Oral, Q12H  aspirin, 325 mg, Oral, Daily  atorvastatin, 20 mg, Oral, Daily  budesonide-formoterol, 1 puff, Inhalation, BID - RT  cetirizine, 10 mg, Oral, Daily  digoxin, 125 mcg, Oral, Daily  furosemide, 40 mg, Oral, BID  metoprolol tartrate, 25 mg, Oral, Q12H  montelukast, 10 mg, Oral, QAM    Infusions     Diet  Diet: Regular/House Diet, Cardiac Diets; Healthy Heart (2-3 Na+); Texture: Regular Texture (IDDSI 7); Fluid Consistency: Thin (IDDSI 0)       Assessment/Plan     Active Hospital Problems    Diagnosis  POA    **Atrial fibrillation with RVR [I48.91]  Yes    CAP (community acquired pneumonia) [J18.9]  Yes    Acute respiratory failure with hypoxia [J96.01]  Yes    Asthma [J45.909]  Yes    Pleural effusion, bilateral [J90]  Yes    Hyperlipidemia [E78.5]  Yes      Resolved Hospital Problems   No resolved problems to display.       78 y.o. female admitted with Atrial fibrillation with RVR.    11/25/23  Patient new to me today and extensive chart review completed.  CXR obtained yesterday with stable pleural effusions.  Have asked pulmonology to evaluate the patient today for this.  However, she is thankfully now on room air.  Pending SNF placement at this time.  Hopefully patient can be discharged in the next 1-2 days.  Given that she does not have an accepting facility, this will likely occur sometime early next week.    Atrial fibrillation with RVR  History of PFO status post closure  -Troponin 11x2, proBNP 2474  -cardiology " following  -TTE (11/19/2023): 64%; diastolic function indeterminate  -RC: Metoprolol 12.5 mg twice daily, digoxin 125 mcg daily  -AC: Apixaban     Acute respiratory failure with hypoxia  Bilateral pleural effusion  Acute on chronic diastolic heart failure  -Chest x-ray shows bilateral basilar atelectasis/infiltrate and mild pleural effusions.  Pro-Gregory 0.10, and lactate 1.4.  She endorses a productive cough and dyspnea.  She denies any fever or chills.  Viral respiratory panel negative.  -CT chest (11/20/23): Bilateral pleural effusions, 7 mm nodular density along right minor fissure-recommend repeat CT chest in 3 months to reevaluate around 2/2024)  -Echocardiogram on 11/19/2023 with EF 63.4%, indeterminate diastolic function  -she completed course of antibiotics about 2 weeks ago for pneumonia while in [facility].  Has done well off antibiotics  -Have consulted pulmonology today for ongoing pleural effusions, appreciate recommendations  -Continue Lasix 40 mg twice daily oral for CHF     HLD  -atorvastatin 20mg    Flow (L/min):  [1] 1    DVT Ppx: Eliquis (home med)  Discussed with patient, family, and nursing staff.  Anticipated discharge home with HH vs SNF, 2-3 days            Alexandra Garcia MD  [facility]  11/25/23  14:09 EST

## 2023-11-25 NOTE — PLAN OF CARE
Goal Outcome Evaluation:  Plan of Care Reviewed With: patient        Progress: no change  Outcome Evaluation: Pt sr this shift all day , hr in 90's, O2 turned off , satting 90-95 in room,  gets winded  when walking, able to recover quick, when asleep pt does drop to hi 80's, nad,  at bsd, eating and drinking better, voiding adequately with duiretic, red bumps to upper back, c/o itching. [facility] sent message via secure chat. awaiting reply. Pulmonology came, no new orders.

## 2023-11-25 NOTE — PLAN OF CARE
Goal Outcome Evaluation:                 Pt. Alert, oriented x4, no voiced c/o pain until this time, being monitored Afib with tachycardia, heart rates stable, v/s stable, 02 sats 96% with 02 inhal. @1l/m per n/c, no s/s acute distress noted

## 2023-11-25 NOTE — PROGRESS NOTES
Hospital Follow Up    LOS:  LOS: 7 days   Patient Name: Judi Sy  Age/Sex: 78 y.o. female  : 1945  MRN: [id number]    Day of Service: 23   Length of Stay: 7  Encounter Provider: Carlitos Morales III, MD  Place of Service: [facility] CARDIOLOGY  Patient Care Team:  Blue Sam APRN as PCP - General (Internal Medicine)  Ravi Griffin MD as PCP - Family Medicine  Den Morgan Jr., MD as Consulting Physician (Cardiology)    Subjective:     Chief Complaint: afib     Interval History: No CP    Objective:     Objective:  Temp:  [97.7 °F (36.5 °C)-98.6 °F (37 °C)] 98.6 °F (37 °C)  Heart Rate:  [89-98] 91  Resp:  [16-18] 16  BP: (120-150)/(72-90) 123/72     Intake/Output Summary (Last 24 hours) at 2023 1114  Last data filed at 2023 0908  Gross per 24 hour   Intake 180 ml   Output 100 ml   Net 80 ml     Body mass index is 26.87 kg/m².      23  1146 23  0500 23  0500   Weight: 72.8 kg (160 lb 9.6 oz) 75.9 kg (167 lb 5.3 oz) 75.5 kg (166 lb 7.2 oz)     Weight change: 2.652 kg (5 lb 13.6 oz)    Physical Exam:   General Appearance:    Awake alert and oriented in no acute distress.   Color:  Skin:  Neuro:  HEENT:    Lungs:     Pink  Warm and dry  No focal, motor or sensory deficits  Neck supple, pupils equal, round and reactive. No JVD, No Bruit  Clear to auscultation,respirations regular, even and                  unlabored    Heart:    Irreg/irreg rate and rhythm, S1 and S2, no murmur, no gallop, no rub. No edema, DP/PT pulses are 2+   Chest Wall:    No abnormalities observed   Abdomen:     Normal bowel sounds, no masses, no organomegaly, soft        non-tender, non-distended, no guarding, no ascites noted   Extremities:   Moves all extremities well, no edema, no cyanosis, no redness       Lab Review:   Results from last 7 days   Lab Units 23  0532 23  0510   SODIUM mmol/L 139 141   POTASSIUM mmol/L 3.9 3.7   CHLORIDE  "mmol/L 97* 99   CO2 mmol/L 32.0* 31.6*   BUN mg/dL 14 17   CREATININE mg/dL 0.67 0.79   GLUCOSE mg/dL 109* 102*   CALCIUM mg/dL 9.5 9.3     Results from last 7 days   Lab Units 11/18/23  1745   HSTROP T ng/L 11     Results from last 7 days   Lab Units 11/25/23  0626 11/24/23  0510   WBC 10*3/mm3 9.93 8.11   HEMOGLOBIN g/dL 14.1 13.9   HEMATOCRIT % 42.7 43.0   PLATELETS 10*3/mm3 461* 531*                     Invalid input(s): \"LDLCALC\"            I reviewed the patient's new clinical results.  I personally viewed and interpreted the patient's EKG  Current Medications:   Scheduled Meds:apixaban, 5 mg, Oral, Q12H  aspirin, 325 mg, Oral, Daily  atorvastatin, 20 mg, Oral, Daily  budesonide-formoterol, 1 puff, Inhalation, BID - RT  cetirizine, 10 mg, Oral, Daily  digoxin, 125 mcg, Oral, Daily  furosemide, 40 mg, Oral, BID  metoprolol tartrate, 25 mg, Oral, Q12H  montelukast, 10 mg, Oral, QAM      Continuous Infusions:     Allergies:  Allergies   Allergen Reactions    Milk-Related Compounds Other (See Comments)     ACID REFLUX    Latex Rash    Tape Rash       Assessment:       Atrial fibrillation with RVR    Hyperlipidemia    Asthma    Pleural effusion, bilateral    Acute respiratory failure with hypoxia    CAP (community acquired pneumonia)        1. Afib with RVR- Hr well controlled on metoprolol. On Eliquis. HR stable on current dose  2. Acute on chronic diastolic CHF. Euvolemic on oral meds  3. Generalize weakness- per primary team  Plans for discharge noted, stable cardiac status, we will sign off, please call if we can help in any way.      Carlitos Morales III, MD  11/25/23  11:14 EST    "

## 2023-11-25 NOTE — CONSULTS
Patient Identification:  Judi Sy  78 y.o.  female  1945  [id number]          LOS 7    Requesting physician:   Alexandra Garcia MD    Reason for Consultation:    Pleural effusion, hypoxia    History of Present Illness:   78-year-old female, former smoker, with asthma, history of PFO status postclosure (2016), recurrent TIA, hyperlipidemia, atrial fibrillation who presented to the emergency department  and found to be in atrial fibrillation with rapid ventricular response on 11/18.    Patient initially was in [facility] and developed shortness of breath and went to [facility] for further evaluation.  This was approximately 3 weeks prior.  She was noted to have bilateral pleural effusions with CTA negative for PE.  Was prescribed erythromycin and cefdinir for pneumonia by her PCP upon returning.    During this hospitalization she was found to be in A-fib with RVR and elevated BNP to greater than 2400.  Was initially given antibiotics with concerns for pneumonia however taken off and has done well for the past few days.  Procalcitonin was negative.  Lactate was negative.    On evaluation patient states that her breathing is improved.  Does not want any oxygen at this time.  Does have some shortness of breath when she is walking however is not very active currently.  Lower extremity edema has improved.  Denies any history of asthma, COPD, frequent lung infections, lung cancer.  Former smoker, quit over 15 years ago.  Does have a history of heart failure with preserved ejection fraction.  Denies any cough or sputum production.  Denies any fevers or chills.    Past Medical History:  Past Medical History:   Diagnosis Date    A-fib     Allergic     Aortic heart valve prolapse     Arthritis     Asthma due to seasonal allergies     Carotid artery stenosis     MILD    COVID-19 12/2021    High cholesterol     History of atrial fibrillation     Knee swelling     PFO (patent foramen ovale) 2016     HX CLOSURE    Right knee pain     Screen for colon cancer 11/15/2023    Stress fracture of other site of femur due to multiple or repetitive stress 05/04/2022    Tear of meniscus of knee     Tendinitis of knee     TIA (transient ischemic attack)     PRIOR TO PFO CLOSURE    Vasovagal syncope        Past Surgical History:  Past Surgical History:   Procedure Laterality Date    BREAST BIOPSY Left     over 15 years ago; benign    CARDIAC SURGERY      CATARACT EXTRACTION Left     KNEE ARTHROSCOPY Left 03/09/2018    Procedure: LEFT KNEE ARTHROSCOPY, PARTIAL MEDIAL MENISCECTOMY, AND MEDIAL FEMORAL SUBCHONDROPLASTY;  Surgeon: Kade Jacob MD;  Location: [facility] OR [facility];  Service: Orthopedics    KNEE ARTHROSCOPY Right 05/13/2022    Procedure: right KNEE ARTHROSCOPY with debridement and subchondral plasty of medial femoral condyle;  Surgeon: Kade Jacob MD;  Location: [facility];  Service: Orthopedics;  Laterality: Right;    PATENT FORAMEN OVALE CLOSURE      TONSILLECTOMY      TUBAL ABDOMINAL LIGATION          Home Meds:  Medications Prior to Admission   Medication Sig Dispense Refill Last Dose    Advair Diskus 100-50 MCG/DOSE DISKUS Inhale 1 puff Every Morning.       albuterol sulfate  (90 Base) MCG/ACT inhaler Inhale 2 puffs Every 4 (Four) Hours As Needed for Shortness of Air. 6.7 g 0     aspirin  MG tablet Take 1 tablet by mouth Daily. 30 tablet 0     atorvastatin (LIPITOR) 20 MG tablet TAKE ONE TABLET BY MOUTH DAILY 90 tablet 2     Calcium Citrate-Vitamin D 250-200 MG-UNIT tablet Take 1 tablet by mouth Every Morning.       cetirizine (zyrTEC) 10 MG tablet Take 1 tablet by mouth Daily.       Cyanocobalamin (VITAMIN B12 PO) Take 1 tablet by mouth Daily.       EPINEPHrine (EPIPEN) 0.3 MG/0.3ML solution auto-injector injection        montelukast (SINGULAIR) 10 MG tablet Take 1 tablet by mouth Every Morning.       Polyethyl Glycol-Propyl Glycol (SYSTANE PRESERVATIVE FREE OP) Apply 1 drop  "to eye(s) as directed by provider 2 (Two) Times a Day.       [] cefdinir (OMNICEF) 300 MG capsule Take 1 capsule by mouth 2 (Two) Times a Day for 7 days. 14 capsule 0          Allergies:  Allergies   Allergen Reactions    Milk-Related Compounds Other (See Comments)     ACID REFLUX    Latex Rash    Tape Rash       Social History:   Social History     Socioeconomic History    Marital status:    Tobacco Use    Smoking status: Former     Packs/day: 0.50     Years: 10.00     Additional pack years: 0.00     Total pack years: 5.00     Types: Cigarettes     Quit date: 2012     Years since quittin.9    Smokeless tobacco: Never    Tobacco comments:     STARTED SMOKING AGE 18   Vaping Use    Vaping Use: Never used   Substance and Sexual Activity    Alcohol use: No     Comment: caffeine use- coffee    Drug use: Never    Sexual activity: Not Currently     Partners: Male       Family History:  Family History   Problem Relation Age of Onset    Heart disease Father     Heart disease Maternal Grandmother     Breast cancer Mother         50's    Malig Hyperthermia Neg Hx        Review of Systems:  12 point review of systems negative except as per HPI above    Objective:    PHYSICAL EXAM:    /72 (BP Location: Right leg, Patient Position: Lying)   Pulse 91   Temp 98.6 °F (37 °C) (Oral)   Resp 16   Ht 167.6 cm (66\")   Wt 75.5 kg (166 lb 7.2 oz)   SpO2 95%   BMI 26.87 kg/m²  Body mass index is 26.87 kg/m². 95% 75.5 kg (166 lb 7.2 oz)    General: Alert, nontoxic, NAD  HEENT: NC/AT, EOMI, MMM  Neck: Supple, trachea midline  Cardiac: RRR, no murmur, gallops, rubs  Pulmonary: Clear to auscultation bilaterally, no adventitious breath sounds, normal respiratory effort  GI: Soft, non-tender, non-distended, normal bowel sounds  Extremities: Warm, well perfused, no LE edema  Skin: no visible rash  Neuro: CN II - XII grossly intact  Psychiatry: Normal mood and affect    Lab Review:   Results from last 7 days "   Lab Units 11/25/23  0626 11/24/23  0510 11/23/23  0417 11/22/23  0523 11/21/23  0549 11/20/23  0619   WBC 10*3/mm3 9.93 8.11 10.14 7.32 7.68 9.43   HEMOGLOBIN g/dL 14.1 13.9 14.4 14.3 14.1 13.8   PLATELETS 10*3/mm3 461* 531* 586* 510* 499* 457*     Results from last 7 days   Lab Units 11/25/23  0532 11/24/23  0510 11/23/23  0417 11/22/23  0523 11/21/23  0549 11/20/23  0619   SODIUM mmol/L 139 141 139 140 141 135*   POTASSIUM mmol/L 3.9 3.7 3.7 3.9 4.0 3.4*   CHLORIDE mmol/L 97* 99 96* 101 101 95*   CO2 mmol/L 32.0* 31.6* 32.0* 27.0 27.9 27.7   BUN mg/dL 14 17 17 13 13 14   CREATININE mg/dL 0.67 0.79 0.88 0.63 0.61 0.65   GLUCOSE mg/dL 109* 102* 105* 89 96 104*   CALCIUM mg/dL 9.5 9.3 9.5 9.2 9.3 9.1   Estimated Creatinine Clearance: 74.2 mL/min (by C-G formula based on SCr of 0.67 mg/dL).    Results from last 7 days   Lab Units 11/25/23  0626 11/24/23  0510 11/23/23 0417 11/20/23 0619 11/18/23 2116 11/18/23  1745   PROCALCITONIN ng/mL  --   --   --   --   --  0.10   LACTATE mmol/L  --   --   --   --  1.4  --    PLATELETS 10*3/mm3 461* 531* 586*   < >  --   --     < > = values in this interval not displayed.              Imaging reviewed  Chest imaging from this hospitalization reviewed.       Assessment / Recommendations:    Acute exacerbation of chronic heart failure with preserved ejection fraction  Atrial fibrillation with rapid ventricular response  Acute hypoxic respiratory failure-resolved  Fluid overload  Bilateral pleural effusions complicated by compressive atelectasis  Asthma  History of PFO status post closure (2016)  Hyperlipidemia  Right minor fissure 7 mm nodule    -Patient stable from a respiratory standpoint and is comfortable on room air.  Diminished breath sounds at the bases in the setting of pleural effusion and atelectasis.  No wheezing appreciated.  -CT chest from 11/20 demonstrates bilateral pleural effusions with resultant basilar atelectasis.  No signs of pneumonia.  Already completed  course of antibiotic this past month for suspected pneumonia.  -I believe that her pleural effusions are secondary to heart failure exacerbation.  I discussed with her that these will slowly improve over the course of the next few days to weeks with ongoing diuresis.  I do not believe that she needs thoracentesis seeing that they are free-flowing on CT chest without any loculations and without any hypoxia.  -Continue diuresis as per primary and cardiology  -She will need repeat CT chest in 3 months to ensure improving effusions/pulmonary edema and reevaluation of the 7 mm nodule.  -I will set her up in pulmonary clinic as an outpatient.    Thank you for allowing me to participate in the care of this patient.  Pulmonary will sign off at this time.  Please contact us if further questions or concerns arise.    Royce Duncan MD  [facility], [facility]  Pulmonary and Critical Care Medicine, Interventional Pulmonology    11/25/2023  13:15 EST    Parts of this note may be an electronic transcription/translation of spoken language to printed text using the Dragon dictation system.

## 2023-11-26 ENCOUNTER — APPOINTMENT (OUTPATIENT)
Dept: GENERAL RADIOLOGY | Facility: HOSPITAL | Age: 78
DRG: 291 | End: 2023-11-26
Payer: MEDICARE

## 2023-11-26 LAB
ANION GAP SERPL CALCULATED.3IONS-SCNC: 11 MMOL/L (ref 5–15)
BUN SERPL-MCNC: 14 MG/DL (ref 8–23)
BUN/CREAT SERPL: 20 (ref 7–25)
CALCIUM SPEC-SCNC: 9.4 MG/DL (ref 8.6–10.5)
CHLORIDE SERPL-SCNC: 97 MMOL/L (ref 98–107)
CO2 SERPL-SCNC: 32 MMOL/L (ref 22–29)
CREAT SERPL-MCNC: 0.7 MG/DL (ref 0.57–1)
DEPRECATED RDW RBC AUTO: 42.6 FL (ref 37–54)
EGFRCR SERPLBLD CKD-EPI 2021: 89.8 ML/MIN/1.73
ERYTHROCYTE [DISTWIDTH] IN BLOOD BY AUTOMATED COUNT: 12.4 % (ref 12.3–15.4)
GLUCOSE SERPL-MCNC: 92 MG/DL (ref 65–99)
HCT VFR BLD AUTO: 41 % (ref 34–46.6)
HGB BLD-MCNC: 12.9 G/DL (ref 12–15.9)
MCH RBC QN AUTO: 29 PG (ref 26.6–33)
MCHC RBC AUTO-ENTMCNC: 31.5 G/DL (ref 31.5–35.7)
MCV RBC AUTO: 92.1 FL (ref 79–97)
PLATELET # BLD AUTO: 429 10*3/MM3 (ref 140–450)
PMV BLD AUTO: 10.6 FL (ref 6–12)
POTASSIUM SERPL-SCNC: 3.6 MMOL/L (ref 3.5–5.2)
RBC # BLD AUTO: 4.45 10*6/MM3 (ref 3.77–5.28)
SODIUM SERPL-SCNC: 140 MMOL/L (ref 136–145)
WBC NRBC COR # BLD AUTO: 7.5 10*3/MM3 (ref 3.4–10.8)

## 2023-11-26 PROCEDURE — 94760 N-INVAS EAR/PLS OXIMETRY 1: CPT

## 2023-11-26 PROCEDURE — 85027 COMPLETE CBC AUTOMATED: CPT | Performed by: STUDENT IN AN ORGANIZED HEALTH CARE EDUCATION/TRAINING PROGRAM

## 2023-11-26 PROCEDURE — 97530 THERAPEUTIC ACTIVITIES: CPT

## 2023-11-26 PROCEDURE — 73600 X-RAY EXAM OF ANKLE: CPT

## 2023-11-26 PROCEDURE — 94799 UNLISTED PULMONARY SVC/PX: CPT

## 2023-11-26 PROCEDURE — 94664 DEMO&/EVAL PT USE INHALER: CPT

## 2023-11-26 PROCEDURE — 80048 BASIC METABOLIC PNL TOTAL CA: CPT | Performed by: STUDENT IN AN ORGANIZED HEALTH CARE EDUCATION/TRAINING PROGRAM

## 2023-11-26 PROCEDURE — 94761 N-INVAS EAR/PLS OXIMETRY MLT: CPT

## 2023-11-26 RX ADMIN — ATORVASTATIN CALCIUM 20 MG: 20 TABLET, FILM COATED ORAL at 08:25

## 2023-11-26 RX ADMIN — METOPROLOL TARTRATE 25 MG: 25 TABLET, FILM COATED ORAL at 08:25

## 2023-11-26 RX ADMIN — ASPIRIN 325 MG: 325 TABLET, COATED ORAL at 08:26

## 2023-11-26 RX ADMIN — FUROSEMIDE 40 MG: 40 TABLET ORAL at 17:48

## 2023-11-26 RX ADMIN — BUDESONIDE AND FORMOTEROL FUMARATE DIHYDRATE 1 PUFF: 160; 4.5 AEROSOL RESPIRATORY (INHALATION) at 20:11

## 2023-11-26 RX ADMIN — Medication 1 APPLICATION: at 17:52

## 2023-11-26 RX ADMIN — DIGOXIN 125 MCG: 125 TABLET ORAL at 14:29

## 2023-11-26 RX ADMIN — CETIRIZINE HYDROCHLORIDE 10 MG: 10 TABLET ORAL at 08:25

## 2023-11-26 RX ADMIN — APIXABAN 5 MG: 5 TABLET, FILM COATED ORAL at 20:55

## 2023-11-26 RX ADMIN — METOPROLOL TARTRATE 25 MG: 25 TABLET, FILM COATED ORAL at 20:55

## 2023-11-26 RX ADMIN — APIXABAN 5 MG: 5 TABLET, FILM COATED ORAL at 08:26

## 2023-11-26 RX ADMIN — MONTELUKAST SODIUM 10 MG: 10 TABLET, FILM COATED ORAL at 05:20

## 2023-11-26 RX ADMIN — BUDESONIDE AND FORMOTEROL FUMARATE DIHYDRATE 1 PUFF: 160; 4.5 AEROSOL RESPIRATORY (INHALATION) at 08:03

## 2023-11-26 RX ADMIN — Medication 10 ML: at 08:26

## 2023-11-26 RX ADMIN — FUROSEMIDE 40 MG: 40 TABLET ORAL at 08:25

## 2023-11-26 RX ADMIN — Medication 1 APPLICATION: at 05:20

## 2023-11-26 NOTE — PLAN OF CARE
Goal Outcome Evaluation:  Plan of Care Reviewed With: patient        Progress: no change  Outcome Evaluation: Pt resting comfortably in bed, no c/o pain or discomfort, rash to upper back improved, raised areas flatter, sme with scab, lotion applied, up to bsc with sba, voiding adequately, eating better, denies nausea, O2 1.5 used all day, desats to 86-88 when O2 off, xray to r foot completed, pt c/o of right foot pain, no redness noted, no open areas. sr on tele, hr 88-96.

## 2023-11-26 NOTE — PROGRESS NOTES
Name: Judi Sy ADMIT: 2023   : 1945  PCP: Blue Sam APRN    MRN: [id number] LOS: 8 days   AGE/SEX: 78 y.o. female  ROOM: [facility]     Subjective   Subjective   No acute events overnight.  Patient is once again requiring oxygen, currently at 2 L nasal cannula.  She feels like her breathing has been okay, no chest pain or shortness of breath.  She is complaining of right ankle pain today.  No fall or trauma.    Objective   Objective   Vital Signs  Temp:  [97.5 °F (36.4 °C)-98.1 °F (36.7 °C)] 97.9 °F (36.6 °C)  Heart Rate:  [] 87  Resp:  [18] 18  BP: (103-129)/(58-82) 116/58  SpO2:  [91 %-94 %] 93 %  on  Flow (L/min):  [2] 2;   Device (Oxygen Therapy): nasal cannula  Body mass index is 26.69 kg/m².  General: Alert, no acute distress.  Somewhat ill-appearing.  ENT: No conjunctival injection or scleral icterus. Moist mucous membranes.   Neuro: Eyes open and moving in all directions, strength normal in all extremities, no focal deficits.   Lungs: Breath sounds decreased bilaterally at bases. No wheeze or crackles.  No respiratory distress.  Heart: RRR, no murmurs. No edema.  Abdomen: Soft, non-tender, non-distended. Normal bowel sounds.   Ext: Warm and well-perfused.  Mild edema of bilateral lower extremities.  Right ankle with tenderness on palpation.  Skin: No rashes or lesions. IV site without swelling or erythema.     Results Review     I reviewed the patient's new clinical results.  Results from last 7 days   Lab Units 23  0435 23  0626 23  0510 237   WBC 10*3/mm3 7.50 9.93 8.11 10.14   HEMOGLOBIN g/dL 12.9 14.1 13.9 14.4   PLATELETS 10*3/mm3 429 461* 531* 586*     Results from last 7 days   Lab Units 23  0435 23  0532 23  0510 23  0417   SODIUM mmol/L 140 139 141 139   POTASSIUM mmol/L 3.6 3.9 3.7 3.7   CHLORIDE mmol/L 97* 97* 99 96*   CO2 mmol/L 32.0* 32.0* 31.6* 32.0*   BUN mg/dL 14 14 17 17   CREATININE mg/dL 0.70 0.67 0.79  "0.88   GLUCOSE mg/dL 92 109* 102* 105*   EGFR mL/min/1.73 89.8 90.7 77.6 68.2           Results from last 7 days   Lab Units 11/26/23  0435 11/25/23  0532 11/24/23  0510 11/23/23  0417   CALCIUM mg/dL 9.4 9.5 9.3 9.5           No results found for: \"HGBA1C\", \"POCGLU\"    XR Ankle 2 View Right    Result Date: 11/26/2023  No evidence for acute abnormality of the right ankle.     Scheduled Medications  apixaban, 5 mg, Oral, Q12H  aspirin, 325 mg, Oral, Daily  atorvastatin, 20 mg, Oral, Daily  budesonide-formoterol, 1 puff, Inhalation, BID - RT  cetirizine, 10 mg, Oral, Daily  digoxin, 125 mcg, Oral, Daily  furosemide, 40 mg, Oral, BID  metoprolol tartrate, 25 mg, Oral, Q12H  montelukast, 10 mg, Oral, QAM    Infusions     Diet  Diet: Regular/House Diet, Cardiac Diets; Healthy Heart (2-3 Na+); Texture: Regular Texture (IDDSI 7); Fluid Consistency: Thin (IDDSI 0)       Assessment/Plan     Active Hospital Problems    Diagnosis  POA    **Atrial fibrillation with RVR [I48.91]  Yes    CAP (community acquired pneumonia) [J18.9]  Yes    Acute respiratory failure with hypoxia [J96.01]  Yes    Asthma [J45.909]  Yes    Pleural effusion, bilateral [J90]  Yes    Hyperlipidemia [E78.5]  Yes      Resolved Hospital Problems   No resolved problems to display.       78 y.o. female admitted with Atrial fibrillation with RVR.    11/26/23  Patient requiring a little bit of oxygen this morning, going to monitor for now.  Will repeat CXR if needed and consider additional dose of IV Lasix.  Also complaining of right ankle pain so will check XR to ensure no abnormality.    Atrial fibrillation with RVR  History of PFO status post closure  -Troponin 11x2, proBNP 2474  -Cardiology consulted, now signed off  -TTE (11/19/2023): 64%; diastolic function indeterminate  -RC: Metoprolol 12.5 mg twice daily, digoxin 125 mcg daily  -AC: Apixaban    Right ankle pain  -No obvious deformity, has some swelling but has bilateral edema  -Tenderness to " palpation  -Check x-ray of right ankle  -Supportive measures with ice and elevation     Acute respiratory failure with hypoxia  Bilateral pleural effusion  Acute on chronic diastolic heart failure  -Chest x-ray shows bilateral basilar atelectasis/infiltrate and mild pleural effusions.  Pro-Gregory 0.10, and lactate 1.4.  She endorses a productive cough and dyspnea.  She denies any fever or chills.  Viral respiratory panel negative.  -CT chest (11/20/23): Bilateral pleural effusions, 7 mm nodular density along right minor fissure-recommend repeat CT chest in 3 months to reevaluate around 2/2024)  -Echocardiogram on 11/19/2023 with EF 63.4%, indeterminate diastolic function  -She completed course of antibiotics about 2 weeks ago for pneumonia while in [facility].  Has done well off antibiotics  -Pulmonology consulted, no additional recommendations.  They plan to see patient as an outpatient.  -Continue Lasix 40 mg twice daily oral for CHF     HLD  -atorvastatin 20mg    Flow (L/min):  [2] 2    DVT Ppx: Eliquis (home med)  Discussed with patient, family, and nursing staff.  Anticipated discharge SNF, pending pre-cert            Alexandra Garcia MD  [facility]  11/26/23  13:39 EST

## 2023-11-26 NOTE — THERAPY TREATMENT NOTE
Patient Name: Judi Sy  : 1945    MRN: [id number]                              Today's Date: 2023       Admit Date: 2023    Visit Dx:     ICD-10-CM ICD-9-CM   1. Atrial fibrillation with RVR  I48.91 427.31     Patient Active Problem List   Diagnosis    Gastroesophageal reflux disease without esophagitis    Hiatal hernia    Hyperlipidemia    Pre-hypertension    Temporary cerebral vascular dysfunction    Osteoporosis    Closed displaced avulsion fracture of left talus    Acute meniscal tear of knee, left, sequela    Primary localized osteoarthrosis of right lower leg    Tear of medial meniscus of right knee, current    Closed nondisplaced fracture of condyle of left femur    Chondromalacia of right knee    Vitamin D deficiency    Status post total left knee replacement    Environmental and seasonal allergies    Viral upper respiratory tract infection    Popliteal synovial cyst, right    Valgus deformity of both great toes    Arthritis of first metatarsophalangeal (MTP) joint of left foot    Arthritis of first metatarsophalangeal (MTP) joint of right foot    Stress fracture of other site of femur due to multiple or repetitive stress    Screen for colon cancer    Asthma    Pleural effusion, bilateral    Left carotid artery stenosis    Atrial fibrillation with RVR    Acute respiratory failure with hypoxia    CAP (community acquired pneumonia)     Past Medical History:   Diagnosis Date    A-fib     Allergic     Aortic heart valve prolapse     Arthritis     Asthma due to seasonal allergies     Carotid artery stenosis     MILD    COVID-19 2021    High cholesterol     History of atrial fibrillation     Knee swelling     PFO (patent foramen ovale) 2016    HX CLOSURE    Right knee pain     Screen for colon cancer 11/15/2023    Stress fracture of other site of femur due to multiple or repetitive stress 2022    Tear of meniscus of knee     Tendinitis of knee     TIA (transient ischemic  attack)     PRIOR TO PFO CLOSURE    Vasovagal syncope      Past Surgical History:   Procedure Laterality Date    BREAST BIOPSY Left     over 15 years ago; benign    CARDIAC SURGERY      CATARACT EXTRACTION Left     KNEE ARTHROSCOPY Left 03/09/2018    Procedure: LEFT KNEE ARTHROSCOPY, PARTIAL MEDIAL MENISCECTOMY, AND MEDIAL FEMORAL SUBCHONDROPLASTY;  Surgeon: Kade Jacob MD;  Location: [facility] OR [facility];  Service: Orthopedics    KNEE ARTHROSCOPY Right 05/13/2022    Procedure: right KNEE ARTHROSCOPY with debridement and subchondral plasty of medial femoral condyle;  Surgeon: Kade Jacob MD;  Location: [facility] OR [facility];  Service: Orthopedics;  Laterality: Right;    PATENT FORAMEN OVALE CLOSURE      TONSILLECTOMY      TUBAL ABDOMINAL LIGATION        General Information       Row Name 11/26/23 1209          Physical Therapy Time and Intention    Document Type therapy note (daily note)  -SV     Mode of Treatment individual therapy;physical therapy  -SV       Row Name 11/26/23 1209          General Information    Patient Profile Reviewed yes  -SV     Existing Precautions/Restrictions fall;oxygen therapy device and L/min  -SV               User Key  (r) = Recorded By, (t) = Taken By, (c) = Cosigned By      Initials Name Provider Type    SV Lauryn Smith, PT Physical Therapist                   Mobility       Row Name 11/26/23 1213          Bed Mobility    Supine-Sit [place] (Bed Mobility) supervision  -SV     Assistive Device (Bed Mobility) bed rails;head of bed elevated  -SV       Row Name 11/26/23 1213          Bed-Chair Transfer    Bed-Chair [place] (Transfers) contact guard;standby assist  -SV     Comment, (Bed-Chair Transfer) bed to c  no unsteadiness noted, no antalgic gait  -SV               User Key  (r) = Recorded By, (t) = Taken By, (c) = Cosigned By      Initials Name Provider Type    SV Lauryn Smith, PT Physical Therapist                   Obj/Interventions       Row Name  11/26/23 1213          Motor Skills    Therapeutic Exercise --  review of PLB technique and purpose, education on energy conservation, slower movement , self monitoring for over exertion, reviewed orders for titrate O2 to maintain 90-95%. laq, shoulder rolls, horiz abd/add( 3 reps only due to heart issues)  -SV       Row Name 11/26/23 1213          Balance    Static Sitting Balance supervision  -SV     Dynamic Sitting Balance standby assist  -SV     Static Standing Balance standby assist  -SV               User Key  (r) = Recorded By, (t) = Taken By, (c) = Cosigned By      Initials Name Provider Type    SV Lauryn Smith, PT Physical Therapist                   Goals/Plan    No documentation.                  Clinical Impression       Row Name 11/26/23 1216          Pain    Pre/Posttreatment Pain Comment pt reports onset of pain right ankle: xray pending  -SV       Row Name 11/26/23 1216          Vital Signs    Pre SpO2 (%) 94  2L  -SV     O2 Delivery Pre Treatment supplemental O2  -SV     Intra SpO2 (%) 88  -SV     O2 Delivery Intra Treatment room air  -SV     Post SpO2 (%) 92  1 L  -SV     O2 Delivery Post Treatment supplemental O2  -SV     Pre Patient Position Supine  -SV     Intra Patient Position Standing  -SV     Post Patient Position Supine  -SV       Row Name 11/26/23 1216          Positioning and Restraints    Pre-Treatment Position in bed  -SV     Post Treatment Position bed  -SV     In Bed notified nsg;call light within reach;encouraged to call for assist;fowlers;with family/caregiver  -               User Key  (r) = Recorded By, (t) = Taken By, (c) = Cosigned By      Initials Name Provider Type    SV Lauryn Smith, PT Physical Therapist                   Outcome Measures       Row Name 11/26/23 1217 11/26/23 0240       How much help from another person do you currently need...    Turning from your back to your side while in flat bed without using bedrails? 4  -SV 4  -SS    Moving from lying on  back to sitting on the side of a flat bed without bedrails? 4  -SV 4  -SS    Moving to and from a bed to a chair (including a wheelchair)? 3  -SV 3  -SS    Standing up from a chair using your arms (e.g., wheelchair, bedside chair)? 3  -SV 3  -SS    Climbing 3-5 steps with a railing? 2  -SV 2  -SS    To walk in hospital room? 3  -SV 3  -SS    AM-PAC 6 Clicks Score (PT) 19  -SV 19  -SS    Highest Level of Mobility Goal 6 --> Walk 10 steps or more  -SV 6 --> Walk 10 steps or more  -SS              User Key  (r) = Recorded By, (t) = Taken By, (c) = Cosigned By      Initials Name Provider Type    Lauryn Juan, PT Physical Therapist    Moises Gan, RN Registered Nurse                                 Physical Therapy Education       Title: PT OT SLP Therapies (In Progress)       Topic: Physical Therapy (In Progress)       Point: Mobility training (In Progress)       Learning Progress Summary             Patient Acceptance, E, NR by  at 11/26/2023 1218    Acceptance, E,TB, VU by  at 11/26/2023 0342    Acceptance, E, NR by  at 11/25/2023 0409    Acceptance, E,TB,D, VU by  at 11/23/2023 1209                         Point: Home exercise program (In Progress)       Learning Progress Summary             Patient Acceptance, E, NR by  at 11/26/2023 1218    Acceptance, E,TB, VU by MIREYA at 11/26/2023 0342    Acceptance, E, NR by SHAKIRA at 11/25/2023 0409    Acceptance, E,TB,D, VU by  at 11/23/2023 1209                         Point: Body mechanics (In Progress)       Learning Progress Summary             Patient Acceptance, E, NR by SV at 11/26/2023 1218    Acceptance, E,TB, VU by MIREYA at 11/26/2023 0342    Acceptance, E, NR by SHAKIRA at 11/25/2023 0409    Acceptance, E,TB,D, VU by  at 11/23/2023 1209                         Point: Precautions (In Progress)       Learning Progress Summary             Patient Acceptance, E, NR by  at 11/26/2023 1218    Acceptance, E,TB, VU by  at 11/26/2023 0342    Acceptance, E, NR by  JL at 11/25/2023 0409    Acceptance, E,TB,D, VU by PH at 11/23/2023 1209                                         User Key       Initials Effective Dates Name Provider Type Discipline     07/11/23 -  Lauryn Smith, PT Physical Therapist PT    PH 06/16/21 -  Key Jeong PTA Physical Therapist Assistant PT    JL 10/20/20 -  Elvin Oro, RN Registered Nurse Nurse    SS 02/03/22 -  Moises Rendon, RN Registered Nurse Nurse                  PT Recommendation and Plan           Time Calculation:         PT Charges       Row Name 11/26/23 1226             Time Calculation    Start Time 1107  -SV      Stop Time 1145  -SV      Time Calculation (min) 38 min  -SV      PT Received On 11/26/23  -SV      PT - Next Appointment 11/27/23  -SV                User Key  (r) = Recorded By, (t) = Taken By, (c) = Cosigned By      Initials Name Provider Type    SV Lauryn Smith, PT Physical Therapist                  Therapy Charges for Today       Code Description Service Date Service Provider Modifiers Qty    37018227946 HC PT THERAPEUTIC ACT EA 15 MIN 11/26/2023 Lauryn Smith, PT GP 2            PT G-Codes  Outcome Measure Options: AM-PAC 6 Clicks Basic Mobility (PT)  AM-PAC 6 Clicks Score (PT): 19       Lauryn Smith, PT  11/26/2023

## 2023-11-26 NOTE — PLAN OF CARE
Goal Outcome Evaluation:      Pt resting in bed at this time. VSS. On 2L O2 NC while sleeping. NSR on tele. C/o pain x1. Nursing will continue to monitor.

## 2023-11-26 NOTE — PLAN OF CARE
Goal Outcome Evaluation:            Pt reported right ankle xray pending. Pt reports not sleeping last night and has been resting most of the day. Per RN Benadryl dose due to rash on her back. Pt on 2 L at rest today. O2 orders reviewed with pt to titrate O2 to maintain sat of 90-95%. PLB purpose reviewed as well as energy conservation: slower movements, rest periods, PLB before, during activity. Pt sba for sup to sit and performed several ex before transfer bed to [facility] with sba/cga no AD. Pt O2 titrated during  activity today and needed 1 L throughout due to desat to 88% on RA sitting eob. Ambulation held today due to pending ankle xray. Pt encouraged to take her medication in sitting with nursing supervision and begin to eat meals at eob with  supervision as able . Encouraged up in chair as well but pt declined due to not able to sleep last night.

## 2023-11-27 ENCOUNTER — APPOINTMENT (OUTPATIENT)
Dept: GENERAL RADIOLOGY | Facility: HOSPITAL | Age: 78
DRG: 291 | End: 2023-11-27
Payer: MEDICARE

## 2023-11-27 LAB
ANION GAP SERPL CALCULATED.3IONS-SCNC: 9.6 MMOL/L (ref 5–15)
BUN SERPL-MCNC: 12 MG/DL (ref 8–23)
BUN/CREAT SERPL: 14.8 (ref 7–25)
CALCIUM SPEC-SCNC: 9.4 MG/DL (ref 8.6–10.5)
CHLORIDE SERPL-SCNC: 95 MMOL/L (ref 98–107)
CO2 SERPL-SCNC: 32.4 MMOL/L (ref 22–29)
CREAT SERPL-MCNC: 0.81 MG/DL (ref 0.57–1)
DEPRECATED RDW RBC AUTO: 41.5 FL (ref 37–54)
EGFRCR SERPLBLD CKD-EPI 2021: 75.3 ML/MIN/1.73
ERYTHROCYTE [DISTWIDTH] IN BLOOD BY AUTOMATED COUNT: 12.1 % (ref 12.3–15.4)
GLUCOSE SERPL-MCNC: 137 MG/DL (ref 65–99)
HCT VFR BLD AUTO: 41.2 % (ref 34–46.6)
HGB BLD-MCNC: 13.6 G/DL (ref 12–15.9)
MCH RBC QN AUTO: 30.6 PG (ref 26.6–33)
MCHC RBC AUTO-ENTMCNC: 33 G/DL (ref 31.5–35.7)
MCV RBC AUTO: 92.8 FL (ref 79–97)
PLATELET # BLD AUTO: 388 10*3/MM3 (ref 140–450)
PMV BLD AUTO: 10.8 FL (ref 6–12)
POTASSIUM SERPL-SCNC: 3.5 MMOL/L (ref 3.5–5.2)
RBC # BLD AUTO: 4.44 10*6/MM3 (ref 3.77–5.28)
SODIUM SERPL-SCNC: 137 MMOL/L (ref 136–145)
WBC NRBC COR # BLD AUTO: 11.01 10*3/MM3 (ref 3.4–10.8)

## 2023-11-27 PROCEDURE — 71045 X-RAY EXAM CHEST 1 VIEW: CPT

## 2023-11-27 PROCEDURE — 94761 N-INVAS EAR/PLS OXIMETRY MLT: CPT

## 2023-11-27 PROCEDURE — 94664 DEMO&/EVAL PT USE INHALER: CPT

## 2023-11-27 PROCEDURE — 85027 COMPLETE CBC AUTOMATED: CPT | Performed by: STUDENT IN AN ORGANIZED HEALTH CARE EDUCATION/TRAINING PROGRAM

## 2023-11-27 PROCEDURE — 97110 THERAPEUTIC EXERCISES: CPT

## 2023-11-27 PROCEDURE — 94799 UNLISTED PULMONARY SVC/PX: CPT

## 2023-11-27 PROCEDURE — 80048 BASIC METABOLIC PNL TOTAL CA: CPT | Performed by: STUDENT IN AN ORGANIZED HEALTH CARE EDUCATION/TRAINING PROGRAM

## 2023-11-27 RX ADMIN — BUDESONIDE AND FORMOTEROL FUMARATE DIHYDRATE 1 PUFF: 160; 4.5 AEROSOL RESPIRATORY (INHALATION) at 19:38

## 2023-11-27 RX ADMIN — CETIRIZINE HYDROCHLORIDE 10 MG: 10 TABLET ORAL at 08:08

## 2023-11-27 RX ADMIN — FUROSEMIDE 40 MG: 40 TABLET ORAL at 18:18

## 2023-11-27 RX ADMIN — DIGOXIN 125 MCG: 125 TABLET ORAL at 13:52

## 2023-11-27 RX ADMIN — BUDESONIDE AND FORMOTEROL FUMARATE DIHYDRATE 1 PUFF: 160; 4.5 AEROSOL RESPIRATORY (INHALATION) at 07:52

## 2023-11-27 RX ADMIN — METOPROLOL TARTRATE 25 MG: 25 TABLET, FILM COATED ORAL at 20:46

## 2023-11-27 RX ADMIN — METOPROLOL TARTRATE 25 MG: 25 TABLET, FILM COATED ORAL at 08:07

## 2023-11-27 RX ADMIN — APIXABAN 5 MG: 5 TABLET, FILM COATED ORAL at 08:08

## 2023-11-27 RX ADMIN — ATORVASTATIN CALCIUM 20 MG: 20 TABLET, FILM COATED ORAL at 08:07

## 2023-11-27 RX ADMIN — Medication 10 ML: at 08:08

## 2023-11-27 RX ADMIN — ASPIRIN 325 MG: 325 TABLET, COATED ORAL at 08:08

## 2023-11-27 RX ADMIN — APIXABAN 5 MG: 5 TABLET, FILM COATED ORAL at 20:47

## 2023-11-27 RX ADMIN — FUROSEMIDE 40 MG: 40 TABLET ORAL at 08:08

## 2023-11-27 RX ADMIN — MONTELUKAST SODIUM 10 MG: 10 TABLET, FILM COATED ORAL at 08:08

## 2023-11-27 NOTE — THERAPY TREATMENT NOTE
Patient Name: Judi Sy  : 1945    MRN: [id number]                              Today's Date: 2023       Admit Date: 2023    Visit Dx:     ICD-10-CM ICD-9-CM   1. Atrial fibrillation with RVR  I48.91 427.31     Patient Active Problem List   Diagnosis    Gastroesophageal reflux disease without esophagitis    Hiatal hernia    Hyperlipidemia    Pre-hypertension    Temporary cerebral vascular dysfunction    Osteoporosis    Closed displaced avulsion fracture of left talus    Acute meniscal tear of knee, left, sequela    Primary localized osteoarthrosis of right lower leg    Tear of medial meniscus of right knee, current    Closed nondisplaced fracture of condyle of left femur    Chondromalacia of right knee    Vitamin D deficiency    Status post total left knee replacement    Environmental and seasonal allergies    Viral upper respiratory tract infection    Popliteal synovial cyst, right    Valgus deformity of both great toes    Arthritis of first metatarsophalangeal (MTP) joint of left foot    Arthritis of first metatarsophalangeal (MTP) joint of right foot    Stress fracture of other site of femur due to multiple or repetitive stress    Screen for colon cancer    Asthma    Pleural effusion, bilateral    Left carotid artery stenosis    Atrial fibrillation with RVR    Acute respiratory failure with hypoxia    CAP (community acquired pneumonia)     Past Medical History:   Diagnosis Date    A-fib     Allergic     Aortic heart valve prolapse     Arthritis     Asthma due to seasonal allergies     Carotid artery stenosis     MILD    COVID-19 2021    High cholesterol     History of atrial fibrillation     Knee swelling     PFO (patent foramen ovale) 2016    HX CLOSURE    Right knee pain     Screen for colon cancer 11/15/2023    Stress fracture of other site of femur due to multiple or repetitive stress 2022    Tear of meniscus of knee     Tendinitis of knee     TIA (transient ischemic  attack)     PRIOR TO PFO CLOSURE    Vasovagal syncope      Past Surgical History:   Procedure Laterality Date    BREAST BIOPSY Left     over 15 years ago; benign    CARDIAC SURGERY      CATARACT EXTRACTION Left     KNEE ARTHROSCOPY Left 03/09/2018    Procedure: LEFT KNEE ARTHROSCOPY, PARTIAL MEDIAL MENISCECTOMY, AND MEDIAL FEMORAL SUBCHONDROPLASTY;  Surgeon: Kade Jacob MD;  [place] [facility], [place], [place] by [facility];  Service: Orthopedics    KNEE ARTHROSCOPY Right 05/13/2022    Procedure: right KNEE ARTHROSCOPY with debridement and subchondral plasty of medial femoral condyle;  Surgeon: Kade Jacob MD;  Location: [facility], [place], operated by [facility];  Service: Orthopedics;  Laterality: Right;    PATENT FORAMEN OVALE CLOSURE      TONSILLECTOMY      TUBAL ABDOMINAL LIGATION        General Information       Row Name 11/27/23 1024          Physical Therapy Time and Intention    Document Type therapy note (daily note)  -PC     Mode of Treatment physical therapy  -       Row Name 11/27/23 1024          Cognition    Orientation Status (Cognition) oriented x 4  -PC               User Key  (r) = Recorded By, (t) = Taken By, (c) = Cosigned By      Initials Name Provider Type    PC Stephie Reaves, PT Physical Therapist                   Mobility       Row Name 11/27/23 1025          Bed Mobility    Supine-Sit [place] (Bed Mobility) supervision  -PC     Assistive Device (Bed Mobility) bed rails;head of bed elevated  -PC       Row Name 11/27/23 1025          Sit-Stand Transfer    Sit-Stand [place] (Transfers) contact guard  -PC     Assistive Device (Sit-Stand Transfers) walker, front-wheeled  -PC       Row Name 11/27/23 1025          Gait/Stairs (Locomotion)    [place] Level (Gait) contact guard  -PC     Assistive Device (Gait) walker, front-wheeled  -PC     Distance in Feet (Gait) 30 ft  -PC     Deviations/Abnormal Patterns (Gait) stride length decreased;gait speed decreased  -PC     Bilateral Gait Deviations forward flexed  posture  -PC               User Key  (r) = Recorded By, (t) = Taken By, (c) = Cosigned By      Initials Name Provider Type    PC Stephie Reaves, PT Physical Therapist                   Obj/Interventions       Row Name 11/27/23 1027          Motor Skills    Therapeutic Exercise --  10 reps AP, LAQ, seated marching, 5 reps shoulder rolls, 3 reps shoulder flexion with deep breathing  -PC               User Key  (r) = Recorded By, (t) = Taken By, (c) = Cosigned By      Initials Name Provider Type    PC Stephie Reaves, PT Physical Therapist                   Goals/Plan    No documentation.                  Clinical Impression       Row Name 11/27/23 1029          Pain    Pretreatment Pain Rating 0/10 - no pain  -PC       Row Name 11/27/23 1029          Plan of Care Review    Plan of Care Reviewed With patient;spouse  -PC     Progress improving  -PC     Outcome Evaluation pt showing some improvement with mobility however still limited with fatigue and poor activity tolerance, she walked farther today, CGA with rolling walker 30 ft, pt on 1L o2, o2 sats post walk >90%. PT will cont to follow  -PC       Row Name 11/27/23 1029          Positioning and Restraints    Pre-Treatment Position in bed  -PC     Post Treatment Position chair  -PC     In Chair sitting;call light within reach;encouraged to call for assist;exit alarm on;with family/caregiver  -PC               User Key  (r) = Recorded By, (t) = Taken By, (c) = Cosigned By      Initials Name Provider Type    PC Stephie Reaves, PT Physical Therapist                   Outcome Measures       Row Name 11/27/23 1031          How much help from another person do you currently need...    Turning from your back to your side while in flat bed without using bedrails? 4  -PC     Moving from lying on back to sitting on the side of a flat bed without bedrails? 4  -PC     Moving to and from a bed to a chair (including a wheelchair)? 3  -PC     Standing up from a chair using your  arms (e.g., wheelchair, bedside chair)? 3  -PC     Climbing 3-5 steps with a railing? 2  -PC     To walk in hospital room? 3  -PC     AM-PAC 6 Clicks Score (PT) 19  -PC     Highest Level of Mobility Goal 6 --> Walk 10 steps or more  -PC               User Key  (r) = Recorded By, (t) = Taken By, (c) = Cosigned By      Initials Name Provider Type    PC Stephie Reaves PT Physical Therapist                                 Physical Therapy Education       Title: PT OT SLP Therapies (Done)       Topic: Physical Therapy (Done)       Point: Mobility training (Done)       Learning Progress Summary             Patient Acceptance, E,D, DU by PC at 11/27/2023 1032    Acceptance, E, NR by SV at 11/26/2023 1218    Acceptance, E,TB, VU by SS at 11/26/2023 0342    Acceptance, E, NR by JL at 11/25/2023 0409    Acceptance, E,TB,D, VU by PH at 11/23/2023 1209                         Point: Home exercise program (Done)       Learning Progress Summary             Patient Acceptance, E,D, DU by PC at 11/27/2023 1032    Acceptance, E, NR by SV at 11/26/2023 1218    Acceptance, E,TB, VU by SS at 11/26/2023 0342    Acceptance, E, NR by JL at 11/25/2023 0409    Acceptance, E,TB,D, VU by  at 11/23/2023 1209                         Point: Body mechanics (Done)       Learning Progress Summary             Patient Acceptance, E,D, DU by PC at 11/27/2023 1032    Acceptance, E, NR by SV at 11/26/2023 1218    Acceptance, E,TB, VU by SS at 11/26/2023 0342    Acceptance, E, NR by JL at 11/25/2023 0409    Acceptance, E,TB,D, VU by PH at 11/23/2023 1209                         Point: Precautions (Done)       Learning Progress Summary             Patient Acceptance, E,D, DU by PC at 11/27/2023 1032    Acceptance, E, NR by SV at 11/26/2023 1218    Acceptance, E,TB, VU by SS at 11/26/2023 0342    Acceptance, E, NR by JL at 11/25/2023 0409    Acceptance, E,TB,D, VU by  at 11/23/2023 1209                                         User Key       Initials  Effective Dates Name Provider Type Discipline    PC 06/16/21 -  Stephie Reaves, PT Physical Therapist PT    SV 07/11/23 -  Lauryn Smith PT Physical Therapist PT    PH 06/16/21 -  Key Jeong PTA Physical Therapist Assistant PT    JL 10/20/20 -  Elvin Oro, RN Registered Nurse Nurse    SS 02/03/22 -  Moises Rendon, RN Registered Nurse Nurse                  PT Recommendation and Plan     Plan of Care Reviewed With: patient, spouse  Progress: improving  Outcome Evaluation: pt showing some improvement with mobility however still limited with fatigue and poor activity tolerance, she walked farther today, CGA with rolling walker 30 ft, pt on 1L o2, o2 sats post walk >90%. PT will cont to follow     Time Calculation:         PT Charges       Row Name 11/27/23 1032             Time Calculation    Start Time 0926  -PC      Stop Time 0950  -PC      Time Calculation (min) 24 min  -PC      PT Received On 11/27/23  -PC      PT - Next Appointment 11/28/23  -PC                User Key  (r) = Recorded By, (t) = Taken By, (c) = Cosigned By      Initials Name Provider Type    PC Stephie Reaves, PT Physical Therapist                  Therapy Charges for Today       Code Description Service Date Service Provider Modifiers Qty    14231339112 HC PT THER PROC EA 15 MIN 11/27/2023 Stephie Reaves, PT GP 2            PT G-Codes  Outcome Measure Options: AM-PAC 6 Clicks Basic Mobility (PT)  AM-PAC 6 Clicks Score (PT): 19       Stephie Reaves, PT  11/27/2023

## 2023-11-27 NOTE — PLAN OF CARE
Goal Outcome Evaluation:  Plan of Care Reviewed With: patient        Progress: no change  Outcome Evaluation: Pt sitting up in bed, encourage to increased activity such as sitting in r/c, did walk towards the chair with PT but only sat for few minutes and decided to go back to bed, using bsc with sba and walker, voiding adequately, intake fair, O2 1.0L use this all day this shift, attempt to turn off O2 but O2 sat dropping to 87-88%, sr on tele, hr 80's satting 94%.

## 2023-11-27 NOTE — CASE MANAGEMENT/SOCIAL WORK
Continued Stay Note  [facility]     Patient Name: Judi Sy  MRN: [id number]  Today's Date: 11/27/2023    Admit Date: 11/18/2023    Plan: Plan [facility] for skilled care.  ALEAH Paredes RN   Discharge Plan       Row Name 11/27/23 1248       Plan    Plan Plan [facility] for skilled care.  ALEAH Paredes RN    Patient/Family in Agreement with Plan yes    Plan Comments Spoke with pt and pt's spouse ( Marciano Sy [phone]) at bedside.  Per Courtney  ( [phone]) pt has a bed and can be accepted at [facility] on 11/28.   Pt remains on O2.  [facility] David arranged to transport pt with a  time of 1600. Cost of $74 which pt will cover the cost.  Plan [facility] for skilled care.  ALEAH Paredes RN                   Discharge Codes    No documentation.                 Expected Discharge Date and Time       Expected Discharge Date Expected Discharge Time    Nov 28, 2023               Radha Paredes RN

## 2023-11-27 NOTE — PLAN OF CARE
Goal Outcome Evaluation:  Plan of Care Reviewed With: patient        Progress: no change  Outcome Evaluation: VSS, 2L NC overnight.  Rash on upper back improving, medicated lotion in room. Up stand by to bathroom. No significant complaint of pain overnight. No changes to plan of care.

## 2023-11-27 NOTE — PROGRESS NOTES
Name: Judi Sy ADMIT: 2023   : 1945  PCP: Blue Sam APRN    MRN: [id number] LOS: 9 days   AGE/SEX: 78 y.o. female  ROOM: [facility]     Subjective   Subjective   Up in chair when seen. Continues to require O2 but down to 1L. Worked w/PT; denies increased soa. No chest pain       Objective   Objective   Vital Signs  Temp:  [97.5 °F (36.4 °C)-98.6 °F (37 °C)] 97.5 °F (36.4 °C)  Heart Rate:  [86-99] 99  Resp:  [16-18] 18  BP: (105-133)/(63-79) 105/63  SpO2:  [91 %-96 %] 92 %  on  Flow (L/min):  [2] 2;   Device (Oxygen Therapy): nasal cannula  Body mass index is 27.19 kg/m².  Physical Exam  Vitals and nursing note reviewed.   Constitutional:       General: She is not in acute distress.     Appearance: She is ill-appearing. She is not toxic-appearing.   HENT:      Head: Normocephalic.      Mouth/Throat:      Mouth: Mucous membranes are moist.   Eyes:      Conjunctiva/sclera: Conjunctivae normal.   Cardiovascular:      Rate and Rhythm: Normal rate and regular rhythm.   Pulmonary:      Effort: Pulmonary effort is normal. No respiratory distress.      Breath sounds: Examination of the right-lower field reveals decreased breath sounds. Examination of the left-lower field reveals decreased breath sounds. Decreased breath sounds present. No wheezing or rales.   Abdominal:      General: Bowel sounds are normal.      Palpations: Abdomen is soft.   Musculoskeletal:         General: Normal range of motion.      Cervical back: Neck supple.      Right lower leg: Edema present.      Left lower leg: Edema present.      Comments: Rt ankle w/normal AROM   Skin:     General: Skin is warm and dry.   Neurological:      Mental Status: She is alert and oriented to person, place, and time.   Psychiatric:         Mood and Affect: Mood normal.         Behavior: Behavior normal.       Results Review     I reviewed the patient's new clinical results.  Results from last 7 days   Lab Units 23  0830 23  0433  "11/25/23  0626 11/24/23  0510   WBC 10*3/mm3 11.01* 7.50 9.93 8.11   HEMOGLOBIN g/dL 13.6 12.9 14.1 13.9   PLATELETS 10*3/mm3 388 429 461* 531*     Results from last 7 days   Lab Units 11/27/23  0830 11/26/23 0435 11/25/23  0532 11/24/23  0510   SODIUM mmol/L 137 140 139 141   POTASSIUM mmol/L 3.5 3.6 3.9 3.7   CHLORIDE mmol/L 95* 97* 97* 99   CO2 mmol/L 32.4* 32.0* 32.0* 31.6*   BUN mg/dL 12 14 14 17   CREATININE mg/dL 0.81 0.70 0.67 0.79   GLUCOSE mg/dL 137* 92 109* 102*   EGFR mL/min/1.73 75.3 89.8 90.7 77.6       Results from last 7 days   Lab Units 11/27/23 0830 11/26/23 0435 11/25/23 0532 11/24/23  0510   CALCIUM mg/dL 9.4 9.4 9.5 9.3       No results found for: \"HGBA1C\", \"POCGLU\"    XR Chest 1 View    Result Date: 11/27/2023  No significant change compared to exam 3 days ago.      XR Ankle 2 View Right    Result Date: 11/26/2023  No evidence for acute abnormality of the right ankle.  This report was finalized on 11/26/2023 1:38 PM by Dr. Javier Davis M.D on Workstation: [id number]       I have personally reviewed all medications:  Scheduled Medications  apixaban, 5 mg, Oral, Q12H  aspirin, 325 mg, Oral, Daily  atorvastatin, 20 mg, Oral, Daily  budesonide-formoterol, 1 puff, Inhalation, BID - RT  cetirizine, 10 mg, Oral, Daily  digoxin, 125 mcg, Oral, Daily  furosemide, 40 mg, Oral, BID  metoprolol tartrate, 25 mg, Oral, Q12H  montelukast, 10 mg, Oral, QAM    Infusions   Diet  Diet: Regular/House Diet, Cardiac Diets; Healthy Heart (2-3 Na+); Texture: Regular Texture (IDDSI 7); Fluid Consistency: Thin (IDDSI 0)    I have personally reviewed:  [x]  Laboratory   [x]  Microbiology   [x]  Radiology   [x]  EKG/Telemetry  [x]  Cardiology/Vascular   []  Pathology    []  Records       Assessment/Plan     Active Hospital Problems    Diagnosis  POA    **Atrial fibrillation with RVR [I48.91]  Yes    CAP (community acquired pneumonia) [J18.9]  Yes    Acute respiratory failure with hypoxia [J96.01]  Yes    Asthma " [J45.909]  Yes    Pleural effusion, bilateral [J90]  Yes    Hyperlipidemia [E78.5]  Yes      Resolved Hospital Problems   No resolved problems to display.       78 y.o. female admitted with Atrial fibrillation with RVR.    11/27/23  Patient requiring less O2, down to 1L NC. Denies increased soa w/PT.  asking about follow up CXR, will order but reassuring that she is overall improving. Rt ankle xray was negative for acute findings.     Atrial fibrillation with RVR  History of PFO status post closure  -Troponin 11x2, proBNP 2474  -Cardiology consulted, now signed off  -TTE (11/19/2023): 64%; diastolic function indeterminate  -RC: Metoprolol 25 mg twice daily, digoxin 125 mcg daily  -AC: Apixaban     Right ankle pain  -No obvious deformity, has some swelling but has bilateral edema  -Tenderness to palpation but normal AROM  -Xray negative  -Supportive measures with ice and elevation     Acute respiratory failure with hypoxia  Bilateral pleural effusion  Acute on chronic diastolic heart failure  -Chest x-ray shows bilateral basilar atelectasis/infiltrate and mild pleural effusions.  Pro-Gregory 0.10, and lactate 1.4.  She endorses a productive cough and dyspnea.  She denies any fever or chills.  Viral respiratory panel negative.  -CT chest (11/20/23): Bilateral pleural effusions, 7 mm nodular density along right minor fissure-recommend repeat CT chest in 3 months to reevaluate around 2/2024)  -Echocardiogram on 11/19/2023 with EF 63.4%, indeterminate diastolic function  -She completed course of antibiotics about 2 weeks ago for pneumonia while in [facility].  Has done well off antibiotics  -Pulmonology consulted, no additional recommendations.  They plan to see patient as an outpatient.  -Continue Lasix 40 mg twice daily oral for CHF     HLD  -atorvastatin 20mg     Flow (L/min):  1      Eliquis (home med) for DVT prophylaxis.  Full code.  Discussed with patient and spouse.  Anticipate discharge to SNU facility  tomorrow.      RAMON Chow  [facility]  11/27/23  13:03 EST

## 2023-11-27 NOTE — PLAN OF CARE
Goal Outcome Evaluation:  Plan of Care Reviewed With: patient, spouse        Progress: improving  Outcome Evaluation: pt showing some improvement with mobility however still limited with fatigue and poor activity tolerance, she walked farther today, CGA with rolling walker 30 ft, pt on 1L o2, o2 sats post walk >90%. PT will cont to follow

## 2023-11-28 VITALS
RESPIRATION RATE: 16 BRPM | OXYGEN SATURATION: 91 % | HEIGHT: 66 IN | DIASTOLIC BLOOD PRESSURE: 79 MMHG | SYSTOLIC BLOOD PRESSURE: 130 MMHG | HEART RATE: 129 BPM | BODY MASS INDEX: 26.93 KG/M2 | TEMPERATURE: 98.1 F | WEIGHT: 167.55 LBS

## 2023-11-28 LAB
ANION GAP SERPL CALCULATED.3IONS-SCNC: 13 MMOL/L (ref 5–15)
BUN SERPL-MCNC: 14 MG/DL (ref 8–23)
BUN/CREAT SERPL: 21.2 (ref 7–25)
CALCIUM SPEC-SCNC: 9.7 MG/DL (ref 8.6–10.5)
CHLORIDE SERPL-SCNC: 96 MMOL/L (ref 98–107)
CO2 SERPL-SCNC: 30 MMOL/L (ref 22–29)
CREAT SERPL-MCNC: 0.66 MG/DL (ref 0.57–1)
DEPRECATED RDW RBC AUTO: 41 FL (ref 37–54)
EGFRCR SERPLBLD CKD-EPI 2021: 91 ML/MIN/1.73
ERYTHROCYTE [DISTWIDTH] IN BLOOD BY AUTOMATED COUNT: 12.3 % (ref 12.3–15.4)
GLUCOSE SERPL-MCNC: 116 MG/DL (ref 65–99)
HCT VFR BLD AUTO: 43.1 % (ref 34–46.6)
HGB BLD-MCNC: 14.4 G/DL (ref 12–15.9)
MCH RBC QN AUTO: 30.5 PG (ref 26.6–33)
MCHC RBC AUTO-ENTMCNC: 33.4 G/DL (ref 31.5–35.7)
MCV RBC AUTO: 91.3 FL (ref 79–97)
PLATELET # BLD AUTO: 373 10*3/MM3 (ref 140–450)
PMV BLD AUTO: 11.5 FL (ref 6–12)
POTASSIUM SERPL-SCNC: 3.6 MMOL/L (ref 3.5–5.2)
RBC # BLD AUTO: 4.72 10*6/MM3 (ref 3.77–5.28)
SODIUM SERPL-SCNC: 139 MMOL/L (ref 136–145)
WBC NRBC COR # BLD AUTO: 10.83 10*3/MM3 (ref 3.4–10.8)

## 2023-11-28 PROCEDURE — 94799 UNLISTED PULMONARY SVC/PX: CPT

## 2023-11-28 PROCEDURE — 85027 COMPLETE CBC AUTOMATED: CPT | Performed by: STUDENT IN AN ORGANIZED HEALTH CARE EDUCATION/TRAINING PROGRAM

## 2023-11-28 PROCEDURE — 94761 N-INVAS EAR/PLS OXIMETRY MLT: CPT

## 2023-11-28 PROCEDURE — 97530 THERAPEUTIC ACTIVITIES: CPT

## 2023-11-28 PROCEDURE — 80048 BASIC METABOLIC PNL TOTAL CA: CPT | Performed by: STUDENT IN AN ORGANIZED HEALTH CARE EDUCATION/TRAINING PROGRAM

## 2023-11-28 PROCEDURE — 94664 DEMO&/EVAL PT USE INHALER: CPT

## 2023-11-28 RX ORDER — FUROSEMIDE 40 MG/1
40 TABLET ORAL 2 TIMES DAILY
Start: 2023-11-28

## 2023-11-28 RX ORDER — AMMONIUM LACTATE 120 MG/G
1 CREAM TOPICAL EVERY 12 HOURS PRN
Start: 2023-11-28

## 2023-11-28 RX ORDER — METOPROLOL TARTRATE 50 MG/1
50 TABLET, FILM COATED ORAL EVERY 12 HOURS SCHEDULED
Status: DISCONTINUED | OUTPATIENT
Start: 2023-11-28 | End: 2023-11-28 | Stop reason: HOSPADM

## 2023-11-28 RX ORDER — DIGOXIN 125 MCG
125 TABLET ORAL
Start: 2023-11-28

## 2023-11-28 RX ORDER — DIPHENHYDRAMINE HCL 25 MG
25 CAPSULE ORAL EVERY 6 HOURS PRN
Start: 2023-11-28

## 2023-11-28 RX ORDER — METOPROLOL TARTRATE 50 MG/1
50 TABLET, FILM COATED ORAL EVERY 12 HOURS SCHEDULED
Start: 2023-11-28

## 2023-11-28 RX ADMIN — CETIRIZINE HYDROCHLORIDE 10 MG: 10 TABLET ORAL at 08:17

## 2023-11-28 RX ADMIN — DIGOXIN 125 MCG: 125 TABLET ORAL at 12:21

## 2023-11-28 RX ADMIN — BUDESONIDE AND FORMOTEROL FUMARATE DIHYDRATE 1 PUFF: 160; 4.5 AEROSOL RESPIRATORY (INHALATION) at 07:36

## 2023-11-28 RX ADMIN — ATORVASTATIN CALCIUM 20 MG: 20 TABLET, FILM COATED ORAL at 08:17

## 2023-11-28 RX ADMIN — METOPROLOL TARTRATE 25 MG: 25 TABLET, FILM COATED ORAL at 11:23

## 2023-11-28 RX ADMIN — ASPIRIN 325 MG: 325 TABLET, COATED ORAL at 08:17

## 2023-11-28 RX ADMIN — FUROSEMIDE 40 MG: 40 TABLET ORAL at 08:17

## 2023-11-28 RX ADMIN — METOPROLOL TARTRATE 25 MG: 25 TABLET, FILM COATED ORAL at 08:17

## 2023-11-28 RX ADMIN — MONTELUKAST SODIUM 10 MG: 10 TABLET, FILM COATED ORAL at 06:45

## 2023-11-28 RX ADMIN — APIXABAN 5 MG: 5 TABLET, FILM COATED ORAL at 08:17

## 2023-11-28 NOTE — PLAN OF CARE
Goal Outcome Evaluation:               Pt. Alert, oriented x4, no voiced c/o pain until this time, v/s stable, 02 sats kept over 90% with 02 inhal. @1.5l/m per n/c, no s/s acute distress noted

## 2023-11-28 NOTE — CASE MANAGEMENT/SOCIAL WORK
Case Management Discharge Note      Final Note: Pt discharged to [facility] for skilled care.   ALEAH Paredes RN         Selected Continued Care - Admitted Since 11/18/2023       Destination Coordination complete.      Service Provider Selected Services Address Phone Fax Patient Preferred    [facility] [address] [address] [phone] --              Durable Medical Equipment    No services have been selected for the patient.                Dialysis/Infusion    No services have been selected for the patient.                Home Medical Care    No services have been selected for the patient.                Therapy    No services have been selected for the patient.                Community Resources    No services have been selected for the patient.                Community & DME    No services have been selected for the patient.                    Transportation Services  W/C Van: [facility]    Final Discharge Disposition Code: 03 - skilled nursing facility (SNF)

## 2023-11-28 NOTE — CASE MANAGEMENT/SOCIAL WORK
Continued Stay Note  [facility]     Patient Name: Judi Sy  MRN: [id number]  Today's Date: 11/28/2023    Admit Date: 11/18/2023    Plan: Plan [facility] for skilled care. ALEAH Paredes RN   Discharge Plan       Row Name 11/28/23 9204       Plan    Plan Plan [facility] for skilled care. ALEAH Paredes RN    Patient/Family in Agreement with Plan yes    Plan Comments Per Courtney  ( [phone]) pt has a bed and can be accepted at [facility] today.  Discharge Summary in packet.  Courtney will print Discharge Summary for facility.  Prescriptions sent to facility pharmacy.  Packet to RN.  [facility] arranged to transport pt with a  time of 1600.  Plan [facility] for skilled care.  ALEAH Paredes RN                   Discharge Codes    No documentation.                 Expected Discharge Date and Time       Expected Discharge Date Expected Discharge Time    Nov 28, 2023               Radha Paredes RN

## 2023-11-28 NOTE — DISCHARGE SUMMARY
Patient Name: Judi Sy  : 1945  MRN: [id number]    Date of Admission: 2023  Date of Discharge:  2023  Primary Care Physician: Blue Sam APRN      Chief Complaint:   Shortness of Breath and Dizziness      Discharge Diagnoses     Active Hospital Problems    Diagnosis  POA    **Atrial fibrillation with RVR [I48.91]  Yes    CAP (community acquired pneumonia) [J18.9]  Yes    Acute respiratory failure with hypoxia [J96.01]  Yes    Asthma [J45.909]  Yes    Pleural effusion, bilateral [J90]  Yes    Hyperlipidemia [E78.5]  Yes      Resolved Hospital Problems   No resolved problems to display.        Hospital Course     Ms. Sy is a 78 y.o. female with a history of HLD, PFO closure, HTN, carotid artery disease who presented to [facility] initially complaining of dyspnea. Was recently on a trip to [place] where she began feeling soa. Seen in the hospital there and was told she had pleural effusions on CT. Followed up with her PCP here and was started on antibiotics.  Please see the admitting history and physical for further details.  She was found to have afib with RVR and was admitted to the hospital for further evaluation and treatment.  Started on IV diuretics for evidence of pleural effusions. 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 Cardiology followed. Transitioned from IV to  po furosemide. Echo completed as below. Has been slow to improve. She was weaned to room air but then required O2 replacement, currently on 0.5 L NC. Serial CXR have been completed and overall stable. Pulmonology evaluated. There were no signs of pneumonia per their evaluation. Pleural effusions likely due to diastolic heart failure.  Thoracentesis was not recommended. Pulmonology agreed with repeating CT chest in 3 months to ensure improvement and to reevaluated nodule. She will follow up in pulmonary clinic as outpatient. Heart rate was higher this a.m., up to 120s without complaints of chest pain, worsening soa or palpitations. Metoprolol has been increased to 50 mg daily. At this point she is stable from a medical standpoint to discharge to SNF for continued OT/PT to work on increasing strength, endurance, and mobility.   Day of Discharge     Subjective:  Requiring O2 0.5L NC. Walked from chair to bed on room air where sats decreased to high 80s. Pt denies feeling more soa. Denies chest pain, palpitations. LE edema about the same. Weight is down ~ 5lbs since admission. D/W pt and  who agree with discharge to SNF today    Physical Exam:  Temp:  [97.9 °F (36.6 °C)-98.1 °F (36.7 °C)] 98.1 °F (36.7 °C)  Heart Rate:  [] 129  Resp:  [16-18] 16  BP: ()/(54-79) 130/79  Body mass index is 27.04 kg/m².  Physical Exam  Vitals and nursing note reviewed.   Constitutional:       General: She is not in acute distress.     Appearance: She is ill-appearing. She is not toxic-appearing.   HENT:      Head: Normocephalic.      Mouth/Throat:      Mouth: Mucous membranes are moist.   Eyes:      Conjunctiva/sclera: Conjunctivae normal.   Cardiovascular:      Rate and Rhythm: Tachycardia present. Rhythm irregular.   Pulmonary:      Effort: Pulmonary effort is normal. No respiratory distress.      Breath sounds: Examination of the right-lower field reveals decreased breath sounds. Examination of the left-lower field reveals decreased breath sounds. No wheezing or rales.      Comments: O2 0.5L NC  Abdominal:      General: Bowel sounds are normal.      Palpations: Abdomen is soft.   Musculoskeletal:      Cervical back: Neck supple.      Right lower leg: Edema present.      Left lower leg: Edema present.   Skin:     General: Skin is warm and dry.    Neurological:      Mental Status: She is alert and oriented to person, place, and time.   Psychiatric:         Mood and Affect: Affect is flat.         Consultants     Consult Orders (all) (From admission, onward)       Start     Ordered    11/25/23 0846  Inpatient Pulmonology Consult  Once        Specialty:  Pulmonary Disease  Provider:  Emir Hunter MD    11/25/23 0846    11/19/23 0702  Inpatient Cardiology Consult  IN AM        Specialty:  Cardiology  Provider:  Den Morgan Jr., MD    11/18/23 2111                  Procedures     * Surgery not found *    Imaging Results (All)       Procedure Component Value Units Date/Time    XR Chest 1 View [[id number]] Collected: 11/27/23 1234     Updated: 11/28/23 0724    Narrative:      CHEST SINGLE VIEW     HISTORY: Hypoxia, shortness of air.     COMPARISON: AP and lateral chest 11/24/2023, AP chest 11/22/2023.     FINDINGS: Heart size is enlarged. There remains left basilar opacity  silhouetting the left hemidiaphragm attributed to commendation of  pleural fluid and associated atelectasis or infiltrate. This does not  appear changed. Small right pleural effusion has not changed. There is  no evidence for perihilar edema or pneumothorax. Cardiac monitoring  leads are present        Impression:      No significant change compared to exam 3 days ago.     This report was finalized on 11/28/2023 7:21 AM by Dr. Javier Davis M.D on Workstation: [id number]       XR Ankle 2 View Right [[id number]] Collected: 11/26/23 1324     Updated: 11/26/23 1341    Narrative:      RIGHT ANKLE: AP, LATERAL     HISTORY: Ankle pain.     FINDINGS: There are degenerative heel spurs. There is no evidence for  fracture or acute abnormality of the right ankle on 2 views. The soft  tissues appear within normal limits.       Impression:      No evidence for acute abnormality of the right ankle.     This report was finalized on 11/26/2023 1:38 PM by Dr. Javier Davis M.D on Workstation:  [id number]       XR Chest PA & Lateral [[id number]] Collected: 11/24/23 1049     Updated: 11/24/23 1053    Narrative:      XR CHEST PA AND LATERAL-     HISTORY: Female who is 78 years-old, pleural effusion     TECHNIQUE: Frontal and lateral views of the chest     COMPARISON: 11/22/2023     FINDINGS: The heart is enlarged. Aorta is calcified. Pulmonary  vasculature is unremarkable. Mild to moderate bilateral pleural  effusions appear similar to prior exam, with bibasilar likely  atelectasis, mildly decreased on the left. No pneumothorax. Otherwise  stable.       Impression:      As described.     This report was finalized on 11/24/2023 10:50 AM by Dr. Silvano Lam M.D on Workstation: No Paper Just Vapor       CT Chest With Contrast Diagnostic [[id number]] Collected: 11/20/23 1619     Updated: 11/22/23 1102    Narrative:      CT CHEST WITH IV CONTRAST     HISTORY: 78-year-old female with pleural effusion. Possible pneumonia.     TECHNIQUE: Radiation dose reduction techniques were utilized, including  automated exposure control and exposure modulation based on body size.   3 mm images were obtained through the chest after the administration of  IV contrast. Compared with chest radiograph 11/18/2023. There is no  recent prior chest CT for comparison.     FINDINGS:  1. There is significant breathing artifact. There are moderately  enlarged bilateral pleural effusions which layer to the apices, greater  on the right. There is extensive compressive atelectasis of both lower  lobes. There is also significant atelectasis at the lingula.     2. There are faint ground glass densities which may represent crowded  lung markings from atelectatic change, but pulmonary edema is possible  as well.     3. There is a 7 mm nodular density along the right minor fissure which  is difficult to characterize given the degree of breathing artifact,  image 50. Reevaluation is recommended with a chest CT in 3 months.     4. There is 3.8 cm ectasia of  the ascending thoracic aorta, aortic arch  measures 2.9 cm.     This report was finalized on 11/22/2023 10:59 AM by Dr. Kianna Harris M.D  on Workstation: [id number]       XR Chest 1 View [[id number]] Collected: 11/22/23 0917     Updated: 11/22/23 0921    Narrative:      XR CHEST 1 VW-     HISTORY: Female who is 78 years-old, pleural effusions     TECHNIQUE: Frontal views of the chest     COMPARISON: 11/18/2023     FINDINGS: The heart is enlarged. Pulmonary vasculature is less  congested. Bibasilar atelectasis/infiltrate and pleural effusions appear  similar to prior exam. No pneumothorax. Otherwise stable.       Impression:      As described.           This report was finalized on 11/22/2023 9:18 AM by Dr. Silvano Lam M.D on Workstation: [id number]       XR Chest 1 View [[id number]] Collected: 11/18/23 1239     Updated: 11/18/23 1243    Narrative:      XR CHEST 1 VW-     HISTORY: Female who is 78 years-old, short of breath     TECHNIQUE: Frontal view of the chest     COMPARISON: 11/5/2021     FINDINGS: The heart is enlarged. Aorta is calcified. Mild prominence of  vascular markings. Bilateral basilar atelectasis/infiltrate and mild  pleural effusions, follow-up recommended. No pneumothorax. No acute  osseous process.       Impression:      As described.           This report was finalized on 11/18/2023 12:40 PM by Dr. Silvano Lam M.D on Workstation: [id number]             Results for orders placed during the hospital encounter of 11/16/23    Duplex Carotid Ultrasound CAR    Interpretation Summary    Atherosclerotic plaque distal right and left common carotid arteries extending into the proximal internal carotid arteries, with less than 50% ICA stenosis bilaterally    Results for orders placed during the hospital encounter of 11/18/23    Adult Transthoracic Echo Complete W/ Cont if Necessary Per Protocol    Interpretation Summary    Left ventricular systolic function is normal. Calculated left ventricular EF  "= 63.4%    Left ventricular diastolic function was indeterminate.    Estimated right ventricular systolic pressure from tricuspid regurgitation is normal (<35 mmHg).    Pertinent Labs     Results from last 7 days   Lab Units 11/28/23  0529 11/27/23  0830 11/26/23 0435 11/25/23  0626   WBC 10*3/mm3 10.83* 11.01* 7.50 9.93   HEMOGLOBIN g/dL 14.4 13.6 12.9 14.1   PLATELETS 10*3/mm3 373 388 429 461*     Results from last 7 days   Lab Units 11/28/23  0529 11/27/23  0830 11/26/23 0435 11/25/23  0532   SODIUM mmol/L 139 137 140 139   POTASSIUM mmol/L 3.6 3.5 3.6 3.9   CHLORIDE mmol/L 96* 95* 97* 97*   CO2 mmol/L 30.0* 32.4* 32.0* 32.0*   BUN mg/dL 14 12 14 14   CREATININE mg/dL 0.66 0.81 0.70 0.67   GLUCOSE mg/dL 116* 137* 92 109*   EGFR mL/min/1.73 91.0 75.3 89.8 90.7       Results from last 7 days   Lab Units 11/28/23  0529 11/27/23 0830 11/26/23 0435 11/25/23  0532   CALCIUM mg/dL 9.7 9.4 9.4 9.5               Invalid input(s): \"LDLCALC\"          Test Results Pending at Discharge       Discharge Details        Discharge Medications        New Medications        Instructions Start Date   ammonium lactate 12 % cream  Commonly known as: AMLACTIN   1 application , Topical, Every 12 Hours PRN      apixaban 5 MG tablet tablet  Commonly known as: ELIQUIS   5 mg, Oral, Every 12 Hours Scheduled      digoxin 125 MCG tablet  Commonly known as: LANOXIN   125 mcg, Oral, Daily Digoxin      diphenhydrAMINE 25 mg capsule  Commonly known as: BENADRYL   25 mg, Oral, Every 6 Hours PRN      furosemide 40 MG tablet  Commonly known as: LASIX   40 mg, Oral, 2 Times Daily      metoprolol tartrate 50 MG tablet  Commonly known as: LOPRESSOR   50 mg, Oral, Every 12 Hours Scheduled             Continue These Medications        Instructions Start Date   Advair Diskus 100-50 MCG/DOSE DISKUS  Generic drug: Fluticasone-Salmeterol   1 puff, Inhalation, Every Morning      albuterol sulfate  (90 Base) MCG/ACT inhaler  Commonly known as: " PROVENTIL HFA;VENTOLIN HFA;PROAIR HFA   2 puffs, Inhalation, Every 4 Hours PRN      aspirin 325 MG EC tablet   325 mg, Oral, Daily      atorvastatin 20 MG tablet  Commonly known as: LIPITOR   TAKE ONE TABLET BY MOUTH DAILY      Calcium Citrate-Vitamin D 250-200 MG-UNIT tablet   1 tablet, Oral, Every Morning      cetirizine 10 MG tablet  Commonly known as: zyrTEC   10 mg, Oral, Daily      EPINEPHrine 0.3 MG/0.3ML solution auto-injector injection  Commonly known as: EPIPEN       montelukast 10 MG tablet  Commonly known as: SINGULAIR   10 mg, Oral, Every Morning      SYSTANE PRESERVATIVE FREE OP   1 drop, Ophthalmic, 2 Times Daily      VITAMIN B12 PO   1 tablet, Oral, Daily             Stop These Medications      cefdinir 300 MG capsule  Commonly known as: OMNICEF              Allergies   Allergen Reactions    Milk-Related Compounds Other (See Comments)     ACID REFLUX    Latex Rash    Tape Rash       Discharge Disposition:  Skilled Nursing Facility (DC - External)      Discharge Diet:  Diet Order   Procedures    Diet: Regular/House Diet, Cardiac Diets; Healthy Heart (2-3 Na+); Texture: Regular Texture (IDDSI 7); Fluid Consistency: Thin (IDDSI 0)       Discharge Activity:   Activity Instructions       Activity as Tolerated              CODE STATUS:    Code Status and Medical Interventions:   Ordered at: 11/18/23 2055     Code Status (Patient has no pulse and is not breathing):    CPR (Attempt to Resuscitate)     Medical Interventions (Patient has pulse or is breathing):    Full Support       Future Appointments   Date Time Provider Department Center   12/21/2023  1:00 PM Blue Sam APRN [facility]  [facility]   12/22/2023  1:00 PM Magui Hooker APRN [facility]      Contact information for follow-up providers       Blue Sam APRN Follow up on 12/21/2023.    Specialty: Internal Medicine  Contact information:  [address] [facility]  [address] [place] [address]  [phone]               Ravi Griffin,  MD. Schedule an appointment as soon as possible for a visit in 1 week(s).    Specialty: Internal Medicine  Why: Follow up 1 week after discharge from rehab  Contact information:  [address] [facility] [id number] [phone]               Magui Hooker APRN Follow up on 12/22/2023.    Specialties: Cardiology, Nurse Practitioner  Contact information:  [address] [facility] [address]  [facility] [address]  [phone]               Royce Duncan MD Follow up.    Specialty: Pulmonary Disease  Why: Follow up in clinic as recommended; call office for appointment  Contact information:  [address] [facility] [address]  [place] [address]  [phone]                       Contact information for after-discharge care       Destination       [facility] .    Service: Skilled Nursing  Contact information:  [address] [facility] [address] [phone]                                   Time Spent on Discharge:  Greater than 30 minutes      RAMON Chow  [facility]  11/28/23  13:05 EST

## 2023-11-28 NOTE — PLAN OF CARE
Goal Outcome Evaluation:  Plan of Care Reviewed With: patient        Progress: improving  Outcome Evaluation: Pt plans to d/c to SNF later today, agreeable to work on some strengthening exercises in sitting and standing today, had just been up to [facility], walked in room with nursing staff, pt performed B UE and B LE strengthening ex in sitting and standing,she fatigued with activity and needed rest breaks.      Anticipated Discharge Disposition (PT): skilled nursing facility

## 2023-11-28 NOTE — THERAPY TREATMENT NOTE
Patient Name: Judi Sy  : 1945    MRN: [id number]                              Today's Date: 2023       Admit Date: 2023    Visit Dx:     ICD-10-CM ICD-9-CM   1. Atrial fibrillation with RVR  I48.91 427.31     Patient Active Problem List   Diagnosis    Gastroesophageal reflux disease without esophagitis    Hiatal hernia    Hyperlipidemia    Pre-hypertension    Temporary cerebral vascular dysfunction    Osteoporosis    Closed displaced avulsion fracture of left talus    Acute meniscal tear of knee, left, sequela    Primary localized osteoarthrosis of right lower leg    Tear of medial meniscus of right knee, current    Closed nondisplaced fracture of condyle of left femur    Chondromalacia of right knee    Vitamin D deficiency    Status post total left knee replacement    Environmental and seasonal allergies    Viral upper respiratory tract infection    Popliteal synovial cyst, right    Valgus deformity of both great toes    Arthritis of first metatarsophalangeal (MTP) joint of left foot    Arthritis of first metatarsophalangeal (MTP) joint of right foot    Stress fracture of other site of femur due to multiple or repetitive stress    Screen for colon cancer    Asthma    Pleural effusion, bilateral    Left carotid artery stenosis    Atrial fibrillation with RVR    Acute respiratory failure with hypoxia    CAP (community acquired pneumonia)     Past Medical History:   Diagnosis Date    A-fib     Allergic     Aortic heart valve prolapse     Arthritis     Asthma due to seasonal allergies     Carotid artery stenosis     MILD    COVID-19 2021    High cholesterol     History of atrial fibrillation     Knee swelling     PFO (patent foramen ovale) 2016    HX CLOSURE    Right knee pain     Screen for colon cancer 11/15/2023    Stress fracture of other site of femur due to multiple or repetitive stress 2022    Tear of meniscus of knee     Tendinitis of knee     TIA (transient ischemic  attack)     PRIOR TO PFO CLOSURE    Vasovagal syncope      Past Surgical History:   Procedure Laterality Date    BREAST BIOPSY Left     over 15 years ago; benign    CARDIAC SURGERY      CATARACT EXTRACTION Left     KNEE ARTHROSCOPY Left 03/09/2018    Procedure: LEFT KNEE ARTHROSCOPY, PARTIAL MEDIAL MENISCECTOMY, AND MEDIAL FEMORAL SUBCHONDROPLASTY;  Surgeon: Kade Jacob MD;  Location: [facility];  Service: Orthopedics    KNEE ARTHROSCOPY Right 05/13/2022    Procedure: right KNEE ARTHROSCOPY with debridement and subchondral plasty of medial femoral condyle;  Surgeon: Kade Jacob MD;  Location: [facility];  Service: Orthopedics;  Laterality: Right;    PATENT FORAMEN OVALE CLOSURE      TONSILLECTOMY      TUBAL ABDOMINAL LIGATION        General Information       Row Name 11/28/23 1059          Physical Therapy Time and Intention    Document Type therapy note (daily note)  -     Mode of Treatment physical therapy  -       Row Name 11/28/23 1059          General Information    Existing Precautions/Restrictions fall;oxygen therapy device and L/min  -       Row Name 11/28/23 1059          Cognition    Orientation Status (Cognition) oriented x 4  -PC               User Key  (r) = Recorded By, (t) = Taken By, (c) = Cosigned By      Initials Name Provider Type    PC Stephie Reaves PT Physical Therapist                   Mobility       Row Name 11/28/23 1059          Bed Mobility    Supine-Sit [place] (Bed Mobility) supervision  -       Row Name 11/28/23 1059          Sit-Stand Transfer    Sit-Stand [place] (Transfers) contact guard  -     Assistive Device (Sit-Stand Transfers) walker, front-wheeled  -       Row Name 11/28/23 1059          Gait/Stairs (Locomotion)    Distance in Feet (Gait) pt had just gotten back to bed, not agreeable to walk but agreeable to do some exercises in sitting and standing  -     Deviations/Abnormal Patterns (Gait) gait speed  decreased;stride length decreased  -PC     Bilateral Gait Deviations forward flexed posture  -PC               User Key  (r) = Recorded By, (t) = Taken By, (c) = Cosigned By      Initials Name Provider Type    PC Stephie Reaves, PT Physical Therapist                   Obj/Interventions       Row Name 11/28/23 1100          Motor Skills    Therapeutic Exercise --  seated AP, LAQ, hip flexion, 10 reps, shoulder rolls and shoulder flexion with deep breathing 10 reps, standing up on toes, shallow knee bends, and high knees, needed seated rest in between and not able to complete 10 reps high knees due to fatigue  -PC               User Key  (r) = Recorded By, (t) = Taken By, (c) = Cosigned By      Initials Name Provider Type    PC Stephie Reaves, PT Physical Therapist                   Goals/Plan    No documentation.                  Clinical Impression       Row Name 11/28/23 1102          Plan of Care Review    Plan of Care Reviewed With patient  -PC     Outcome Evaluation Pt plans to d/c to SNF later today, agreeable to work on some strengthening exercises in sitting and standing today, had just been up to [facility], walked in room with nursing staff, pt performed B UE and B LE strengthening ex in sitting and standing,she fatigued with activity and needed rest breaks.  -PC       Row Name 11/28/23 1102          Positioning and Restraints    Pre-Treatment Position in bed  -PC     Post Treatment Position bed  -PC     In Bed supine;call light within reach;encouraged to call for assist  -PC               User Key  (r) = Recorded By, (t) = Taken By, (c) = Cosigned By      Initials Name Provider Type    Stephie Alves, PT Physical Therapist                   Outcome Measures       Row Name 11/28/23 1104 11/28/23 0300       How much help from another person do you currently need...    Turning from your back to your side while in flat bed without using bedrails? 4  -PC 4  -JL    Moving from lying on back to sitting on the side  of a flat bed without bedrails? 4  -PC 4  -JL    Moving to and from a bed to a chair (including a wheelchair)? 3  -PC 3  -JL    Standing up from a chair using your arms (e.g., wheelchair, bedside chair)? 3  -PC 3  -JL    Climbing 3-5 steps with a railing? 2  -PC 2  -JL    To walk in hospital room? 3  -PC 3  -JL    AM-PAC 6 Clicks Score (PT) 19  -PC 19  -JL    Highest Level of Mobility Goal 6 --> Walk 10 steps or more  -PC 6 --> Walk 10 steps or more  -JL              User Key  (r) = Recorded By, (t) = Taken By, (c) = Cosigned By      Initials Name Provider Type    PC Stephie Reaves, PT Physical Therapist    Elvin Henriquez, EFRAIN Registered Nurse                                 Physical Therapy Education       Title: PT OT SLP Therapies (Done)       Topic: Physical Therapy (Done)       Point: Mobility training (Done)       Learning Progress Summary             Patient Acceptance, E,D, DU by  at 11/28/2023 1104    Acceptance, E,D, DU by  at 11/27/2023 1032    Acceptance, E, NR by  at 11/26/2023 1218    Acceptance, E,TB, VU by  at 11/26/2023 0342    Acceptance, E, NR by  at 11/25/2023 0409    Acceptance, E,TB,D, VU by  at 11/23/2023 1209                         Point: Home exercise program (Done)       Learning Progress Summary             Patient Acceptance, E,D, DU by PC at 11/28/2023 1104    Acceptance, E,D, DU by PC at 11/27/2023 1032    Acceptance, E, NR by  at 11/26/2023 1218    Acceptance, E,TB, VU by  at 11/26/2023 0342    Acceptance, E, NR by  at 11/25/2023 0409    Acceptance, E,TB,D, VU by  at 11/23/2023 1209                         Point: Body mechanics (Done)       Learning Progress Summary             Patient Acceptance, E,D, DU by PC at 11/28/2023 1104    Acceptance, E,D, DU by  at 11/27/2023 1032    Acceptance, E, NR by  at 11/26/2023 1218    Acceptance, E,TB, VU by  at 11/26/2023 0342    Acceptance, E, NR by SHAKIRA at 11/25/2023 0409    Acceptance, E,TB,D, VU by PH at 11/23/2023 1209                          Point: Precautions (Done)       Learning Progress Summary             Patient Acceptance, E,D, DU by PC at 11/28/2023 1104    Acceptance, E,D, DU by PC at 11/27/2023 1032    Acceptance, E, NR by  at 11/26/2023 1218    Acceptance, E,TB, VU by  at 11/26/2023 0342    Acceptance, E, NR by  at 11/25/2023 0409    Acceptance, E,TB,D, VU by  at 11/23/2023 1209                                         User Key       Initials Effective Dates Name Provider Type Discipline    PC 06/16/21 -  Stephie Reaves, PT Physical Therapist PT    SV 07/11/23 -  Lauryn Smith PT Physical Therapist PT     06/16/21 -  Key Jeong PTA Physical Therapist Assistant PT     10/20/20 -  Elvin Oro, RN Registered Nurse Nurse     02/03/22 -  Moises Rendon, RN Registered Nurse Nurse                  PT Recommendation and Plan     Plan of Care Reviewed With: patient  Progress: improving  Outcome Evaluation: Pt plans to d/c to SNF later today, agreeable to work on some strengthening exercises in sitting and standing today, had just been up to [facility], walked in room with nursing staff, pt performed B UE and B LE strengthening ex in sitting and standing,she fatigued with activity and needed rest breaks.     Time Calculation:         PT Charges       Row Name 11/28/23 1105             Time Calculation    Start Time 1027  -PC      Stop Time 1048  -PC      Time Calculation (min) 21 min  -PC      PT Received On 11/28/23  -PC      PT - Next Appointment 11/29/23  -PC                User Key  (r) = Recorded By, (t) = Taken By, (c) = Cosigned By      Initials Name Provider Type    PC Stephie Reaves, PT Physical Therapist                  Therapy Charges for Today       Code Description Service Date Service Provider Modifiers Qty    25973925892 HC PT THER PROC EA 15 MIN 11/27/2023 Stephie Reaves, PT GP 2    16191498918 HC PT THERAPEUTIC ACT EA 15 MIN 11/28/2023 Stephie Reaves, PT GP 1            PT G-Codes  Outcome  Measure Options: AM-PAC 6 Clicks Basic Mobility (PT)  AM-PAC 6 Clicks Score (PT): 19  PT Discharge Summary  Anticipated Discharge Disposition (PT): skilled nursing facility    Stephie Reaves, PT  11/28/2023

## 2023-12-07 ENCOUNTER — TELEPHONE (OUTPATIENT)
Dept: INTERNAL MEDICINE | Age: 78
End: 2023-12-07
Payer: MEDICARE

## 2023-12-07 NOTE — TELEPHONE ENCOUNTER
Zee with [facility] called stating pt will be discharged from rehab tomorrow and she was calling for home health orders for nursing, pt and OT. Call back number to confirm verbal orders [phone].

## 2023-12-08 ENCOUNTER — TRANSCRIBE ORDERS (OUTPATIENT)
Dept: HOME HEALTH SERVICES | Facility: HOME HEALTHCARE | Age: 78
End: 2023-12-08
Payer: MEDICARE

## 2023-12-08 ENCOUNTER — HOME HEALTH ADMISSION (OUTPATIENT)
Dept: HOME HEALTH SERVICES | Facility: HOME HEALTHCARE | Age: 78
End: 2023-12-08
Payer: MEDICARE

## 2023-12-08 ENCOUNTER — DOCUMENTATION (OUTPATIENT)
Dept: HOME HEALTH SERVICES | Facility: HOME HEALTHCARE | Age: 78
End: 2023-12-08
Payer: MEDICARE

## 2023-12-08 DIAGNOSIS — I50.33 ACUTE ON CHRONIC DIASTOLIC HEART FAILURE: Primary | ICD-10-CM

## 2023-12-08 NOTE — PROGRESS NOTES
Spoke with patient and spouse at [facility] 12/7/23, instructed regarding home health services, verified address and phone.  Patient agreeable to home care if primary care provider orders services, has not had home care services in the past. When scheduling visits, home health staff should leave a message with callback number if patient/spouse do not answer.

## 2023-12-10 ENCOUNTER — HOME CARE VISIT (OUTPATIENT)
Dept: HOME HEALTH SERVICES | Facility: HOME HEALTHCARE | Age: 78
End: 2023-12-10

## 2023-12-15 ENCOUNTER — HOSPITAL ENCOUNTER (OUTPATIENT)
Facility: HOSPITAL | Age: 78
Discharge: HOME | End: 2023-12-15
Payer: MEDICARE

## 2023-12-15 ENCOUNTER — LAB (OUTPATIENT)
Facility: HOSPITAL | Age: 78
End: 2023-12-15
Payer: MEDICARE

## 2023-12-15 DIAGNOSIS — J90 PLEURAL EFFUSION, BILATERAL: ICD-10-CM

## 2023-12-15 PROCEDURE — 71046 X-RAY EXAM CHEST 2 VIEWS: CPT

## 2023-12-15 PROCEDURE — 80061 LIPID PANEL: CPT | Performed by: NURSE PRACTITIONER

## 2023-12-15 PROCEDURE — 82306 VITAMIN D 25 HYDROXY: CPT | Performed by: NURSE PRACTITIONER

## 2023-12-15 PROCEDURE — 84443 ASSAY THYROID STIM HORMONE: CPT | Performed by: NURSE PRACTITIONER

## 2023-12-15 PROCEDURE — 84439 ASSAY OF FREE THYROXINE: CPT | Performed by: NURSE PRACTITIONER

## 2023-12-15 PROCEDURE — 85025 COMPLETE CBC W/AUTO DIFF WBC: CPT | Performed by: NURSE PRACTITIONER

## 2023-12-15 PROCEDURE — 80053 COMPREHEN METABOLIC PANEL: CPT | Performed by: NURSE PRACTITIONER

## 2023-12-21 ENCOUNTER — OFFICE VISIT (OUTPATIENT)
Dept: INTERNAL MEDICINE | Age: 78
End: 2023-12-21
Payer: MEDICARE

## 2023-12-21 ENCOUNTER — TRANSCRIBE ORDERS (OUTPATIENT)
Dept: ADMINISTRATIVE | Facility: HOSPITAL | Age: 78
End: 2023-12-21
Payer: MEDICARE

## 2023-12-21 VITALS
SYSTOLIC BLOOD PRESSURE: 118 MMHG | DIASTOLIC BLOOD PRESSURE: 78 MMHG | HEART RATE: 95 BPM | WEIGHT: 157.6 LBS | TEMPERATURE: 98.6 F | HEIGHT: 66 IN | OXYGEN SATURATION: 98 % | BODY MASS INDEX: 25.33 KG/M2

## 2023-12-21 DIAGNOSIS — E78.5 HYPERLIPIDEMIA, UNSPECIFIED HYPERLIPIDEMIA TYPE: ICD-10-CM

## 2023-12-21 DIAGNOSIS — I48.0 PAROXYSMAL ATRIAL FIBRILLATION: Primary | ICD-10-CM

## 2023-12-21 DIAGNOSIS — R91.1 LUNG NODULE: Primary | ICD-10-CM

## 2023-12-21 DIAGNOSIS — I50.30 DIASTOLIC CONGESTIVE HEART FAILURE, UNSPECIFIED HF CHRONICITY: ICD-10-CM

## 2023-12-21 PROCEDURE — 99214 OFFICE O/P EST MOD 30 MIN: CPT | Performed by: NURSE PRACTITIONER

## 2023-12-21 RX ORDER — ATORVASTATIN CALCIUM 40 MG/1
40 TABLET, FILM COATED ORAL DAILY
Qty: 90 TABLET | Refills: 1 | Status: SHIPPED | OUTPATIENT
Start: 2023-12-21

## 2023-12-21 NOTE — PROGRESS NOTES
"[facility] INTERNAL MEDICINE  RAMON Merino Sy / 78 y.o. / female  12/21/2023      CC:   Asthma, Hypertension, and Hyperlipidemia      VITALS    Visit Vitals  /78 (BP Location: Right arm, Patient Position: Sitting, Cuff Size: Adult)   Pulse 95   Temp 98.6 °F (37 °C) (Temporal)   Ht 167.6 cm (66\")   Wt 71.5 kg (157 lb 9.6 oz)   SpO2 98%   BMI 25.44 kg/m²       BP Readings from Last 3 Encounters:   12/21/23 118/78   11/28/23 130/79   11/15/23 134/86     Wt Readings from Last 3 Encounters:   12/21/23 71.5 kg (157 lb 9.6 oz)   11/28/23 76 kg (167 lb 8.8 oz)   11/15/23 78.3 kg (172 lb 9.6 oz)      Body mass index is 25.44 kg/m².    HPI:     Date of admission/discharge: 11/18/2023 - 11/28/2023  Hospital: [facility]  Principle Dx: Atrial fibrillation with RVR CAP, acute respiratory failure with hypoxia, asthma, pleural effusion bilateral  Secondary Dx: Hyperlipidemia  History prior to hospitalization: Patient originally presented to the emergency room with a history of hyperlipidemia, PFO closure, hypertension, carotid artery disease originally complaining of dyspnea.  Pleural effusions on CT when she was feeling shortness of breath in [facility].    Evaluation/Treatment: She was found to have A-fib RVR and was admitted to the hospital for further evaluation and treatment.  She was started on IV diuretics for evidence of pleural effusions.  CT completed showing moderately enlarged bilateral pleural effusions greater on the right with faint ground glass opacities from the possible pulmonary edema, nodular density related to minor fissure difficult to characterize and 3.8 centimeters ectasia of ascending thoracic aorta with a recommendation to repeat CT chest in 3 months.  Cardiology consulted which transition from IV to p.o. furosemide.  Echo was completed which showed left ventricular systolic function normal, EF of 63.4%, left ventricular diastolic function indeterminate, right " ventricular systolic pressure from tricuspid regurg normal.  Very slow improvement.  Remained on room air Tapin required O2 replacement 0.5 L nasal cannula.  Serial chest x-rays were completed and overall stable.  Pulmonary was evaluated with no signs of pneumonia further evaluation.  Decided that pleural effusions are likely related to diastolic heart failure.  Thoracentesis at the time was not recommended.  It was decided that pulmonary would follow-up in 3 months to ensure improvement with a CT of the chest it was recommended she follow-up with their care team outpatient.  She was discharged to SNF for continued OT PT. she was discharged on Eliquis, digoxin and Lasix.   Course:     Patient also presents for 1 month follow-up.     Asthma and environmental allergies are managed by family allergy and asthma in which she receives routine allergy injections.  For asthma she is on Advair daily, rescue inhaler as needed, did note she needs new rescue inhaler prescribed today, Singulair, Zyrtec.  Over the last few months she had worsening symptoms due to pleural effusion.     Hyperlipidemia no myalgias with atorvastatin 20 mg daily.  Last LDL of 93.  No elevation in liver enzymes.     Patient has seen cardiology in the past May 2022 by Dr. Morgan for PFO and carotid stenosis of the left which has been asymptomatic.  Closure of PFO in 2016.  Recurrent TIA and hyperlipidemia. Patient had worsening osteoarthritis and needed right knee replacement and wanted to come off Plavix.  Plavix was able to be held for surgery as it was only on board for neurological reasons.  Last echocardiogram showed no evidence of residual PFO and there is no indication for beta-blocker.  Last EKG showed sinus rhythm with border line left axis deviation and borderline left atrial abnormality.  It was recommended she have a repeat carotid ultrasound in 1 year which would have been May 2023.  It has now been updated with carotid stenosis bilateral  less than 50%.  Due to recent hospitalization cardiology appointment was rescheduled for tomorrow.     Today she is still having some mild shortness of breath that was seen by her pulmonologist yesterday, repeat chest x-ray does show significant improvement in pleural effusion.  Plan CT of the chest for repeat in February.      Patient Care Team:  Blue Sam APRN as PCP - General (Internal Medicine)  Ravi Griffin MD as PCP - Family Medicine  Den Morgan Jr., MD as Consulting Physician (Cardiology)  ____________________________________________________________________    ASSESSMENT & PLAN:    1. Paroxysmal atrial fibrillation    2. Diastolic congestive heart failure, unspecified HF chronicity    3. Hyperlipidemia, unspecified hyperlipidemia type      Orders Placed This Encounter   Procedures    Comprehensive Metabolic Panel    BNP ([facility] Only)    TSH+Free T4    CBC w AUTO Differential       Summary/Discussion:  Keep follow-up with cardiology tomorrow for atrial fibrillation and diastolic congestive heart failure.  Would recommend waiting once daily with rapid weight gain of 3 pounds or more requiring called our office or cardiology for likely modification of Lasix or further evaluation treatment  Will keep CT of the chest follow-up in February  We will go ahead and complete labs including BMP CMP TSH and CBC for cardiology have for tomorrow appointment  Follow-up with primary care in 3 months or chronic medical, earlier if needed    30 minutes were spent in reviewing history assessment plan and documentation    Return in about 3 months (around 3/21/2024) for Next scheduled follow up.    ____________________________________________________________________    REVIEW OF SYSTEMS    Review of Systems  Constitutional neg except per HPI   Resp shortness of breath with exertion up stairs  CV neg     PHYSICAL EXAMINATION    Physical Exam  Constitutional  No distress  Cardiovascular Rate  normal . Rhythm:  irregular . Heart sounds:  normal  Pulmonary/Chest  Effort normal. Breath sounds:  normal  Psychiatric  Alert. Judgment and thought content normal. Mood normal      REVIEWED DATA:    Labs:   No visits with results within 14 Day(s) from this visit.   Latest known visit with results is:   No results displayed because visit has over 200 results.          Imaging:   XR Chest 2 View    Result Date: 12/18/2023  Narrative: XR CHEST 2 VW-12/15/2023  HISTORY: Possible pleural effusions.  Heart size is within normal limits. On the previous lateral view of 11/24/2023 there are moderate bilateral pleural effusions. The posterior costophrenic angles have cleared since the previous study. There is some mild atelectasis, chronic parenchymal change or residual infiltrate in the right lung base. Left lung appears clear. Radiopaque closure device is seen overlying the heart.      Impression: 1. No pleural effusions are seen on the current study. 2. There is some minimal chronic parenchymal change, atelectasis or residual infiltrate in the right lung base.   This report was finalized on 12/18/2023 12:33 PM by Dr. Ronnie Baker M.D on Workstation: [id number]      XR Chest 1 View    Result Date: 11/28/2023  Narrative: CHEST SINGLE VIEW  HISTORY: Hypoxia, shortness of air.  COMPARISON: AP and lateral chest 11/24/2023, AP chest 11/22/2023.  FINDINGS: Heart size is enlarged. There remains left basilar opacity silhouetting the left hemidiaphragm attributed to commendation of pleural fluid and associated atelectasis or infiltrate. This does not appear changed. Small right pleural effusion has not changed. There is no evidence for perihilar edema or pneumothorax. Cardiac monitoring leads are present      Impression: No significant change compared to exam 3 days ago.  This report was finalized on 11/28/2023 7:21 AM by Dr. Javier Davis M.D on Workstation: [id number]      XR Ankle 2 View Right    Result Date: 11/26/2023  Narrative: RIGHT  ANKLE: AP, LATERAL  HISTORY: Ankle pain.  FINDINGS: There are degenerative heel spurs. There is no evidence for fracture or acute abnormality of the right ankle on 2 views. The soft tissues appear within normal limits.      Impression: No evidence for acute abnormality of the right ankle.  This report was finalized on 11/26/2023 1:38 PM by Dr. Javier Davis M.D on Workstation: [id number]      XR Chest PA & Lateral    Result Date: 11/24/2023  Narrative: XR CHEST PA AND LATERAL-  HISTORY: Female who is 78 years-old, pleural effusion  TECHNIQUE: Frontal and lateral views of the chest  COMPARISON: 11/22/2023  FINDINGS: The heart is enlarged. Aorta is calcified. Pulmonary vasculature is unremarkable. Mild to moderate bilateral pleural effusions appear similar to prior exam, with bibasilar likely atelectasis, mildly decreased on the left. No pneumothorax. Otherwise stable.      Impression: As described.  This report was finalized on 11/24/2023 10:50 AM by Dr. Silvano Lam M.D on Workstation: [id number]      XR Chest 1 View    Result Date: 11/22/2023  Narrative: XR CHEST 1 VW-  HISTORY: Female who is 78 years-old, pleural effusions  TECHNIQUE: Frontal views of the chest  COMPARISON: 11/18/2023  FINDINGS: The heart is enlarged. Pulmonary vasculature is less congested. Bibasilar atelectasis/infiltrate and pleural effusions appear similar to prior exam. No pneumothorax. Otherwise stable.      Impression: As described.    This report was finalized on 11/22/2023 9:18 AM by Dr. Silvano Lam M.D on Workstation: [id number]        Medical Tests:        Summary of old records / correspondence / consultant report:   DC summary re: issues addressed on HPI    Request outside records:       ALLERGIES  Allergies   Allergen Reactions    Milk-Related Compounds Other (See Comments)     ACID REFLUX    Latex Rash    Tape Rash        MEDICATIONS   Current Outpatient Medications   Medication Sig Dispense Refill    Advair Diskus 100-50  MCG/DOSE DISKUS Inhale 1 puff Every Morning.      albuterol sulfate  (90 Base) MCG/ACT inhaler Inhale 2 puffs Every 4 (Four) Hours As Needed for Shortness of Air. 6.7 g 0    apixaban (ELIQUIS) 5 MG tablet tablet Take 1 tablet by mouth Every 12 (Twelve) Hours. Indications: Atrial Fibrillation      aspirin  MG tablet Take 1 tablet by mouth Daily. 30 tablet 0    Calcium Citrate-Vitamin D 250-200 MG-UNIT tablet Take 1 tablet by mouth Every Morning.      cetirizine (zyrTEC) 10 MG tablet Take 1 tablet by mouth Daily.      Cyanocobalamin (VITAMIN B12 PO) Take 1 tablet by mouth Daily.      digoxin (LANOXIN) 125 MCG tablet Take 1 tablet by mouth Daily.      diphenhydrAMINE (BENADRYL) 25 mg capsule Take 1 capsule by mouth Every 6 (Six) Hours As Needed for Itching.      EPINEPHrine (EPIPEN) 0.3 MG/0.3ML solution auto-injector injection       furosemide (LASIX) 40 MG tablet Take 1 tablet by mouth 2 (Two) Times a Day.      metoprolol tartrate (LOPRESSOR) 50 MG tablet Take 1 tablet by mouth Every 12 (Twelve) Hours.      montelukast (SINGULAIR) 10 MG tablet Take 1 tablet by mouth Every Morning.      Polyethyl Glycol-Propyl Glycol (SYSTANE PRESERVATIVE FREE OP) Apply 1 drop to eye(s) as directed by provider 2 (Two) Times a Day.      ammonium lactate (AMLACTIN) 12 % cream Apply 1 g topically to the appropriate area as directed Every 12 (Twelve) Hours As Needed for Dry Skin. (Patient not taking: Reported on 12/21/2023)      atorvastatin (Lipitor) 40 MG tablet Take 1 tablet by mouth Daily. 90 tablet 1     No current facility-administered medications for this visit.       Current outpatient and discharge medications have been reconciled for the patient.  Reviewed by: RAMON Merino       Critical access hospital:     The following portions of the patient's history were reviewed and updated as appropriate: Allergies / Current Medications / Past Medical History / Surgical History / Social History / Family History    PROBLEM LIST   Patient  Active Problem List   Diagnosis    Gastroesophageal reflux disease without esophagitis    Hiatal hernia    Hyperlipidemia    Pre-hypertension    Temporary cerebral vascular dysfunction    Osteoporosis    Closed displaced avulsion fracture of left talus    Acute meniscal tear of knee, left, sequela    Primary localized osteoarthrosis of right lower leg    Tear of medial meniscus of right knee, current    Closed nondisplaced fracture of condyle of left femur    Chondromalacia of right knee    Vitamin D deficiency    Status post total left knee replacement    Environmental and seasonal allergies    Viral upper respiratory tract infection    Popliteal synovial cyst, right    Valgus deformity of both great toes    Arthritis of first metatarsophalangeal (MTP) joint of left foot    Arthritis of first metatarsophalangeal (MTP) joint of right foot    Stress fracture of other site of femur due to multiple or repetitive stress    Screen for colon cancer    Asthma    Pleural effusion, bilateral    Left carotid artery stenosis    Atrial fibrillation with RVR    Acute respiratory failure with hypoxia    CAP (community acquired pneumonia)       PAST MEDICAL HISTORY  Past Medical History:   Diagnosis Date    A-fib     Allergic     Aortic heart valve prolapse     Arthritis     Asthma due to seasonal allergies     Carotid artery stenosis     MILD    COVID-19 12/2021    High cholesterol     History of atrial fibrillation     Knee swelling     PFO (patent foramen ovale) 2016    HX CLOSURE    Right knee pain     Screen for colon cancer 11/15/2023    Stress fracture of other site of femur due to multiple or repetitive stress 05/04/2022    Tear of meniscus of knee     Tendinitis of knee     TIA (transient ischemic attack)     PRIOR TO PFO CLOSURE    Vasovagal syncope        SURGICAL HISTORY  Past Surgical History:   Procedure Laterality Date    BREAST BIOPSY Left     over 15 years ago; benign    CARDIAC SURGERY      CATARACT EXTRACTION  Left     KNEE ARTHROSCOPY Left 2018    Procedure: LEFT KNEE ARTHROSCOPY, PARTIAL MEDIAL MENISCECTOMY, AND MEDIAL FEMORAL SUBCHONDROPLASTY;  Surgeon: Kade Jacob MD;  Location: [facility] OR [facility];  Service: Orthopedics    KNEE ARTHROSCOPY Right 2022    Procedure: right KNEE ARTHROSCOPY with debridement and subchondral plasty of medial femoral condyle;  Surgeon: Kade Jacob MD;  Location: [facility] OR [facility];  Service: Orthopedics;  Laterality: Right;    PATENT FORAMEN OVALE CLOSURE      TONSILLECTOMY      TUBAL ABDOMINAL LIGATION         SOCIAL HISTORY  Social History     Socioeconomic History    Marital status:    Tobacco Use    Smoking status: Former     Packs/day: 0.50     Years: 10.00     Additional pack years: 0.00     Total pack years: 5.00     Types: Cigarettes     Quit date: 2012     Years since quittin.9    Smokeless tobacco: Never    Tobacco comments:     STARTED SMOKING AGE 18   Vaping Use    Vaping Use: Never used   Substance and Sexual Activity    Alcohol use: No     Comment: caffeine use- coffee    Drug use: Never    Sexual activity: Not Currently     Partners: Male       FAMILY HISTORY  Family History   Problem Relation Age of Onset    Heart disease Father     Heart disease Maternal Grandmother     Breast cancer Mother         50's    Malig Hyperthermia Neg Hx      *dragon dictation used for documentation.

## 2023-12-22 ENCOUNTER — LAB (OUTPATIENT)
Facility: HOSPITAL | Age: 78
End: 2023-12-22
Payer: MEDICARE

## 2023-12-22 ENCOUNTER — OFFICE VISIT (OUTPATIENT)
Dept: CARDIOLOGY | Facility: CLINIC | Age: 78
End: 2023-12-22
Payer: MEDICARE

## 2023-12-22 VITALS
HEIGHT: 66 IN | BODY MASS INDEX: 25.26 KG/M2 | HEART RATE: 94 BPM | DIASTOLIC BLOOD PRESSURE: 70 MMHG | SYSTOLIC BLOOD PRESSURE: 116 MMHG | WEIGHT: 157.2 LBS

## 2023-12-22 DIAGNOSIS — E78.2 MIXED HYPERLIPIDEMIA: ICD-10-CM

## 2023-12-22 DIAGNOSIS — I77.810 THORACIC AORTIC ECTASIA: ICD-10-CM

## 2023-12-22 DIAGNOSIS — J90 PLEURAL EFFUSION, BILATERAL: ICD-10-CM

## 2023-12-22 DIAGNOSIS — I48.91 ATRIAL FIBRILLATION WITH RVR: ICD-10-CM

## 2023-12-22 DIAGNOSIS — I50.31 ACUTE DIASTOLIC CHF (CONGESTIVE HEART FAILURE): ICD-10-CM

## 2023-12-22 DIAGNOSIS — I10 ESSENTIAL HYPERTENSION: ICD-10-CM

## 2023-12-22 DIAGNOSIS — Z51.81 ENCOUNTER FOR MEDICATION MONITORING: ICD-10-CM

## 2023-12-22 DIAGNOSIS — I48.91 ATRIAL FIBRILLATION WITH RVR: Primary | ICD-10-CM

## 2023-12-22 LAB
ALBUMIN SERPL-MCNC: 4.1 G/DL (ref 3.8–4.8)
ALBUMIN/GLOB SERPL: 2 {RATIO} (ref 1.2–2.2)
ALP SERPL-CCNC: 95 IU/L (ref 44–121)
ALT SERPL-CCNC: 33 IU/L (ref 0–32)
AST SERPL-CCNC: 32 IU/L (ref 0–40)
BASOPHILS # BLD AUTO: 0.1 X10E3/UL (ref 0–0.2)
BASOPHILS NFR BLD AUTO: 1 %
BILIRUB SERPL-MCNC: 0.4 MG/DL (ref 0–1.2)
BNP SERPL-MCNC: 12.2 PG/ML (ref 0–100)
BUN SERPL-MCNC: 10 MG/DL (ref 8–27)
BUN/CREAT SERPL: 11 (ref 12–28)
CALCIUM SERPL-MCNC: 9.9 MG/DL (ref 8.7–10.3)
CHLORIDE SERPL-SCNC: 100 MMOL/L (ref 96–106)
CO2 SERPL-SCNC: 30 MMOL/L (ref 20–29)
CREAT SERPL-MCNC: 0.9 MG/DL (ref 0.57–1)
DIGOXIN SERPL-MCNC: 0.9 NG/ML (ref 0.6–1.2)
EGFRCR SERPLBLD CKD-EPI 2021: 66 ML/MIN/1.73
EOSINOPHIL # BLD AUTO: 0.1 X10E3/UL (ref 0–0.4)
EOSINOPHIL NFR BLD AUTO: 2 %
ERYTHROCYTE [DISTWIDTH] IN BLOOD BY AUTOMATED COUNT: 13.3 % (ref 11.7–15.4)
GLOBULIN SER CALC-MCNC: 2.1 G/DL (ref 1.5–4.5)
GLUCOSE SERPL-MCNC: 115 MG/DL (ref 70–99)
HCT VFR BLD AUTO: 38.6 % (ref 34–46.6)
HGB BLD-MCNC: 13.3 G/DL (ref 11.1–15.9)
IMM GRANULOCYTES # BLD AUTO: 0 X10E3/UL (ref 0–0.1)
IMM GRANULOCYTES NFR BLD AUTO: 0 %
LYMPHOCYTES # BLD AUTO: 1.8 X10E3/UL (ref 0.7–3.1)
LYMPHOCYTES NFR BLD AUTO: 28 %
MCH RBC QN AUTO: 30.6 PG (ref 26.6–33)
MCHC RBC AUTO-ENTMCNC: 34.5 G/DL (ref 31.5–35.7)
MCV RBC AUTO: 89 FL (ref 79–97)
MONOCYTES # BLD AUTO: 0.7 X10E3/UL (ref 0.1–0.9)
MONOCYTES NFR BLD AUTO: 10 %
NEUTROPHILS # BLD AUTO: 3.8 X10E3/UL (ref 1.4–7)
NEUTROPHILS NFR BLD AUTO: 59 %
PLATELET # BLD AUTO: 248 X10E3/UL (ref 150–450)
POTASSIUM SERPL-SCNC: 3.7 MMOL/L (ref 3.5–5.2)
PROT SERPL-MCNC: 6.2 G/DL (ref 6–8.5)
RBC # BLD AUTO: 4.34 X10E6/UL (ref 3.77–5.28)
SODIUM SERPL-SCNC: 143 MMOL/L (ref 134–144)
T4 FREE SERPL-MCNC: 1.35 NG/DL (ref 0.82–1.77)
TSH SERPL DL<=0.005 MIU/L-ACNC: 2.81 UIU/ML (ref 0.45–4.5)
WBC # BLD AUTO: 6.5 X10E3/UL (ref 3.4–10.8)

## 2023-12-22 PROCEDURE — 80162 ASSAY OF DIGOXIN TOTAL: CPT

## 2023-12-22 PROCEDURE — 36415 COLL VENOUS BLD VENIPUNCTURE: CPT

## 2023-12-22 NOTE — PROGRESS NOTES
"    CARDIOLOGY        Patient Name: Judi Sy  :1945  Age: 78 y.o.  Primary Cardiologist: Den Morgan MD  Encounter Provider:  Jos Frye PA-C    Date of Service: 23            CHIEF COMPLAINT / REASON FOR OFFICE VISIT   3-week follow-up      HISTORY OF PRESENT ILLNESS       HPI  Judi Sy is a 78 y.o. female who presents today for 3 follow-up.     Pt has a  history significant for hypertension, hyperlipidemia, CHF, and paroxysmal A-fib presents for 3 week hospital follow-up.  Patient was admitted on 2023 for new onset A-fib with RVR, bilateral pleural effusions, and hypoxia.  Patient was diuresed along with rate controlled/anticoagulated and discharged to SNF along with addition of Metoprolol, Digoxin, Lasix, and Eliquis.  Patient saw her PCP yesterday.    Patient has been doing better since her hospital stay.  Patient went to skilled nurse facility for rehab.  Patient still occasionally gets short of breath when going up steps has improved. No chest pain or palpitations.  Patient has been tolerating her medications without issues.  Patient denies any increased swelling to her legs, bleeding issues, or orthopnea.  Patient states she can tell when she is in A-fib now.  Patient's appetite has returned since leaving the hospital as well.    The following portions of the patient's history were reviewed and updated as appropriate: allergies, current medications, past family history, past medical history, past social history, past surgical history and problem list.      VITAL SIGNS     Visit Vitals  /70 (BP Location: Right arm)   Pulse 94   Ht 167.6 cm (66\")   Wt 71.3 kg (157 lb 3.2 oz)   BMI 25.37 kg/m²         Wt Readings from Last 3 Encounters:   23 71.3 kg (157 lb 3.2 oz)   23 71.5 kg (157 lb 9.6 oz)   23 76 kg (167 lb 8.8 oz)     Body mass index is 25.37 kg/m².        PHYSICAL EXAMINATION     Constitutional:       General: Awake.      " Appearance: Not in distress.   Neck:      Vascular: No JVD.   Pulmonary:      Effort: Pulmonary effort is normal.      Breath sounds: Normal breath sounds.   Cardiovascular:      Normal rate. Regular rhythm.   Pulses:     Intact distal pulses.   Edema:     Peripheral edema absent.   Skin:     General: Skin is warm.   Neurological:      Mental Status: Alert and easily aroused.   Psychiatric:         Behavior: Behavior is cooperative.           REVIEWED DATA       ECG 12 Lead    Date/Time: 12/22/2023 1:18 PM  Performed by: Jos Frye PA-C    Authorized by: Jos Frye PA-C  Comparison: compared with previous ECG   Rhythm: sinus rhythm  Ectopy: atrial premature contractions  BPM: 94  T flattening: V4, V5 and V6  QRS axis: normal  Comments: Similar to EKG on 5/6/2022        Cardiac Procedures:    Transthoracic echo on 11/19/2023  Interpretation Summary         Left ventricular systolic function is normal. Calculated left ventricular EF = 63.4%    Left ventricular diastolic function was indeterminate.    Estimated right ventricular systolic pressure from tricuspid regurgitation is normal (<35 mmHg).     Carotid duplex on 11/16/2023  Study Findings         Right CCA Prox:  No plaque visualized. Intima-medial thickening noted.         Right ICA Prox:  Plaque present.        Right ECA:  No plaque visualized.        Right Vertebral:  Antegrade flow noted.              Left CCA Prox:  No plaque visualized. Intima-medial thickening noted.        Left CCA Dist:  No plaque visualized. Intima-medial thickening noted.        Left ICA Prox:  Plaque present.        Left ECA:  No plaque visualized.        Left Vertebral:  Antegrade flow noted.     Study Impression        Right CCA Dist:  Imaging indicates atherosclerotic plaque.       Right ICA:  Imaging indicates <50% stenosis.        Right ECA:  Imaging indicates patent flow.       Right Vertebral:  Antegrade flow is present.           Left CCA Dist: Imaging  indicates atherosclerotic plaque.       Left ICA:  Imaging indicates <50% stenosis.        Left ECA:  Imaging indicates patent flow.       Left Vertebral:  Antegrade flow is present.               Carotid Duplex Conclusions: Atherosclerotic plaque distal right and left common carotid arteries extending into the proximal internal carotid arteries, with less than 50% ICA stenosis bilaterally.         Lipid Panel          1/4/2023    09:57 8/25/2023    09:06 12/15/2023    10:10   Lipid Panel   Total Cholesterol   163    Total Cholesterol 178  153     Triglycerides 72  63  119    HDL Cholesterol 64  60  49    VLDL Cholesterol 14  13  21    LDL Cholesterol  100  80  93    LDL/HDL Ratio 1.56          Lab Results   Component Value Date     12/21/2023     12/15/2023    K 3.7 12/21/2023    K 3.9 12/15/2023     12/21/2023     12/15/2023    CO2 30 (H) 12/21/2023    CO2 30.0 (H) 12/15/2023    BUN 10 12/21/2023    BUN 10 12/15/2023    CREATININE 0.90 12/21/2023    CREATININE 0.77 12/15/2023    EGFRIFNONA 74 11/23/2021    EGFRIFNONA 88 11/03/2021    EGFRIFAFRI 98 10/06/2021    EGFRIFAFRI 99 03/31/2021    GLUCOSE 115 (H) 12/21/2023    GLUCOSE 126 (H) 12/15/2023    CALCIUM 9.9 12/21/2023    CALCIUM 9.8 12/15/2023    PROTENTOTREF 6.2 12/21/2023    PROTENTOTREF 6.3 08/25/2023    ALBUMIN 4.1 12/21/2023    ALBUMIN 4.0 12/15/2023    BILITOT 0.4 12/21/2023    BILITOT 0.4 12/15/2023    AST 32 12/21/2023    AST 25 12/15/2023    ALT 33 (H) 12/21/2023    ALT 28 12/15/2023     Lab Results   Component Value Date    WBC 6.5 12/21/2023    WBC 5.84 12/15/2023    HGB 13.3 12/21/2023    HGB 13.0 12/15/2023    HCT 38.6 12/21/2023    HCT 39.2 12/15/2023    MCV 89 12/21/2023    MCV 91.6 12/15/2023     12/21/2023     12/15/2023     Lab Results   Component Value Date    PROBNP 2,474.0 (H) 11/18/2023    BNP 12.2 12/21/2023     Lab Results   Component Value Date    TROPONINT 11 11/18/2023     Lab Results   Component  Value Date    TSH 2.810 12/21/2023    TSH 2.330 12/15/2023             ASSESSMENT & PLAN     Diagnoses and all orders for this visit:    1. Atrial fibrillation with RVR (Primary)   New onset atrial fibrillation.  Initially started on diltiazem but failed it.  Patient transition over to Metoprolol and Digoxin.  Patient started on Eliquis as well. Outpatient labs ordered by PCP yesterday for 3 months.  Patient is not in A-fib today.  Will order Holter to be placed on patient    2. Acute diastolic CHF (congestive heart failure)   Diuresed in hospital.  Patient patient's currently on 40 mg of Lasix daily.    3. Pleural effusion, bilateral   Due to diastolic heart failure.  Patient diuresed in hospital.  Euvolemic on exam. Continue Lasix.    4. Mixed hyperlipidemia   Taking Atorvastatin 40 mg.  Recent increase by PCP good lipid profile on 12/15/2023.  No change.    5. Essential hypertension   As above    6. Thoracic aortic ectasia   Noted on CT of chest.  PCP has scheduled repeat CT of chest in 3 months for reevaluation.    7.  Therapeutic drug monitoring   Will check digoxin level due to not performed in hospital or outpatient labs yesterday.      Follow-up with Dr. Morgan in 1 month    Future Appointments         Provider Department Center    3/21/2024 1:00 PM Blue Sam APRN [facility]                MEDICATIONS         Discharge Medications            Accurate as of December 22, 2023  1:50 PM. If you have any questions, ask your nurse or doctor.                Continue These Medications        Instructions Start Date   Advair Diskus 100-50 MCG/DOSE DISKUS  Generic drug: Fluticasone-Salmeterol   1 puff, Inhalation, Every Morning      albuterol sulfate  (90 Base) MCG/ACT inhaler  Commonly known as: PROVENTIL HFA;VENTOLIN HFA;PROAIR HFA   2 puffs, Inhalation, Every 4 Hours PRN      ammonium lactate 12 % cream  Commonly known as: AMLACTIN   1 application , Topical, Every 12  Hours PRN      apixaban 5 MG tablet tablet  Commonly known as: ELIQUIS   5 mg, Oral, Every 12 Hours Scheduled      aspirin 325 MG EC tablet   325 mg, Oral, Daily      atorvastatin 40 MG tablet  Commonly known as: Lipitor   40 mg, Oral, Daily      Calcium Citrate-Vitamin D 250-200 MG-UNIT tablet   1 tablet, Oral, Every Morning      cetirizine 10 MG tablet  Commonly known as: zyrTEC   10 mg, Oral, Daily      digoxin 125 MCG tablet  Commonly known as: LANOXIN   125 mcg, Oral, Daily Digoxin      diphenhydrAMINE 25 mg capsule  Commonly known as: BENADRYL   25 mg, Oral, Every 6 Hours PRN      EPINEPHrine 0.3 MG/0.3ML solution auto-injector injection  Commonly known as: EPIPEN       furosemide 40 MG tablet  Commonly known as: LASIX   40 mg, Oral, 2 Times Daily      metoprolol tartrate 50 MG tablet  Commonly known as: LOPRESSOR   50 mg, Oral, Every 12 Hours Scheduled      montelukast 10 MG tablet  Commonly known as: SINGULAIR   10 mg, Oral, Every Morning      SYSTANE PRESERVATIVE FREE OP   1 drop, Ophthalmic, 2 Times Daily      VITAMIN B12 PO   1 tablet, Oral, Daily                   **Dragon Disclaimer:   Much of this encounter note is an electronic transcription/translation of spoken language to printed text. The electronic translation of spoken language may permit erroneous, or at times, nonsensical words or phrases to be inadvertently transcribed. Although I have reviewed the note for such errors, some may still exist.

## 2023-12-26 RX ORDER — DIGOXIN 125 MCG
125 TABLET ORAL
Qty: 90 TABLET | Refills: 0 | Status: SHIPPED | OUTPATIENT
Start: 2023-12-26 | End: 2023-12-27 | Stop reason: SDUPTHER

## 2023-12-26 RX ORDER — FUROSEMIDE 40 MG/1
40 TABLET ORAL 2 TIMES DAILY
Qty: 90 TABLET | Refills: 0 | Status: SHIPPED | OUTPATIENT
Start: 2023-12-26 | End: 2023-12-27 | Stop reason: SDUPTHER

## 2023-12-27 RX ORDER — METOPROLOL TARTRATE 50 MG/1
50 TABLET, FILM COATED ORAL EVERY 12 HOURS SCHEDULED
Qty: 180 TABLET | Refills: 0
Start: 2023-12-27

## 2023-12-27 RX ORDER — FUROSEMIDE 40 MG/1
40 TABLET ORAL 2 TIMES DAILY
Qty: 90 TABLET | Refills: 0 | Status: SHIPPED | OUTPATIENT
Start: 2023-12-27

## 2023-12-27 RX ORDER — DIGOXIN 125 MCG
125 TABLET ORAL
Qty: 90 TABLET | Refills: 0 | Status: SHIPPED | OUTPATIENT
Start: 2023-12-27

## 2024-01-19 ENCOUNTER — OFFICE VISIT (OUTPATIENT)
Dept: CARDIOLOGY | Facility: CLINIC | Age: 79
End: 2024-01-19
Payer: MEDICARE

## 2024-01-19 VITALS
DIASTOLIC BLOOD PRESSURE: 80 MMHG | RESPIRATION RATE: 16 BRPM | SYSTOLIC BLOOD PRESSURE: 120 MMHG | OXYGEN SATURATION: 98 % | HEIGHT: 66 IN | WEIGHT: 156 LBS | BODY MASS INDEX: 25.07 KG/M2 | HEART RATE: 96 BPM

## 2024-01-19 DIAGNOSIS — I48.0 PAF (PAROXYSMAL ATRIAL FIBRILLATION): Primary | ICD-10-CM

## 2024-01-19 DIAGNOSIS — I65.22 CAROTID STENOSIS, ASYMPTOMATIC, LEFT: ICD-10-CM

## 2024-01-19 DIAGNOSIS — Q21.12 PFO (PATENT FORAMEN OVALE): ICD-10-CM

## 2024-01-19 DIAGNOSIS — I77.810 THORACIC AORTIC ECTASIA: ICD-10-CM

## 2024-01-19 NOTE — PATIENT INSTRUCTIONS
Since you are not currently taking metoprolol we will restart the medication at 25 mg twice daily.  Continue taking digoxin for 1 week.  If heart rate is similar to current resting heart rate or lower you will stop digoxin at that point in time.    Continue Eliquis twice daily.    Weigh yourself tomorrow and we will consider that your baseline weight.  Stop taking Lasix today.  If you gain more than 2 pounds in a day or 5 pounds in a week you will start taking Lasix until your weight is back to baseline.  If we find that you are having to take Lasix more often than not this means that you will need to continue taking the medication daily.    Return to clinic 3 months.

## 2024-01-19 NOTE — PROGRESS NOTES
[facility]      Patient Name: Judi Sy  :1945  Age: 78 y.o.  Encounter Provider:  Den Morgan Jr, MD      Chief Complaint:       HPI  Judi Sy is a 78 y.o. female past medical history of PFO status post closure by Dr. alarcon in 2016, recurrent TIA and hyperlipidemia presents for follow-up evaluation of chronic medical illness.     Last clinic visit note: She denies any chest pain shortness of air palpitations.  No focal neurological deficits dizziness or syncope.  She denies any orthopnea PND or edema.  She exercises daily without any limitations.  She states that 1 of the main reasons she came to see me today was to inquire about potentially coming off of Plavix.  She denies tobacco alcohol or illicit drug use.  Family history reviewed and is not pertinent to this clinic visit.  She does note that she was diagnosed with left-sided carotid stenosis many years ago and has not had a follow-up ultrasound since then.    She was unfortunately very ill on a trip to [facility] where she became acutely dyspneic and confused.  She was taken to [facility] where they failed to see evidence of significant pathology and she was sent back to [place].  She was admitted shortly thereafter with acute pneumonia and atrial fibrillation with RVR.  She was found to have moderate bilateral pleural effusions that responded well to diuretics.  Echocardiogram performed showing normal EF with no significant valvular heart disease.  She had a Holter monitor placed at discharge which showed that she was back in sinus rhythm however a few episodes of recurrent atrial fibrillation one of them lasting an hour long.  She was sent home on digoxin as she had breakthrough RVR on diltiazem.  She was supposedly on beta-blockers at home but she has not been taking metoprolol since leaving the hospital.  Resting heart rate today in clinic is 96 bpm.  She saw RAMON chaidez  "stat and had a digoxin level that was in therapeutic range.  She feels very well and is back to regular activity.  She denies chest pain or shortness of air with activity.  Her oxygen saturations have been 95% and higher.  She denies orthopnea, PND or edema.  No angina.  No palpitations, dizziness or syncope.      The following portions of the patient's history were reviewed and updated as appropriate: allergies, current medications, past family history, past medical history, past social history, past surgical history and problem list.      Review of Systems   Constitutional: Negative for chills and fever.   HENT:  Negative for hoarse voice and sore throat.    Eyes:  Negative for double vision and photophobia.   Cardiovascular:  Negative for chest pain, leg swelling, near-syncope, orthopnea, palpitations, paroxysmal nocturnal dyspnea and syncope.   Respiratory:  Negative for cough and wheezing.    Skin:  Negative for poor wound healing and rash.   Musculoskeletal:  Negative for arthritis and joint swelling.   Gastrointestinal:  Negative for bloating, abdominal pain, hematemesis and hematochezia.   Neurological:  Negative for dizziness and focal weakness.   Psychiatric/Behavioral:  Negative for depression and suicidal ideas.      ROS was reviewed, updated and amended when necessary.    OBJECTIVE:   Vital Signs  Vitals:    01/19/24 1338   BP: 120/80   Pulse: 96   Resp: 16   SpO2: 98%     Estimated body mass index is 25.18 kg/m² as calculated from the following:    Height as of this encounter: 167.6 cm (66\").    Weight as of this encounter: 70.8 kg (156 lb).    Physical Exam  Vitals reviewed.   Constitutional:       Appearance: She is well-developed.   HENT:      Head: Normocephalic and atraumatic.   Eyes:      Conjunctiva/sclera: Conjunctivae normal.      Pupils: Pupils are equal, round, and reactive to light.   Neck:      Thyroid: No thyromegaly.      Vascular: No JVD.   Cardiovascular:      Heart sounds: No murmur " heard.     No friction rub. No gallop.   Pulmonary:      Effort: No respiratory distress.   Chest:      Chest wall: No tenderness.   Abdominal:      General: Bowel sounds are normal. There is no distension.   Musculoskeletal:         General: No tenderness.   Skin:     Findings: No erythema or rash.   Neurological:      Mental Status: She is alert and oriented to person, place, and time.   Psychiatric:         Judgment: Judgment normal.     Physical exam was reviewed, updated and amended when necessary.    Procedures        ASSESSMENT:     PFO  Hypertension  Carotid stenosis    PLAN OF CARE:     PAF -seen at time of pulmonary infection.  She did have acute diastolic heart failure that resolved well with diuretics.  I will have her hold diuretics as she is euvolemic today in clinic.  She will restart diuretics if she has more than 8 to pound weight gain in 1 day or 5 pound weight gain in 1 week.  I do not think digoxin is a good long-term outpatient strategy for her.  I would have her start metoprolol 25 mg twice daily and stop the digoxin after 1 week.  PFO -last echocardiogram with no evidence for residual PFO.   Hypertension seemingly well controlled at this point.  Continued twice daily blood pressure log.  I have advised her on sodium and fluid restriction.  Carotid stenosis no bruit on exam.  Will repeat -bilateral ultrasound of carotids in 12 months.  TAA -noted on CAT scan with ascending aorta measuring 3.8 cm.  Repeat scan in 1 year.    Return to clinic 3 months             Discharge Medications            Accurate as of January 19, 2024  2:10 PM. If you have any questions, ask your nurse or doctor.                Continue These Medications        Instructions Start Date   Advair Diskus 100-50 MCG/DOSE DISKUS  Generic drug: Fluticasone-Salmeterol   1 puff, Inhalation, Every Morning      albuterol sulfate  (90 Base) MCG/ACT inhaler  Commonly known as: PROVENTIL HFA;VENTOLIN HFA;PROAIR HFA   2 puffs,  Inhalation, Every 4 Hours PRN      ammonium lactate 12 % cream  Commonly known as: AMLACTIN   1 application , Topical, Every 12 Hours PRN      apixaban 5 MG tablet tablet  Commonly known as: ELIQUIS   5 mg, Oral, Every 12 Hours Scheduled      aspirin 325 MG EC tablet   325 mg, Oral, Daily      atorvastatin 40 MG tablet  Commonly known as: Lipitor   40 mg, Oral, Daily      Calcium Citrate-Vitamin D 250-200 MG-UNIT tablet   1 tablet, Oral, Every Morning      cetirizine 10 MG tablet  Commonly known as: zyrTEC   10 mg, Oral, Daily      digoxin 125 MCG tablet  Commonly known as: LANOXIN   125 mcg, Oral, Daily Digoxin      diphenhydrAMINE 25 mg capsule  Commonly known as: BENADRYL   25 mg, Oral, Every 6 Hours PRN      EPINEPHrine 0.3 MG/0.3ML solution auto-injector injection  Commonly known as: EPIPEN       furosemide 40 MG tablet  Commonly known as: LASIX   40 mg, Oral, 2 Times Daily      metoprolol tartrate 50 MG tablet  Commonly known as: LOPRESSOR   50 mg, Oral, Every 12 Hours Scheduled      montelukast 10 MG tablet  Commonly known as: SINGULAIR   10 mg, Oral, Every Morning      SYSTANE PRESERVATIVE FREE OP   1 drop, Ophthalmic, 2 Times Daily      VITAMIN B12 PO   1 tablet, Oral, Daily               Thank you for allowing me to participate in the care of your patient,      Sincerely,   Den Morgan MD  [facility]  01/19/24  14:10 EST

## 2024-01-28 ENCOUNTER — PATIENT MESSAGE (OUTPATIENT)
Dept: CARDIOLOGY | Facility: CLINIC | Age: 79
End: 2024-01-28
Payer: MEDICARE

## 2024-01-31 NOTE — TELEPHONE ENCOUNTER
From: Judi Sy  To: Den Morgan  Sent: 1/28/2024 6:50 PM EST  Subject: Judi's wgt / UT log    Hi Dr. Morgan,  Here is the log of Judi's weight and pulse rate over the past week. I sense that the data might be reassuring and we will wait for your decisions about the Lasix and Digoxin. Thanks, Marciano and Judi

## 2024-02-01 ENCOUNTER — HOSPITAL ENCOUNTER (OUTPATIENT)
Facility: HOSPITAL | Age: 79
Discharge: HOME | End: 2024-02-01
Admitting: INTERNAL MEDICINE
Payer: MEDICARE

## 2024-02-01 DIAGNOSIS — R91.1 LUNG NODULE: ICD-10-CM

## 2024-02-01 PROCEDURE — 71250 CT THORAX DX C-: CPT

## 2024-02-12 ENCOUNTER — TRANSCRIBE ORDERS (OUTPATIENT)
Dept: ADMINISTRATIVE | Facility: HOSPITAL | Age: 79
End: 2024-02-12
Payer: MEDICARE

## 2024-02-12 DIAGNOSIS — R91.1 PULMONARY NODULE: Primary | ICD-10-CM

## 2024-02-12 RX ORDER — APIXABAN 5 MG/1
5 TABLET, FILM COATED ORAL EVERY 12 HOURS
Qty: 90 TABLET | Refills: 1 | Status: SHIPPED | OUTPATIENT
Start: 2024-02-12

## 2024-02-20 ENCOUNTER — PATIENT ROUNDING (BHMG ONLY) (OUTPATIENT)
Dept: URGENT CARE | Facility: CLINIC | Age: 79
End: 2024-02-20
Payer: MEDICARE

## 2024-02-20 NOTE — ED NOTES
Thank you for letting us care for you at your recent visit to [facility]. We would love to hear about your experience with us. Was this the first time you have visited our location?    We’re always looking for ways to make our patients’ experience even better. Do you have any recommendations on ways we may improve?     Please be on the lookout for a survey about your recent visit from Leilani Andino via text or email. We would greatly appreciate if you could fill that out and turn it back in. We want your voice to be heard and we value your feedback.     Thank you for choosing [facility] for your healthcare needs. I appreciate you taking the time to respond!

## 2024-03-01 DIAGNOSIS — E78.5 HYPERLIPIDEMIA, UNSPECIFIED HYPERLIPIDEMIA TYPE: Primary | ICD-10-CM

## 2024-03-01 DIAGNOSIS — R73.01 IMPAIRED FASTING BLOOD SUGAR: ICD-10-CM

## 2024-03-01 RX ORDER — FUROSEMIDE 40 MG/1
40 TABLET ORAL 2 TIMES DAILY
Qty: 90 TABLET | Refills: 1 | Status: SHIPPED | OUTPATIENT
Start: 2024-03-01

## 2024-03-01 RX ORDER — FUROSEMIDE 40 MG/1
40 TABLET ORAL 2 TIMES DAILY
COMMUNITY
Start: 2024-02-04 | End: 2024-03-01 | Stop reason: SDUPTHER

## 2024-03-16 LAB
ALBUMIN SERPL-MCNC: 4.1 G/DL (ref 3.5–5.2)
ALBUMIN/GLOB SERPL: 2.2 G/DL
ALP SERPL-CCNC: 103 U/L (ref 39–117)
ALT SERPL-CCNC: 14 U/L (ref 1–33)
APPEARANCE UR: CLEAR
AST SERPL-CCNC: 16 U/L (ref 1–32)
BACTERIA #/AREA URNS HPF: NORMAL /HPF
BASOPHILS # BLD AUTO: 0.06 10*3/MM3 (ref 0–0.2)
BASOPHILS NFR BLD AUTO: 1 % (ref 0–1.5)
BILIRUB SERPL-MCNC: 0.4 MG/DL (ref 0–1.2)
BILIRUB UR QL STRIP: NEGATIVE
BUN SERPL-MCNC: 15 MG/DL (ref 8–23)
BUN/CREAT SERPL: 19.2 (ref 7–25)
CALCIUM SERPL-MCNC: 9.1 MG/DL (ref 8.6–10.5)
CASTS URNS QL MICRO: NORMAL /LPF
CHLORIDE SERPL-SCNC: 104 MMOL/L (ref 98–107)
CHOLEST SERPL-MCNC: 160 MG/DL (ref 0–200)
CHOLEST/HDLC SERPL: 2.86 {RATIO}
CO2 SERPL-SCNC: 28.2 MMOL/L (ref 22–29)
COLOR UR: YELLOW
CREAT SERPL-MCNC: 0.78 MG/DL (ref 0.57–1)
EGFRCR SERPLBLD CKD-EPI 2021: 78.8 ML/MIN/1.73
EOSINOPHIL # BLD AUTO: 0.17 10*3/MM3 (ref 0–0.4)
EOSINOPHIL NFR BLD AUTO: 2.8 % (ref 0.3–6.2)
EPI CELLS #/AREA URNS HPF: NORMAL /HPF (ref 0–10)
ERYTHROCYTE [DISTWIDTH] IN BLOOD BY AUTOMATED COUNT: 13.1 % (ref 12.3–15.4)
GLOBULIN SER CALC-MCNC: 1.9 GM/DL
GLUCOSE SERPL-MCNC: 82 MG/DL (ref 65–99)
GLUCOSE UR QL STRIP: NEGATIVE
HBA1C MFR BLD: 5.7 % (ref 4.8–5.6)
HCT VFR BLD AUTO: 40.9 % (ref 34–46.6)
HDLC SERPL-MCNC: 56 MG/DL (ref 40–60)
HGB BLD-MCNC: 13.3 G/DL (ref 12–15.9)
HGB UR QL STRIP: NEGATIVE
IMM GRANULOCYTES # BLD AUTO: 0 10*3/MM3 (ref 0–0.05)
IMM GRANULOCYTES NFR BLD AUTO: 0 % (ref 0–0.5)
KETONES UR QL STRIP: NEGATIVE
LDLC SERPL CALC-MCNC: 88 MG/DL (ref 0–100)
LEUKOCYTE ESTERASE UR QL STRIP: NEGATIVE
LYMPHOCYTES # BLD AUTO: 2.05 10*3/MM3 (ref 0.7–3.1)
LYMPHOCYTES NFR BLD AUTO: 33.2 % (ref 19.6–45.3)
MCH RBC QN AUTO: 30.6 PG (ref 26.6–33)
MCHC RBC AUTO-ENTMCNC: 32.5 G/DL (ref 31.5–35.7)
MCV RBC AUTO: 94.2 FL (ref 79–97)
MICRO URNS: NORMAL
MICRO URNS: NORMAL
MONOCYTES # BLD AUTO: 0.54 10*3/MM3 (ref 0.1–0.9)
MONOCYTES NFR BLD AUTO: 8.8 % (ref 5–12)
NEUTROPHILS # BLD AUTO: 3.35 10*3/MM3 (ref 1.7–7)
NEUTROPHILS NFR BLD AUTO: 54.2 % (ref 42.7–76)
NITRITE UR QL STRIP: NEGATIVE
NRBC BLD AUTO-RTO: 0 /100 WBC (ref 0–0.2)
PH UR STRIP: 7 [PH] (ref 5–7.5)
PLATELET # BLD AUTO: 208 10*3/MM3 (ref 140–450)
POTASSIUM SERPL-SCNC: 4.1 MMOL/L (ref 3.5–5.2)
PROT SERPL-MCNC: 6 G/DL (ref 6–8.5)
PROT UR QL STRIP: NEGATIVE
RBC # BLD AUTO: 4.34 10*6/MM3 (ref 3.77–5.28)
RBC #/AREA URNS HPF: NORMAL /HPF (ref 0–2)
SODIUM SERPL-SCNC: 144 MMOL/L (ref 136–145)
SP GR UR STRIP: 1.01 (ref 1–1.03)
T4 FREE SERPL-MCNC: 1.31 NG/DL (ref 0.93–1.7)
TRIGL SERPL-MCNC: 86 MG/DL (ref 0–150)
TSH SERPL DL<=0.005 MIU/L-ACNC: 2.73 UIU/ML (ref 0.27–4.2)
URINALYSIS REFLEX: NORMAL
UROBILINOGEN UR STRIP-MCNC: 0.2 MG/DL (ref 0.2–1)
VLDLC SERPL CALC-MCNC: 16 MG/DL (ref 5–40)
WBC # BLD AUTO: 6.17 10*3/MM3 (ref 3.4–10.8)
WBC #/AREA URNS HPF: NORMAL /HPF (ref 0–5)

## 2024-03-21 ENCOUNTER — OFFICE VISIT (OUTPATIENT)
Dept: INTERNAL MEDICINE | Age: 79
End: 2024-03-21
Payer: MEDICARE

## 2024-03-21 VITALS
BODY MASS INDEX: 25.65 KG/M2 | TEMPERATURE: 98.6 F | HEIGHT: 66 IN | SYSTOLIC BLOOD PRESSURE: 118 MMHG | DIASTOLIC BLOOD PRESSURE: 76 MMHG | WEIGHT: 159.6 LBS | OXYGEN SATURATION: 99 % | HEART RATE: 67 BPM

## 2024-03-21 DIAGNOSIS — E55.9 VITAMIN D DEFICIENCY: ICD-10-CM

## 2024-03-21 DIAGNOSIS — Z12.11 SCREENING FOR MALIGNANT NEOPLASM OF COLON: Primary | ICD-10-CM

## 2024-03-21 DIAGNOSIS — E78.2 MIXED HYPERLIPIDEMIA: ICD-10-CM

## 2024-03-21 DIAGNOSIS — R73.01 IMPAIRED FASTING BLOOD SUGAR: ICD-10-CM

## 2024-03-21 DIAGNOSIS — Z78.0 POSTMENOPAUSAL: ICD-10-CM

## 2024-03-21 NOTE — PROGRESS NOTES
"    I N T E R N A L  M E D I C I N E  RAMON FRY      ENCOUNTER DATE:  03/21/2024    Judi Smiley Sy / 78 y.o. / female      CHIEF COMPLAINT / REASON FOR OFFICE VISIT     Hyperlipidemia      ASSESSMENT & PLAN     Problem List Items Addressed This Visit       Hyperlipidemia    Relevant Medications    atorvastatin (Lipitor) 40 MG tablet    Other Relevant Orders    CBC w AUTO Differential    Comprehensive Metabolic Panel    Lipid Panel With / Chol / HDL Ratio    TSH+Free T4    Hemoglobin A1c    Vitamin D deficiency    Relevant Orders    Vitamin D,25-Hydroxy     Other Visit Diagnoses       Screening for malignant neoplasm of colon    -  Primary    Relevant Orders    Cologuard - Stool, Per Rectum    Postmenopausal        Relevant Orders    DEXA Bone Density Axial    Impaired fasting blood sugar        Relevant Orders    Hemoglobin A1c          Orders Placed This Encounter   Procedures    DEXA Bone Density Axial    Cologuard - Stool, Per Rectum    Comprehensive Metabolic Panel    Lipid Panel With / Chol / HDL Ratio    TSH+Free T4    Hemoglobin A1c    Vitamin D,25-Hydroxy    CBC w AUTO Differential     No orders of the defined types were placed in this encounter.      SUMMARY/DISCUSSION  Continue current medication regimen for hyperlipidemia  Order for Cologuard for colon cancer screening and DEXA scan for screening for osteoporosis.  Follow-up as scheduled with specialty management    Next Appointment with me: Visit date not found    Return in about 6 months (around 9/21/2024) for Medicare Wellness WITH LABS ONE WEEK PRIOR .      VITAL SIGNS     Visit Vitals  /76 (BP Location: Left arm, Patient Position: Sitting, Cuff Size: Adult)   Pulse 67   Temp 98.6 °F (37 °C) (Temporal)   Ht 167.6 cm (66\")   Wt 72.4 kg (159 lb 9.6 oz)   SpO2 99%   BMI 25.76 kg/m²       @BP@  Wt Readings from Last 3 Encounters:   03/21/24 72.4 kg (159 lb 9.6 oz)   02/19/24 68.5 kg (151 lb)   01/19/24 70.8 kg (156 lb)     Body mass " index is 25.76 kg/m².      MEDICATIONS AT THE TIME OF OFFICE VISIT     Current Outpatient Medications on File Prior to Visit   Medication Sig    Advair Diskus 100-50 MCG/DOSE DISKUS Inhale 1 puff Every Morning.    albuterol sulfate  (90 Base) MCG/ACT inhaler Inhale 2 puffs Every 4 (Four) Hours As Needed for Shortness of Air.    atorvastatin (Lipitor) 40 MG tablet Take 1 tablet by mouth Daily.    Calcium Citrate-Vitamin D 250-200 MG-UNIT tablet Take 1 tablet by mouth Every Morning.    cetirizine (zyrTEC) 10 MG tablet Take 1 tablet by mouth Daily.    Cyanocobalamin (VITAMIN B12 PO) Take 1 tablet by mouth Daily.    diphenhydrAMINE (BENADRYL) 25 mg capsule Take 1 capsule by mouth Every 6 (Six) Hours As Needed for Itching.    Eliquis 5 MG tablet tablet TAKE ONE TABLET BY MOUTH EVERY 12 HOURS    EPINEPHrine (EPIPEN) 0.3 MG/0.3ML solution auto-injector injection     metoprolol tartrate (LOPRESSOR) 25 MG tablet Take 1 tablet by mouth 2 (Two) Times a Day.    montelukast (SINGULAIR) 10 MG tablet Take 1 tablet by mouth Every Morning.    aspirin  MG tablet Take 1 tablet by mouth Daily. (Patient not taking: Reported on 3/21/2024)    [DISCONTINUED] ammonium lactate (AMLACTIN) 12 % cream Apply 1 g topically to the appropriate area as directed Every 12 (Twelve) Hours As Needed for Dry Skin.    [DISCONTINUED] furosemide (LASIX) 40 MG tablet Take 1 tablet by mouth 2 (Two) Times a Day.    [DISCONTINUED] Polyethyl Glycol-Propyl Glycol (SYSTANE PRESERVATIVE FREE OP) Apply 1 drop to eye(s) as directed by provider 2 (Two) Times a Day.     No current facility-administered medications on file prior to visit.      HISTORY OF PRESENT ILLNESS     Asthma and environmental allergies are managed by family allergy and asthma in which she receives routine allergy injections.  For asthma she is on Advair daily, rescue inhaler as needed, did note she needs new rescue inhaler prescribed today, Singulair, Zyrtec.  Over the last few months  she had worsening symptoms due to pleural effusion.  She is now seeing pulmonary who updated a CT of the chest in December with minor fissure stable with several indeterminate pulmonary nodules.  Updated CT scheduled for August 1, 2024.     Hyperlipidemia no myalgias with atorvastatin 20 mg daily.  Last LDL of 88.  No elevation in liver enzymes.      Patient has seen cardiology in the past May 2022 by Dr. Morgan for PFO and carotid stenosis of the left which has been asymptomatic.  Closure of PFO in 2016.  Recurrent TIA and hyperlipidemia. Patient had worsening osteoarthritis and needed right knee replacement and wanted to come off Plavix.  Plavix was able to be held for surgery as it was only on board for neurological reasons.  Last echocardiogram showed no evidence of residual PFO and there is no indication for beta-blocker.  Last EKG showed sinus rhythm with border line left axis deviation and borderline left atrial abnormality.  It was recommended she have a repeat carotid ultrasound in 1 year which would have been May 2023.  It has now been updated with carotid stenosis bilateral less than 50%.  She has scheduled follow-up with cardiology April 5.      REVIEW OF SYSTEMS     Constitutional neg except per HPI   Resp improved   CV neg     PHYSICAL EXAMINATION     Physical Exam  Constitutional  No distress  Cardiovascular Rate  normal . Rhythm: regular . Heart sounds:  normal  Pulmonary/Chest  Effort normal. Breath sounds:  normal  Psychiatric  Alert. Judgment and thought content normal. Mood normal      REVIEWED DATA     Labs:   Lab Results   Component Value Date    GLUCOSE 82 03/15/2024    BUN 15 03/15/2024    CREATININE 0.78 03/15/2024    EGFRRESULT 78.8 03/15/2024    EGFR 80.1 12/15/2023    BCR 19.2 03/15/2024    K 4.1 03/15/2024    CO2 28.2 03/15/2024    CALCIUM 9.1 03/15/2024    PROTENTOTREF 6.0 03/15/2024    ALBUMIN 4.1 03/15/2024    BILITOT 0.4 03/15/2024    AST 16 03/15/2024    ALT 14 03/15/2024     Lab  Results   Component Value Date    CHOL 163 12/15/2023    CHLPL 160 03/15/2024    TRIG 86 03/15/2024    HDL 56 03/15/2024    LDL 88 03/15/2024     Lab Results   Component Value Date    HGBA1C 5.70 (H) 03/15/2024     Lab Results   Component Value Date    TSH 2.730 03/15/2024     Lab Results   Component Value Date    CHOL 163 12/15/2023    CHLPL 160 03/15/2024    TRIG 86 03/15/2024    HDL 56 03/15/2024    LDL 88 03/15/2024     Brief Urine Lab Results  (Last result in the past 365 days)        Color   Clarity   Blood   Leuk Est   Nitrite   Protein   CREAT   Urine HCG        03/15/24 0909 Yellow   Clear   Negative   Negative   Negative   Negative                 Imaging:           Medical Tests:           Summary of old records / correspondence / consultant report:           Request outside records:             *Dragon dictation used for documentation.

## 2024-04-05 ENCOUNTER — OFFICE VISIT (OUTPATIENT)
Dept: CARDIOLOGY | Facility: CLINIC | Age: 79
End: 2024-04-05
Payer: MEDICARE

## 2024-04-05 VITALS
WEIGHT: 160 LBS | HEART RATE: 62 BPM | HEIGHT: 66 IN | RESPIRATION RATE: 18 BRPM | DIASTOLIC BLOOD PRESSURE: 82 MMHG | SYSTOLIC BLOOD PRESSURE: 118 MMHG | BODY MASS INDEX: 25.71 KG/M2

## 2024-04-05 DIAGNOSIS — I77.810 THORACIC AORTIC ECTASIA: ICD-10-CM

## 2024-04-05 DIAGNOSIS — E78.5 HYPERLIPIDEMIA, UNSPECIFIED HYPERLIPIDEMIA TYPE: ICD-10-CM

## 2024-04-05 DIAGNOSIS — Q21.12 PFO (PATENT FORAMEN OVALE): ICD-10-CM

## 2024-04-05 DIAGNOSIS — I48.0 PAF (PAROXYSMAL ATRIAL FIBRILLATION): Primary | ICD-10-CM

## 2024-04-05 PROCEDURE — 99214 OFFICE O/P EST MOD 30 MIN: CPT | Performed by: INTERNAL MEDICINE

## 2024-04-05 PROCEDURE — 1160F RVW MEDS BY RX/DR IN RCRD: CPT | Performed by: INTERNAL MEDICINE

## 2024-04-05 PROCEDURE — 1159F MED LIST DOCD IN RCRD: CPT | Performed by: INTERNAL MEDICINE

## 2024-04-05 RX ORDER — DIGOXIN 125 MCG
125 TABLET ORAL
COMMUNITY
Start: 2024-03-27

## 2024-04-05 RX ORDER — FUROSEMIDE 40 MG/1
40 TABLET ORAL DAILY
COMMUNITY
Start: 2024-03-26

## 2024-04-05 NOTE — PROGRESS NOTES
[facility]      Patient Name: Judi Sy  :1945  Age: 78 y.o.  Encounter Provider:  Den Morgan Jr, MD      Chief Complaint: Follow-up atrial fibrillation      HPI  Judi Sy is a 78 y.o. female past medical history of PFO status post closure by Dr. alarcon in 2016, recurrent TIA and hyperlipidemia presents for follow-up evaluation of chronic medical illness.     Last clinic visit note: She denies any chest pain shortness of air palpitations.  No focal neurological deficits dizziness or syncope.  She denies any orthopnea PND or edema.  She exercises daily without any limitations.  She states that 1 of the main reasons she came to see me today was to inquire about potentially coming off of Plavix.  She denies tobacco alcohol or illicit drug use.  Family history reviewed and is not pertinent to this clinic visit.  She does note that she was diagnosed with left-sided carotid stenosis many years ago and has not had a follow-up ultrasound since then.    She was unfortunately very ill on a trip to [facility] where she became acutely dyspneic and confused.  She was taken to [facility] where they failed to see evidence of significant pathology and she was sent back to [place].  She was admitted shortly thereafter with acute pneumonia and atrial fibrillation with RVR.  She was found to have moderate bilateral pleural effusions that responded well to diuretics.  Echocardiogram performed showing normal EF with no significant valvular heart disease.  She had a Holter monitor placed at discharge which showed that she was back in sinus rhythm however a few episodes of recurrent atrial fibrillation one of them lasting an hour long.  She was sent home on digoxin as she had breakthrough RVR on diltiazem.  She was supposedly on beta-blockers at home but she has not been taking metoprolol since leaving the hospital.  Resting heart rate today in clinic is 96 bpm.   She saw RAMON garduno and had a digoxin level that was in therapeutic range.  She feels very well and is back to regular activity.  She denies chest pain or shortness of air with activity.  Her oxygen saturations have been 95% and higher.  She denies orthopnea, PND or edema.  No angina.  No palpitations, dizziness or syncope.    Follow-up Holter showed 2% atrial fibrillation.  We decided to continue anticoagulation.  She has occasional palpitations but they are infrequent and do not negatively impact quality of life.  She did notice some exertional dyspnea with her second mile of walking last night.  This is not usual for her and she feels it is related to allergies.  She has no orthopnea, PND or edema.  No angina.  Blood pressure and heart rate are well-controlled today in clinic.    The following portions of the patient's history were reviewed and updated as appropriate: allergies, current medications, past family history, past medical history, past social history, past surgical history and problem list.      Review of Systems   Constitutional: Negative for chills and fever.   HENT:  Negative for hoarse voice and sore throat.    Eyes:  Negative for double vision and photophobia.   Cardiovascular:  Negative for chest pain, leg swelling, near-syncope, orthopnea, palpitations, paroxysmal nocturnal dyspnea and syncope.   Respiratory:  Negative for cough and wheezing.    Skin:  Negative for poor wound healing and rash.   Musculoskeletal:  Negative for arthritis and joint swelling.   Gastrointestinal:  Negative for bloating, abdominal pain, hematemesis and hematochezia.   Neurological:  Negative for dizziness and focal weakness.   Psychiatric/Behavioral:  Negative for depression and suicidal ideas.      ROS was reviewed, updated and amended when necessary.    OBJECTIVE:   Vital Signs  Vitals:    04/05/24 1123   BP: 118/82   Pulse: 62   Resp: 18     Estimated body mass index is 25.82 kg/m² as calculated from  "the following:    Height as of this encounter: 167.6 cm (66\").    Weight as of this encounter: 72.6 kg (160 lb).    Physical Exam  Vitals reviewed.   Constitutional:       Appearance: She is well-developed.   HENT:      Head: Normocephalic and atraumatic.   Eyes:      Conjunctiva/sclera: Conjunctivae normal.      Pupils: Pupils are equal, round, and reactive to light.   Neck:      Thyroid: No thyromegaly.      Vascular: No JVD.   Cardiovascular:      Heart sounds: No murmur heard.     No friction rub. No gallop.   Pulmonary:      Effort: No respiratory distress.   Chest:      Chest wall: No tenderness.   Abdominal:      General: Bowel sounds are normal. There is no distension.   Musculoskeletal:         General: No tenderness.   Skin:     Findings: No erythema or rash.   Neurological:      Mental Status: She is alert and oriented to person, place, and time.   Psychiatric:         Judgment: Judgment normal.     Physical exam was reviewed, updated and amended when necessary.    Procedures        ASSESSMENT:     PFO  Hypertension  Carotid stenosis    PLAN OF CARE:     PAF -seen at time of pulmonary infection but also noted 2% of the time on follow-up Holter monitor.  Continue anticoagulation.  She is tolerating metoprolol well.  PFO -status post closure.  Last echocardiogram with no evidence for residual PFO.   Hypertension seemingly well controlled at this point.  Continued twice daily blood pressure log.  I have advised her on sodium and fluid restriction.  Carotid stenosis no bruit on exam.  Will repeat -bilateral ultrasound of carotids in 12 months.  TAA -noted on CAT scan with ascending aorta measuring 3.8 cm.  Repeat scan scheduled for August.    Return to clinic 12 months             Discharge Medications            Accurate as of April 5, 2024 11:32 AM. If you have any questions, ask your nurse or doctor.                Continue These Medications        Instructions Start Date   Advair Diskus 100-50 MCG/DOSE " DISKUS  Generic drug: Fluticasone-Salmeterol   1 puff, Inhalation, Every Morning      albuterol sulfate  (90 Base) MCG/ACT inhaler  Commonly known as: PROVENTIL HFA;VENTOLIN HFA;PROAIR HFA   2 puffs, Inhalation, Every 4 Hours PRN      aspirin 325 MG EC tablet   325 mg, Oral, Daily      atorvastatin 40 MG tablet  Commonly known as: Lipitor   40 mg, Oral, Daily      Calcium Citrate-Vitamin D 250-200 MG-UNIT tablet   1 tablet, Oral, Every Morning      cetirizine 10 MG tablet  Commonly known as: zyrTEC   10 mg, Oral, Daily      digoxin 125 MCG tablet  Commonly known as: LANOXIN   125 mcg, Daily Digoxin      diphenhydrAMINE 25 mg capsule  Commonly known as: BENADRYL   25 mg, Oral, Every 6 Hours PRN      Eliquis 5 MG tablet tablet  Generic drug: apixaban   5 mg, Oral, Every 12 Hours      EPINEPHrine 0.3 MG/0.3ML solution auto-injector injection  Commonly known as: EPIPEN       furosemide 40 MG tablet  Commonly known as: LASIX   40 mg, Daily      metoprolol tartrate 25 MG tablet  Commonly known as: LOPRESSOR   25 mg, Oral, 2 Times Daily      montelukast 10 MG tablet  Commonly known as: SINGULAIR   10 mg, Oral, Every Morning      VITAMIN B12 PO   1 tablet, Oral, Daily               Thank you for allowing me to participate in the care of your patient,      Sincerely,   Den Morgan MD  [facility]  04/05/24  11:32 EDT

## 2024-05-31 ENCOUNTER — HOSPITAL ENCOUNTER (OUTPATIENT)
Dept: BONE DENSITY | Facility: HOSPITAL | Age: 79
Discharge: HOME | End: 2024-05-31
Payer: MEDICARE

## 2024-05-31 DIAGNOSIS — Z78.0 POSTMENOPAUSAL: ICD-10-CM

## 2024-05-31 PROCEDURE — 77080 DXA BONE DENSITY AXIAL: CPT

## 2024-06-22 DIAGNOSIS — E78.5 HYPERLIPIDEMIA, UNSPECIFIED HYPERLIPIDEMIA TYPE: ICD-10-CM

## 2024-06-23 RX ORDER — ATORVASTATIN CALCIUM 40 MG/1
40 TABLET, FILM COATED ORAL DAILY
Qty: 90 TABLET | Refills: 1 | Status: SHIPPED | OUTPATIENT
Start: 2024-06-23

## 2024-08-01 ENCOUNTER — HOSPITAL ENCOUNTER (OUTPATIENT)
Facility: HOSPITAL | Age: 79
Discharge: HOME | End: 2024-08-01
Admitting: INTERNAL MEDICINE
Payer: MEDICARE

## 2024-08-01 DIAGNOSIS — R91.1 PULMONARY NODULE: ICD-10-CM

## 2024-08-01 PROCEDURE — 71250 CT THORAX DX C-: CPT

## 2024-08-19 ENCOUNTER — TRANSCRIBE ORDERS (OUTPATIENT)
Dept: ADMINISTRATIVE | Facility: HOSPITAL | Age: 79
End: 2024-08-19
Payer: MEDICARE

## 2024-08-19 DIAGNOSIS — R91.8 LUNG NODULES: Primary | ICD-10-CM

## 2024-09-12 ENCOUNTER — OFFICE VISIT (OUTPATIENT)
Dept: INTERNAL MEDICINE | Age: 79
End: 2024-09-12
Payer: MEDICARE

## 2024-09-12 VITALS
OXYGEN SATURATION: 95 % | TEMPERATURE: 97.8 F | WEIGHT: 172.4 LBS | HEIGHT: 66 IN | HEART RATE: 70 BPM | BODY MASS INDEX: 27.71 KG/M2 | DIASTOLIC BLOOD PRESSURE: 76 MMHG | SYSTOLIC BLOOD PRESSURE: 124 MMHG

## 2024-09-12 DIAGNOSIS — Z12.31 ENCOUNTER FOR SCREENING MAMMOGRAM FOR MALIGNANT NEOPLASM OF BREAST: ICD-10-CM

## 2024-09-12 DIAGNOSIS — I65.29 STENOSIS OF CAROTID ARTERY, UNSPECIFIED LATERALITY: ICD-10-CM

## 2024-09-12 DIAGNOSIS — I79.8 OTHER DISORDERS OF ARTERIES, ARTERIOLES AND CAPILLARIES IN DISEASES CLASSIFIED ELSEWHERE: ICD-10-CM

## 2024-09-12 DIAGNOSIS — R32 URINARY INCONTINENCE, UNSPECIFIED TYPE: ICD-10-CM

## 2024-09-12 DIAGNOSIS — Z00.00 MEDICARE ANNUAL WELLNESS VISIT, SUBSEQUENT: Primary | ICD-10-CM

## 2024-09-12 DIAGNOSIS — R73.01 IMPAIRED FASTING BLOOD SUGAR: ICD-10-CM

## 2024-09-12 DIAGNOSIS — E78.5 HYPERLIPIDEMIA, UNSPECIFIED HYPERLIPIDEMIA TYPE: ICD-10-CM

## 2024-09-12 NOTE — PROGRESS NOTES
I N T E R N A L  M E D I C I N E    S K Y L E R   F R Y E  D N P ,  A P R N      ENCOUNTER DATE:  09/12/2024    Judi Sy / 79 y.o. / female      MEDICARE ANNUAL WELLNESS VISIT       Chief Complaint:    Chief Complaint   Patient presents with    Medicare Wellness-subsequent    Hyperlipidemia    Asthma    Urinary Incontinence     Patient's general assessment of her health since a year ago:     - Compared to one year ago, she feels her physical health is:   STABLE/SAME    - Compared to one year ago, she feels her mental health is:  STABLE/SAME    Recent Hospitalization (within past 365 days) (NO unless indicated)  Yes at [facility] in November for pleural effusion       Patient Care Team:    Patient Care Team:  Blue Sam, CONCHIS, APRN as PCP - General (Internal Medicine)  Ravi Griffin MD (Inactive) as PCP - Family Medicine  Den Morgan Jr., MD as Consulting Physician (Cardiology)    Allergies:  Milk-related compounds, Latex, and Tape    Medications:  Current Outpatient Medications on File Prior to Visit   Medication Sig Dispense Refill    Advair Diskus 100-50 MCG/DOSE DISKUS Inhale 1 puff Every Morning.      apixaban (Eliquis) 5 MG tablet tablet Take 1 tablet by mouth Every 12 (Twelve) Hours. 180 tablet 1    aspirin  MG tablet Take 1 tablet by mouth Daily. 30 tablet 0    atorvastatin (LIPITOR) 40 MG tablet TAKE 1 TABLET BY MOUTH DAILY 90 tablet 1    Calcium Citrate-Vitamin D 250-200 MG-UNIT tablet Take 1 tablet by mouth Every Morning.      cetirizine (zyrTEC) 10 MG tablet Take 1 tablet by mouth Daily.      Cyanocobalamin (VITAMIN B12 PO) Take 1 tablet by mouth Daily.      diphenhydrAMINE (BENADRYL) 25 mg capsule Take 1 capsule by mouth Every 6 (Six) Hours As Needed for Itching.      EPINEPHrine (EPIPEN) 0.3 MG/0.3ML solution auto-injector injection       metoprolol tartrate (LOPRESSOR) 25 MG tablet Take 1 tablet by mouth 2 (Two) Times a Day. 60 tablet 11    montelukast  (SINGULAIR) 10 MG tablet Take 1 tablet by mouth Every Morning.      albuterol sulfate  (90 Base) MCG/ACT inhaler Inhale 2 puffs Every 4 (Four) Hours As Needed for Shortness of Air. (Patient not taking: Reported on 9/12/2024) 6.7 g 0    [DISCONTINUED] digoxin (LANOXIN) 125 MCG tablet Take 1 tablet by mouth Daily. (Patient not taking: Reported on 4/5/2024)      [DISCONTINUED] furosemide (LASIX) 40 MG tablet Take 1 tablet by mouth Daily. (Patient not taking: Reported on 4/5/2024)       No current facility-administered medications on file prior to visit.          No opioid medication identified on active medication list. I have reviewed chart for other potential  high risk medication/s and harmful drug interactions in the elderly.       Opioid Pain Assessment: No opioid listed on medication list       HPI for other active medical problems:     Asthma and environmental allergies are managed by family allergy and asthma in which she receives routine allergy injections.  For asthma she is on Advair daily, rescue inhaler as needed, on Singulair, Zyrtec.  Over the last few months she had worsening symptoms due to pleural effusion.  She is now seeing pulmonary who updated a CT of the chest in December with minor fissure stable with several indeterminate pulmonary nodules.  Updated CT scheduled for August 1, 2024 ordered by Dr. Flores which showed scattered tiny nodules in both lungs stable in size, largest nodule measuring 5 mm located in the base the left upper lobe.  No lymphadenopathy or pleural effusion.  Coronary calcifications were present.  Small hiatal hernia.  Updated CT could potentially be completed in 6 to 12 months.     Hyperlipidemia no myalgias with atorvastatin 20 mg daily.  Last LDL of 79.  No elevation in liver enzymes.      Patient has seen cardiology in the past May 2022 by Dr. Morgan for PFO and carotid stenosis of the left which has been asymptomatic.  Closure of PFO in 2016.  Recurrent TIA and  hyperlipidemia. Patient had worsening osteoarthritis and needed right knee replacement and wanted to come off Plavix.  Plavix was able to be held for surgery as it was only on board for neurological reasons.  Last echocardiogram showed no evidence of residual PFO and there is no indication for beta-blocker.  Last EKG showed sinus rhythm with border line left axis deviation and borderline left atrial abnormality.  It was recommended she have a repeat carotid ultrasound in 1 year which would have been May 2023.  It has now been updated with carotid stenosis bilateral less than 50%.  She has seen cardiology since she last saw me.  PAF was seen at the time of pulmonary infection but also noted 2% of the time on Holter monitor.  She was continued on anticoagulation along with metoprolol.  PFO status postclosure with last echo with no evidence of residual PFO.  Hypertension stable current regimen.  Carotid stenosis with no bruit.  Plan for carotid duplex in November TAA was noticed on CAT scan with ascending aorta measuring 3.8 cm    DEXA scan recently updated May 31, 2024 with osteopenia without progression to osteoporosis.  Mammogram is completed October 10, 2023 which was benign.  Cologuard was - March 27, 2024.    HISTORY     PFSH:     The following portions of the patient's history were reviewed and updated as appropriate: Allergies / Current Medications / Past Medical History / Surgical History / Social History / Family History    Problem List:  Patient Active Problem List   Diagnosis    Gastroesophageal reflux disease without esophagitis    Hiatal hernia    Hyperlipidemia    Pre-hypertension    Temporary cerebral vascular dysfunction    Osteoporosis    Closed displaced avulsion fracture of left talus    Acute meniscal tear of knee, left, sequela    Primary localized osteoarthrosis of right lower leg    Tear of medial meniscus of right knee, current    Closed nondisplaced fracture of condyle of left femur     Chondromalacia of right knee    Vitamin D deficiency    Status post total left knee replacement    Environmental and seasonal allergies    Viral upper respiratory tract infection    Popliteal synovial cyst, right    Valgus deformity of both great toes    Arthritis of first metatarsophalangeal (MTP) joint of left foot    Arthritis of first metatarsophalangeal (MTP) joint of right foot    Stress fracture of other site of femur due to multiple or repetitive stress    Screen for colon cancer    Asthma    Pleural effusion, bilateral    Left carotid artery stenosis    Atrial fibrillation with RVR    Acute respiratory failure with hypoxia    CAP (community acquired pneumonia)       Past Medical History:  Past Medical History:   Diagnosis Date    A-fib     Allergic     Aortic heart valve prolapse     Arthritis     Asthma due to seasonal allergies     Carotid artery stenosis     MILD    COVID-19 12/2021    High cholesterol     History of atrial fibrillation     Knee swelling     PFO (patent foramen ovale) 2016    HX CLOSURE    Right knee pain     Screen for colon cancer 11/15/2023    Stress fracture of other site of femur due to multiple or repetitive stress 05/04/2022    Tear of meniscus of knee     Tendinitis of knee     TIA (transient ischemic attack)     PRIOR TO PFO CLOSURE    Vasovagal syncope        Past Surgical History:  Past Surgical History:   Procedure Laterality Date    BREAST BIOPSY Left     over 15 years ago; benign    CARDIAC SURGERY      CATARACT EXTRACTION Left     KNEE ARTHROSCOPY Left 03/09/2018    Procedure: LEFT KNEE ARTHROSCOPY, PARTIAL MEDIAL MENISCECTOMY, AND MEDIAL FEMORAL SUBCHONDROPLASTY;  Surgeon: Kade Jacob MD;  Location: [facility] [place];  Service: Orthopedics    KNEE ARTHROSCOPY Right 05/13/2022    Procedure: right KNEE ARTHROSCOPY with debridement and subchondral plasty of medial femoral condyle;  Surgeon: Kade Jacob MD;  Location: [facility] [place];  Service:  "Orthopedics;  Laterality: Right;    PATENT FORAMEN OVALE CLOSURE      TONSILLECTOMY      TUBAL ABDOMINAL LIGATION         Social History:  Social History     Socioeconomic History    Marital status:    Tobacco Use    Smoking status: Former     Current packs/day: 0.00     Average packs/day: 0.5 packs/day for 10.0 years (5.0 ttl pk-yrs)     Types: Cigarettes     Start date: 2002     Quit date: 2012     Years since quittin.7    Smokeless tobacco: Never    Tobacco comments:     STARTED SMOKING AGE 18   Vaping Use    Vaping status: Never Used   Substance and Sexual Activity    Alcohol use: No     Comment: caffeine use- coffee    Drug use: Never    Sexual activity: Not Currently     Partners: Male       Family History:  Family History   Problem Relation Age of Onset    Heart disease Father     Heart disease Maternal Grandmother     Breast cancer Mother         50's    Malig Hyperthermia Neg Hx          PATIENT ASSESSMENT     Vitals:  Vitals:    24 1054   BP: 124/76   BP Location: Left arm   Patient Position: Sitting   Cuff Size: Large Adult   Pulse: 70   Temp: 97.8 °F (36.6 °C)   TempSrc: Temporal   SpO2: 95%   Weight: 78.2 kg (172 lb 6.4 oz)   Height: 167.6 cm (66\")       BP Readings from Last 3 Encounters:   24 124/76   24 118/82   24 118/76     Wt Readings from Last 3 Encounters:   24 78.2 kg (172 lb 6.4 oz)   24 72.6 kg (160 lb)   24 72.4 kg (159 lb 9.6 oz)      Body mass index is 27.83 kg/m².      Review of Systems:    Review of Systems  Constitutional neg except per HPI   Resp neg  CV neg    urinary incontinence     Physical Exam:    Physical Exam  Constitutional  No distress  Cardiovascular Rate  normal . Rhythm: regular . Heart sounds:  normal  Pulmonary/Chest  Effort normal. Breath sounds:  normal  Psychiatric  Alert. Judgment and thought content normal. Mood normal      Reviewed Data:    Labs:   Lab Results   Component Value Date     " "08/29/2024    K 4.3 08/29/2024    CALCIUM 9.4 08/29/2024    AST 24 08/29/2024    ALT 27 08/29/2024    BUN 14 08/29/2024    CREATININE 0.91 08/29/2024    CREATININE 0.78 03/15/2024    CREATININE 0.90 12/21/2023    EGFRIFNONA 74 11/23/2021    EGFRIFAFRI 98 10/06/2021       Lab Results   Component Value Date    HGBA1C 5.70 (H) 08/29/2024    HGBA1C 5.70 (H) 03/15/2024    HGBA1C 5.50 09/13/2017       Lab Results   Component Value Date    LDL 79 08/29/2024    LDL 88 03/15/2024    LDL 93 12/15/2023    HDL 52 08/29/2024    TRIG 73 08/29/2024    CHOLHDLRATIO 2.79 08/29/2024       Lab Results   Component Value Date    TSH 3.860 08/29/2024    FREET4 1.17 08/29/2024          Lab Results   Component Value Date    WBC 5.98 08/29/2024    HGB 12.8 08/29/2024    HGB 13.3 03/15/2024    HGB 13.3 12/21/2023     08/29/2024                 No results found for: \"PSA\"    Imaging:                Medical Tests:              Summary of old records / correspondence / consultant report:             Request outside records:           SCREENING ASSESSMENT      Screening for Glaucoma:  Previous screening for glaucoma?: Yes    Hearing Loss Screen:  Finger Rub Hearing Test (right ear): Passed  Finger Rub Hearing Test (left ear): Passed    Urinary Incontinence Screen:  Episodes of urinary incontinence? : Yes    Depression Screen:      9/12/2024    10:49 AM   PHQ-2/PHQ-9 Depression Screening   Little Interest or Pleasure in Doing Things 0-->not at all   Feeling Down, Depressed or Hopeless 0-->not at all   PHQ-9: Brief Depression Severity Measure Score 0        PHQ-2: 0 (Not depressed)    PHQ-9: 0 (Negative screening for depression)         FUNCTIONAL, FALL RISK, & COGNITIVE SCREENING (components below):     A) Functional and cognitive status based on patient responses:        9/5/2024     8:52 AM   Functional & Cognitive Status   Do you have difficulty preparing food and eating? No   Do you have difficulty bathing yourself, getting dressed or " grooming yourself? No   Do you have difficulty using the toilet? No   Do you have difficulty moving around from place to place? No   Do you have trouble with steps or getting out of a bed or a chair? No   Current Diet Well Balanced Diet   Dental Exam Up to date   Eye Exam Up to date   Exercise (times per week) 7 times per week   Current Exercises Include Walking   Do you need help using the phone?  No   Are you deaf or do you have serious difficulty hearing?  No   Do you need help to go to places out of walking distance? No   Do you need help shopping? No   Do you need help preparing meals?  No   Do you need help with housework?  No   Do you need help with laundry? No   Do you need help taking your medications? No   Do you need help managing money? No   Do you ever drive or ride in a car without wearing a seat belt? No   Have you felt unusual stress, anger or loneliness in the last month? No   Who do you live with? Spouse   If you need help, do you have trouble finding someone available to you? No   Have you been bothered in the last four weeks by sexual problems? No   Do you have difficulty concentrating, remembering or making decisions? No       B) Assessment of Fall Risk:    Fall Risk Assessment was completed, and patient is at low risk for falls.    Need for further evaluation of gait, strength, and balance? : NO    Timed Up and Go (TUG): 6 seconds   (>= 12 seconds indicates high risk for falling)    Observable abnormalities included:   Normal balance and gait pattern    C. Assessment of Cognitive Function:    Mini-Cog Test:     1) Registration (3 objects): NO   2) Clock Draw: Passed? : Yes   3) Number of objects recalled: 2 (MA)     Further evaluation required? : No    **OVERALL ASSESSMENT OF FUNCTIONAL ABILITY**  (Assessment of ability to perform ADL's (showering/bathing, using toilet, dressing, feeding self, moving self around) and IADL's (use telephone, shop, prepare food, housekeep, do laundry, transport  independently, take medications independently, and handle finances)    DEGREE OF FUNCTIONAL IMPAIRMENT:   NONE (based on assessment noted above)    ABILITY TO LIVE INDEPENDENTLY:    Capable of living independently       COUNSELING       A. Identification of Health Risk Factors:    Risk factors include: cardiovascular risk factors      B. Age-Appropriate Screening Schedule:  (Refer to the list below for future screening recommendations based on patient's age, sex and/or medical conditions. Orders for these recommended tests are listed in the plan section. The patient has been provided with a written plan)    Health Maintenance Topics  Health Maintenance   Topic Date Due    INFLUENZA VACCINE  08/01/2024    TDAP/TD VACCINES (2 - Tdap) 10/18/2024    LIPID PANEL  08/29/2025    ANNUAL WELLNESS VISIT  09/12/2025    BMI FOLLOWUP  09/12/2025    MAMMOGRAM  10/10/2025    DXA SCAN  05/31/2026    COLORECTAL CANCER SCREENING  03/27/2027    HEPATITIS C SCREENING  Completed    COVID-19 Vaccine  Completed    RSV Vaccine - Adults  Completed    Pneumococcal Vaccine 65+  Completed    ZOSTER VACCINE  Completed       Health Maintenance Topics Due or Over-Due  Health Maintenance Due   Topic Date Due    INFLUENZA VACCINE  08/01/2024         C. Advanced Care Planning:    Advance Care Planning   ACP discussion was held with the patient during this visit. Patient has an advance directive in EMR which is still valid.        D. Patient Self-Management and Personalized Health Advice:    She has been provided with PERSONALIZED COUNSELING/INFORMATION (AVS educational information) about:     -- optimizing diet/nutrition plans, improving exercise / conditioning, and reducing risk for cardiovascular disease (heart, stroke, vascular)      She has been recommended for the following PREVENTATIVE SERVICES which has been performed today, will be ordered today or ordered/performed on upcoming follow-up visit:     LIFESTYLE PREVENTATIVE MEASURES   NUTRITION  counseling provided, EXERCISE counseling provided    CARDIOVASCULAR SCREENING   Carotid duplex study    CANCER SCREENING   COLORECTAL cancer: Colonoscopy/Cologuard discussed , BREAST CANCER screening discussed and mammogram every 1-2 years recommended (managed by me)    MISC SCREENING  OSTEOPOROSIS screening DISCUSSED    VACCINATION/IMMUNIZATION   vaccination for INFLUENZA administered/recommended/discussed       E. Miscellaneous Items: [id number]    Aspirin use counseling: Does not need ASA (and currently is not on it)    Discussed BMI with her. The BMI is above average; BMI management plan is completed (discussed plans for weight loss)    Reviewed use of high risk medication in the elderly: YES    Reviewed for potential of harmful drug interactions in the elderly: YES        WRAP UP       Assessment & Plan:    1) MEDICARE ANNUAL WELLNESS VISIT    2) OTHER MEDICAL CONDITIONS ADDRESSED TODAY:            Problem List Items Addressed This Visit       Hyperlipidemia    Relevant Medications    atorvastatin (LIPITOR) 40 MG tablet    Other Relevant Orders    Comprehensive Metabolic Panel    Lipid Panel With / Chol / HDL Ratio    Hemoglobin A1c     Other Visit Diagnoses       Medicare annual wellness visit, subsequent    -  Primary    Urinary incontinence, unspecified type        Relevant Orders    Ambulatory Referral to Physical Therapy for Evaluation & Treatment    Stenosis of carotid artery, unspecified laterality        Relevant Orders    Duplex Carotid Ultrasound CAR    Encounter for screening mammogram for malignant neoplasm of breast        Relevant Orders    Mammo screening digital tomosynthesis bilateral w CAD    Other disorders of arteries, arterioles and capillaries in diseases classified elsewhere        Relevant Orders    Duplex Carotid Ultrasound CAR    Impaired fasting blood sugar        Relevant Orders    Hemoglobin A1c                      Orders Placed This Encounter   Procedures    Mammo screening  digital tomosynthesis bilateral w CAD    Comprehensive Metabolic Panel    Lipid Panel With / Chol / HDL Ratio    Hemoglobin A1c    Ambulatory Referral to Physical Therapy for Evaluation & Treatment       Discussion / Summary:  Carotid ultrasound and mammogram later this year.  Up-to-date on Cologuard  Referral to physical therapy for pelvic floor training for urge incontinence  Laboratory values unremarkable except for mild elevation in blood sugar, cholesterol well-controlled with current regimen  Continue all current medications and specialty care  Follow-up in 6 months for chronic medical, earlier if needed    Medications as of TODAY:              Current Outpatient Medications   Medication Sig Dispense Refill    Advair Diskus 100-50 MCG/DOSE DISKUS Inhale 1 puff Every Morning.      apixaban (Eliquis) 5 MG tablet tablet Take 1 tablet by mouth Every 12 (Twelve) Hours. 180 tablet 1    aspirin  MG tablet Take 1 tablet by mouth Daily. 30 tablet 0    atorvastatin (LIPITOR) 40 MG tablet TAKE 1 TABLET BY MOUTH DAILY 90 tablet 1    Calcium Citrate-Vitamin D 250-200 MG-UNIT tablet Take 1 tablet by mouth Every Morning.      cetirizine (zyrTEC) 10 MG tablet Take 1 tablet by mouth Daily.      Cyanocobalamin (VITAMIN B12 PO) Take 1 tablet by mouth Daily.      diphenhydrAMINE (BENADRYL) 25 mg capsule Take 1 capsule by mouth Every 6 (Six) Hours As Needed for Itching.      EPINEPHrine (EPIPEN) 0.3 MG/0.3ML solution auto-injector injection       metoprolol tartrate (LOPRESSOR) 25 MG tablet Take 1 tablet by mouth 2 (Two) Times a Day. 60 tablet 11    montelukast (SINGULAIR) 10 MG tablet Take 1 tablet by mouth Every Morning.      albuterol sulfate  (90 Base) MCG/ACT inhaler Inhale 2 puffs Every 4 (Four) Hours As Needed for Shortness of Air. (Patient not taking: Reported on 9/12/2024) 6.7 g 0     No current facility-administered medications for this visit.         FOLLOW-UP:            Return in about 6 months (around  3/12/2025) for Next scheduled follow up.              Future Appointments   Date Time Provider Department Center   3/17/2025 10:15 AM Blue Sam DNP, APRN [facility]  [place]   4/11/2025 11:15 AM Den Morgan Jr., MD [facility] [place]     After Visit Summary (AVS) including the Personalized Prevention  Plan Services (PPPS) was either printed and given to the patient at check-out today and/or sent to [facility] for review.

## 2024-12-22 DIAGNOSIS — E78.5 HYPERLIPIDEMIA, UNSPECIFIED HYPERLIPIDEMIA TYPE: ICD-10-CM

## 2024-12-23 RX ORDER — ATORVASTATIN CALCIUM 40 MG/1
40 TABLET, FILM COATED ORAL DAILY
Qty: 90 TABLET | Refills: 0 | Status: SHIPPED | OUTPATIENT
Start: 2024-12-23

## 2025-01-12 RX ORDER — APIXABAN 5 MG/1
5 TABLET, FILM COATED ORAL
Qty: 180 TABLET | Refills: 1 | Status: SHIPPED | OUTPATIENT
Start: 2025-01-12

## 2025-01-17 RX ORDER — METOPROLOL TARTRATE 25 MG/1
25 TABLET, FILM COATED ORAL 2 TIMES DAILY
Qty: 60 TABLET | Refills: 11 | Status: SHIPPED | OUTPATIENT
Start: 2025-01-17

## 2025-02-10 ENCOUNTER — HOSPITAL ENCOUNTER (OUTPATIENT)
Dept: MAMMOGRAPHY | Facility: HOSPITAL | Age: 80
Discharge: HOME | End: 2025-02-10
Admitting: NURSE PRACTITIONER
Payer: MEDICARE

## 2025-02-10 DIAGNOSIS — Z12.31 ENCOUNTER FOR SCREENING MAMMOGRAM FOR MALIGNANT NEOPLASM OF BREAST: ICD-10-CM

## 2025-02-10 PROCEDURE — 77067 SCR MAMMO BI INCL CAD: CPT

## 2025-02-10 PROCEDURE — 77063 BREAST TOMOSYNTHESIS BI: CPT

## 2025-03-10 ENCOUNTER — LAB (OUTPATIENT)
Dept: LAB | Facility: HOSPITAL | Age: 80
End: 2025-03-10
Payer: MEDICARE

## 2025-03-10 PROCEDURE — 80061 LIPID PANEL: CPT | Performed by: NURSE PRACTITIONER

## 2025-03-10 PROCEDURE — 80053 COMPREHEN METABOLIC PANEL: CPT | Performed by: NURSE PRACTITIONER

## 2025-03-10 PROCEDURE — 83036 HEMOGLOBIN GLYCOSYLATED A1C: CPT | Performed by: NURSE PRACTITIONER

## 2025-03-17 ENCOUNTER — OFFICE VISIT (OUTPATIENT)
Dept: INTERNAL MEDICINE | Age: 80
End: 2025-03-17
Payer: MEDICARE

## 2025-03-17 VITALS
TEMPERATURE: 97.3 F | WEIGHT: 174.2 LBS | OXYGEN SATURATION: 94 % | HEIGHT: 66 IN | HEART RATE: 84 BPM | SYSTOLIC BLOOD PRESSURE: 126 MMHG | BODY MASS INDEX: 28 KG/M2 | DIASTOLIC BLOOD PRESSURE: 74 MMHG

## 2025-03-17 DIAGNOSIS — R73.01 IMPAIRED FASTING GLUCOSE: ICD-10-CM

## 2025-03-17 DIAGNOSIS — E78.2 MIXED HYPERLIPIDEMIA: Primary | ICD-10-CM

## 2025-03-17 DIAGNOSIS — E55.9 VITAMIN D DEFICIENCY: ICD-10-CM

## 2025-03-17 DIAGNOSIS — I65.22 LEFT CAROTID ARTERY STENOSIS: ICD-10-CM

## 2025-03-17 DIAGNOSIS — J30.89 ENVIRONMENTAL AND SEASONAL ALLERGIES: ICD-10-CM

## 2025-03-17 DIAGNOSIS — I48.91 ATRIAL FIBRILLATION WITH RVR: ICD-10-CM

## 2025-03-17 NOTE — PROGRESS NOTES
I N T E R N A L  M E D I C I N E  RAMON FRY      ENCOUNTER DATE:  03/17/2025    Judi Smiley Sy / 79 y.o. / female      CHIEF COMPLAINT / REASON FOR OFFICE VISIT     Hypertension and Hyperlipidemia      ASSESSMENT & PLAN     Problem List Items Addressed This Visit       Hyperlipidemia - Primary    Relevant Medications    atorvastatin (LIPITOR) 40 MG tablet    Other Relevant Orders    Comprehensive Metabolic Panel    Lipid Panel With / Chol / HDL Ratio    TSH+Free T4    Vitamin D deficiency    Relevant Orders    Vitamin D,25-Hydroxy    Environmental and seasonal allergies    Overview   Managed by family allergy and asthma, stable on Advair, Singulair, cetirizine, allergy injections         Left carotid artery stenosis    Overview   Statin and baby aspirin         Relevant Orders    Lipid Panel With / Chol / HDL Ratio    Atrial fibrillation with RVR    Relevant Medications    EPINEPHrine (EPIPEN) 0.3 MG/0.3ML solution auto-injector injection    metoprolol tartrate (LOPRESSOR) 25 MG tablet    Other Relevant Orders    Comprehensive Metabolic Panel    CBC & Differential     Other Visit Diagnoses         Impaired fasting glucose        Relevant Orders    Hemoglobin A1c    Urinalysis With Culture If Indicated - Urine, Clean Catch          Orders Placed This Encounter   Procedures    Comprehensive Metabolic Panel    Lipid Panel With / Chol / HDL Ratio    Hemoglobin A1c    TSH+Free T4    Urinalysis With Culture If Indicated - Urine, Clean Catch    Vitamin D,25-Hydroxy    CBC & Differential     No orders of the defined types were placed in this encounter.      SUMMARY/DISCUSSION  I provided her phone number to schedule for carotid ultrasound along with CT chest  Continue current medication regimen for hypertension and hyperlipidemia at this time.  Continue all specialty management.  I have also discussed her memory with her today.  She was able to get a many mental status exam with all 3 words correct.  States  "that she is more forgetful regarding things that she does not care as much about.  Will continue to monitor    Next Appointment with me: Visit date not found    Return in about 6 months (around 9/17/2025) for Medicare Wellness.      VITAL SIGNS     Visit Vitals  /74   Pulse 84   Temp 97.3 °F (36.3 °C)   Ht 167.6 cm (66\")   Wt 79 kg (174 lb 3.2 oz)   SpO2 94%   BMI 28.12 kg/m²       Wt Readings from Last 3 Encounters:   03/17/25 79 kg (174 lb 3.2 oz)   09/12/24 78.2 kg (172 lb 6.4 oz)   04/05/24 72.6 kg (160 lb)     Body mass index is 28.12 kg/m².      MEDICATIONS AT THE TIME OF OFFICE VISIT     Current Outpatient Medications on File Prior to Visit   Medication Sig    Advair Diskus 100-50 MCG/DOSE DISKUS Inhale 1 puff Every Morning.    albuterol sulfate  (90 Base) MCG/ACT inhaler Inhale 2 puffs Every 4 (Four) Hours As Needed for Shortness of Air.    aspirin  MG tablet Take 1 tablet by mouth Daily.    atorvastatin (LIPITOR) 40 MG tablet TAKE 1 TABLET BY MOUTH DAILY    Calcium Citrate-Vitamin D 250-200 MG-UNIT tablet Take 1 tablet by mouth Every Morning.    cetirizine (zyrTEC) 10 MG tablet Take 1 tablet by mouth Daily.    Cyanocobalamin (VITAMIN B12 PO) Take 1 tablet by mouth Daily.    diphenhydrAMINE (BENADRYL) 25 mg capsule Take 1 capsule by mouth Every 6 (Six) Hours As Needed for Itching.    Eliquis 5 MG tablet tablet TAKE ONE TABLET BY MOUTH EVERY 12 HOURS    EPINEPHrine (EPIPEN) 0.3 MG/0.3ML solution auto-injector injection     metoprolol tartrate (LOPRESSOR) 25 MG tablet TAKE 1 TABLET BY MOUTH TWICE A DAY    montelukast (SINGULAIR) 10 MG tablet Take 1 tablet by mouth Every Morning.     No current facility-administered medications on file prior to visit.      HISTORY OF PRESENT ILLNESS     Asthma and environmental allergies are managed by family allergy and asthma in which she receives routine allergy injections.  For asthma she is on Advair daily, rescue inhaler as needed, on Singulair, Zyrtec. "  She admits to not using the Advair consistently.  She is overall doing well regarding her breathing at this point.  Now has pulmonary on board.  Last CT of the chest August 2024 which showed stable tiny lung nodules with a recommended repeat in 6 to 12 months. Dr. Flores I placed orders but patient did not proceed to schedule at that time.     Hyperlipidemia no myalgias with atorvastatin 20 mg daily.  Last LDL of 91.  No elevation in liver enzymes.     Patient has seen cardiology in the past May 2022 by Dr. Morgan for PFO and carotid stenosis of the left which has been asymptomatic.  Closure of PFO in 2016.  Recurrent TIA and hyperlipidemia. Patient had worsening osteoarthritis and needed right knee replacement and wanted to come off Plavix.  Plavix was able to be held for surgery as it was only on board for neurological reasons.  Last echocardiogram showed no evidence of residual PFO and there is no indication for beta-blocker.  Last EKG showed sinus rhythm with border line left axis deviation and borderline left atrial abnormality.  It was recommended she have a repeat carotid ultrasound in 1 year which would have been May 2023.  It has now been updated with carotid stenosis bilateral less than 50%.   PAF was seen at the time of pulmonary infection but also noted 2% of the time on Holter monitor.  She was continued on anticoagulation along with metoprolol.  PFO status postclosure with last echo with no evidence of residual PFO.  Hypertension stable current regimen.  Carotid stenosis with no bruit.  I placed an updated order to monitor carotid stenosis but she has not scheduled this at this time.    DEXA scan recently updated May 31, 2024 with osteopenia without progression to osteoporosis.  Mammogram is completed October 10, 2023 which was benign.  Cologuard was - March 27, 2024.    REVIEW OF SYSTEMS     Constitutional neg except per HPI   Resp neg  CV neg     PHYSICAL EXAMINATION     Physical  Exam  Constitutional  No distress  Cardiovascular Rate  normal . Rhythm: regular . Heart sounds:  normal  Pulmonary/Chest  Effort normal. Breath sounds:  normal  Psychiatric  Alert. Judgment and thought content normal. Mood normal      REVIEWED DATA     Labs:   Lab Results   Component Value Date    GLUCOSE 102 (H) 03/10/2025    BUN 17 03/10/2025    CREATININE 0.88 03/10/2025    EGFR 67.8 03/10/2025    BCR 19.3 03/10/2025    K 3.9 03/10/2025    CO2 31.2 (H) 03/10/2025    CALCIUM 9.6 03/10/2025    ALBUMIN 3.7 03/10/2025    BILITOT 0.3 03/10/2025    AST 19 03/10/2025    ALT 13 03/10/2025     Lab Results   Component Value Date    WBC 5.98 08/29/2024    HGB 12.8 08/29/2024    HCT 39.3 08/29/2024    MCV 96.1 08/29/2024     08/29/2024     Lab Results   Component Value Date    CHOL 155 03/10/2025    CHLPL 145 08/29/2024    TRIG 67 03/10/2025    HDL 51 03/10/2025    LDL 91 03/10/2025      Lab Results   Component Value Date    TSH 3.860 08/29/2024     Brief Urine Lab Results       None          Lab Results   Component Value Date    HGBA1C 5.90 (H) 03/10/2025       Imaging:           Medical Tests:           Summary of old records / correspondence / consultant report:           Request outside records:

## 2025-04-01 ENCOUNTER — PATIENT MESSAGE (OUTPATIENT)
Dept: INTERNAL MEDICINE | Age: 80
End: 2025-04-01
Payer: MEDICARE

## 2025-04-01 DIAGNOSIS — E78.5 HYPERLIPIDEMIA, UNSPECIFIED HYPERLIPIDEMIA TYPE: ICD-10-CM

## 2025-04-02 RX ORDER — ATORVASTATIN CALCIUM 40 MG/1
40 TABLET, FILM COATED ORAL DAILY
Qty: 90 TABLET | Refills: 1 | Status: SHIPPED | OUTPATIENT
Start: 2025-04-02

## 2025-04-30 ENCOUNTER — OFFICE VISIT (OUTPATIENT)
Dept: CARDIOLOGY | Age: 80
End: 2025-04-30
Payer: MEDICARE

## 2025-04-30 VITALS
DIASTOLIC BLOOD PRESSURE: 72 MMHG | BODY MASS INDEX: 28.03 KG/M2 | HEART RATE: 69 BPM | HEIGHT: 66 IN | SYSTOLIC BLOOD PRESSURE: 114 MMHG | WEIGHT: 174.4 LBS

## 2025-04-30 DIAGNOSIS — I65.22 CAROTID STENOSIS, ASYMPTOMATIC, LEFT: ICD-10-CM

## 2025-04-30 DIAGNOSIS — Q21.12 PFO (PATENT FORAMEN OVALE): ICD-10-CM

## 2025-04-30 DIAGNOSIS — I77.810 THORACIC AORTIC ECTASIA: ICD-10-CM

## 2025-04-30 DIAGNOSIS — I48.0 PAF (PAROXYSMAL ATRIAL FIBRILLATION): Primary | ICD-10-CM

## 2025-04-30 DIAGNOSIS — E78.5 HYPERLIPIDEMIA, UNSPECIFIED HYPERLIPIDEMIA TYPE: ICD-10-CM

## 2025-04-30 PROCEDURE — 1159F MED LIST DOCD IN RCRD: CPT | Performed by: INTERNAL MEDICINE

## 2025-04-30 PROCEDURE — 93000 ELECTROCARDIOGRAM COMPLETE: CPT | Performed by: INTERNAL MEDICINE

## 2025-04-30 PROCEDURE — 1160F RVW MEDS BY RX/DR IN RCRD: CPT | Performed by: INTERNAL MEDICINE

## 2025-04-30 PROCEDURE — 99214 OFFICE O/P EST MOD 30 MIN: CPT | Performed by: INTERNAL MEDICINE

## 2025-04-30 NOTE — PROGRESS NOTES
[facility]      Patient Name: Judi Sy  :1945  Age: 79 y.o.  Encounter Provider:  Den Morgan Jr, MD      Chief Complaint: Follow-up atrial fibrillation      HPI  Judi Sy is a 79 y.o. female past medical history of PFO status post closure by Dr. alarcon in 2016, recurrent TIA and hyperlipidemia presents for follow-up evaluation of chronic medical illness.     Last clinic visit note: She denies any chest pain shortness of air palpitations.  No focal neurological deficits dizziness or syncope.  She denies any orthopnea PND or edema.  She exercises daily without any limitations.  She states that 1 of the main reasons she came to see me today was to inquire about potentially coming off of Plavix.  She denies tobacco alcohol or illicit drug use.  Family history reviewed and is not pertinent to this clinic visit.  She does note that she was diagnosed with left-sided carotid stenosis many years ago and has not had a follow-up ultrasound since then.    She was unfortunately very ill on a trip to [facility] where she became acutely dyspneic and confused.  She was taken to [facility] where they failed to see evidence of significant pathology and she was sent back to [place].  She was admitted shortly thereafter with acute pneumonia and atrial fibrillation with RVR.  She was found to have moderate bilateral pleural effusions that responded well to diuretics.  Echocardiogram performed showing normal EF with no significant valvular heart disease.  She had a Holter monitor placed at discharge which showed that she was back in sinus rhythm however a few episodes of recurrent atrial fibrillation one of them lasting an hour long.  She was sent home on digoxin as she had breakthrough RVR on diltiazem.  She was supposedly on beta-blockers at home but she has not been taking metoprolol since leaving the hospital.  Resting heart rate today in clinic is 96 bpm.   She saw RAMON garduno and had a digoxin level that was in therapeutic range.  She feels very well and is back to regular activity.  She denies chest pain or shortness of air with activity.  Her oxygen saturations have been 95% and higher.  She denies orthopnea, PND or edema.  No angina.  No palpitations, dizziness or syncope.    Follow-up Holter showed 2% atrial fibrillation.  We decided to continue anticoagulation.  She has occasional palpitations but they are infrequent and do not negatively impact quality of life.  She did notice some exertional dyspnea with her second mile of walking last night.  This is not usual for her and she feels it is related to allergies.  She has no orthopnea, PND or edema.  No angina.  Blood pressure and heart rate are well-controlled today in clinic.    No clinical complaints at time of follow-up.  She is increasing activity without limiting symptoms.  She denies angina.  No orthopnea, PND.  Chronic stable lower extremity edema.  No palpitations, dizziness or syncope.  Heart rate and blood pressure well-controlled today in clinic.  Last LDL 91.    The following portions of the patient's history were reviewed and updated as appropriate: allergies, current medications, past family history, past medical history, past social history, past surgical history and problem list.      Review of Systems   Constitutional: Negative for chills and fever.   HENT:  Negative for hoarse voice and sore throat.    Eyes:  Negative for double vision and photophobia.   Cardiovascular:  Negative for chest pain, leg swelling, near-syncope, orthopnea, palpitations, paroxysmal nocturnal dyspnea and syncope.   Respiratory:  Negative for cough and wheezing.    Skin:  Negative for poor wound healing and rash.   Musculoskeletal:  Negative for arthritis and joint swelling.   Gastrointestinal:  Negative for bloating, abdominal pain, hematemesis and hematochezia.   Neurological:  Negative for dizziness and  "focal weakness.   Psychiatric/Behavioral:  Negative for depression and suicidal ideas.      ROS was reviewed, updated and amended when necessary.    OBJECTIVE:   Vital Signs  Vitals:    04/30/25 0939   BP: 114/72   Pulse: 69     Estimated body mass index is 28.58 kg/m² as calculated from the following:    Height as of this encounter: 166.4 cm (65.5\").    Weight as of this encounter: 79.1 kg (174 lb 6.4 oz).    Physical Exam  Vitals reviewed.   Constitutional:       Appearance: She is well-developed.   HENT:      Head: Normocephalic and atraumatic.   Eyes:      Conjunctiva/sclera: Conjunctivae normal.      Pupils: Pupils are equal, round, and reactive to light.   Neck:      Thyroid: No thyromegaly.      Vascular: No JVD.   Cardiovascular:      Heart sounds: No murmur heard.     No friction rub. No gallop.   Pulmonary:      Effort: No respiratory distress.   Chest:      Chest wall: No tenderness.   Abdominal:      General: Bowel sounds are normal. There is no distension.   Musculoskeletal:         General: No tenderness.   Skin:     Findings: No erythema or rash.   Neurological:      Mental Status: She is alert and oriented to person, place, and time.   Psychiatric:         Judgment: Judgment normal.   Physical exam was reviewed, updated and amended when necessary.      ECG 12 Lead    Date/Time: 4/30/2025 9:40 AM  Performed by: Den Morgan Jr., MD    Authorized by: Den Morgan Jr., MD  Comparison: compared with previous ECG from 12/22/2023  Similar to previous ECG  Rhythm: sinus rhythm  Ectopy: unifocal PVCs  Other findings: left atrial abnormality    Clinical impression: non-specific ECG            ASSESSMENT:     PFO  Hypertension  Carotid stenosis    PLAN OF CARE:     PAF -seen at time of pulmonary infection but also noted 2% of the time on follow-up Holter monitor.  Continue anticoagulation.  She is tolerating metoprolol well.  PFO -status post closure.  Last echocardiogram with no evidence for residual PFO. "  Stop aspirin.  History of TIA -on anticoagulation for PAF.  Stop aspirin.  Hypertension - seemingly well controlled at this point.  Continued twice daily blood pressure log.  I have advised her on sodium and fluid restriction.  Carotid stenosis no bruit on exam.  Patient has follow-up carotid Doppler ordered.  TAA -noted on CAT scan with ascending aorta measuring 3.8 cm.  Repeat scan in August 2024 shows stable size thoracic aorta at 3.9 cm which I personally measured.    Return to clinic 12 months             Discharge Medications            Accurate as of April 30, 2025  9:40 AM. If you have any questions, ask your nurse or doctor.                Continue These Medications        Instructions Start Date   Advair Diskus 100-50 MCG/DOSE DISKUS  Generic drug: Fluticasone-Salmeterol   1 puff, Every Morning      albuterol sulfate  (90 Base) MCG/ACT inhaler  Commonly known as: PROVENTIL HFA;VENTOLIN HFA;PROAIR HFA   2 puffs, Inhalation, Every 4 Hours PRN      aspirin 325 MG EC tablet   325 mg, Oral, Daily      atorvastatin 40 MG tablet  Commonly known as: LIPITOR   40 mg, Oral, Daily      Calcium Citrate-Vitamin D 250-200 MG-UNIT tablet   1 tablet, Every Morning      cetirizine 10 MG tablet  Commonly known as: zyrTEC   10 mg, Daily      diphenhydrAMINE 25 mg capsule  Commonly known as: BENADRYL   25 mg, Oral, Every 6 Hours PRN      Eliquis 5 MG tablet tablet  Generic drug: apixaban   5 mg, Oral      EPINEPHrine 0.3 MG/0.3ML solution auto-injector injection  Commonly known as: EPIPEN       metoprolol tartrate 25 MG tablet  Commonly known as: LOPRESSOR   25 mg, Oral, 2 Times Daily      montelukast 10 MG tablet  Commonly known as: SINGULAIR   10 mg, Every Morning      VITAMIN B12 PO   1 tablet, Daily               Thank you for allowing me to participate in the care of your patient,      Sincerely,   Den Morgan MD  [facility]  04/30/25  09:40 EDT

## 2025-07-15 RX ORDER — APIXABAN 5 MG/1
5 TABLET, FILM COATED ORAL EVERY 12 HOURS SCHEDULED
Qty: 180 TABLET | Refills: 1 | Status: SHIPPED | OUTPATIENT
Start: 2025-07-15

## 2025-08-11 ENCOUNTER — HOSPITAL ENCOUNTER (OUTPATIENT)
Dept: CT IMAGING | Facility: HOSPITAL | Age: 80
Discharge: HOME | End: 2025-08-11
Admitting: INTERNAL MEDICINE
Payer: MEDICARE

## 2025-08-11 DIAGNOSIS — R91.8 LUNG NODULES: ICD-10-CM

## 2025-08-11 PROCEDURE — 71250 CT THORAX DX C-: CPT

## (undated) DEVICE — GOWN,NON-REINFORCED,SIRUS,SET IN SLV,XXL: Brand: MEDLINE

## (undated) DEVICE — BNDG ESMARK STRL 6INX12FT LF

## (undated) DEVICE — PAD,ABDOMINAL,8"X10",ST,LF: Brand: MEDLINE

## (undated) DEVICE — SUPER TURBOVAC 90 IFS: Brand: COBLATION

## (undated) DEVICE — BNDG ELAS ELITE V/CLOSE 4IN 5YD LF STRL

## (undated) DEVICE — UNDERCAST PADDING: Brand: DEROYAL

## (undated) DEVICE — PK ARTHSCP 40

## (undated) DEVICE — BNDG ELAS ELITE V/CLOSE 6IN 5YD LF STRL

## (undated) DEVICE — APPL CHLORAPREP HI/LITE 26ML ORNG

## (undated) DEVICE — SKIN PREP TRAY W/CHG: Brand: MEDLINE INDUSTRIES, INC.

## (undated) DEVICE — DISPOSABLE TOURNIQUET CUFF SINGLE BLADDER, SINGLE PORT AND QUICK CONNECT CONNECTOR: Brand: COLOR CUFF

## (undated) DEVICE — Device: Brand: ACCUPORT® CANNULA

## (undated) DEVICE — SUT ETHLN 4/0 PS2 PLSTC 1667G

## (undated) DEVICE — GLV SURG BIOGEL LTX PF 8

## (undated) DEVICE — ABL APOLLO RF M/PRT 50D

## (undated) DEVICE — TUBING, SUCTION, 1/4" X 20', STRAIGHT: Brand: MEDLINE INDUSTRIES, INC.

## (undated) DEVICE — BLD SHAVER TORPEDO COOLCUT 4.0MM 13CM

## (undated) DEVICE — OCCLUSIVE GAUZE STRIP,3% BISMUTH TRIBROMOPHENATE IN PETROLATUM BLEND: Brand: XEROFORM

## (undated) DEVICE — GLV SURG SIGNATURE ESSENTIAL PF LTX SZ8

## (undated) DEVICE — DRAPE,REIN 53X77,STERILE: Brand: MEDLINE

## (undated) DEVICE — BLD DISSCT COOL CUT SJ CRVD 4MM 13CM

## (undated) DEVICE — GAUZE,SPONGE,FLUFF,6"X6.75",STRL,10/TRAY: Brand: MEDLINE

## (undated) DEVICE — APPL CHLORAPREP W/TINT 26ML ORNG

## (undated) DEVICE — Device: Brand: ACCUMIX® MIXING SYSTEM

## (undated) DEVICE — TBG ARTHSCP PT W CONN/REDUC 8FT

## (undated) DEVICE — PAD GRND REM POLYHESIVE A/ DISP

## (undated) DEVICE — 3M™ IOBAN™ 2 ANTIMICROBIAL INCISE DRAPE 6640EZ: Brand: IOBAN™ 2

## (undated) DEVICE — DRSNG GZ PETROLTM XEROFORM CURAD 1X8IN STRL

## (undated) DEVICE — GLV SURG TRIUMPH CLASSIC PF LTX 8 STRL